# Patient Record
Sex: MALE | Race: WHITE | Employment: OTHER | URBAN - METROPOLITAN AREA
[De-identification: names, ages, dates, MRNs, and addresses within clinical notes are randomized per-mention and may not be internally consistent; named-entity substitution may affect disease eponyms.]

---

## 2017-05-30 ENCOUNTER — APPOINTMENT (OUTPATIENT)
Dept: GENERAL RADIOLOGY | Age: 62
DRG: 246 | End: 2017-05-30
Attending: PHYSICIAN ASSISTANT
Payer: SUBSIDIZED

## 2017-05-30 ENCOUNTER — HOSPITAL ENCOUNTER (INPATIENT)
Age: 62
LOS: 3 days | Discharge: HOME | DRG: 246 | End: 2017-06-02
Attending: EMERGENCY MEDICINE | Admitting: SPECIALIST
Payer: SUBSIDIZED

## 2017-05-30 DIAGNOSIS — I21.4 NSTEMI (NON-ST ELEVATED MYOCARDIAL INFARCTION) (HCC): Primary | ICD-10-CM

## 2017-05-30 PROBLEM — I50.9 ACUTE CONGESTIVE HEART FAILURE (HCC): Status: ACTIVE | Noted: 2017-05-30

## 2017-05-30 LAB
ALBUMIN SERPL BCP-MCNC: 2.9 G/DL (ref 3.5–5)
ALBUMIN/GLOB SERPL: 0.7 {RATIO} (ref 1.1–2.2)
ALP SERPL-CCNC: 85 U/L (ref 45–117)
ALT SERPL-CCNC: 29 U/L (ref 12–78)
ANION GAP BLD CALC-SCNC: 8 MMOL/L (ref 5–15)
AST SERPL W P-5'-P-CCNC: 14 U/L (ref 15–37)
ATRIAL RATE: 72 BPM
ATRIAL RATE: 82 BPM
BASOPHILS # BLD AUTO: 0 K/UL (ref 0–0.1)
BASOPHILS # BLD: 0 % (ref 0–1)
BILIRUB SERPL-MCNC: 0.2 MG/DL (ref 0.2–1)
BNP SERPL-MCNC: 7025 PG/ML (ref 0–125)
BUN SERPL-MCNC: 10 MG/DL (ref 6–20)
BUN/CREAT SERPL: 10 (ref 12–20)
CALCIUM SERPL-MCNC: 8.4 MG/DL (ref 8.5–10.1)
CALCULATED P AXIS, ECG09: 16 DEGREES
CALCULATED P AXIS, ECG09: 55 DEGREES
CALCULATED R AXIS, ECG10: -140 DEGREES
CALCULATED R AXIS, ECG10: 169 DEGREES
CALCULATED T AXIS, ECG11: -125 DEGREES
CALCULATED T AXIS, ECG11: -146 DEGREES
CHLORIDE SERPL-SCNC: 106 MMOL/L (ref 97–108)
CO2 SERPL-SCNC: 28 MMOL/L (ref 21–32)
CREAT SERPL-MCNC: 1.04 MG/DL (ref 0.7–1.3)
DIAGNOSIS, 93000: NORMAL
DIAGNOSIS, 93000: NORMAL
EOSINOPHIL # BLD: 0.2 K/UL (ref 0–0.4)
EOSINOPHIL NFR BLD: 2 % (ref 0–7)
ERYTHROCYTE [DISTWIDTH] IN BLOOD BY AUTOMATED COUNT: 12.6 % (ref 11.5–14.5)
GLOBULIN SER CALC-MCNC: 4 G/DL (ref 2–4)
GLUCOSE SERPL-MCNC: 136 MG/DL (ref 65–100)
HCT VFR BLD AUTO: 43.3 % (ref 36.6–50.3)
HGB BLD-MCNC: 14.8 G/DL (ref 12.1–17)
LYMPHOCYTES # BLD AUTO: 21 % (ref 12–49)
LYMPHOCYTES # BLD: 1.8 K/UL (ref 0.8–3.5)
MCH RBC QN AUTO: 33.2 PG (ref 26–34)
MCHC RBC AUTO-ENTMCNC: 34.2 G/DL (ref 30–36.5)
MCV RBC AUTO: 97.1 FL (ref 80–99)
MONOCYTES # BLD: 0.5 K/UL (ref 0–1)
MONOCYTES NFR BLD AUTO: 6 % (ref 5–13)
NEUTS SEG # BLD: 6.2 K/UL (ref 1.8–8)
NEUTS SEG NFR BLD AUTO: 71 % (ref 32–75)
P-R INTERVAL, ECG05: 162 MS
P-R INTERVAL, ECG05: 166 MS
PLATELET # BLD AUTO: 270 K/UL (ref 150–400)
POTASSIUM SERPL-SCNC: 4.3 MMOL/L (ref 3.5–5.1)
PROT SERPL-MCNC: 6.9 G/DL (ref 6.4–8.2)
Q-T INTERVAL, ECG07: 416 MS
Q-T INTERVAL, ECG07: 418 MS
QRS DURATION, ECG06: 78 MS
QRS DURATION, ECG06: 84 MS
QTC CALCULATION (BEZET), ECG08: 455 MS
QTC CALCULATION (BEZET), ECG08: 488 MS
RBC # BLD AUTO: 4.46 M/UL (ref 4.1–5.7)
SODIUM SERPL-SCNC: 142 MMOL/L (ref 136–145)
TROPONIN I SERPL-MCNC: 1.61 NG/ML
VENTRICULAR RATE, ECG03: 72 BPM
VENTRICULAR RATE, ECG03: 82 BPM
WBC # BLD AUTO: 8.8 K/UL (ref 4.1–11.1)

## 2017-05-30 PROCEDURE — 94640 AIRWAY INHALATION TREATMENT: CPT

## 2017-05-30 PROCEDURE — 99285 EMERGENCY DEPT VISIT HI MDM: CPT

## 2017-05-30 PROCEDURE — 36415 COLL VENOUS BLD VENIPUNCTURE: CPT | Performed by: PHYSICIAN ASSISTANT

## 2017-05-30 PROCEDURE — 85025 COMPLETE CBC W/AUTO DIFF WBC: CPT | Performed by: PHYSICIAN ASSISTANT

## 2017-05-30 PROCEDURE — 77030029684 HC NEB SM VOL KT MONA -A

## 2017-05-30 PROCEDURE — 84484 ASSAY OF TROPONIN QUANT: CPT | Performed by: PHYSICIAN ASSISTANT

## 2017-05-30 PROCEDURE — 80053 COMPREHEN METABOLIC PANEL: CPT | Performed by: PHYSICIAN ASSISTANT

## 2017-05-30 PROCEDURE — 74011000250 HC RX REV CODE- 250: Performed by: SPECIALIST

## 2017-05-30 PROCEDURE — 77030013140 HC MSK NEB VYRM -A

## 2017-05-30 PROCEDURE — 71020 XR CHEST PA LAT: CPT

## 2017-05-30 PROCEDURE — 74011250637 HC RX REV CODE- 250/637: Performed by: PHYSICIAN ASSISTANT

## 2017-05-30 PROCEDURE — 93005 ELECTROCARDIOGRAM TRACING: CPT

## 2017-05-30 PROCEDURE — 96374 THER/PROPH/DIAG INJ IV PUSH: CPT

## 2017-05-30 PROCEDURE — 83880 ASSAY OF NATRIURETIC PEPTIDE: CPT | Performed by: EMERGENCY MEDICINE

## 2017-05-30 PROCEDURE — 65660000000 HC RM CCU STEPDOWN

## 2017-05-30 PROCEDURE — 74011250636 HC RX REV CODE- 250/636: Performed by: SPECIALIST

## 2017-05-30 PROCEDURE — 74011250637 HC RX REV CODE- 250/637: Performed by: SPECIALIST

## 2017-05-30 RX ORDER — GUAIFENESIN 100 MG/5ML
81 LIQUID (ML) ORAL DAILY
Status: DISCONTINUED | OUTPATIENT
Start: 2017-05-31 | End: 2017-06-02 | Stop reason: HOSPADM

## 2017-05-30 RX ORDER — ALBUTEROL SULFATE 1.25 MG/3ML
1.25 SOLUTION RESPIRATORY (INHALATION)
Status: DISCONTINUED | OUTPATIENT
Start: 2017-05-30 | End: 2017-06-02 | Stop reason: HOSPADM

## 2017-05-30 RX ORDER — IBUPROFEN 200 MG
600 TABLET ORAL
COMMUNITY
End: 2017-06-02

## 2017-05-30 RX ORDER — ASPIRIN 325 MG
325 TABLET ORAL
Status: COMPLETED | OUTPATIENT
Start: 2017-05-30 | End: 2017-05-30

## 2017-05-30 RX ORDER — ENOXAPARIN SODIUM 100 MG/ML
40 INJECTION SUBCUTANEOUS EVERY 24 HOURS
Status: DISCONTINUED | OUTPATIENT
Start: 2017-05-30 | End: 2017-06-02 | Stop reason: HOSPADM

## 2017-05-30 RX ORDER — VALSARTAN 80 MG/1
40 TABLET ORAL EVERY 12 HOURS
Status: DISCONTINUED | OUTPATIENT
Start: 2017-05-30 | End: 2017-06-01

## 2017-05-30 RX ORDER — GUAIFENESIN 100 MG/5ML
200 SOLUTION ORAL 4 TIMES DAILY
COMMUNITY
End: 2017-06-14

## 2017-05-30 RX ORDER — BUMETANIDE 0.25 MG/ML
1 INJECTION INTRAMUSCULAR; INTRAVENOUS ONCE
Status: COMPLETED | OUTPATIENT
Start: 2017-05-30 | End: 2017-05-30

## 2017-05-30 RX ORDER — SODIUM CHLORIDE 0.9 % (FLUSH) 0.9 %
5-10 SYRINGE (ML) INJECTION EVERY 8 HOURS
Status: DISCONTINUED | OUTPATIENT
Start: 2017-05-30 | End: 2017-06-02 | Stop reason: HOSPADM

## 2017-05-30 RX ORDER — SODIUM CHLORIDE 0.9 % (FLUSH) 0.9 %
5-10 SYRINGE (ML) INJECTION AS NEEDED
Status: DISCONTINUED | OUTPATIENT
Start: 2017-05-30 | End: 2017-06-02 | Stop reason: HOSPADM

## 2017-05-30 RX ADMIN — Medication 10 ML: at 21:29

## 2017-05-30 RX ADMIN — ALBUTEROL SULFATE 1.25 MG: 1.25 SOLUTION RESPIRATORY (INHALATION) at 18:59

## 2017-05-30 RX ADMIN — ENOXAPARIN SODIUM 40 MG: 40 INJECTION SUBCUTANEOUS at 21:28

## 2017-05-30 RX ADMIN — ASPIRIN 325 MG: 325 TABLET, COATED ORAL at 16:48

## 2017-05-30 RX ADMIN — BUMETANIDE 1 MG: 0.25 INJECTION INTRAMUSCULAR; INTRAVENOUS at 18:58

## 2017-05-30 RX ADMIN — VALSARTAN 40 MG: 80 TABLET ORAL at 21:28

## 2017-05-30 NOTE — ED PROVIDER NOTES
HPI Comments: Jacqueline Jean Baptiste is a 59 y.o. male  who presents by private vehicle to ER with c/o Patient presents with:  Shortness of Breath. Patient reports falling on mothers day and hitting his left chest on a stair. Since patient with cough, congestion and shortness of breath. Patient has taken mucinex with minimal relief. Patient reports a few days of fever. He specifically denies any  chills, nausea, vomiting,  headache, rash, diarrhea, abdominal pain, urinary/bowel changes, sweating or weight loss. PCP: None   PMHx significant for: History reviewed. No pertinent past medical history. PSHx significant for: Past Surgical History:  No date: HX TONSILLECTOMY  No date: HX WISDOM TEETH EXTRACTION  Social Hx: Tobacco use: Smoking status: Current Every Day Smoker                                                   Packs/day: 2.00      Years: 0.00         Smokeless status: Not on file                     ; EtOH use: The patient states he drinks 42 per week.; Illicit Drug use: Allergies:  No Known Allergies    There are no other complaints, changes or physical findings at this time. Patient is a 59 y.o. male presenting with shortness of breath. The history is provided by the patient. Shortness of Breath   This is a new problem. The problem occurs intermittently. The current episode started more than 1 week ago. The problem has not changed since onset. Associated symptoms include cough. Pertinent negatives include no fever, no headaches, no coryza, no rhinorrhea, no ear pain, no neck pain, no sputum production, no hemoptysis, no wheezing, no PND, no orthopnea, no chest pain, no syncope, no vomiting, no abdominal pain, no rash, no leg pain, no leg swelling and no claudication. The problem's precipitants include smoke. He has tried nothing for the symptoms. He has had no prior hospitalizations. He has had no prior ED visits. He has had no prior ICU admissions.  Associated medical issues do not include asthma, COPD, pneumonia, chronic lung disease, PE, CAD, heart failure, past MI, DVT or recent surgery. History reviewed. No pertinent past medical history. Past Surgical History:   Procedure Laterality Date    HX TONSILLECTOMY      HX WISDOM TEETH EXTRACTION           History reviewed. No pertinent family history. Social History     Social History    Marital status: SINGLE     Spouse name: N/A    Number of children: N/A    Years of education: N/A     Occupational History    Not on file. Social History Main Topics    Smoking status: Current Every Day Smoker     Packs/day: 2.00    Smokeless tobacco: Not on file    Alcohol use 25.2 oz/week     42 Cans of beer per week    Drug use: Not on file    Sexual activity: Not on file     Other Topics Concern    Not on file     Social History Narrative    No narrative on file         ALLERGIES: Review of patient's allergies indicates no known allergies. Review of Systems   Constitutional: Negative. Negative for fever. HENT: Negative. Negative for ear pain and rhinorrhea. Eyes: Negative. Respiratory: Positive for cough and shortness of breath. Negative for hemoptysis, sputum production and wheezing. Cardiovascular: Negative. Negative for chest pain, orthopnea, claudication, leg swelling, syncope and PND. Gastrointestinal: Negative. Negative for abdominal pain and vomiting. Endocrine: Negative. Genitourinary: Negative. Musculoskeletal: Negative. Negative for neck pain. Skin: Negative. Negative for rash. Allergic/Immunologic: Negative. Neurological: Negative. Negative for headaches. Hematological: Negative. Psychiatric/Behavioral: Negative. All other systems reviewed and are negative.       Vitals:    05/30/17 1428   BP: 148/81   Pulse: 84   Resp: 24   Temp: 98.2 °F (36.8 °C)   SpO2: 94%   Weight: 104.3 kg (230 lb)   Height: 5' 10\" (1.778 m)            Physical Exam   Constitutional: He is oriented to person, place, and time. He appears well-developed and well-nourished. HENT:   Head: Normocephalic and atraumatic. Right Ear: External ear normal.   Left Ear: External ear normal.   Mouth/Throat: Oropharynx is clear and moist. No oropharyngeal exudate. Eyes: Conjunctivae and EOM are normal. Pupils are equal, round, and reactive to light. Right eye exhibits no discharge. Left eye exhibits no discharge. No scleral icterus. Neck: Normal range of motion. Neck supple. No tracheal deviation present. No thyromegaly present. Cardiovascular: Normal rate, regular rhythm, normal heart sounds and intact distal pulses. No murmur heard. Pulmonary/Chest: Effort normal and breath sounds normal. No respiratory distress. He has no wheezes. He has no rales. Abdominal: Soft. Bowel sounds are normal. He exhibits no distension. There is no tenderness. There is no rebound and no guarding. Musculoskeletal: Normal range of motion. He exhibits no edema or tenderness. Lymphadenopathy:     He has no cervical adenopathy. Neurological: He is alert and oriented to person, place, and time. No cranial nerve deficit. Coordination normal.   Skin: Skin is warm. No rash noted. No erythema. Psychiatric: He has a normal mood and affect. His behavior is normal. Judgment and thought content normal.   Nursing note and vitals reviewed. MDM  Number of Diagnoses or Management Options  NSTEMI (non-ST elevated myocardial infarction) [facility]):   Diagnosis management comments: Assesment/Plan- Patient with elevated troponin, abnormal EKG and chest xray. Discussed with Dr. Alicia Nix for admission.         Amount and/or Complexity of Data Reviewed  Clinical lab tests: ordered and reviewed  Tests in the radiology section of CPT®: ordered and reviewed  Tests in the medicine section of CPT®: ordered and reviewed  Discuss the patient with other providers: yes (Attending-  Dr. Marion Matamoros  )      ED Course       Procedures             CONSULT NOTE:   5:15 JESSI Tomlin PA-C spoke with Dr. Shoaib Coulter,   Specialty: Cardiology  Discussed pt's hx, disposition, and available diagnostic and imaging results. Reviewed care plans. Consultant agrees with plans as outlined. Will see for further evaluation.

## 2017-05-30 NOTE — H&P
Cardiology History and Physical    Admission date & time: 5/30/2017 5:51 PM     Chief complaint:  Samir Ochoa is a 59 y.o. male who complains of progressive BERNABE. Impression:  Subacute ADHF, likely ischemic with LV dysfunction  Recent anterior MI, likely 2 weeks ago  COPD with tobacco abuse  Alcohol dependence      Plan:   Presentation with acute MI Mother's Day now complicated by progressive HF. Will diurese, start ARB (eventually ARNI), continue aspirin. Avoid beta blocker just yet given active wheezing. Echo tomorrow. Based on these findings will decide on cath/MRI, etc      History of present illness:  No PMH except for long term smoker/beer drinker. Acute chest pain 2 weeks ago on Mother's Day, lasting 3 days. Now with progressive BERNABE/orthopnea/PND, much worse today. No recurrent chest pain. No edema. EKG in ED - recent anterior MI with Qs V1-5, evolving T wave inversions. CXR - ADHF  Troponin 1+    No meds    History reviewed. No pertinent past medical history. Past Surgical History:   Procedure Laterality Date    HX TONSILLECTOMY      HX WISDOM TEETH EXTRACTION       History reviewed. No pertinent family history. Primary care physician:  None     Medications before admission:  Previous Medications    No medications on file      No Known Allergies    . Lives with sister. Not       Review of systems:  Review of Systems   Constitutional: Positive for malaise/fatigue. Negative for chills and fever. HENT: Negative for hearing loss. Respiratory: Positive for cough, shortness of breath and wheezing. Negative for hemoptysis and sputum production. Cardiovascular: Positive for orthopnea and PND. Negative for palpitations, claudication and leg swelling. Gastrointestinal: Negative for abdominal pain, blood in stool, heartburn, melena, nausea and vomiting. Genitourinary: Negative for dysuria and urgency. Musculoskeletal: Negative for back pain. Neurological: Negative for dizziness, sensory change, speech change, focal weakness, seizures, loss of consciousness and headaches. Endo/Heme/Allergies: Does not bruise/bleed easily. Psychiatric/Behavioral: Negative for depression and hallucinations. The patient is not nervous/anxious. All other systems reviewed and are negative except as above. Physical Exam:  Visit Vitals    /77    Pulse 86    Temp 98.2 °F (36.8 °C)    Resp 22    Ht 5' 10\" (1.778 m)    Wt 230 lb (104.3 kg)    SpO2 (!) 89%    BMI 33 kg/m2        Physical Examination: General appearance - alert, well appearing, and in no distress and overweight  Neck - supple, no significant adenopathy, carotids upstroke normal bilaterally, no bruits  Chest - wheezing noted posteriorly.  Coarse crackles right base  Heart - normal rate, regular rhythm, normal S1, S2, no murmurs, rubs, clicks or gallops  Abdomen - soft, nontender, nondistended, no masses or organomegaly  Neurological - alert, oriented, normal speech, no focal findings or movement disorder noted  Extremities - peripheral pulses normal, no pedal edema, no clubbing or cyanosis    Laboratory and Imaging have been reviewed and are notable for:    EKG: see above    X Ray: HF, CM    Recent Results (from the past 12 hour(s))   EKG, 12 LEAD, INITIAL    Collection Time: 05/30/17  3:01 PM   Result Value Ref Range    Ventricular Rate 72 BPM    Atrial Rate 72 BPM    P-R Interval 166 ms    QRS Duration 78 ms    Q-T Interval 416 ms    QTC Calculation (Bezet) 455 ms    Calculated P Axis 16 degrees    Calculated R Axis 169 degrees    Calculated T Axis -125 degrees    Diagnosis       Normal sinus rhythm  Low voltage QRS  Possible Anterolateral infarct , age undetermined  T wave abnormality, consider inferior ischemia  Abnormal ECG  No previous ECGs available     CBC WITH AUTOMATED DIFF    Collection Time: 05/30/17  3:37 PM   Result Value Ref Range    WBC 8.8 4.1 - 11.1 K/uL    RBC 4.46 4.10 - 5.70 M/uL    HGB 14.8 12.1 - 17.0 g/dL    HCT 43.3 36.6 - 50.3 %    MCV 97.1 80.0 - 99.0 FL    MCH 33.2 26.0 - 34.0 PG    MCHC 34.2 30.0 - 36.5 g/dL    RDW 12.6 11.5 - 14.5 %    PLATELET 538 753 - 450 K/uL    NEUTROPHILS 71 32 - 75 %    LYMPHOCYTES 21 12 - 49 %    MONOCYTES 6 5 - 13 %    EOSINOPHILS 2 0 - 7 %    BASOPHILS 0 0 - 1 %    ABS. NEUTROPHILS 6.2 1.8 - 8.0 K/UL    ABS. LYMPHOCYTES 1.8 0.8 - 3.5 K/UL    ABS. MONOCYTES 0.5 0.0 - 1.0 K/UL    ABS. EOSINOPHILS 0.2 0.0 - 0.4 K/UL    ABS. BASOPHILS 0.0 0.0 - 0.1 K/UL   METABOLIC PANEL, COMPREHENSIVE    Collection Time: 05/30/17  3:37 PM   Result Value Ref Range    Sodium 142 136 - 145 mmol/L    Potassium 4.3 3.5 - 5.1 mmol/L    Chloride 106 97 - 108 mmol/L    CO2 28 21 - 32 mmol/L    Anion gap 8 5 - 15 mmol/L    Glucose 136 (H) 65 - 100 mg/dL    BUN 10 6 - 20 MG/DL    Creatinine 1.04 0.70 - 1.30 MG/DL    BUN/Creatinine ratio 10 (L) 12 - 20      GFR est AA >60 >60 ml/min/1.73m2    GFR est non-AA >60 >60 ml/min/1.73m2    Calcium 8.4 (L) 8.5 - 10.1 MG/DL    Bilirubin, total 0.2 0.2 - 1.0 MG/DL    ALT (SGPT) 29 12 - 78 U/L    AST (SGOT) 14 (L) 15 - 37 U/L    Alk.  phosphatase 85 45 - 117 U/L    Protein, total 6.9 6.4 - 8.2 g/dL    Albumin 2.9 (L) 3.5 - 5.0 g/dL    Globulin 4.0 2.0 - 4.0 g/dL    A-G Ratio 0.7 (L) 1.1 - 2.2     TROPONIN I    Collection Time: 05/30/17  3:37 PM   Result Value Ref Range    Troponin-I, Qt. 1.61 (H) <0.05 ng/mL   EKG, 12 LEAD, INITIAL    Collection Time: 05/30/17  4:41 PM   Result Value Ref Range    Ventricular Rate 82 BPM    Atrial Rate 82 BPM    P-R Interval 162 ms    QRS Duration 84 ms    Q-T Interval 418 ms    QTC Calculation (Bezet) 488 ms    Calculated P Axis 55 degrees    Calculated R Axis -140 degrees    Calculated T Axis -146 degrees    Diagnosis       Normal sinus rhythm  Possible Left atrial enlargement  Low voltage QRS  Possible Anterolateral infarct , age undetermined  T wave abnormality, consider inferior ischemia  Abnormal ECG  When compared with ECG of 30-MAY-2017 15:01,  MANUAL COMPARISON REQUIRED, DATA IS UNCONFIRMED

## 2017-05-30 NOTE — IP AVS SNAPSHOT
[facility] 
 
 
 [address] [place] [address] 
[phone] Patient: Toya Youssef MRN: [id number] GPX:[id number] You are allergic to the following Allergen Reactions Codeine Itching Recent Documentation Height Weight BMI Smoking Status 1.778 m 102.9 kg 32.54 kg/m2 Current Every Day Smoker Emergency Contacts Name Discharge Info Relation Home Work Mobile Serene Holder DISCHARGE CAREGIVER [3] Sister [23] [phone] About your hospitalization You were admitted on:  May 30, 2017 You last received care in the:  [facility] 3 PROG CARE TELE 2 You were discharged on:  June 2, 2017 Unit phone number:  [phone] Why you were hospitalized Your primary diagnosis was:  Acute On Chronic Systolic Heart Failure (Hcc) Your diagnoses also included:  Acute Congestive Heart Failure (Hcc), Coronary Artery Disease Involving Native Coronary Artery Without Angina Pectoris, Etoh Abuse, Mi (Myocardial Infarction) (Hcc), 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 
[phone] Cecelia Rahman MD On 6/7/2017 11:20 am  [address] [place] [phone] [address] [place] [address] 
[phone]  None   None ([phone]) Patient stated that they have no PCP 
  
 [facility]  [facility] WILL SUPPLY YOUR OXYGEN [address] [facility] [address] 
[phone] Your Appointments Wednesday June 07, 2017 11:20 AM EDT Office Visit with Alejandra Moore MD  
[facility] [address] [facility] 600 1007 [facility]  
[phone] Thursday June 08, 2017  1:00 PM EDT TRANSITIONAL CARE MANAGEMENT with Nieves Min MD  
[facility]  
 [address] [place] [address] 3 1007 [facility]  
[phone] Current Discharge Medication List  
  
START taking these medications Dose & Instructions Dispensing Information Comments Morning Noon Evening Bedtime  
 aspirin 81 mg chewable tablet Your last dose was: Your next dose is:    
   
   
 Dose:  81 mg Take 1 Tab by mouth daily. Refills:  0  
     
   
   
   
  
 atorvastatin 20 mg tablet Commonly known as:  LIPITOR Your last dose was: Your next dose is:    
   
   
 Dose:  20 mg Take 1 Tab by mouth daily. Dr. Alejandra Moore to provide refills Quantity:  30 Tab Refills:  0  
     
   
   
   
  
 bumetanide 0.5 mg tablet Commonly known as:  Mariajose Sam Your last dose was: Your next dose is:    
   
   
 Dose:  0.5 mg Take 1 Tab by mouth daily. Dr. Alejandra Moore to provide refills Quantity:  30 Tab Refills:  0  
     
   
   
   
  
 clopidogrel 75 mg Tab Commonly known as:  PLAVIX Start taking on:  6/3/2017 Your last dose was: Your next dose is:    
   
   
 Dose:  75 mg Take 1 Tab by mouth daily. Dr. Alejandra Moore to provide refills  Indications: Thrombosis Prevention after PCI Quantity:  30 Tab Refills:  0  
     
   
   
   
  
 fluticasone-vilanterol 100-25 mcg/dose inhaler Commonly known as:  BREO ELLIPTA Your last dose was: Your next dose is:    
   
   
 Dose:  1 Puff Take 1 Puff by inhalation daily. Dr Jesus Quinones to provide refills Quantity:  1 Inhaler Refills:  0  
     
   
   
   
  
 metFORMIN 500 mg tablet Commonly known as:  GLUCOPHAGE Your last dose was: Your next dose is:    
   
   
 Dose:  500 mg Take 1 Tab by mouth two (2) times daily (with meals). Quantity:  60 Tab Refills:  3  
     
   
   
   
  
 metoprolol succinate 25 mg XL tablet Commonly known as:  TOPROL-XL Your last dose was: Your next dose is:    
   
   
 Dose:  25 mg Take 1 Tab by mouth daily. Dr. Paloma Duran to provide refills Quantity:  30 Tab Refills:  0  
     
   
   
   
  
 nicotine 21 mg/24 hr  
Commonly known as:  Carlee Begin Your last dose was: Your next dose is:    
   
   
 Dose:  1 Patch 1 Patch by TransDERmal route daily for 30 days. Dr Valeriano skinner to provide refills Quantity:  1 Patch Refills:  0  
     
   
   
   
  
 * predniSONE 20 mg tablet Commonly known as:  Patel Shiley Your last dose was: Your next dose is:    
   
   
 Dose:  40 mg Take 2 Tabs by mouth daily (with breakfast). Quantity:  6 Tab Refills:  0  
     
   
   
   
  
 * predniSONE 20 mg tablet Commonly known as:  Patel Shiley Start taking on:  6/5/2017 Your last dose was: Your next dose is:    
   
   
 Dose:  20 mg Take 1 Tab by mouth daily (with breakfast). Quantity:  3 Tab Refills:  0  
     
   
   
   
  
 * predniSONE 10 mg tablet Commonly known as:  Patel Shiley Start taking on:  6/8/2017 Your last dose was: Your next dose is:    
   
   
 Dose:  10 mg Take 1 Tab by mouth daily (with breakfast). Quantity:  3 Tab Refills:  0  
     
   
   
   
  
 * Notice: This list has 3 medication(s) that are the same as other medications prescribed for you. Read the directions carefully, and ask your doctor or other care provider to review them with you. CONTINUE these medications which have NOT CHANGED Dose & Instructions Dispensing Information Comments Morning Noon Evening Bedtime  
 guaiFENesin 100 mg/5 mL liquid Commonly known as:  ROBITUSSIN Your last dose was: Your next dose is:    
   
   
 Dose:  200 mg Take 200 mg by mouth four (4) times daily. Refills:  0 STOP taking these medications   
 ibuprofen 200 mg tablet Commonly known as:  MOTRIN Where to Get Your Medications Information on where to get these meds will be given to you by the nurse or doctor. ! Ask your nurse or doctor about these medications  
  atorvastatin 20 mg tablet  
 bumetanide 0.5 mg tablet  
 clopidogrel 75 mg Tab  
 fluticasone-vilanterol 100-25 mcg/dose inhaler  
 metFORMIN 500 mg tablet  
 metoprolol succinate 25 mg XL tablet  
 nicotine 21 mg/24 hr  
 predniSONE 10 mg tablet  
 predniSONE 20 mg tablet  
 predniSONE 20 mg tablet Discharge Instructions [facility] Office: [address] [place] [address] 
    [phone] [facility] office: [address] [place] [address],[address] [place]  
                           [phone] Patient Discharge Instructions Vibha Asif / [id number] : 1955 Admitted 2017 Discharged: 2017 Cardiologist:   Dr. Sheila Riley  PCP:   Family practice    Pulm: Dr Darrion Masters Diagnosis:  
Patient Active Problem List  
Diagnosis Code  Acute congestive heart failure (HCC) I50.9  Coronary artery disease involving native coronary artery without angina pectoris I25.10  Acute on chronic systolic heart failure (HCC) I50.23  
 ETOH abuse F10.10  MI (myocardial infarction) ([facility] [address] I21.3  Controlled type 2 diabetes mellitus with circulatory disorder (HCC) E11.59  
 Nicotine dependence F17.200 Procedures/Test: ECHO: weak heart                                 CATH: permanent blockage Left anterior descending. Stent to the right coronary artery · It is important that you take the medication exactly as they are prescribed. · Keep your medication in the bottles provided by the pharmacist and keep a list of the medication names, dosages, and times to be taken in your wallet. · Do not take other medications without consulting your doctor. BRING ALL of YOUR MEDICINES TO YOUR OFFICE VISIT with Dr. Rigoberto Jenkins Discharge Instructions It is normal to feel tired the first couple days. Take it easy and follow the physicians instructions. CHECK THE CATHETER INSERTION SITE DAILY: 
 
Remove the wrist dressing 24 hours after the procedure. You may shower 24 hours after the procedure. Wash with soap and water and pat dry. Gentle cleaning of the site with soap and water is sufficient, cover with a dry clean dressing or bandage. Do not apply creams or powders to the area. No soaking the wrist for 3 days. Leave the puncture site open to air after 24 hours post-procedure. CALL THE PHYSICIANS:  
 
If the site becomes red, swollen or feels warm to the touch If there is bleeding or drainage or if there is unusual pain at the radial site. If there is any minor oozing, you may apply a band-aid and remove after 12 hours. If the bleeding continues, hold pressure with the middle finger against the puncture site and the thumb against the back of the wrist,call 911 to be transported to the hospital. 
DO NOT DRIVE YOURSELF, [facility] [place] [facility]. ACTIVITY:  
For the first 24 hours do not manipulate the wrist. 
No lifting, pushing or pulling over 3-5 pounds with the affected wrist for 7 daysand no straining the insertion site. Do not lift grocery bags or the garbage can, do not run the vacuum  or  for 7 days.  
Start with short walks as in the hospital and gradually increase as tolerated each day. It is recommended to walk 30 minutes 5-7 days per week. Follow your physicians instructions on activity. Avoid walking outside in extremes of heat or cold. Walk inside when it is cold and windy or hot and humid. Things to keep in mind: 
No driving for at least 24 hours, or as designated by your physician. Limit the number of times you go up and down the stairs Take rests and pace yourself with activity. Be careful and do not strain with bowel movements. MEDICATIONS: 
 
Take all medications as prescribed Call your physician if you have any questions Keep an updated list of your medications with you at all times and give a list to your physician and pharmacist 
 
SIGNS AND SYMPTOMS:  
Be cautious of symptoms of angina or recurrent symptoms such as chest discomfort, unusual shortness of breath or fatigue. These could be symptoms of restenosis, a new blockage or a heart attack. If your symptoms are relieved with rest it is still recommended that you notify your physician of recurrent chest pain or discomfort. For CHEST PAIN or symptoms of angina not relieved with rest:  If the discomfort is not relieved with rest, and you have been prescribed Nitroglycerin, take as directed (taken under the tongue, one at a time 5 minutes apart for a total of 3 doses). If the discomfort is not relieved after the 3rd nitroglycerin, call 911. If you have not been prescribed Nitroglycerin  and your chest discomfort is not relieved with rest, call 911. AFTER CARE:  
Follow up with your physician as instructed. Follow a heart healthy diet with proper portion control, daily stress management, daily exercise, blood pressure and cholesterol control , and smoking cessation. See CHF packet Daily weight and call MD for 3 pound weight gain in 3 days. Diet: American Heart association, low fat, 1500 mg sodium  Diabetic. Call for or return to ER for recurrent or prolonged chest pain, shortness of breath, lightheadedness, dizzy, palpitations, passing out, swelling in the legs or abdomen, pain or swelling  At the cath site. Lifestyle changes IF you smoke, Stop smoking go to : PrimeLetters.ca. aspx for text reminders Eat a heart-healthy diet that is low in cholesterol, saturated fat, and salt, and is full of fruits, vegetables and whole-grains. Eat at least two servings of fish each week. You may get more details about how to eat healthy, but these tips can help you get started. Www.aha.com When should you call for help? Call 911 anytime you think you may need emergency care. For example, call if: 
You have signs of a heart attack. These may include: 
Chest pain or pressure. Sweating. Shortness of breath. Nausea or vomiting. Pain that spreads from the chest to the neck, jaw, or one or both shoulders or arms. Dizziness or lightheadedness. A fast or irregular pulse. After calling 911, chew 1 adult-strength aspirin. Wait for an ambulance. Do not try to drive yourself. You passed out (lost consciousness). You feel like you are having another heart attack. Call your doctor now or seek immediate medical care if: 
You have had any chest pain, even if it has gone away. You have new or increased shortness of breath. You are dizzy or lightheaded, or you feel like you may faint. Watch closely for changes in your health, and be sure to contact your doctor if you have any problem Information obtained by : 
I understand that if any problems occur once I am at home I am to contact my physician. I understand and acknowledge receipt of the instructions indicated above.   
 
                                                                                                                                     
R.N.'s Signature Date/Time Patient or Representative Signature                                                          Date/Time Heart Failure: Care Instructions Your Care Instructions Heart failure occurs when your heart does not pump as much blood as the body needs. Failure does not mean that the heart has stopped pumping but rather that it is not pumping as well as it should. Over time, this causes fluid buildup in your lungs and other parts of your body. Fluid buildup can cause shortness of breath, fatigue, swollen ankles, and other problems. By taking medicines regularly, reducing sodium (salt) in your diet, checking your weight every day, and making lifestyle changes, you can feel better and live longer. Follow-up care is a key part of your treatment and safety. Be sure to make and go to all appointments, and call your doctor if you are having problems. It's also a good idea to know your test results and keep a list of the medicines you take. How can you care for yourself at home? Medicines · Be safe with medicines. Take your medicines exactly as prescribed. Call your doctor if you think you are having a problem with your medicine. · Do not take any vitamins, over-the-counter medicine, or herbal products without talking to your doctor first. Ross Herd not take ibuprofen (Advil or Motrin) and naproxen (Aleve) without talking to your doctor first. They could make your heart failure worse. · You may be taking some of the following medicine. ¨ Beta-blockers can slow heart rate, decrease blood pressure, and improve your condition. Taking a beta-blocker may lower your chance of needing to be hospitalized. ¨ Angiotensin-converting enzyme inhibitors (ACEIs) reduce the heart's workload, lower blood pressure, and reduce swelling.  Taking an ACEI may lower your chance of needing to be hospitalized again. ¨ Angiotensin II receptor blockers (ARBs) work like ACEIs. Your doctor may prescribe them instead of ACEIs. ¨ Diuretics, also called water pills, reduce swelling. ¨ Potassium supplements replace this important mineral, which is sometimes lost with diuretics. ¨ Aspirin and other blood thinners prevent blood clots, which can cause a stroke or heart attack. You will get more details on the specific medicines your doctor prescribes. Diet · Your doctor may suggest that you limit sodium to 1,500 milligrams (mg) a day. That is less than 1 teaspoon of salt a day, including all the salt you eat in cooking or in packaged foods. People get most of their sodium from processed foods. Fast food and restaurant meals also tend to be very high in sodium. · Ask your doctor how much liquid you can drink each day. You may have to limit liquids. Weight · Weigh yourself without clothing at the same time each day. Record your weight. Call your doctor if you gain more than 3 pounds in 24 hours or 5 pounds in one week. A sudden weight gain may mean that your heart failure is getting worse. Activity level · Start light exercise (if your doctor says it is okay). Even if you can only do a small amount, exercise will help you get stronger, have more energy, and manage your weight and your stress. Walking is an easy way to get exercise. Start out by walking a little more than you did before. Bit by bit, increase the amount you walk. · When you exercise, watch for signs that your heart is working too hard. You are pushing yourself too hard if you cannot talk while you are exercising. If you become short of breath or dizzy or have chest pain, stop, sit down, and rest. 
· If you feel \"wiped out\" the day after you exercise, walk slower or for a shorter distance until you can work up to a better pace. · Get enough rest at night.  Sleeping with 1 or 2 pillows under your upper body and head may help you breathe easier. Lifestyle changes · Do not smoke. Smoking can make a heart condition worse. If you need help quitting, talk to your doctor about stop-smoking programs and medicines. These can increase your chances of quitting for good. Quitting smoking may be the most important step you can take to protect your heart. · Limit alcohol to 2 drinks a day for men and 1 drink a day for women. Too much alcohol can cause health problems. · Avoid getting sick from colds and the flu. Get a pneumococcal vaccine shot. If you have had one before, ask your doctor whether you need another dose. Get a flu shot each year. If you must be around people with colds or the flu, wash your hands often. When should you call for help? Call 911 if you have symptoms of sudden heart failure such as: 
· You have severe trouble breathing. · You cough up pink, foamy mucus. · You have a new irregular or rapid heartbeat. Call your doctor now or seek immediate medical care if: 
· You have new or increased shortness of breath. · You are dizzy or lightheaded, or you feel like you may faint. · You have sudden weight gain, such as 3 pounds in 24 hours, or 5 pounds in one week. · You have increased swelling in your legs, ankles, or feet. · You are suddenly so tired or weak that you cannot do your usual activities. Watch closely for changes in your health, and be sure to contact your doctor if: 
· You develop new symptoms. Where can you learn more? Go to Fantrotter.be Enter N553 in the search box to learn more about \"Heart Failure: Care Instructions. \"  
© 7626-2007 Healthwise, Incorporated. Care instructions adapted under license by Mallorie Negrete (which disclaims liability or warranty for this information).  This care instruction is for use with your licensed healthcare professional. If you have questions about a medical condition or this instruction, always ask your healthcare professional. [place] [address] any warranty or liability for your use of this information. Content Version: 77.5.408137; Current as of: February 20, 2015 (modified 9/26/16). Discharge Instructions Attachments/References CLOPIDOGREL (BY MOUTH) (ENGLISH) METOPROLOL (BY MOUTH) (ENGLISH) BUMETANIDE (BY MOUTH) (ENGLISH) ASPIRIN: HEART ATTACK AND STROKE PREVENTION (ENGLISH) METFORMIN (BY MOUTH) (ENGLISH) PREDNISONE (BY MOUTH) (ENGLISH) Discharge Orders None [facility] Announcement We are excited to announce that we are making your provider's discharge notes available to you in GlobeRanger. You will see these notes when they are completed and signed by the physician that discharged you from your recent hospital stay. If you have any questions or concerns about any information you see in GlobeRanger, please call the Health Information Department where you were seen or reach out to your Primary Care Provider for more information about your plan of care. Introducing [facility]! Kenya Cleverly introduces GlobeRanger patient portal. Now you can access parts of your medical record, email your doctor's office, and request medication refills online. 1. In your internet browser, go to [email or web address] 2. Click on the First Time User? Click Here link in the Sign In box. You will see the New Member Sign Up page. 3. Enter your GlobeRanger Access Code exactly as it appears below. You will not need to use this code after youve completed the sign-up process. If you do not sign up before the expiration date, you must request a new code. · GlobeRanger Access Code: [email or web address] Expires: 8/29/2017 11:22 AM 
 
4. Enter the last four digits of your Social Security Number (xxxx) and Date of Birth (mm/dd/yyyy) as indicated and click Submit. You will be taken to the next sign-up page. 5. Create a CPA Exchange ID. This will be your CPA Exchange login ID and cannot be changed, so think of one that is secure and easy to remember. 6. Create a CPA Exchange password. You can change your password at any time. 7. Enter your Password Reset Question and Answer. This can be used at a later time if you forget your password. 8. Enter your e-mail address. You will receive e-mail notification when new information is available in [address] 9. Click Sign Up. You can now view and download portions of your medical record. 10. Click the Download Summary menu link to download a portable copy of your medical information. If you have questions, please visit the Frequently Asked Questions section of the CPA Exchange website. Remember, CPA Exchange is NOT to be used for urgent needs. For medical emergencies, dial 911. Now available from your iPhone and Android! General Information Please provide this summary of care documentation to your next provider. Patient Signature:  ____________________________________________________________ Date:  ____________________________________________________________  
  
Terrie Vargas Provider Signature:  ____________________________________________________________ Date:  ____________________________________________________________ More Information Clopidogrel (By mouth) Clopidogrel (zoli-HEU-gj-grel) Helps prevent stroke, heart attack, and other heart problems. This medicine is a platelet inhibitor. Brand Name(s): Plavix There may be other brand names for this medicine. When This Medicine Should Not Be Used: This medicine is not right for everyone. Do not use it if you had an allergic reaction to clopidogrel. How to Use This Medicine:  
Tablet · Your doctor will tell you how much medicine to use. Do not use more than directed. · This medicine should come with a Medication Guide.  Ask your pharmacist for a copy if you do not have one. · Missed dose: Take a dose as soon as you remember. If it is almost time for your next dose, wait until then and take a regular dose. Do not take extra medicine to make up for a missed dose. · Store the medicine in a closed container at room temperature, away from heat, moisture, and direct light. Drugs and Foods to Avoid: Ask your doctor or pharmacist before using any other medicine, including over-the-counter medicines, vitamins, and herbal products. · Some medicines can affect how clopidogrel works. Tell your doctor if you are using any of the following: ¨ Esomeprazole, omeprazole, repaglinide, or warfarin ¨ Medicine to treat depression ¨ NSAID pain or arthritis medicine (including celecoxib, diclofenac, ibuprofen, or naproxen) Warnings While Using This Medicine: · Tell your doctor if you are pregnant or breastfeeding, or if you have bleeding problems, stomach ulcer or bleeding, a recent stroke, or have a history of bleeding problems. · This medicine can cause you to bleed and bruise more easily. Take precautions to avoid injury. Brush and floss your teeth gently, do not play rough sports, and be careful with sharp objects. Severe bleeding can be life-threatening. · This medicine may cause a rare but serious blood clotting condition called thrombotic thrombocytopenic purpura. · Make sure any doctor or dentist who treats you knows that you are using this medicine. Tell your doctor if you plan to have surgery or a dental procedure. · Do not stop using this medicine suddenly. Your doctor will need to slowly decrease your dose before you stop it completely. · Your doctor will check your progress and the effects of this medicine at regular visits. Keep all appointments. · Keep all medicine out of the reach of children. Never share your medicine with anyone. Possible Side Effects While Using This Medicine: Call your doctor right away if you notice any of these side effects: · Allergic reaction: Itching or hives, swelling in your face or hands, swelling or tingling in your mouth or throat, chest tightness, trouble breathing · Bloody or black, tarry stools · Nosebleeds · Pinpoint red or purple spots on your skin or in your mouth · Problems with vision, speech, or walking · Red or dark brown urine · Seizures · Severe stomach pain · Trouble breathing, tiredness, fast heartbeat, yellow skin or eyes · Unusual bleeding, bruising, or weakness · Vomiting of blood or vomit that looks like coffee grounds If you notice other side effects that you think are caused by this medicine, tell your doctor. Call your doctor for medical advice about side effects. You may report side effects to FDA at 9-336-FDA-1397 © 2017 [address]  Information is for End User's use only and may not be sold, redistributed or otherwise used for commercial purposes. The above information is an  only. It is not intended as medical advice for individual conditions or treatments. Talk to your doctor, nurse or pharmacist before following any medical regimen to see if it is safe and effective for you. Metoprolol (By mouth) Metoprolol (met-oh-PROE-lol) Treats high blood pressure, angina (chest pain), and heart failure. May lower the risk of death after a heart attack. This medicine is a beta-blocker. Brand Name(s): Lopressor, Toprol XL There may be other brand names for this medicine. When This Medicine Should Not Be Used: This medicine is not right for everyone. Do not use if you had an allergic reaction to metoprolol or similar medicines. Do not use this medicine if you have certain blood circulation or heart problems. Ask your doctor about these problems. How to Use This Medicine:  
Tablet, Long Acting Tablet · Take your medicine as directed.  Your dose may need to be changed several times to find what works best for you. · Take this medicine with a meal or right after a meal. Take this medicine the same way every day, at the same time. · Swallow the tablet whole with a glass of water. You may break the extended-release tablet in half, but do not chew or crush it. · Missed dose: Take a dose as soon as you remember. If it is almost time for your next dose, wait until then and take a regular dose. Do not take extra medicine to make up for a missed dose. · Store the medicine in a closed container at room temperature, away from heat, moisture, and direct light. Drugs and Foods to Avoid: Ask your doctor or pharmacist before using any other medicine, including over-the-counter medicines, vitamins, and herbal products. · Some medicines can affect how metoprolol works. Tell your doctor if you are taking any of the following: ¨ Digoxin, dipyridamole, hydralazine, hydroxychloroquine, methyldopa, quinidine ¨ Medicine to treat depression (such as bupropion, clomipramine, desipramine, fluoxetine, fluvoxamine, paroxetine, sertraline), medicine to treat mental illness (such as chlorpromazine, fluphenazine, haloperidol, thioridazine), medicine for heart rhythm problems (such as propafenone), HIV/AIDS medicine (such as ritonavir), medicine to treat a fungus infection (such as terbinafine), a monoamine oxidase inhibitor (MAOI), an ergot medicine for headaches, a calcium channel blocker (such as amlodipine, diltiazem, verapamil), or an alpha blocker (such as clonidine, prazosin, reserpine, guanethidine) Warnings While Using This Medicine: · Tell your doctor if you are pregnant or breastfeeding, or if you have blood vessel, heart, or circulation problems (such as heart failure, rhythm problems, or slow heartbeat). Tell your doctor if you have kidney disease, liver disease, diabetes, lung disease (such as asthma), an overactive thyroid, or a history of allergies. · This medicine may cause worse symptoms of heart failure while the dose is being adjusted. · Do not stop using this medicine suddenly. Your doctor will need to slowly decrease your dose before you stop it completely. · Tell any doctor or dentist who treats you that you are using this medicine. You may need to stop using this medicine several days before you have surgery or medical tests. · This medicine could lower your blood pressure too much, especially when you first use it or if you are dehydrated. Stand or sit up slowly if you feel lightheaded or dizzy. · This medicine may make you dizzy or drowsy. Do not drive or do anything else that could be dangerous until you know how this medicine affects you. · Your doctor will check your progress and the effects of this medicine at regular visits. Keep all appointments. · Keep all medicine out of the reach of children. Never share your medicine with anyone. Possible Side Effects While Using This Medicine:  
Call your doctor right away if you notice any of these side effects: · Allergic reaction: Itching or hives, swelling in your face or hands, swelling or tingling in your mouth or throat, chest tightness, trouble breathing · Lightheadedness, dizziness, or fainting · Slow heartbeat · Swelling in your hands, ankles, or feet, trouble breathing, tiredness · Worsening chest pain If you notice these less serious side effects, talk with your doctor: · Diarrhea · Mild dizziness or tiredness If you notice other side effects that you think are caused by this medicine, tell your doctor. Call your doctor for medical advice about side effects. You may report side effects to FDA at 7-173-NOX-1366 © 2017 [facility] Information is for End User's use only and may not be sold, redistributed or otherwise used for commercial purposes. The above information is an  only.  It is not intended as medical advice for individual conditions or treatments. Talk to your doctor, nurse or pharmacist before following any medical regimen to see if it is safe and effective for you. Bumetanide (By mouth) Bumetanide (bue-MET-a-nide) Treats fluid retention (edema) and high blood pressure. This medicine is a diuretic (water pill). Brand Name(s):  
There may be other brand names for this medicine. When This Medicine Should Not Be Used: This medicine is not right for everyone. Do not use it if you had an allergic reaction to bumetanide or to sulfa drugs, you have liver or kidney problems, or you are dehydrated. How to Use This Medicine:  
Tablet · Your doctor will tell you how much medicine to use. Do not use more than directed. · If this medicine upsets your stomach, you may take it with food. · Missed dose: Take a dose as soon as you remember. If it is almost time for your next dose, wait until then and take a regular dose. Do not take extra medicine to make up for a missed dose. · Store the medicine in a closed container at room temperature, away from heat, moisture, and direct light. Drugs and Foods to Avoid: Ask your doctor or pharmacist before using any other medicine, including over-the-counter medicines, vitamins, and herbal products. · Some medicines and foods can affect how bumetanide works. Tell your doctor if you are taking any of the following: ¨ A blood thinner, such as warfarin ¨ Bepridil ¨ Digoxin ¨ Lithium ¨ Other diuretics, such as furosemide or hydrochlorothiazide · Do not drink alcohol while you are using this medicine. Warnings While Using This Medicine: · Tell your doctor if you are pregnant or breastfeeding, or if you have kidney disease, liver disease, diabetes, gout, or pancreatitis. · This medicine may make you dizzy. Do not drive or do anything that could be dangerous until you know how this medicine affects you. · Tell any doctor or dentist who treats you that you are taking this medicine. · Keep all medicine out of the reach of children. Never share your medicine with anyone. Possible Side Effects While Using This Medicine:  
Call your doctor right away if you notice any of these side effects: · Allergic reaction: Itching or hives, swelling in your face or hands, swelling or tingling in your mouth or throat, chest tightness, trouble breathing · Blistering, peeling, red skin rash · Chest pain · Dry mouth, increased thirst, muscle cramps, problems urinating, nausea or vomiting · Fever, chills, cough, pain in your side or lower back · Hearing loss, or ringing in the ears · Lightheadedness or fainting · Severe diarrhea or stomach pain · Unusual bleeding or bruising If you notice these less serious side effects, talk with your doctor: · Blurred vision · Loss of appetite · Problems having sex · Stomach cramps If you notice other side effects that you think are caused by this medicine, tell your doctor. Call your doctor for medical advice about side effects. You may report side effects to FDA at 6-088-FDA-6472 © 2017 [address] Information is for End User's use only and may not be sold, redistributed or otherwise used for commercial purposes. The above information is an  only. It is not intended as medical advice for individual conditions or treatments. Talk to your doctor, nurse or pharmacist before following any medical regimen to see if it is safe and effective for you. Taking Aspirin to Prevent Heart Attack and Stroke: Care Instructions Your Care Instructions Aspirin acts as a \"blood thinner. \" It prevents blood clots from forming. When taken during and after a heart attack, it can reduce your chance of dying. And it's used if you have a stent in your coronary artery. Also, aspirin helps certain people lower their risk of a heart attack or stroke. Be sure you know what dose of aspirin to take and how often to take it. Low-dose aspirin is typically 81 mg. But the dose for daily aspirin can range from 81 mg to 325 mg. Taking aspirin every day can cause bleeding. It may not be safe if you have stomach ulcers. And it may not be safe if you have high blood pressure that is not controlled. If you take aspirin pills every day, do not take ones that have other ingredients such as caffeine or sodium. Before you start to take aspirin, tell your doctor all the medicines, vitamins, herbal products, and supplements you take. Follow-up care is a key part of your treatment and safety. Be sure to make and go to all appointments, and call your doctor if you are having problems. It's also a good idea to know your test results and keep a list of the medicines you take. How can you care for yourself at home? · Take aspirin with a full glass of water unless your doctor tells you not to. Do not lie down right after you take it. · If you have a stent in your coronary artery, take your aspirin as your heart doctor says to. If another doctor says to stop taking the aspirin for any reason, talk to your heart doctor before you stop. · Do not chew or crush the coated or sustained-release forms of aspirin. · Ask your doctor if you can drink alcohol while you take aspirin. And ask how much you can drink. Too much alcohol with aspirin can cause stomach bleeding. · Do not take aspirin if you are pregnant, unless your doctor says it is okay. · Keep all aspirin out of children's reach. · Throw aspirin away if it starts to smell like vinegar. · Do not take aspirin if you have gout or if you take prescription blood thinners, unless your doctor has told you to. · Do not take prescription or over-the-counter medicines, vitamins, herbal products, or supplements without talking to your doctor first. [employer] the label before you take another over-the-counter medicine.  Many contain aspirin. So they could cause you to take too much aspirin. · Talk with your doctor before you take a pain medicine. Ask which type of medicine you can take and how to take it safely with aspirin. · Tell your doctor or dentist before a surgery or procedure that you take aspirin. He or she will tell you if you should stop taking aspirin before your surgery or procedure. Make sure that you understand exactly what your doctor wants you to do. Where can you learn more? Go to http://olivier-yon.info/. Enter D188 in the search box to learn more about \"Taking Aspirin to Prevent Heart Attack and Stroke: Care Instructions. \" Current as of: January 27, 2016 Content Version: 11.2 © 4817-1765 [employer]. Care instructions adapted under license by [employer] (which disclaims liability or warranty for this information). If you have questions about a medical condition or this instruction, always ask your healthcare professional. [place] [address] any warranty or liability for your use of this information. Metformin (By mouth) Metformin Hydrochloride (met-FOR-min marixa-droe-KLOR-karlene) Treats type 2 diabetes. Brand Name(s): Fortamet, Glucophage, Glucophage XR, Glumetza, Riomet There may be other brand names for this medicine. When This Medicine Should Not Be Used: This medicine is not right for everyone. Do not use if you had an allergic reaction to metformin, or if you have severe kidney problems or metabolic acidosis. How to Use This Medicine:  
Liquid, Tablet, Long Acting Tablet · Take your medicine as directed. Your dose may need to be changed several times to find what works best for you. · It is best to take this medicine with food or milk. · Swallow the extended-release tablet whole. Do not crush, break, or chew it. Tell your doctor if you have trouble swallowing the tablets whole. · Measure the oral liquid medicine with a marked measuring spoon, oral syringe, or medicine cup. · Read and follow the patient instructions that come with this medicine. Talk to your doctor or pharmacist if you have any questions. · Missed dose: Take a dose as soon as you remember. If it is almost time for your next dose, wait until then and take a regular dose. Do not take extra medicine to make up for a missed dose. · Store the medicine in a closed container at room temperature, away from heat, moisture, and direct light. Drugs and Foods to Avoid: Ask your doctor or pharmacist before using any other medicine, including over-the-counter medicines, vitamins, and herbal products. · Some medicines can affect how metformin works. Tell your doctor if you are using any of the following: ¨ Acetazolamide ¨ Dichlorphenamide ¨ Diuretics (water pills) ¨ Estrogen or birth control pills ¨ Heart or blood pressure medicine ¨ Isoniazid ¨ Nicotinic acid ¨ Phenothiazine medicine ¨ Phenytoin Jamie Rhymes Steroid medicine ¨ Thyroid medicine ¨ Topiramate ¨ Zonisamide Warnings While Using This Medicine: · Tell your doctor if you are pregnant or breastfeeding, or if you have heart or blood vessel disease, heart failure, blood circulation problems, kidney disease, liver disease, anemia, an adrenal gland or pituitary gland disorder, or a vitamin B12 deficiency. Tell your doctor if you had a heart attack. Tell your doctor if you drink alcohol. · Too much of this medicine can cause a rare, but serious condition called lactic acidosis. · Part of the extended-release tablet may pass in your stool. This is normal. 
· Make sure any doctor or dentist who treats you knows that you are using this medicine. You may need to stop using this medicine before you have surgery, an x-ray, CT scan, or other medical test. 
· Your doctor will do lab tests at regular visits to check on the effects of this medicine. Keep all appointments. · Keep all medicine out of the reach of children. Never share your medicine with anyone. Possible Side Effects While Using This Medicine:  
Call your doctor right away if you notice any of these side effects: · Allergic reaction: Itching or hives, swelling in your face or hands, swelling or tingling in your mouth or throat, chest tightness, trouble breathing · Confusion, fast heartbeat, increased hunger, shakiness · Fever or chills · Stomach pain, nausea, vomiting, muscle pain or cramping · Trouble breathing, slow heartbeat, lightheadedness, dizziness · Unusual tiredness or weakness If you notice these less serious side effects, talk with your doctor: · Diarrhea, gas, nausea If you notice other side effects that you think are caused by this medicine, tell your doctor. Call your doctor for medical advice about side effects. You may report side effects to FDA at 9-698-FDA-2626 © 2017 [address] Information is for End User's use only and may not be sold, redistributed or otherwise used for commercial purposes. The above information is an  only. It is not intended as medical advice for individual conditions or treatments. Talk to your doctor, nurse or pharmacist before following any medical regimen to see if it is safe and effective for you. Prednisone (By mouth) Prednisone (PRED-ni-sone) Treats many diseases and conditions, especially problems related to inflammation. This medicine is a corticosteroid. Brand Name(s): Contrast Allergy PreMed Pack, Milly, predniSONE Intensol There may be other brand names for this medicine. When This Medicine Should Not Be Used: This medicine is not right for everyone. Do not use if you had an allergic reaction to prednisone or if you are pregnant. How to Use This Medicine:  
Liquid, Tablet, Delayed Release Tablet · Take your medicine as directed.  Your dose may need to be changed several times to find what works best for you. · It is best to take this medicine with food or milk. · Swallow the delayed-release tablet whole. Do not crush, break, or chew it. · Measure the oral liquid medicine with a marked measuring spoon, oral syringe, or medicine cup. · Missed dose: Take a dose as soon as you remember. If it is almost time for your next dose, wait until then and take a regular dose. Do not take extra medicine to make up for a missed dose. · Store the medicine in a closed container at room temperature, away from heat, moisture, and direct light. Do not freeze the oral liquid. Drugs and Foods to Avoid: Ask your doctor or pharmacist before using any other medicine, including over-the-counter medicines, vitamins, and herbal products. · Tell your doctor if you use any of the following: ¨ Aminoglutethimide, amphotericin B, carbamazepine, cholestyramine, cyclosporine, digoxin, isoniazid, ketoconazole, phenobarbital, phenytoin, or rifampin ¨ Blood thinner, such as warfarin ¨ NSAID pain or arthritis medicine, such as aspirin, diclofenac, ibuprofen, naproxen, celecoxib ¨ Diuretic (water pill) ¨ Diabetes medicine ¨ Macrolide antibiotic, such as azithromycin, clarithromycin, erythromycin ¨ Estrogen, including birth control pills or hormone replacement therapy · This medicine may interfere with vaccines. Ask your doctor before you get a flu shot or any other vaccines. Warnings While Using This Medicine: · It is not safe to take this medicine during pregnancy. It could harm an unborn baby. Tell your doctor right away if you become pregnant. · Tell your doctor if you are breastfeeding or if you have kidney problems, heart failure, high blood pressure, a recent heart attack, diabetes, glaucoma, osteoporosis, or thyroid problems. Tell your doctor about any infection you have.  Also tell your doctor if you have had mental or emotional problems (such as depression) or stomach or bowel problems (such as an ulcer or diverticulitis). · This medicine may cause the following problems: ¨ Mood or behavior changes ¨ Higher blood pressure, retaining water, changes in salt or potassium levels in your body ¨ Cataracts or glaucoma (with long-term use) ¨ Weak bones or osteoporosis (with long-term use) ¨ Slow growth in children (with long-term use) ¨ Muscle problems (with high doses, especially if you have myasthenia gravis or similar nerve and muscle problems) · Do not stop using this medicine suddenly. Your doctor will need to slowly decrease your dose before you stop it completely. · This medicine could cause you to get infections more easily. Tell your doctor right away if you are exposed to chicken pox, measles, or other serious infection. Tell your doctor if you had a serious infection in the past, such as tuberculosis or herpes. · Tell your doctor about any extra stress or anxiety in your life. Your dose might need to be changed for a short time. · Tell any doctor or dentist who treats you that you are using this medicine. This medicine may affect certain medical test results. · Keep all medicine out of the reach of children. Never share your medicine with anyone. Possible Side Effects While Using This Medicine:  
Call your doctor right away if you notice any of these side effects: · Allergic reaction: Itching or hives, swelling in your face or hands, swelling or tingling in your mouth or throat, chest tightness, trouble breathing · Dark freckles, skin color changes, coldness, weakness, tiredness, nausea, vomiting, weight loss · Depression, unusual thoughts, feelings, or behaviors, trouble sleeping · Fever, chills, cough, sore throat, and body aches · Muscle pain or weakness · Rapid weight gain, swelling in your hands, ankles, or feet · Severe stomach pain, nausea, vomiting, or red or black stools · Skin changes or growths · Trouble seeing, eye pain, headache If you notice these less serious side effects, talk with your doctor: · Increased appetite · Round, puffy face · Weight gain around your neck, upper back, breast, face, or waist 
If you notice other side effects that you think are caused by this medicine, tell your doctor. Call your doctor for medical advice about side effects. You may report side effects to FDA at 4-334-FDA-3914 © 2017 [address] Information is for End User's use only and may not be sold, redistributed or otherwise used for commercial purposes. The above information is an  only. It is not intended as medical advice for individual conditions or treatments. Talk to your doctor, nurse or pharmacist before following any medical regimen to see if it is safe and effective for you.

## 2017-05-30 NOTE — PROGRESS NOTES
[facility]: MED RECONCILIATION    Comments:  Patient denies being on any RX medications. Allergies: Review of patient's allergies indicates no known allergies. Prior to Admission Medications:     Medication Documentation Review Audit       Reviewed by Vicki Miller PHARMD (Pharmacist) on 05/30/17 at 1830         Medication Sig Documenting Provider Last Dose Status Taking? guaiFENesin (ROBITUSSIN) 100 mg/5 mL liquid Take 200 mg by mouth four (4) times daily. Historical Provider 5/30/2017 Active Yes    ibuprofen (MOTRIN) 200 mg tablet Take 600 mg by mouth every eight (8) hours as needed for Pain.  Historical Provider 5/30/2017 Active Yes                  JUANITA MedinaD   Contact: [id number]

## 2017-05-30 NOTE — IP AVS SNAPSHOT
Current Discharge Medication List  
  
START taking these medications Dose & Instructions Dispensing Information Comments Morning Noon Evening Bedtime  
 aspirin 81 mg chewable tablet Your last dose was: Your next dose is:    
   
   
 Dose:  81 mg Take 1 Tab by mouth daily. Refills:  0  
     
   
   
   
  
 atorvastatin 20 mg tablet Commonly known as:  LIPITOR Your last dose was: Your next dose is:    
   
   
 Dose:  20 mg Take 1 Tab by mouth daily. Dr. Dirk Barros to provide refills Quantity:  30 Tab Refills:  0  
     
   
   
   
  
 bumetanide 0.5 mg tablet Commonly known as:  Jonh Finders Your last dose was: Your next dose is:    
   
   
 Dose:  0.5 mg Take 1 Tab by mouth daily. Dr. Dirk Barros to provide refills Quantity:  30 Tab Refills:  0  
     
   
   
   
  
 clopidogrel 75 mg Tab Commonly known as:  PLAVIX Start taking on:  6/3/2017 Your last dose was: Your next dose is:    
   
   
 Dose:  75 mg Take 1 Tab by mouth daily. Dr. Dirk Barros to provide refills  Indications: Thrombosis Prevention after PCI Quantity:  30 Tab Refills:  0  
     
   
   
   
  
 fluticasone-vilanterol 100-25 mcg/dose inhaler Commonly known as:  BREO ELLIPTA Your last dose was: Your next dose is:    
   
   
 Dose:  1 Puff Take 1 Puff by inhalation daily. Dr Jose E Hayward to provide refills Quantity:  1 Inhaler Refills:  0  
     
   
   
   
  
 metFORMIN 500 mg tablet Commonly known as:  GLUCOPHAGE Your last dose was: Your next dose is:    
   
   
 Dose:  500 mg Take 1 Tab by mouth two (2) times daily (with meals). Quantity:  60 Tab Refills:  3  
     
   
   
   
  
 metoprolol succinate 25 mg XL tablet Commonly known as:  TOPROL-XL Your last dose was:     
   
Your next dose is:    
   
   
 Dose:  25 mg  
 Take 1 Tab by mouth daily. Dr. Betsy Lau to provide refills Quantity:  30 Tab Refills:  0  
     
   
   
   
  
 nicotine 21 mg/24 hr  
Commonly known as:  Keron Love Your last dose was: Your next dose is:    
   
   
 Dose:  1 Patch 1 Patch by TransDERmal route daily for 30 days. Dr Rudy skinner to provide refills Quantity:  1 Patch Refills:  0  
     
   
   
   
  
 * predniSONE 20 mg tablet Commonly known as:  Euel Salaam Your last dose was: Your next dose is:    
   
   
 Dose:  40 mg Take 2 Tabs by mouth daily (with breakfast). Quantity:  6 Tab Refills:  0  
     
   
   
   
  
 * predniSONE 20 mg tablet Commonly known as:  Euel Salaam Start taking on:  6/5/2017 Your last dose was: Your next dose is:    
   
   
 Dose:  20 mg Take 1 Tab by mouth daily (with breakfast). Quantity:  3 Tab Refills:  0  
     
   
   
   
  
 * predniSONE 10 mg tablet Commonly known as:  Euel Salaam Start taking on:  6/8/2017 Your last dose was: Your next dose is:    
   
   
 Dose:  10 mg Take 1 Tab by mouth daily (with breakfast). Quantity:  3 Tab Refills:  0  
     
   
   
   
  
 * Notice: This list has 3 medication(s) that are the same as other medications prescribed for you. Read the directions carefully, and ask your doctor or other care provider to review them with you. CONTINUE these medications which have NOT CHANGED Dose & Instructions Dispensing Information Comments Morning Noon Evening Bedtime  
 guaiFENesin 100 mg/5 mL liquid Commonly known as:  ROBITUSSIN Your last dose was: Your next dose is:    
   
   
 Dose:  200 mg Take 200 mg by mouth four (4) times daily. Refills:  0 STOP taking these medications   
 ibuprofen 200 mg tablet Commonly known as:  MOTRIN Where to Get Your Medications Information on where to get these meds will be given to you by the nurse or doctor. ! Ask your nurse or doctor about these medications  
  atorvastatin 20 mg tablet  
 bumetanide 0.5 mg tablet  
 clopidogrel 75 mg Tab  
 fluticasone-vilanterol 100-25 mcg/dose inhaler  
 metFORMIN 500 mg tablet  
 metoprolol succinate 25 mg XL tablet  
 nicotine 21 mg/24 hr  
 predniSONE 10 mg tablet  
 predniSONE 20 mg tablet  
 predniSONE 20 mg tablet

## 2017-05-30 NOTE — ED TRIAGE NOTES
Shortness of breath times several weeks. Dallin Segura on mothers day hitting left side of chest and was not seen anywhere. Increasing shortness of breath. Denies cough or fever.

## 2017-05-31 LAB
ANION GAP BLD CALC-SCNC: 10 MMOL/L (ref 5–15)
BUN SERPL-MCNC: 10 MG/DL (ref 6–20)
BUN/CREAT SERPL: 10 (ref 12–20)
CALCIUM SERPL-MCNC: 8.5 MG/DL (ref 8.5–10.1)
CHLORIDE SERPL-SCNC: 103 MMOL/L (ref 97–108)
CHOLEST SERPL-MCNC: 138 MG/DL
CO2 SERPL-SCNC: 27 MMOL/L (ref 21–32)
CREAT SERPL-MCNC: 1.01 MG/DL (ref 0.7–1.3)
EST. AVERAGE GLUCOSE BLD GHB EST-MCNC: 166 MG/DL
GLUCOSE SERPL-MCNC: 133 MG/DL (ref 65–100)
HBA1C MFR BLD: 7.4 % (ref 4.2–6.3)
HDLC SERPL-MCNC: 42 MG/DL
HDLC SERPL: 3.3 {RATIO} (ref 0–5)
LDLC SERPL CALC-MCNC: 70.8 MG/DL (ref 0–100)
LIPID PROFILE,FLP: NORMAL
POTASSIUM SERPL-SCNC: 3.6 MMOL/L (ref 3.5–5.1)
SODIUM SERPL-SCNC: 140 MMOL/L (ref 136–145)
TRIGL SERPL-MCNC: 126 MG/DL (ref ?–150)
TROPONIN I SERPL-MCNC: 1.46 NG/ML
VLDLC SERPL CALC-MCNC: 25.2 MG/DL

## 2017-05-31 PROCEDURE — 80061 LIPID PANEL: CPT | Performed by: SPECIALIST

## 2017-05-31 PROCEDURE — 74011000250 HC RX REV CODE- 250: Performed by: PHYSICIAN ASSISTANT

## 2017-05-31 PROCEDURE — 74011250637 HC RX REV CODE- 250/637: Performed by: PHYSICIAN ASSISTANT

## 2017-05-31 PROCEDURE — 94640 AIRWAY INHALATION TREATMENT: CPT

## 2017-05-31 PROCEDURE — 93306 TTE W/DOPPLER COMPLETE: CPT

## 2017-05-31 PROCEDURE — 80048 BASIC METABOLIC PNL TOTAL CA: CPT | Performed by: SPECIALIST

## 2017-05-31 PROCEDURE — 74011250637 HC RX REV CODE- 250/637: Performed by: SPECIALIST

## 2017-05-31 PROCEDURE — 77030013140 HC MSK NEB VYRM -A

## 2017-05-31 PROCEDURE — 77010033678 HC OXYGEN DAILY

## 2017-05-31 PROCEDURE — 36415 COLL VENOUS BLD VENIPUNCTURE: CPT | Performed by: SPECIALIST

## 2017-05-31 PROCEDURE — 74011250636 HC RX REV CODE- 250/636: Performed by: PHYSICIAN ASSISTANT

## 2017-05-31 PROCEDURE — 83036 HEMOGLOBIN GLYCOSYLATED A1C: CPT | Performed by: SPECIALIST

## 2017-05-31 PROCEDURE — 84484 ASSAY OF TROPONIN QUANT: CPT | Performed by: SPECIALIST

## 2017-05-31 PROCEDURE — 65660000000 HC RM CCU STEPDOWN

## 2017-05-31 PROCEDURE — 74011250636 HC RX REV CODE- 250/636: Performed by: SPECIALIST

## 2017-05-31 RX ORDER — ALPRAZOLAM 0.5 MG/1
0.5 TABLET ORAL
Status: DISCONTINUED | OUTPATIENT
Start: 2017-05-31 | End: 2017-06-02 | Stop reason: HOSPADM

## 2017-05-31 RX ORDER — IBUPROFEN 200 MG
1 TABLET ORAL DAILY
Status: DISCONTINUED | OUTPATIENT
Start: 2017-05-31 | End: 2017-06-02 | Stop reason: HOSPADM

## 2017-05-31 RX ORDER — LORAZEPAM 2 MG/ML
2 INJECTION INTRAMUSCULAR
Status: DISCONTINUED | OUTPATIENT
Start: 2017-05-31 | End: 2017-06-02 | Stop reason: HOSPADM

## 2017-05-31 RX ORDER — FLUTICASONE FUROATE AND VILANTEROL 100; 25 UG/1; UG/1
1 POWDER RESPIRATORY (INHALATION) DAILY
Status: DISCONTINUED | OUTPATIENT
Start: 2017-05-31 | End: 2017-06-02 | Stop reason: HOSPADM

## 2017-05-31 RX ORDER — ROSUVASTATIN CALCIUM 10 MG/1
5 TABLET, COATED ORAL
Status: DISCONTINUED | OUTPATIENT
Start: 2017-05-31 | End: 2017-06-02 | Stop reason: HOSPADM

## 2017-05-31 RX ORDER — METOPROLOL SUCCINATE 25 MG/1
25 TABLET, EXTENDED RELEASE ORAL DAILY
Status: DISCONTINUED | OUTPATIENT
Start: 2017-05-31 | End: 2017-06-02 | Stop reason: HOSPADM

## 2017-05-31 RX ORDER — IPRATROPIUM BROMIDE AND ALBUTEROL SULFATE 2.5; .5 MG/3ML; MG/3ML
3 SOLUTION RESPIRATORY (INHALATION)
Status: DISCONTINUED | OUTPATIENT
Start: 2017-05-31 | End: 2017-06-02 | Stop reason: HOSPADM

## 2017-05-31 RX ORDER — LORAZEPAM 2 MG/ML
4 INJECTION INTRAMUSCULAR
Status: DISCONTINUED | OUTPATIENT
Start: 2017-05-31 | End: 2017-06-02 | Stop reason: HOSPADM

## 2017-05-31 RX ORDER — BUMETANIDE 1 MG/1
0.5 TABLET ORAL DAILY
Status: DISCONTINUED | OUTPATIENT
Start: 2017-05-31 | End: 2017-06-02 | Stop reason: HOSPADM

## 2017-05-31 RX ADMIN — ALPRAZOLAM 0.5 MG: 0.5 TABLET ORAL at 21:58

## 2017-05-31 RX ADMIN — VALSARTAN 40 MG: 80 TABLET ORAL at 08:47

## 2017-05-31 RX ADMIN — Medication 10 ML: at 14:05

## 2017-05-31 RX ADMIN — ENOXAPARIN SODIUM 40 MG: 40 INJECTION SUBCUTANEOUS at 22:01

## 2017-05-31 RX ADMIN — Medication 10 ML: at 22:02

## 2017-05-31 RX ADMIN — METHYLPREDNISOLONE SODIUM SUCCINATE 40 MG: 40 INJECTION, POWDER, FOR SOLUTION INTRAMUSCULAR; INTRAVENOUS at 14:05

## 2017-05-31 RX ADMIN — ROSUVASTATIN CALCIUM 5 MG: 10 TABLET, FILM COATED ORAL at 21:57

## 2017-05-31 RX ADMIN — BUMETANIDE 0.5 MG: 1 TABLET ORAL at 08:47

## 2017-05-31 RX ADMIN — IPRATROPIUM BROMIDE AND ALBUTEROL SULFATE 3 ML: .5; 3 SOLUTION RESPIRATORY (INHALATION) at 19:55

## 2017-05-31 RX ADMIN — METOPROLOL SUCCINATE 25 MG: 25 TABLET, FILM COATED, EXTENDED RELEASE ORAL at 08:47

## 2017-05-31 RX ADMIN — METHYLPREDNISOLONE SODIUM SUCCINATE 40 MG: 40 INJECTION, POWDER, FOR SOLUTION INTRAMUSCULAR; INTRAVENOUS at 21:54

## 2017-05-31 RX ADMIN — Medication 10 ML: at 05:16

## 2017-05-31 RX ADMIN — VALSARTAN 40 MG: 80 TABLET ORAL at 21:59

## 2017-05-31 RX ADMIN — ASPIRIN 81 MG 81 MG: 81 TABLET ORAL at 08:47

## 2017-05-31 RX ADMIN — METHYLPREDNISOLONE SODIUM SUCCINATE 40 MG: 40 INJECTION, POWDER, FOR SOLUTION INTRAMUSCULAR; INTRAVENOUS at 10:16

## 2017-05-31 RX ADMIN — FLUTICASONE FUROATE AND VILANTEROL TRIFENATATE 1 PUFF: 100; 25 POWDER RESPIRATORY (INHALATION) at 10:15

## 2017-05-31 RX ADMIN — IPRATROPIUM BROMIDE AND ALBUTEROL SULFATE 3 ML: .5; 3 SOLUTION RESPIRATORY (INHALATION) at 15:14

## 2017-05-31 RX ADMIN — IPRATROPIUM BROMIDE AND ALBUTEROL SULFATE 3 ML: .5; 3 SOLUTION RESPIRATORY (INHALATION) at 11:00

## 2017-05-31 NOTE — PROGRESS NOTES
Kristina Pfeiffer MD [address] [place] [address] Dillon Gladys   Office [phone]  Mobile [phone]            NAME:  Frank Rick   :   1955   MRN:   [id number]     Assessment/Plan:   1. Recent anterior MI with HF  2. COPD  3. New onset DM  4. Nicotine addiction  5. Alcohol dependence - no evidence of withdrawal as yet     Start low dose beta blocker  Maintenance diuretic  Echo today  Pulm evaluation  Check A1c - may be a good candidate for Denver Darting, MD    Subjective:   Cardiac ROS: breathing much better. Patient denies any exertional chest pain,, palpitations, syncope,, edema or paroxysmal nocturnal dyspnea. Review of Systems: No nausea, indigestion, vomiting, pain, , sputum. No bleeding. Taking po. Appetite fair      Objective:     Visit Vitals    /85 (BP 1 Location: Left arm, BP Patient Position: At rest;Supine)    Pulse 70    Temp 98.4 °F (36.9 °C)    Resp 20    Ht 5' 10\" (1.778 m)    Wt 229 lb 9.6 oz (104.1 kg)    SpO2 93%    BMI 32.94 kg/m2    O2 Flow Rate (L/min): 2 l/min O2 Device: Nasal cannula    Temp (24hrs), Av.7 °F (37.1 °C), Min:98.2 °F (36.8 °C), Max:99.4 °F (37.4 °C)            1901 -  0700  In: 400 [P.O.:400]  Out: 900 [Urine:900]    TELE: sinus    General: AAOx3 cooperative, no acute distress. HEENT: Atraumatic. Pink and moist.  Anicteric sclerae. Neck : Supple, no thyromegaly. Lungs: few rales right base, scattered rhonchi  Heart: Regular rhythm, no murmur, no rubs, no gallops. No JVD. No carotid bruits. Abdomen: Soft, non-distended, non-tender. + Bowel sounds. No bruits. Extremities: No edema, no clubbing, no cyanosis. No calf tenderness  Neurologic: Grossly intact. Alert and oriented X 3. No acute neurological distress. Psych: Good insight. Not anxious nor agitated.     Additional comments: None    Care Plan discussed with:    Comments   Patient x    Family      RN     Care Manager Consultant:          Data Review:     EKG    Echo    No results for input(s): TNIPOC in the last 72 hours. No lab exists for component: ITNL   Recent Labs      05/31/17   0030  05/30/17   1537   TROIQ  1.46*  1.61*     Recent Labs      05/31/17   0030  05/30/17   1537   NA  140  142   K  3.6  4.3   CL  103  106   CO2  27  28   BUN  10  10   CREA  1.01  1.04   GLU  133*  136*   ALB   --   2.9*   WBC   --   8.8   HGB   --   14.8   HCT   --   43.3   PLT   --   270     No results for input(s): INR, PTP, APTT in the last 72 hours.     No lab exists for component: INREXT    Medications reviewed  Current Facility-Administered Medications   Medication Dose Route Frequency    sodium chloride (NS) flush 5-10 mL  5-10 mL IntraVENous Q8H    sodium chloride (NS) flush 5-10 mL  5-10 mL IntraVENous PRN    aspirin chewable tablet 81 mg  81 mg Oral DAILY    valsartan (DIOVAN) tablet 40 mg  40 mg Oral Q12H    enoxaparin (LOVENOX) injection 40 mg  40 mg SubCUTAneous Q24H    albuterol (ACCUNEB) nebulizer solution 1.25 mg  1.25 mg Nebulization Q6H PRN         Sushil Mendoza MD

## 2017-05-31 NOTE — PROGRESS NOTES
0900: Pulmonary at bedside. Notified of need for nicotine patch. Night RN reported increased anxiety overnight. Patient needing something for anxiety. Orders to follow. 1730: MRI checklist complete. 1800: No CBC last night. Unable to add on Hgb A1C. Lab drawn and sent. Bedside shift change report given to Indu VALDES (oncoming nurse) by Issa German RN (offgoing nurse). Report included the following information SBAR, Kardex, Intake/Output, MAR, Recent Results and Cardiac Rhythm NSR.

## 2017-05-31 NOTE — ROUTINE PROCESS
TRANSFER - OUT REPORT:    Verbal report given to claude Koehler  being transferred to [facility] for routine progression of care       Report consisted of patients Situation, Background, Assessment and   Recommendations(SBAR). Information from the following report(s) SBAR, ED Summary, [facility] and Recent Results was reviewed with the receiving nurse. Opportunity for questions and clarification was provided.

## 2017-05-31 NOTE — CONSULTS
Name: Shavonne Olivares[address]: [address]   : 1955 Admit Date: 2017   Phone: [phone]  Room: [facility]   PCP: None  MRN: [id number]   Date: 2017  Code: Full Code        HPI:    Chart and notes reviewed. Data reviewed. I review the patient's current medications in the medical record at each encounter. I have evaluated and examined the patient. 9:05 AM       History was obtained from patient. I was asked by Janet Solano MD to see Joe Bourne in consultation for a chief complaint of COPD. Mr. Katharine Garcia is a pleasant 59year old male who presented to the [address] [place] [address] ED with SOB since the day after Mother's Day. Patient states he fell down the stairs and hit his left chest and head. Denies LOC. Since that time, he reports increasing SOB and states his chest felt like it was in a vice. SOB worse with exertion. Nebs and O2 have been helpful. EKG in the ED with recent anterior MI with Qs V1-5, evolving T wave inversions. He reports smoking 2PPD for the last 30 years. Also reports drinking 6 beers per night. States his last drink was on Saturday. No sign/symptoms of withdrawal at this time. He denies known history of lung disease. Does not use home inhalers or nebs at baseline. Reports occupational exposure as a  working around  [employer] and also working as a . He does not wear a protective mask while working. Denies fever or chills. Denies CP currently. Does not take any routine home medications at baseline. Does voice desire to stop smoking and is asking for a nicotine patch. He reports snoring and fatigue/feelig tired during the day. CXR is personally visualized. There are small bilateral pleural effusions. There is pulmonary vascular congestion and mild to moderate pulmonary edema. There are Kerley B lines present. Trop 1.61 --> 1.46  proBNP 7025    History reviewed. No pertinent past medical history.     Past Surgical History:   Procedure Laterality Date    HX TONSILLECTOMY      HX WISDOM TEETH EXTRACTION         History reviewed. No pertinent family history. Social History   Substance Use Topics    Smoking status: Current Every Day Smoker     Packs/day: 2.00    Smokeless tobacco: Not on file    Alcohol use 25.2 oz/week     42 Cans of beer per week       No Known Allergies    Current Facility-Administered Medications   Medication Dose Route Frequency    metoprolol succinate (TOPROL-XL) XL tablet 25 mg  25 mg Oral DAILY    bumetanide (BUMEX) tablet 0.5 mg  0.5 mg Oral DAILY    nicotine (NICODERM CQ) 21 mg/24 hr patch 1 Patch  1 Patch TransDERmal DAILY    ALPRAZolam (XANAX) tablet 0.5 mg  0.5 mg Oral QHS PRN    sodium chloride (NS) flush 5-10 mL  5-10 mL IntraVENous Q8H    sodium chloride (NS) flush 5-10 mL  5-10 mL IntraVENous PRN    aspirin chewable tablet 81 mg  81 mg Oral DAILY    valsartan (DIOVAN) tablet 40 mg  40 mg Oral Q12H    enoxaparin (LOVENOX) injection 40 mg  40 mg SubCUTAneous Q24H    albuterol (ACCUNEB) nebulizer solution 1.25 mg  1.25 mg Nebulization Q6H PRN         REVIEW OF SYSTEMS   Negative except as stated in the HPI. Physical Exam:   Visit Vitals    /72 (BP 1 Location: Left arm, BP Patient Position: At rest)    Pulse 72    Temp 98.3 °F (36.8 °C)    Resp 22    Ht 5' 10\" (1.778 m)    Wt 104.1 kg (229 lb 9.6 oz)    SpO2 92%    BMI 32.94 kg/m2       General:  Alert, cooperative, no distress, appears stated age. Head:  Normocephalic, without obvious abnormality, atraumatic. Eyes:  Conjunctivae/corneas clear. Nose: Nares normal. Septum midline. Mucosa normal.    Throat: Lips, mucosa, and tongue normal.    Neck: Supple, symmetrical, trachea midline, no adenopathy. Lungs:   Coarse breath sounds with wheezing bilaterally. Chest wall:  No tenderness or deformity. Heart:  Regular rate and rhythm, S1, S2 normal, no murmur, click, rub or gallop.    Abdomen:   Soft, non-tender. Bowel sounds normal. No masses,  No organomegaly. Extremities: Extremities normal, atraumatic, no cyanosis or edema. Pulses: 2+ and symmetric all extremities. Skin: Skin color, texture, turgor normal. No rashes or lesions   Lymph nodes: Cervical, supraclavicular nodes normal.   Neurologic: Grossly nonfocal       Lab Results   Component Value Date/Time    Sodium 140 05/31/2017 12:30 AM    Potassium 3.6 05/31/2017 12:30 AM    Chloride 103 05/31/2017 12:30 AM    CO2 27 05/31/2017 12:30 AM    BUN 10 05/31/2017 12:30 AM    Creatinine 1.01 05/31/2017 12:30 AM    Glucose 133 05/31/2017 12:30 AM    Calcium 8.5 05/31/2017 12:30 AM       Lab Results   Component Value Date/Time    WBC 8.8 05/30/2017 03:37 PM    HGB 14.8 05/30/2017 03:37 PM    PLATELET 295 48/25/6643 03:37 PM    MCV 97.1 05/30/2017 03:37 PM       Lab Results   Component Value Date/Time    AST (SGOT) 14 05/30/2017 03:37 PM    Alk.  phosphatase 85 05/30/2017 03:37 PM    Protein, total 6.9 05/30/2017 03:37 PM    Albumin 2.9 05/30/2017 03:37 PM    Globulin 4.0 05/30/2017 03:37 PM       No results found for: IRON, FE, TIBC, IBCT, PSAT, FERR    No results found for: SR, CRP, ELPIDIO, ANAIGG, RA, RPR, RPRT, VDRLT, VDRLS, TSH, TSHEXT     No results found for: PH, PHI, PCO2, PCO2I, PO2, PO2I, HCO3, HCO3I, FIO2, FIO2I    Lab Results   Component Value Date/Time    Troponin-I, Qt. 1.46 05/31/2017 12:30 AM        No results found for: CULT    No results found for: TOXA1, RPR, HBCM, HBSAG, HAAB, HCAB, HCAB1, HAAT, G6PD, CRYAC, HIVGT, HIVR, HIV1, HIV12, HIVPC, HIVRPI    No results found for: VANCT, CPK    No results found for: COLOR, APPRN, SPGRU, BRUNO, PROTU, GLUCU, KETU, BILU, BLDU, UROU, BATOOL, LEUKU, WBCU, RBCU, UEPI, BACTU, CASTS, UCRY    IMPRESSION  · Acute hypoxic respiratory failure  · Recent anterior MI  · Acute diastolic heart failure with likely ischemia and LV dysfunction  · Wheezing with likely underlying COPD and probable exacerbation  · Pulmonary edema  · Heavy tobacco use  · Alcohol abuse    PLAN  · Wean O2 as able to keep sats > 90%  · Will need 6 MWT prior to discharge  · Place on schedule Duonebs and Breo  · Place on Solumedrol 40 mg q 8hr  · F/U ECHO; cardiology following and considering potential fututre cath  · Diuresis with Bumex per cardiology  · ASA, Bblocker, ARB per cardiology  · Discussed the importance of complete smoking cessation; nicotine patch ordered  · Low dose Xanax 0.5mg at night if needed for anxiety  · CIWA protocol placed  · Patient would benefit from outpatient pulmonary follow up with complete PFTs and sleep evaluation  · GI prophylaxis: not indicated, cardiac diet  · DVT prophylaxis: Lovenox      Thank you for allowing us to participate in the care of this patient. We will be happy to follow along in his progress with you.     Andrea Sherman

## 2017-05-31 NOTE — PROGRESS NOTES
5/31/2017   CARE MANAGEMENT NOTE:  CM is following pt for initial discharge planning. EMR reviewed. CM met with pt who was his own historian for this needs assessment. Reportedly, pt is renting a room in a two story home with bedroom on the second floor. His landlady and older sister also reside in the home. There are three front steps and one entry step thru the side entrance; and nine steps between floor levels. PTA, pt was ambulatory without any assistive device although he reports being SOB with steps at times. He reports being indepn with ADLs, and provides his own transportation. Pt is employed full time at [employer] but he does not have insurance coverage. He has not applied for any [place]. Encouraged pt to apply for the Care Card - application given to pt for completion. Pt does not have home healthcare nor DME for home use. PCP - none. CM provided pt with a list of BS providers. Plan is to return home when medically stable. He does not anticipate any post discharge needs at this writing.   Danitza

## 2017-05-31 NOTE — ROUTINE PROCESS
TRANSFER - IN REPORT:    Verbal report received from Leta Marrufo RN(name) on Joe Bourne  being received from ER(unit) for routine progression of care      Report consisted of patients Situation, Background, Assessment and   Recommendations(SBAR). Information from the following report(s) SBAR, Kardex, ED Summary, Intake/Output, MAR, Accordion, Recent Results, Cardiac Rhythm SR and Alarm Parameters  was reviewed with the receiving nurse. Opportunity for questions and clarification was provided. Assessment completed upon patients arrival to unit and care assumed. 2 RN skin assessment done per writer and Nino Dacosta RN. No skin issues noted.

## 2017-05-31 NOTE — CARDIO/PULMONARY
Cardiac Rehab: Received consult for cardiac teaching on MI & CHF. Per Dr Ji Buchanan, \"Recent anterior MI, likely 2 weeks ago. \" Now complicated by progressive HF. LVEF 15-20%. Heart Failure education folder given to [facility]. Discussed patient's understanding of his current cardiac condition. Aware he had a heart attack and likely COPD. Explained HF with reduced EF. Discussed signs/symptoms of fluid overload. Reviewed importance of daily weight monitoring and low sodium diet (less than 1500 mg daily). He has a scale. Briefly reviewed purposes of new cardiac meds. Pt indicated affording meds may be an issues due to lack of insurance coverage. Has Care Card application. Smoking history assessed. Pt is a current 2 ppd smoker; started at age 26. Aware of importance of smoking cessation, as well as limiting ETOH intake. Pt has been working as a  at [employer] does yard work. Indicated he does not believe he will be able to work anymore. Expressed feeling \"overwhelmed\" with the change in his health status. [facility] could benefit from reinforcement on all topics discussed, with emphasis on new meds: metoprolol, valsartan, Bumex, and aspirin. He verbalized basic understanding and all questions were answered. UPDATE at 36: Met with patient and his sister Mitchell Oshea). Pt resides with this sister in [place]. Reviewed CHF management with pt & his family member. Sister indicated plans to assist pt in applying for SSDI. Strongly encouraged pt to follow through on [place] application, which was at his bedside.

## 2017-05-31 NOTE — ROUTINE PROCESS
Bedside and Verbal shift change report given to Melisa Felty, RN (oncoming nurse) by Rajeev Edge RN (offgoing nurse). Report included the following information SBAR, Kardex, ED Summary, Intake/Output, MAR, Accordion, Recent Results, Cardiac Rhythm SR and Alarm Parameters .

## 2017-05-31 NOTE — PROGRESS NOTES
Echo demonstrates severe LV dysfunction with EF 15-20%, apical akinesis. Will get cardiac MRI tomorrow. If + viability, then cath.      Will need LifeVest at discharge

## 2017-06-01 ENCOUNTER — APPOINTMENT (OUTPATIENT)
Dept: MRI IMAGING | Age: 62
DRG: 246 | End: 2017-06-01
Attending: SPECIALIST
Payer: SUBSIDIZED

## 2017-06-01 PROBLEM — I50.23 ACUTE ON CHRONIC SYSTOLIC HEART FAILURE (HCC): Status: ACTIVE | Noted: 2017-06-01

## 2017-06-01 PROBLEM — I21.9 MI (MYOCARDIAL INFARCTION) (HCC): Chronic | Status: ACTIVE | Noted: 2017-06-01

## 2017-06-01 PROBLEM — I25.10 CORONARY ARTERY DISEASE INVOLVING NATIVE CORONARY ARTERY WITHOUT ANGINA PECTORIS: Status: ACTIVE | Noted: 2017-06-01

## 2017-06-01 PROBLEM — E11.59 CONTROLLED TYPE 2 DIABETES MELLITUS WITH CIRCULATORY DISORDER (HCC): Chronic | Status: ACTIVE | Noted: 2017-06-01

## 2017-06-01 PROBLEM — F17.200 NICOTINE DEPENDENCE: Chronic | Status: ACTIVE | Noted: 2017-06-01

## 2017-06-01 PROBLEM — F10.10 ETOH ABUSE: Chronic | Status: ACTIVE | Noted: 2017-06-01

## 2017-06-01 LAB
GLUCOSE BLD STRIP.AUTO-MCNC: 174 MG/DL (ref 65–100)
GLUCOSE BLD STRIP.AUTO-MCNC: 237 MG/DL (ref 65–100)
SERVICE CMNT-IMP: ABNORMAL
SERVICE CMNT-IMP: ABNORMAL

## 2017-06-01 PROCEDURE — 75561 CARDIAC MRI FOR MORPH W/DYE: CPT

## 2017-06-01 PROCEDURE — 74011250636 HC RX REV CODE- 250/636: Performed by: SPECIALIST

## 2017-06-01 PROCEDURE — 94640 AIRWAY INHALATION TREATMENT: CPT

## 2017-06-01 PROCEDURE — 82962 GLUCOSE BLOOD TEST: CPT

## 2017-06-01 PROCEDURE — 77010033678 HC OXYGEN DAILY

## 2017-06-01 PROCEDURE — 74011636320 HC RX REV CODE- 636/320

## 2017-06-01 PROCEDURE — 74011250637 HC RX REV CODE- 250/637: Performed by: PHYSICIAN ASSISTANT

## 2017-06-01 PROCEDURE — A9579 GAD-BASE MR CONTRAST NOS,1ML: HCPCS

## 2017-06-01 PROCEDURE — 74011000250 HC RX REV CODE- 250: Performed by: PHYSICIAN ASSISTANT

## 2017-06-01 PROCEDURE — 77030021566

## 2017-06-01 PROCEDURE — 74011250636 HC RX REV CODE- 250/636: Performed by: PHYSICIAN ASSISTANT

## 2017-06-01 PROCEDURE — 74011250637 HC RX REV CODE- 250/637: Performed by: NURSE PRACTITIONER

## 2017-06-01 PROCEDURE — 65660000000 HC RM CCU STEPDOWN

## 2017-06-01 PROCEDURE — 74011250637 HC RX REV CODE- 250/637: Performed by: SPECIALIST

## 2017-06-01 PROCEDURE — 74011636637 HC RX REV CODE- 636/637: Performed by: FAMILY MEDICINE

## 2017-06-01 RX ORDER — VALSARTAN 40 MG/1
20 TABLET ORAL EVERY 12 HOURS
Status: DISCONTINUED | OUTPATIENT
Start: 2017-06-01 | End: 2017-06-02

## 2017-06-01 RX ORDER — DEXTROSE 50 % IN WATER (D50W) INTRAVENOUS SYRINGE
12.5-25 AS NEEDED
Status: DISCONTINUED | OUTPATIENT
Start: 2017-06-01 | End: 2017-06-02 | Stop reason: HOSPADM

## 2017-06-01 RX ORDER — MAGNESIUM SULFATE 100 %
4 CRYSTALS MISCELLANEOUS AS NEEDED
Status: DISCONTINUED | OUTPATIENT
Start: 2017-06-01 | End: 2017-06-02 | Stop reason: HOSPADM

## 2017-06-01 RX ORDER — INSULIN LISPRO 100 [IU]/ML
INJECTION, SOLUTION INTRAVENOUS; SUBCUTANEOUS
Status: DISCONTINUED | OUTPATIENT
Start: 2017-06-01 | End: 2017-06-02 | Stop reason: HOSPADM

## 2017-06-01 RX ORDER — PREDNISONE 20 MG/1
20 TABLET ORAL
Status: DISCONTINUED | OUTPATIENT
Start: 2017-06-05 | End: 2017-06-02 | Stop reason: HOSPADM

## 2017-06-01 RX ORDER — PREDNISONE 5 MG/1
10 TABLET ORAL
Status: DISCONTINUED | OUTPATIENT
Start: 2017-06-08 | End: 2017-06-02 | Stop reason: HOSPADM

## 2017-06-01 RX ORDER — PREDNISONE 20 MG/1
40 TABLET ORAL
Status: DISCONTINUED | OUTPATIENT
Start: 2017-06-02 | End: 2017-06-02 | Stop reason: HOSPADM

## 2017-06-01 RX ADMIN — VALSARTAN 20 MG: 40 TABLET ORAL at 22:26

## 2017-06-01 RX ADMIN — Medication 10 ML: at 14:00

## 2017-06-01 RX ADMIN — BUMETANIDE 0.5 MG: 1 TABLET ORAL at 10:01

## 2017-06-01 RX ADMIN — Medication 10 ML: at 06:52

## 2017-06-01 RX ADMIN — IPRATROPIUM BROMIDE AND ALBUTEROL SULFATE 3 ML: .5; 3 SOLUTION RESPIRATORY (INHALATION) at 11:51

## 2017-06-01 RX ADMIN — Medication 10 ML: at 22:29

## 2017-06-01 RX ADMIN — ASPIRIN 81 MG 81 MG: 81 TABLET ORAL at 10:01

## 2017-06-01 RX ADMIN — INSULIN LISPRO 2 UNITS: 100 INJECTION, SOLUTION INTRAVENOUS; SUBCUTANEOUS at 17:58

## 2017-06-01 RX ADMIN — METHYLPREDNISOLONE SODIUM SUCCINATE 40 MG: 40 INJECTION, POWDER, FOR SOLUTION INTRAMUSCULAR; INTRAVENOUS at 06:52

## 2017-06-01 RX ADMIN — INSULIN LISPRO 2 UNITS: 100 INJECTION, SOLUTION INTRAVENOUS; SUBCUTANEOUS at 22:27

## 2017-06-01 RX ADMIN — IPRATROPIUM BROMIDE AND ALBUTEROL SULFATE 3 ML: .5; 3 SOLUTION RESPIRATORY (INHALATION) at 19:09

## 2017-06-01 RX ADMIN — FLUTICASONE FUROATE AND VILANTEROL TRIFENATATE 1 PUFF: 100; 25 POWDER RESPIRATORY (INHALATION) at 10:04

## 2017-06-01 RX ADMIN — METOPROLOL SUCCINATE 25 MG: 25 TABLET, FILM COATED, EXTENDED RELEASE ORAL at 10:01

## 2017-06-01 RX ADMIN — GADOPENTETATE DIMEGLUMINE 40 ML: 469.01 INJECTION INTRAVENOUS at 08:00

## 2017-06-01 RX ADMIN — ROSUVASTATIN CALCIUM 5 MG: 10 TABLET, FILM COATED ORAL at 22:27

## 2017-06-01 RX ADMIN — IPRATROPIUM BROMIDE AND ALBUTEROL SULFATE 3 ML: .5; 3 SOLUTION RESPIRATORY (INHALATION) at 07:20

## 2017-06-01 RX ADMIN — ENOXAPARIN SODIUM 40 MG: 40 INJECTION SUBCUTANEOUS at 22:27

## 2017-06-01 RX ADMIN — IPRATROPIUM BROMIDE AND ALBUTEROL SULFATE 3 ML: .5; 3 SOLUTION RESPIRATORY (INHALATION) at 15:34

## 2017-06-01 RX ADMIN — METHYLPREDNISOLONE SODIUM SUCCINATE 40 MG: 40 INJECTION, POWDER, FOR SOLUTION INTRAMUSCULAR; INTRAVENOUS at 13:08

## 2017-06-01 NOTE — PROGRESS NOTES
6-1-2017 CARE MANAGEMENT NOTE:  I met with the pt to introduce myself and explain the role of case management. He confirmed that he rents a room in a 2 story house with his bedroom on the second floor. He has been working full-time at [employer] but does not thin he will be able to return to work due to his recently diagnosed medical problems. I advised him to apply for Social Security disability upon discharge. The cardiac nurse practitioner has put in an order for the pt to be fitted for a Life Vest. I explained to the pt that the cost would be $3000 which he cannot afford. I sent a request to Teresita Julio to cover the cost and it has been approved. CM will continue to follow and assist with discharge needs as indicated.  CELSA ConnollyW, CM

## 2017-06-01 NOTE — DIABETES MGMT
DTC Consult Note    Recommendations/ Comments: If appropriate, please consider adding Metformin when appropriate and adding carb consistent to his meal plan. Also pt would benefit from POC glucose and insulin correction scale    Pt has had a lot of bad news today so reports he has had about as much information as he could take. He has heard a lot of misinformation about diabetes and has a negative outlook about his life at the moment. We began some basic education but he is not ready to hear more right now. Will follow up to teach meter later today and recommend OP education when he can be more receptive. Consult received for [x]             New diagnosis    Chart reviewed and initial evaluation complete on Vega Santos. Patient is a 59 y.o. male with new diagnosis Type 2 diabetes. Assessed and instructed patient on the following:   ·  interpretation of lab results- discussed what an A1c is and how used to dx; goal    · blood sugar goals- what is normal for someone without diabetes  · complications of diabetes mellitus- pt thinks his feet are going to fall off  · Exercise-reports he is very active but when I described aerobic activity did not sound like he was doing any  · SMBG skills-will need a meter but not ready to learn right now  · nutrition -eats 3 meals, sister fixes supper.  Drinks beer from 4-8 pm daily, dislikes veggies  · referred to Diabetes Educator- would benefit from OP education after d/c    Encouraged the following:   · increased exercise as allowed   · dietary modifications: eat 3 meals a day, use plate method for portion control, increase veggies   · regular blood sugar monitorin times daily    Provided patient with the following: [x]             Diabetes Self care education materials               []             Insulin education materials               []             CHO counting education materials               [x]             Outpatient DTC contact number []             Glucometer                 Discussed with patient and/or family need for follow up appointment for diabetes management after discharge. A1c:   Lab Results   Component Value Date/Time    Hemoglobin A1c 7.4 05/31/2017 05:31 PM       Recent Glucose Results: No results found for: GLU, GLUPOC, GLUCPOC     Lab Results   Component Value Date/Time    Creatinine 1.01 05/31/2017 12:30 AM     Estimated Creatinine Clearance: 90.8 mL/min (based on Cr of 1.01). Active Orders   Diet    DIET CARDIAC Regular; 2 GM NA (House Low NA)        PO intake: Patient Vitals for the past 72 hrs:   % Diet Eaten   05/31/17 1407 100 %   05/31/17 0849 100 %       Current hospital DM medication: none    Will continue to follow as needed. Thank you.   Berry Elizabeth RD, CDE

## 2017-06-01 NOTE — PROGRESS NOTES
Spiritual Care Assessment/Progress Notes    [facility] [id number]  [id number]    1955  59 y.o.  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 ([facility] 06/01/2017    Nicotine dependence 06/01/2017    Acute congestive heart failure ([facility] 05/30/2017        Date: 6/1/2017       Level of Uatsdin/Spiritual Activity:  []         Involved in mainor tradition/spiritual practice    []         Not involved in mainor tradition/spiritual practice  [x]         Spiritually oriented    []         Claims no spiritual orientation    []         seeking spiritual identity  []         Feels alienated from Adventist practice/tradition  []         Feels angry about Adventist practice/tradition  [x]         Spirituality/Adventist tradition is a resource for coping at this time.   []         Not able to assess due to medical condition    Services Provided Today:  []         crisis intervention    []         reading Scriptures  [x]         spiritual assessment    [x]         prayer  [x]         empathic listening/emotional support  []         rites and rituals (cite in comments)  [x]         life review     []         Adventist support  []         theological development   []         advocacy  []         ethical dialog     [x]         blessing  []         bereavement support    []         support to family  []         anticipatory grief support   []         help with AMD  []         spiritual guidance    []         meditation      Spiritual Care Needs  [x]         Emotional Support  [x]         Spiritual/Congregational Care  []         Loss/Adjustment  []         Advocacy/Referral                /Ethics  []         No needs expressed at               this time  []         Other: (note in               comments)  Spiritual Care Plan  []         Follow up visits with               pt/family  []         Provide materials  []         Schedule sacraments  []         Contact Community               Clergy  [x]         Follow up as needed  []         Other: (note in               comments)     Comments:  responded to staff request for pt support in room 322. Pt discussed many of his medical concerns and processed some of his complex pscyho-social concerns. Pt then discussed his mainor, preeminently as [address], however, he has come to respect and appreciate [address] and [facility], including praying the Rosary along with friends who are Moravian. Pt notes his mainor has been a silver lining allowing him what he has needed to cope and find strength through the many ups and downs he has faced.  provided a listening presence as pt processed life and mainor. Additionally,  provided pt with a Rosary and booklet on How to [address] at pt's request. Pt also discussed the importance of finding a Shinto community, namely a [employer], as he is expressing interest in finding a community of mainor. Spiritual care will continue to follow as able and as needed.      10[address] Anna Mccarthy M.Div, M.S, Anthony Schwartz4 available at [phone]

## 2017-06-01 NOTE — PROGRESS NOTES
SHIFT REPORT  Bedside and Verbal shift change report given to Indu VALDES (oncoming nurse) by Rody Miranda RN (offgoing nurse). Report included the following information SBAR, Kardex, MAR and Cardiac Rhythm Normal sinus rhythm. SHIFT ASSESSMENT    1920 Pt requested 2 juices, assisted by RN. Pt CIWA is 2.     81747 CIWA is 1. Pt resting. [place] is 1. Pt resting. END SHIFT REPORT  Bedside and Verbal shift change report given to Priti Marie (oncoming nurse) by Grisel Fernandez RN (offgoing nurse). Report included the following information SBAR, Kardex and MAR, sinus rhythm.

## 2017-06-01 NOTE — PROGRESS NOTES
Name: Suresh [facility] [address]: [address]   : 1955 Admit Date: 2017   Phone: [phone]  Room: [facility]   PCP: None  MRN: [id number]   Date: 2017  Code: Full Code          Chart and notes reviewed. Data reviewed. I review the patient's current medications in the medical record at each encounter. I have evaluated and examined the patient. Overnight events  Afebrile  Sats 93% on 2L  Hgb A1C 7.4  CIWA 1  ECHO: EF 15-20% with diffuse akinesis; grade 2 diastolic dysfunction  Cardiac MRI pending    ROS: Feeling overwhelmed about his newly diagnosed heart disease and DM. States his SOB is improved overall. Still with some BERNABE. Has occasional dry cough. Denies fever or chills. Denies CP. Denies abd pain ro EL pain/swelling. Physical Exam:   Visit Vitals    /66 (BP 1 Location: Left arm, BP Patient Position: At rest)    Pulse 74    Temp 97.9 °F (36.6 °C)    Resp 16    Ht 5' 10\" (1.778 m)    Wt 105.1 kg (231 lb 9.6 oz)    SpO2 93%    BMI 33.23 kg/m2       General:  Alert, cooperative, no distress, appears stated age. Head:  Normocephalic, without obvious abnormality, atraumatic. Eyes:  Conjunctivae/corneas clear. Nose: Nares normal. Septum midline. Mucosa normal.    Throat: Lips, mucosa, and tongue normal.    Neck: Supple, symmetrical, trachea midline, no adenopathy. Lungs:   Significantly improved air movement today. No wheeze appreciated and lung sound are mostly clear. Chest wall:  No tenderness or deformity. Heart:  Regular rate and rhythm, S1, S2 normal, no murmur, click, rub or gallop. Abdomen:   Obese, soft, non-tender. Bowel sounds normal. No masses,  No organomegaly. Extremities: Extremities normal, atraumatic, no cyanosis or edema. Pulses: 2+ and symmetric all extremities.    Skin: Skin color, texture, turgor normal. No rashes or lesions   Lymph nodes: Cervical, supraclavicular nodes normal.   Neurologic: Grossly nonfocal       Lab Results   Component Value Date/Time    Sodium 140 05/31/2017 12:30 AM    Potassium 3.6 05/31/2017 12:30 AM    Chloride 103 05/31/2017 12:30 AM    CO2 27 05/31/2017 12:30 AM    BUN 10 05/31/2017 12:30 AM    Creatinine 1.01 05/31/2017 12:30 AM    Glucose 133 05/31/2017 12:30 AM    Calcium 8.5 05/31/2017 12:30 AM       Lab Results   Component Value Date/Time    WBC 8.8 05/30/2017 03:37 PM    HGB 14.8 05/30/2017 03:37 PM    PLATELET 599 73/31/2464 03:37 PM    MCV 97.1 05/30/2017 03:37 PM       Lab Results   Component Value Date/Time    AST (SGOT) 14 05/30/2017 03:37 PM    Alk.  phosphatase 85 05/30/2017 03:37 PM    Protein, total 6.9 05/30/2017 03:37 PM    Albumin 2.9 05/30/2017 03:37 PM    Globulin 4.0 05/30/2017 03:37 PM       No results found for: IRON, FE, TIBC, IBCT, PSAT, FERR    No results found for: SR, CRP, ELPIDIO, ANAIGG, RA, RPR, RPRT, VDRLT, VDRLS, TSH, TSHEXT, TSHEXT     No results found for: PH, PHI, PCO2, PCO2I, PO2, PO2I, HCO3, HCO3I, FIO2, FIO2I    Lab Results   Component Value Date/Time    Troponin-I, Qt. 1.46 05/31/2017 12:30 AM        No results found for: CULT    No results found for: TOXA1, RPR, HBCM, HBSAG, HAAB, HCAB, HCAB1, HAAT, G6PD, CRYAC, HIVGT, HIVR, HIV1, HIV12, HIVPC, HIVRPI    No results found for: VANCT, CPK    No results found for: COLOR, APPRN, SPGRU, BRUNO, PROTU, GLUCU, KETU, BILU, BLDU, UROU, BATOOL, LEUKU, WBCU, RBCU, UEPI, BACTU, CASTS, UCRY      Images: no new chest images this morning    IMPRESSION  · Acute hypoxic respiratory failure  · Recent anterior MI  · ICM: EF 37-31%  · Acute diastolic heart failure with likely ischemia and LV dysfunction  · Wheezing with likely underlying COPD and probable exacerbation: improved  · Pulmonary edema  · Heavy tobacco use  · Alcohol abuse    PLAN  · Wean O2 as able to keep sats > 90%  · Check 6 MWT and overnight oximetry prior to discharge  · Continue schedule Duonebs and Breo  · Switch Solumedrol to prednisone taper  · F/U cardiac MRI; cardiology following and considering potential cath  · Life vest ordered  · Diuresis with Bumex per cardiology  · ASA, Bblocker, ARB per cardiology  · Discussed the importance of complete smoking cessation; nicotine patch in place  · Low dose Xanax 0.5mg at night if needed for anxiety  · CIWA protocol  · Patient would benefit from outpatient pulmonary follow up with complete PFTs and sleep evaluation. He will be referred to research. · GI prophylaxis: not indicated, cardiac diet  · DVT prophylaxis: Lovenox      Patient is stable from a pulmonary standpoint. We will sign off and arrange for outpatient follow up as above. Please call with questions.     Andrea Calle

## 2017-06-01 NOTE — PROGRESS NOTES
SHIFT REPORT  Bedside and Verbal shift change report given to Indu VALDES (oncoming nurse) by Dore Romberg (offgoing nurse). Report included the following information SBAR, Kardex and MAR. SHIFT ASSESSMENT    2030 Pt said Xanax made me to feel worst in the morning then a hangover and doesn't want it tonight. Pt said he is overwelment with all the new health issues. 0130 Tele called for pt heart rate in 140. Pt was checked. Pt went to bathroom. 0232 Pt resting quietly in bed. END SHIFT REPORT  Bedside and Verbal shift change report given to Brian Cunningham (oncoming nurse) by Kierra Colunga RN (offgoing nurse). Report included the following information SBAR, Kardex and MAR.

## 2017-06-01 NOTE — PROGRESS NOTES
[facility] [address] [place] [address]   Office [phone]  Fax [phone]       Initial Consult Note     Name: Rene Lynne MRN: [id number]  Sex: male    YOB: 1955  Age: 59 y.o. PCP: None     Date of admission:    5/30/2017  Date of consultation:   6/1/2017  Requesting physician:   Liz Mobley  Reason for consultation:   Medical management of diabetes    History of present illness  Rene Lynne is a 59 y.o. male with hx of tobacco and alcohol abuse who is admitted for the ACHF . The pt presented to the ER with progressive SOB for the last couple of weeks, on admission was diagnosed with ACHF. The pt is managed by the cardiologist.   The patient denies any history of diabetes. Has not seen the last PCP since 1985. No vision change,no polyuria, no frequency with urination. Pt endorses intermittent bilateral leg discomfort, denies tingling or numbness. Pt denies chest pain now, reports SOB improved, . Home Medications   Prior to Admission medications    Medication Sig Start Date End Date Taking? Authorizing Provider   ibuprofen (MOTRIN) 200 mg tablet Take 600 mg by mouth every eight (8) hours as needed for Pain. Yes Historical Provider   guaiFENesin (ROBITUSSIN) 100 mg/5 mL liquid Take 200 mg by mouth four (4) times daily.    Yes Historical Provider       Allergies   No Known Allergies    Past Medical History   Past Medical History:   Diagnosis Date    Acute on chronic systolic heart failure ([address] 6/1/2017    Controlled type 2 diabetes mellitus with circulatory disorder ([address] 6/1/2017    Coronary artery disease involving native coronary artery without angina pectoris 6/1/2017    ETOH abuse 6/1/2017    MI (myocardial infarction) ([address] 6/1/2017    Nicotine dependence 6/1/2017       Past Surgical History  Past Surgical History:   Procedure Laterality Date    HX TONSILLECTOMY      HX WISDOM TEETH EXTRACTION         Family History  Father: with multiple heart attacks in early 35s. No history of diabetes. Social History   Living arrangements: patient lives with the [facility]    Ambulates: Independently     Alcohol history: Yes, significant. 6-7 beers per day, last drink 5 days ago on Saturday 5/27. Smoking history: smoker  (2-2.5 ppd x 30 yrs)    Illicit drug history: no history of illicit drug use        Review of Systems  Review of Systems   Constitutional: Positive for activity change. Negative for appetite change, diaphoresis and fatigue. HENT: Negative for congestion, hearing loss and trouble swallowing. Eyes: Negative for photophobia, pain and visual disturbance. Respiratory: Negative for chest tightness, shortness of breath and wheezing. Cardiovascular: Negative for chest pain, palpitations and leg swelling. Gastrointestinal: Negative for abdominal pain and nausea. Endocrine: Negative for polydipsia and polyuria. Genitourinary: Negative for difficulty urinating, dysuria and flank pain. Musculoskeletal: Negative for back pain and myalgias. Neurological: Negative for headaches. Physical Exam  Objective  O2 Flow Rate (L/min): 2 l/min O2 Device: Nasal cannula   General:  Alert, cooperative, no distress, appears stated age. Head:  Normocephalic, without obvious abnormality, atraumatic. Eyes:  Conjunctivae/corneas clear. Nose: Nares normal. Septum midline. Mucosa normal.    Throat: Lips, mucosa, and tongue normal.    Neck: Supple, symmetrical, trachea midline, no adenopathy. Lungs: No wheezing, no rales. Chest wall:  No tenderness or deformity. Heart:  Regular rate and rhythm, S1, S2 normal, no murmur, click, rub or gallop. Abdomen:  Obese, soft, non-tender. Bowel sounds normal. No masses, No organomegaly. Extremities: Extremities normal, atraumatic, no cyanosis or edema. Pulses: 2+ and symmetric all extremities.    Skin: Skin color, texture, turgor normal. No rashes or lesions   Lymph nodes: Cervical, supraclavicular nodes normal.   Neurologic: Grossly nonfocal       Laboratory Data  No results found for this or any previous visit (from the past 8 hour(s)). Imaging  CXR Results  (Last 48 hours)               05/30/17 1521  XR CHEST PA LAT Final result    Impression:  IMPRESSION:   1. Findings consistent with congestive heart failure. Narrative:  Exam:  2 view chest       Indication: Shortness of breath, times several weeks left-sided chest pain, no   cough or fever. COMPARISON: None       PA and lateral views demonstrate mild cardiomegaly. The patient is on a cardiac   monitor. There are small bilateral pleural effusions. There is pulmonary vascular   congestion and mild to moderate pulmonary edema. There are Kerley B lines   present. These findings are consistent with congestive heart failure. CT Results  (Last 48 hours)    None        EKG (5/30/2017): Normal sinus rhythm. Possible Left atrial enlargement. Low voltage QRS . Possible Anterolateral infarct , age undetermined . T wave abnormality, consider inferior ischemia. Abnormal ECG          Impression / Recommendations     Timi Mae is a 59 y.o. male who is admitted for ACHF. The Family Medicine Service was consulted for medical management of newly diagnosed diabetes. Diabetes mellitus type 2. Newly diagnosed. HgA1C  7.4 (5/31/2017). In the setting on NSTEMI. -POC BG checks ACHS with SSI while IP  -Will consider Metformin as the first line treatment after the discharge  -The pt needs eye exam, foot exam and urine microalbumin    ACHF/ STEMI.  Management per primary team ( cardiology)  -Metoprolol XL 25 mg daily  -Bumex 0.5mg daily  -Diovan 20 mg every 12 hours   -ASA  -Crestor 5 mg daily     COPD exacerbation.   -Pulmonologist is following, management per them  -Duonebs  -prednisone  -OP follow up for PFT as outpatient     Tobacco abuse.  -Continue Nitotine patch 21mg/24h daily  -Discussed smoking cessation and the benefits from it    Alcohol abuse. Drinks 6-7 beers per day. Last drink 5 days ago. -CIWA protocol          FEN/GI - Cardiac diet per primary team.   DVT prophylaxis - Lovenox  GI prophylaxis - Not indicated at this time  Disposition - Plan to d/c to Per primary team.  Code Status - Full, discussed with patient / caregivers. Thank you very much for allowing us to participate in the care of this pleasant patient. The family medicine service will continue to follow the patient's medical progress along with you. Please do not hesitate to page with any questions or to discuss the case (pager # [phone]).     Patient will be discussed with Dr. Hiro Funk by:     Beto Raymundo MD  Family Medicine Resident        For Billing    Chief Complaint   Patient presents with    [facility] Problems  Date Reviewed: 6/1/2017          Codes Class Noted POA    Coronary artery disease involving native coronary artery without angina pectoris ICD-10-CM: I25.10  ICD-9-CM: 414.01  6/1/2017 Yes        * (Principal)Acute on chronic systolic heart failure ([facility] ICD-10-CM: I50.23  ICD-9-CM: 428.23  6/1/2017 Yes        ETOH abuse (Chronic) ICD-10-CM: F10.10  ICD-9-CM: 305.00  6/1/2017 Yes        MI (myocardial infarction) ([facility] (Chronic) ICD-10-CM: I21.3  ICD-9-CM: 410.90  6/1/2017 No        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

## 2017-06-01 NOTE — PROGRESS NOTES
Cardiology Progress Note    [facility]      NAME:  Augie Millard   :   1955   MRN:   [id number]     Assessment/Plan:   Recent    AMI- OOH       - MRI this am- if viability noted plan cath in am       - ASA, BB, statin   HFrEF- ischemic EF 15%        - cont low dose BB       - hold diovan this am 2/2 hypotension- decrease dose        - low dose buemx       - Life vest ordered for SCD px- long DW pt         - wean O2, 6mwt, overnight ox        - cardaic rehab ed        - case management for disability/care card/medicaide aj   DM- new onset      - diabetes ed       -consult FP      - ? jardiance given good HF data   Nicotine addition  Etoh abuse  DVT px:   GI px:  Care Coordination: 35 minutes spent reviewing data, dx, treatment w/ pt, coordinating care with team including care management, and  arranging tests, consult, vest .    Agreed. Pt seen and examined. Breathing better. Multiple noncardiac issues and \"opinions\".  RRR. F/U MRI for viability. If viability, then consider cath. Subjective:   Augie Millard is a 59y.o. year old male w/ no prior hx:   except for long term smoker/beer drinker. Acute chest pain 2 weeks ago on Mother's Day, lasting 3 days. Now with progressive BERNABE/orthopnea/PND, much worse today. No recurrent chest pain. No edema.      EKG in ED - recent anterior MI with Qs V1-5, evolving T wave inversions. CXR - ADHF  Troponin 1+    A1C 7.4   proBNP 7000  Overnight activities reviewed:    - BP lowish overnight    - Tele SR    - cardiac MRI this am         - Lab s pending  - UO: 1150      I/O +530 /24, net I/O -545   - CIWA 0-2     Cardiac ROS: class 2-3 sx- fatigue/BERNABE, Patient denies any exertional chest pain, dyspnea, palpitations, syncope, orthopnea, edema or paroxysmal nocturnal dyspnea. Review of Systems: fatigue, No nausea, indigestion, vomiting, pain, cough, sputum. No bleeding. Taking po.  Appetite OK, OOB         CARDIAC EVALUATION   ECHO: 2017:  EF 15-20%, apical akinesis    Objective:     Visit Vitals    /66 (BP 1 Location: Left arm, BP Patient Position: At rest)    Pulse 74    Temp 97.9 °F (36.6 °C)    Resp 16    Ht 5' 10\" (1.778 m)    Wt 231 lb 9.6 oz (105.1 kg)    SpO2 93%    BMI 33.23 kg/m2      O2 Flow Rate (L/min): 2 l/min O2 Device: Nasal cannula    Temp (24hrs), Av.7 °F (37.1 °C), Min:97.9 °F (36.6 °C), Max:99.5 °F (37.5 °C)        Intake/Output Summary (Last 24 hours) at 17  Last data filed at 17 3566   Gross per 24 hour   Intake             1200 ml   Output             1425 ml   Net             -225 ml       TELE: SR     General: AAOx3 cooperative, no acute distress. HEENT: Atraumatic. Pink and moist.  Anicteric sclerae. Neck: Supple,     Lungs: 2 lpm nc CTA bilaterally. No wheezing/rhonchi/crackles. Heart: PMI: diminished, Regular rhythm, no murmur, no S3, no rubs, no gallops. No JVD. + HJR, No carotid bruits. Abdomen: Soft, non-distended, non-tender. + Bowel sounds. No bruits. : voiding   Extremities: No edema, no clubbing, no cyanosis. No calf tenderness  Neurologic: Grossly intact. Alert and oriented X 3. No acute neurological distress. Psych: improving  insight. Not anxious nor agitated.       Care Plan discussed with:    Comments   Patient y    Family      RN y    Care Manager                    Consultant:          Data Review:   CMP: No results found for: NA, K, CL, CO2, AGAP, GLU, BUN, CREA, GFRAA, GFRNA, CA, MG, PHOS, ALB, TBIL, TBILI, TP, ALB, GLOB, AGRAT, SGOT, ALT, GPT  CBC: No results found for: WBC, HGB, HCT, PLT, HGBEXT, HCTEXT, PLTEXT  All Cardiac Markers in the last 24 hours: No results found for: CPK, CKMMB, CKMB, RCK3, CKMBT, CKNDX, CKND1, VINAYAK, TROPT, TROIQ, LEN, TROPT, TNIPOC, BNP, BNPP  Recent Glucose Results: No results found for: GLUCPOC, GLU, GLUPOC  ABG: No results found for: PH, PHI, PCO2, PCO2I, PO2, PO2I, HCO3, HCO3I, FIO2, FIO2I  LIPIDS:  Cholesterol, total   Date Value Ref Range Status   05/31/2017 138 <200 MG/DL Final     Triglyceride   Date Value Ref Range Status   05/31/2017 126 <150 MG/DL Final     Comment:     Based on NCEP-ATP III:  Triglycerides <150 mg/dL  is considered normal, 150-199 mg/dL  borderline high,  200-499 mg/dL high and  greater than or equal to 500 mg/dL very high. HDL Cholesterol   Date Value Ref Range Status   05/31/2017 42 MG/DL Final     Comment:     Based on NCEP ATP III, HDL Cholesterol <40 mg/dL is considered low and >60 mg/dL is elevated.      LDL, calculated   Date Value Ref Range Status   05/31/2017 70.8 0 - 100 MG/DL Final     Comment:     Based on the NCEP-ATP: LDL-C concentrations are considered  optimal <100 mg/dL,  near optimal/above Normal 100-129 mg/dL  Borderline High: 130-159, High: 160-189 mg/dL  Very High: Greater than or equal to 190 mg/dL       VLDL, calculated   Date Value Ref Range Status   05/31/2017 25.2 MG/DL Final     CHOL/HDL Ratio   Date Value Ref Range Status   05/31/2017 3.3 0 - 5.0   Final       Medications reviewed  Current Facility-Administered Medications   Medication Dose Route Frequency    metoprolol succinate (TOPROL-XL) XL tablet 25 mg  25 mg Oral DAILY    bumetanide (BUMEX) tablet 0.5 mg  0.5 mg Oral DAILY    nicotine (NICODERM CQ) 21 mg/24 hr patch 1 Patch  1 Patch TransDERmal DAILY    ALPRAZolam (XANAX) tablet 0.5 mg  0.5 mg Oral QHS PRN    albuterol-ipratropium (DUO-NEB) 2.5 MG-0.5 MG/3 ML  3 mL Nebulization QID RT    fluticasone-vilanterol (BREO ELLIPTA) 100mcg-25mcg/puff  1 Puff Inhalation DAILY    methylPREDNISolone (PF) (SOLU-MEDROL) injection 40 mg  40 mg IntraVENous Q8H    LORazepam (ATIVAN) injection 2 mg  2 mg IntraVENous Q1H PRN    LORazepam (ATIVAN) injection 4 mg  4 mg IntraVENous Q1H PRN    rosuvastatin (CRESTOR) tablet 5 mg  5 mg Oral QHS    sodium chloride (NS) flush 5-10 mL  5-10 mL IntraVENous Q8H    sodium chloride (NS) flush 5-10 mL 5-10 mL IntraVENous PRN    aspirin chewable tablet 81 mg  81 mg Oral DAILY    valsartan (DIOVAN) tablet 40 mg  40 mg Oral Q12H    enoxaparin (LOVENOX) injection 40 mg  40 mg SubCUTAneous Q24H    albuterol (ACCUNEB) nebulizer solution 1.25 mg  1.25 mg Nebulization Q6H PRN         Selena Carrera RN, ACNP-BC, [facility]

## 2017-06-01 NOTE — DISCHARGE SUMMARY
Cardiology Discharge Summary     Patient ID:       Joe Bourne  [id number]  57 y.o.  1955    Admit Date: 5/30/2017    Discharge Date: 6/1/2017     Admitting Physician: Janet Solano MD   PCP: None    Discharge Physician: Dr. Ramirez Saliva: Pulmonary/Intensive care and family practice                     Cardiac Rehab,   Case management     Admission Diagnoses: Acute congestive heart failure [facility])    Discharge Diagnoses:    Patient Active Problem List   Diagnosis Code    Acute congestive heart failure ([facility] I50.9    Coronary artery disease involving native coronary artery without angina pectoris I25.10    Acute on chronic systolic heart failure ([facility] I50.23    ETOH abuse F10.10    MI (myocardial infarction) ([facility] I21.3    Controlled type 2 diabetes mellitus with circulatory disorder [facility] E11.59    Nicotine dependence F17.200       Discharge Condition: Stable  Care Coordination: 45 minutes spent coordinating care, DC planning, arranging follow up, reviewing dx, medications, treatment plan, follow up plans, and   Cardiology Procedures this Admission:  Left heart catheterization with PCI  EchoCardiogram  MRI     Hospital Course:    Joe Bourne  is a 59y.o. year old male w/ no prior hx:   except for long term smoker/beer drinker. Acute chest pain 2 weeks ago on Mother's Day, lasting 3 days. Now with progressive BERNABE/orthopnea/PND. No recurrent chest pain. No edema.      EKG in ED - recent anterior MI with Qs V1-5, evolving T wave inversions. CXR - ADHF  Troponin 1+     He   was admitted to telemetry unit  for further observation and medical therapy. He  was treated with lovenox,  beta blockers, ASA, statin. ECHO: 5/31//2017: EF 15-20%, apical AK. He underwent a cardiac MRI for viability.      MRI: 6/1/2017: LV dilated LVEF 32%, severe HK ant wall, RVEF 55%, mild MR; anterior infarct- no viability RCA/LCA: viable   Subsequently underwent a cardiac CATH 6/2/2017: LM: nl, LAD: m100%, LCX codom normal, RCA:m 85%, LVEDP 25, no AV gradient, dilated LV, apical AK, EF 20%  -- s/p PCI pRCA: 2.5x18 Xience (EMORY). He was treated with DAPT, BB ,and statin   Refer patient to phase II outpatient cardiac rehab @ [facility]. HFrEF- ischemic EF 32% by MRI - proBNP 7000- He was treated with IV loops-> po. His BP only tolerated  low dose BB. He was challanged with diovan which was stopped 2/2 hypotension. Life vest   for SCD px. Overnight oximetry showed desat. [facility]       Acute Hypoxic resp failure- combined HF and COPD. Seen by pulm. Steroids and nebs added. He will follow up with pulm as OP who are planning to enroll him in a study. DM- new onset. A1C 7.4 Family practice consulted. Diabetes education-   Metformin to start 6/5. Family practice to follow. Nicotine addition- nicoderm   Etoh abuse- CIWAh scale   $ issues- case management consulted- discussed . disability/medicaide- care card application provided. meds changed to $4 list. Samples provided by pulm. Care card application        LIPIDS:   Lab Results  Component Value Date/Time   Cholesterol, total 138 05/31/2017 12:30 AM   HDL Cholesterol 42 05/31/2017 12:30 AM   LDL, calculated 70.8 05/31/2017 12:30 AM   Triglyceride 126 05/31/2017 12:30 AM   CHOL/HDL Ratio 3.3 05/31/2017 12:30 AM       Lab Results  Component Value Date/Time   ALT (SGPT) 29 05/30/2017 03:37 PM   AST (SGOT) 14 05/30/2017 03:37 PM   Alk. phosphatase 85 05/30/2017 03:37 PM   Bilirubin, total 0.2 05/30/2017 03:37 PM              The patient was seen by cardiac rehab for education. Up ambulating with assistance. Case mangement was consulted for discharge planning. The patient/family was kept apprised of his disease process and treatment plan of care. After receiving maximum benefit from his in-patient hospitalization, he was ready for discharge. At the time of discharge  He was up ambulating and hemodynamically stable.      Discharge Exam: Visit Vitals    /66 (BP 1 Location: Left arm, BP Patient Position: At rest)    Pulse 74    Temp 97.9 °F (36.6 °C)    Resp 16    Ht 5' 10\" (1.778 m)    Wt 231 lb 9.6 oz (105.1 kg)    SpO2 93%    BMI 33.23 kg/m2     See Final Progress Note    Disposition: home    Patient Instructions: see AVS       Referenced discharge instructions provided by nursing for diet and activity. Follow-up with   Future Appointments  Date Time Provider Catalina Riley   6/7/2017 11:20 AM Vishal Beatty MD [facility] LANDA   6/8/2017 1:00 PM Yanely Arroyo MD FP [facility] SCHED       Signed:  Calos Jeffery.  Brayton Boxer, RN, ACNP-BC, Terri Bojorquez MD    6/1/2017  10:13 AM

## 2017-06-01 NOTE — PROGRESS NOTES
Bedside and Verbal shift change report given to Caryle Murdoch (oncoming nurse) by Samir Vidal (offgoing nurse). Report included the following information SBAR, Kardex, Intake/Output, MAR, Recent Results and Cardiac Rhythm nsr. At time of report, pt is off floor to cardiac MRI, was NOT assessed by me. Per RN pt was NOT NPO since midnight. Schuyler in MRI notified, is ok. 0930 pt back on floor, tele notified. Janel Pelt notified of /66. Orders for am labs, consults to CM to order life vest, diabetes mgmt, cardiac rehab    0936 Janel Pelt at bedside. Verbal orders to hold diovan. 0674 verbal orders to dc siezure precautions, CIWA protocol. Placing order for  visit. 1040 order for consult to family practice, overnight oximetry, 6 minute walk, diovan 20 mg q12, am pro BNP    1130 Alicja Jimenez at bedside. Order for PO prednisone taper    1312 orders for POC glucose ACHS, SSI, diabetes order set. 1620 telephone orders taken from Janel Pelt to make pt NPO with meds at midnight. Per Lifevest rep, she is concerned re: pt compliance. Also, per rep, pt is making depressed statements. Janel Pelt notified, telephone orders taken for psych consult. 1710 pt refusing life vest. Explained purpose & importance, still refusing. Janel Pelt notified. Telephone orders taken from Dr Joya Morales to change diet to cardiac [facility].    1800 pt agreed to wear life vest, spoke at length about importance of adherence to medical regimen after discharge. 1830 informational handouts provided to pt on cardiac catheterization & diabetes. Bedside and Verbal shift change report given to Indu (oncoming nurse) by Caryle Murdoch (offgoing nurse). Report included the following information SBAR, Kardex, Procedure Summary, Intake/Output, MAR, Recent Results and Cardiac Rhythm nsr. Night RN aware pt is NPO at midnight.

## 2017-06-02 VITALS
RESPIRATION RATE: 20 BRPM | DIASTOLIC BLOOD PRESSURE: 114 MMHG | OXYGEN SATURATION: 94 % | HEART RATE: 72 BPM | SYSTOLIC BLOOD PRESSURE: 128 MMHG | BODY MASS INDEX: 32.47 KG/M2 | WEIGHT: 226.8 LBS | TEMPERATURE: 99 F | HEIGHT: 70 IN

## 2017-06-02 LAB
ANION GAP BLD CALC-SCNC: 8 MMOL/L (ref 5–15)
ATRIAL RATE: 63 BPM
BNP SERPL-MCNC: 6586 PG/ML (ref 0–125)
BUN SERPL-MCNC: 22 MG/DL (ref 6–20)
BUN/CREAT SERPL: 20 (ref 12–20)
CALCIUM SERPL-MCNC: 8.9 MG/DL (ref 8.5–10.1)
CALCULATED P AXIS, ECG09: 57 DEGREES
CALCULATED R AXIS, ECG10: -108 DEGREES
CALCULATED T AXIS, ECG11: -134 DEGREES
CHLORIDE SERPL-SCNC: 103 MMOL/L (ref 97–108)
CO2 SERPL-SCNC: 28 MMOL/L (ref 21–32)
CREAT SERPL-MCNC: 1.1 MG/DL (ref 0.7–1.3)
DIAGNOSIS, 93000: NORMAL
GLUCOSE BLD STRIP.AUTO-MCNC: 129 MG/DL (ref 65–100)
GLUCOSE BLD STRIP.AUTO-MCNC: 131 MG/DL (ref 65–100)
GLUCOSE BLD STRIP.AUTO-MCNC: 170 MG/DL (ref 65–100)
GLUCOSE BLD STRIP.AUTO-MCNC: 231 MG/DL (ref 65–100)
GLUCOSE SERPL-MCNC: 128 MG/DL (ref 65–100)
MAGNESIUM SERPL-MCNC: 2 MG/DL (ref 1.6–2.4)
P-R INTERVAL, ECG05: 162 MS
POTASSIUM SERPL-SCNC: 4.1 MMOL/L (ref 3.5–5.1)
Q-T INTERVAL, ECG07: 476 MS
QRS DURATION, ECG06: 98 MS
QTC CALCULATION (BEZET), ECG08: 487 MS
SERVICE CMNT-IMP: ABNORMAL
SODIUM SERPL-SCNC: 139 MMOL/L (ref 136–145)
VENTRICULAR RATE, ECG03: 63 BPM

## 2017-06-02 PROCEDURE — C1769 GUIDE WIRE: HCPCS

## 2017-06-02 PROCEDURE — C1874 STENT, COATED/COV W/DEL SYS: HCPCS

## 2017-06-02 PROCEDURE — 027034Z DILATION OF CORONARY ARTERY, ONE ARTERY WITH DRUG-ELUTING INTRALUMINAL DEVICE, PERCUTANEOUS APPROACH: ICD-10-PCS | Performed by: SPECIALIST

## 2017-06-02 PROCEDURE — 77030019569 HC BND COMPR RAD TERU -B

## 2017-06-02 PROCEDURE — 74011250636 HC RX REV CODE- 250/636: Performed by: SPECIALIST

## 2017-06-02 PROCEDURE — 82962 GLUCOSE BLOOD TEST: CPT

## 2017-06-02 PROCEDURE — 77010033678 HC OXYGEN DAILY

## 2017-06-02 PROCEDURE — B2151ZZ FLUOROSCOPY OF LEFT HEART USING LOW OSMOLAR CONTRAST: ICD-10-PCS | Performed by: SPECIALIST

## 2017-06-02 PROCEDURE — 36415 COLL VENOUS BLD VENIPUNCTURE: CPT | Performed by: NURSE PRACTITIONER

## 2017-06-02 PROCEDURE — B2111ZZ FLUOROSCOPY OF MULTIPLE CORONARY ARTERIES USING LOW OSMOLAR CONTRAST: ICD-10-PCS | Performed by: SPECIALIST

## 2017-06-02 PROCEDURE — 80048 BASIC METABOLIC PNL TOTAL CA: CPT | Performed by: NURSE PRACTITIONER

## 2017-06-02 PROCEDURE — 97161 PT EVAL LOW COMPLEX 20 MIN: CPT

## 2017-06-02 PROCEDURE — 77030013521 HC DEV INFL BLN BSC -B

## 2017-06-02 PROCEDURE — 94640 AIRWAY INHALATION TREATMENT: CPT

## 2017-06-02 PROCEDURE — 93005 ELECTROCARDIOGRAM TRACING: CPT

## 2017-06-02 PROCEDURE — 74011000250 HC RX REV CODE- 250: Performed by: SPECIALIST

## 2017-06-02 PROCEDURE — 4A023N7 MEASUREMENT OF CARDIAC SAMPLING AND PRESSURE, LEFT HEART, PERCUTANEOUS APPROACH: ICD-10-PCS | Performed by: SPECIALIST

## 2017-06-02 PROCEDURE — 74011250637 HC RX REV CODE- 250/637: Performed by: SPECIALIST

## 2017-06-02 PROCEDURE — 74011636320 HC RX REV CODE- 636/320: Performed by: SPECIALIST

## 2017-06-02 PROCEDURE — 74011000258 HC RX REV CODE- 258: Performed by: SPECIALIST

## 2017-06-02 PROCEDURE — 74011000250 HC RX REV CODE- 250: Performed by: PHYSICIAN ASSISTANT

## 2017-06-02 PROCEDURE — 93458 L HRT ARTERY/VENTRICLE ANGIO: CPT

## 2017-06-02 PROCEDURE — 83880 ASSAY OF NATRIURETIC PEPTIDE: CPT | Performed by: NURSE PRACTITIONER

## 2017-06-02 PROCEDURE — C1887 CATHETER, GUIDING: HCPCS

## 2017-06-02 PROCEDURE — 77030029065 HC DRSG HEMO QCLOT ZMED -B

## 2017-06-02 PROCEDURE — 77030004532 HC CATH ANGI DX IMP BSC -A

## 2017-06-02 PROCEDURE — C1894 INTRO/SHEATH, NON-LASER: HCPCS

## 2017-06-02 PROCEDURE — 83735 ASSAY OF MAGNESIUM: CPT | Performed by: NURSE PRACTITIONER

## 2017-06-02 RX ORDER — ATORVASTATIN CALCIUM 20 MG/1
20 TABLET, FILM COATED ORAL DAILY
Qty: 30 TAB | Refills: 0 | Status: SHIPPED | OUTPATIENT
Start: 2017-06-02 | End: 2017-06-14 | Stop reason: SDUPTHER

## 2017-06-02 RX ORDER — PREDNISONE 20 MG/1
20 TABLET ORAL
Qty: 3 TAB | Refills: 0 | Status: SHIPPED | OUTPATIENT
Start: 2017-06-05 | End: 2017-06-14 | Stop reason: ALTCHOICE

## 2017-06-02 RX ORDER — BUMETANIDE 0.5 MG/1
0.5 TABLET ORAL DAILY
Qty: 30 TAB | Refills: 0 | Status: SHIPPED | OUTPATIENT
Start: 2017-06-02 | End: 2017-07-03 | Stop reason: SDUPTHER

## 2017-06-02 RX ORDER — METOPROLOL SUCCINATE 25 MG/1
25 TABLET, EXTENDED RELEASE ORAL DAILY
Qty: 30 TAB | Refills: 0 | Status: SHIPPED | OUTPATIENT
Start: 2017-06-02 | End: 2017-07-03 | Stop reason: SDUPTHER

## 2017-06-02 RX ORDER — SODIUM CHLORIDE 9 MG/ML
75 INJECTION, SOLUTION INTRAVENOUS CONTINUOUS
Status: DISPENSED | OUTPATIENT
Start: 2017-06-02 | End: 2017-06-02

## 2017-06-02 RX ORDER — MIDAZOLAM HYDROCHLORIDE 1 MG/ML
.5-1 INJECTION, SOLUTION INTRAMUSCULAR; INTRAVENOUS
Status: DISCONTINUED | OUTPATIENT
Start: 2017-06-02 | End: 2017-06-02 | Stop reason: HOSPADM

## 2017-06-02 RX ORDER — HEPARIN SODIUM 200 [USP'U]/100ML
500 INJECTION, SOLUTION INTRAVENOUS
Status: DISCONTINUED | OUTPATIENT
Start: 2017-06-02 | End: 2017-06-02 | Stop reason: HOSPADM

## 2017-06-02 RX ORDER — IBUPROFEN 200 MG
1 TABLET ORAL DAILY
Qty: 1 PATCH | Refills: 0 | Status: SHIPPED | OUTPATIENT
Start: 2017-06-02 | End: 2017-07-02

## 2017-06-02 RX ORDER — PREDNISONE 10 MG/1
10 TABLET ORAL
Qty: 3 TAB | Refills: 0 | Status: SHIPPED | OUTPATIENT
Start: 2017-06-08 | End: 2017-06-14 | Stop reason: ALTCHOICE

## 2017-06-02 RX ORDER — PREDNISONE 20 MG/1
40 TABLET ORAL
Qty: 6 TAB | Refills: 0 | Status: SHIPPED | OUTPATIENT
Start: 2017-06-02 | End: 2017-06-14 | Stop reason: ALTCHOICE

## 2017-06-02 RX ORDER — LIDOCAINE HYDROCHLORIDE 10 MG/ML
1-20 INJECTION INFILTRATION; PERINEURAL
Status: DISCONTINUED | OUTPATIENT
Start: 2017-06-02 | End: 2017-06-02 | Stop reason: HOSPADM

## 2017-06-02 RX ORDER — SODIUM CHLORIDE 0.9 % (FLUSH) 0.9 %
5-10 SYRINGE (ML) INJECTION EVERY 8 HOURS
Status: DISCONTINUED | OUTPATIENT
Start: 2017-06-02 | End: 2017-06-02 | Stop reason: HOSPADM

## 2017-06-02 RX ORDER — FLUTICASONE FUROATE AND VILANTEROL 100; 25 UG/1; UG/1
1 POWDER RESPIRATORY (INHALATION) DAILY
Qty: 1 INHALER | Refills: 0 | Status: SHIPPED | OUTPATIENT
Start: 2017-06-02 | End: 2017-11-01

## 2017-06-02 RX ORDER — SODIUM CHLORIDE 0.9 % (FLUSH) 0.9 %
5-10 SYRINGE (ML) INJECTION AS NEEDED
Status: DISCONTINUED | OUTPATIENT
Start: 2017-06-02 | End: 2017-06-02 | Stop reason: HOSPADM

## 2017-06-02 RX ORDER — NITROGLYCERIN 0.4 MG/1
0.4 TABLET SUBLINGUAL
Status: DISCONTINUED | OUTPATIENT
Start: 2017-06-02 | End: 2017-06-02 | Stop reason: HOSPADM

## 2017-06-02 RX ORDER — HYDROCORTISONE SODIUM SUCCINATE 100 MG/2ML
100 INJECTION, POWDER, FOR SOLUTION INTRAMUSCULAR; INTRAVENOUS
Status: DISCONTINUED | OUTPATIENT
Start: 2017-06-02 | End: 2017-06-02 | Stop reason: HOSPADM

## 2017-06-02 RX ORDER — CLOPIDOGREL BISULFATE 75 MG/1
600 TABLET ORAL
Status: COMPLETED | OUTPATIENT
Start: 2017-06-02 | End: 2017-06-02

## 2017-06-02 RX ORDER — VERAPAMIL HYDROCHLORIDE 2.5 MG/ML
2.5-5 INJECTION, SOLUTION INTRAVENOUS
Status: DISCONTINUED | OUTPATIENT
Start: 2017-06-02 | End: 2017-06-02 | Stop reason: HOSPADM

## 2017-06-02 RX ORDER — FENTANYL CITRATE 50 UG/ML
25-200 INJECTION, SOLUTION INTRAMUSCULAR; INTRAVENOUS
Status: DISCONTINUED | OUTPATIENT
Start: 2017-06-02 | End: 2017-06-02 | Stop reason: HOSPADM

## 2017-06-02 RX ORDER — HEPARIN SODIUM 1000 [USP'U]/ML
1000-5000 INJECTION, SOLUTION INTRAVENOUS; SUBCUTANEOUS
Status: DISCONTINUED | OUTPATIENT
Start: 2017-06-02 | End: 2017-06-02 | Stop reason: HOSPADM

## 2017-06-02 RX ORDER — CLOPIDOGREL BISULFATE 75 MG/1
75 TABLET ORAL DAILY
Qty: 30 TAB | Refills: 0 | Status: SHIPPED | OUTPATIENT
Start: 2017-06-03 | End: 2017-06-30 | Stop reason: SDUPTHER

## 2017-06-02 RX ORDER — GUAIFENESIN 100 MG/5ML
81 LIQUID (ML) ORAL DAILY
Status: SHIPPED | COMMUNITY
Start: 2017-06-02

## 2017-06-02 RX ORDER — CLOPIDOGREL BISULFATE 75 MG/1
75 TABLET ORAL DAILY
Status: DISCONTINUED | OUTPATIENT
Start: 2017-06-03 | End: 2017-06-02 | Stop reason: HOSPADM

## 2017-06-02 RX ORDER — METFORMIN HYDROCHLORIDE 500 MG/1
500 TABLET ORAL 2 TIMES DAILY WITH MEALS
Qty: 60 TAB | Refills: 3 | OUTPATIENT
Start: 2017-06-02 | End: 2017-06-14

## 2017-06-02 RX ADMIN — MIDAZOLAM HYDROCHLORIDE 1 MG: 1 INJECTION, SOLUTION INTRAMUSCULAR; INTRAVENOUS at 10:18

## 2017-06-02 RX ADMIN — VERAPAMIL HYDROCHLORIDE 2.5 MG: 2.5 INJECTION INTRAVENOUS at 09:53

## 2017-06-02 RX ADMIN — MIDAZOLAM HYDROCHLORIDE 1 MG: 1 INJECTION, SOLUTION INTRAMUSCULAR; INTRAVENOUS at 10:19

## 2017-06-02 RX ADMIN — HEPARIN SODIUM 1000 UNITS: 200 INJECTION, SOLUTION INTRAVENOUS at 09:43

## 2017-06-02 RX ADMIN — NITROGLYCERIN 200 MCG: 5 INJECTION, SOLUTION INTRAVENOUS at 09:52

## 2017-06-02 RX ADMIN — FENTANYL CITRATE 50 MCG: 50 INJECTION, SOLUTION INTRAMUSCULAR; INTRAVENOUS at 09:51

## 2017-06-02 RX ADMIN — BIVALIRUDIN 1.75 MG/KG/HR: 250 INJECTION, POWDER, LYOPHILIZED, FOR SOLUTION INTRAVENOUS at 10:20

## 2017-06-02 RX ADMIN — Medication 10 ML: at 14:00

## 2017-06-02 RX ADMIN — MIDAZOLAM HYDROCHLORIDE 1 MG: 1 INJECTION, SOLUTION INTRAMUSCULAR; INTRAVENOUS at 09:55

## 2017-06-02 RX ADMIN — LIDOCAINE HYDROCHLORIDE 4 ML: 10 INJECTION, SOLUTION INFILTRATION; PERINEURAL at 09:49

## 2017-06-02 RX ADMIN — HEPARIN SODIUM 3000 UNITS: 1000 INJECTION, SOLUTION INTRAVENOUS; SUBCUTANEOUS at 09:55

## 2017-06-02 RX ADMIN — IPRATROPIUM BROMIDE AND ALBUTEROL SULFATE 3 ML: .5; 3 SOLUTION RESPIRATORY (INHALATION) at 07:23

## 2017-06-02 RX ADMIN — IPRATROPIUM BROMIDE AND ALBUTEROL SULFATE 3 ML: .5; 3 SOLUTION RESPIRATORY (INHALATION) at 15:04

## 2017-06-02 RX ADMIN — CLOPIDOGREL 600 MG: 75 TABLET, FILM COATED ORAL at 10:29

## 2017-06-02 RX ADMIN — HEPARIN SODIUM 1000 UNITS: 200 INJECTION, SOLUTION INTRAVENOUS at 09:42

## 2017-06-02 RX ADMIN — MIDAZOLAM HYDROCHLORIDE 3 MG: 1 INJECTION, SOLUTION INTRAMUSCULAR; INTRAVENOUS at 09:48

## 2017-06-02 RX ADMIN — IOPAMIDOL 140 ML: 755 INJECTION, SOLUTION INTRAVENOUS at 10:30

## 2017-06-02 RX ADMIN — ASPIRIN 81 MG 81 MG: 81 TABLET ORAL at 12:01

## 2017-06-02 NOTE — CARDIO/PULMONARY
Cardiac Rehab: Received consult for cardiac teaching on CAD. S/P PTCA with EMORY to RCA. LVEF 32% by cardiac MRI. Patient off floor. Will follow. UPDATE at 1430: Met with Vibha Asif. LifeVest at bedside. Discussed pt's understanding of PCI procedure and tx plan. Reviewed diagram of coronary arteries with blockages listed. Provided stent card and booklet on coronary artery procedures. Briefly reviewed post-PCI temporary restrictions. Pt repeatedly interrupted this nurse with jovial remarks. He joked about part of his heart being \"dead. \" Had difficulty staying focused on topic. Encouraged pt to read booklet on PCI procedure. Recommend additional teaching be done with family present, for further reinforcement. Vibha Asif could benefit from reinforcement on the following topics:  Purpose and potential side effects of meds: Plavix, metoprolol, Bumex, atorvastatin, metformin, and prednisone. Info attached to AVS on new PO meds. Dietary recommendations for diabetes & heart healthy/low sodium nutrition. S/S fluid overload & when to call MD for fluid wt gain.

## 2017-06-02 NOTE — PROGRESS NOTES
6-2-2017 CASE MANAGEMENT NOTE:  The pt received the Life Vest yesterday evening from [employer]. Per order, I sent a referral thru [employer] to [facility] for nocturnal oxygen and the CM supervisor, Venice Summers, has approved payment for this. [employer] has contacted the pt tnd will deliver the oxygen to the pt this evening when he notifies them he is home. I also have given the pt Good Rx coupons for Clopidogrel, Atorvastatin and Metoprolol and faxed all the prescriptions to [address] [place] [address] ([phone], [phone]) per the sister's request. The pt's sister will transport him home today. Care Management Interventions  PCP Verified by CM:  Yes  Transition of Care Consult (CM Consult):  (Life Vest and nocturnal oxygen)  Discharge Durable Medical Equipment: Yes (Oxygen from [facility])  Physical Therapy Consult: Yes  Occupational Therapy Consult: Yes  Speech Therapy Consult: No  Current Support Network:  (Rents a room and sister lives in same house)  Confirm Follow Up Transport: Family  Plan discussed with Pt/Family/Caregiver: Yes  Discharge Location  Discharge Placement:  (Home with oxygen from [facility])    ELLEN Che, CM

## 2017-06-02 NOTE — PROGRESS NOTES
1038 TRANSFER - IN REPORT:    Verbal report received from Cath(name) on [facility]  being received from cath(unit) for routine progression of care      Report consisted of patients Situation, Background, Assessment and   Recommendations(SBAR). Information from the following report(s) Procedure Summary was reviewed with the receiving nurse. Opportunity for questions and clarification was provided. Assessment completed upon patients arrival to unit and care assumed. No family here per patient. 1155 Dr Osvaldo Price at bedside speaking with patient. Aware am meds held prior to cath. Confirmed he would like aspirin administered as soon as possible. 1230  Air released incrementally from TR Band. No bleeding or hematoma noted. Radial and Ulnar pulse on R wrist palpable. Pt tolerated well. Will continue to monitor. 12:58 PM  Air release completed. TR Band removed from R wrist. No bleeding or  Hematoma. Dressing applied. Wrist immobilizer in place. Radial and ulnar pulse remain palpable on affected extremity. Pt tolerated well. Instructions given to pt regarding movement and activity restrictions. Pt voiced understanding. 1:05 PM  TRANSFER - OUT REPORT:    Verbal report given to Ramona(name) on [facility]  being transferred to 322(unit) for routine progression of care       Report consisted of patients Situation, Background, Assessment and   Recommendations(SBAR). Information from the following report(s) SBAR, Kardex, Procedure Summary, MAR and Cardiac Rhythm SR was reviewed with the receiving nurse. Lines:   Peripheral IV 06/02/17 Left Arm (Active)   Site Assessment Clean, dry, & intact 6/2/2017 11:36 AM   Phlebitis Assessment 0 6/2/2017 11:36 AM   Infiltration Assessment 0 6/2/2017 11:36 AM   Dressing Status Clean, dry, & intact 6/2/2017 11:36 AM        Opportunity for questions and clarification was provided.       Patient transported with:   Monitor  Registered Nurse  [facility] [[email or web address]] Diagnostics

## 2017-06-02 NOTE — DIABETES MGMT
DTC Consult Note    Recommendations/ Comments: If appropriate, please consider adding Metformin when appropriate. Please consider the following uptitration for the medical management of Type 2 Diabetes using Metformin:   Week 1: Metformin 500 mg with breakfast only  Week 2: Metformin 500 mg with breakfast; 500 mg with dinner   Week 3: Metformin 1000 mg with breakfast; 500 mg with dinner  Week 4: Metformin 1000 mg with breakfast; 1000 mg with dinner  -If gastrointestinal side effects appear as doses advanced, decrease to previous lower dose and try to advance the dose at a later time. Medical Management of Hyperglycemia in Type 2 Diabetes: A Consensus Algorithm for the Initiation and Adjustment of Therapy. Diabetes Care 31:111, 2008    Estimated Creatinine Clearance: 82.6 mL/min (based on Cr of 1.1). Ally Wright will require prescriptions for the following (Please specify Reli-On brand.):    ReliOn Prime Meter  ReliOn Prime Test Strips   ReliOn Lancets UltraThin Plus  ReliOn Lancing Device     These medications can only be filled at [facility], to take advantage to the low cost.         Consult received for [x]             New diagnosis    Chart reviewed and initial evaluation complete on Ally Wright. Patient is a 59 y.o. male with new diagnosis Type 2 diabetes.     Assessed and instructed patient on the following:   ·  interpretation of lab results- discussed what an A1c is and how used to dx; goal    · blood sugar goals- what is normal for someone without diabetes  · complications of diabetes mellitus- especially importance of heart health + diabetes    · Exercise  · SMBG skills  · Nutrition- drinks juice, milk, sweet tea- discussed importance of avoiding sugary drinks  · referred to Diabetes Educator- would benefit from OP education after d/c    Encouraged the following:   · increased exercise as allowed   · dietary modifications: eat 3 meals a day, use plate method for portion control, increase veggies   · regular blood sugar monitorin times daily    Provided patient with the following: [x]             Diabetes Self care education materials               []             Insulin education materials               []             CHO counting education materials               [x]             Outpatient DTC contact number               []             Glucometer                 Discussed with patient and/or family need for follow up appointment for diabetes management after discharge. A1c:   Lab Results   Component Value Date/Time    Hemoglobin A1c 7.4 2017 05:31 PM       Recent Glucose Results:   Lab Results   Component Value Date/Time     (H) 2017 01:40 AM    GLUCPOC 129 (H) 2017 10:43 AM    GLUCPOC 131 (H) 2017 07:39 AM    GLUCPOC 237 (H) 2017 08:56 PM        Lab Results   Component Value Date/Time    Creatinine 1.10 2017 01:40 AM     Estimated Creatinine Clearance: 82.6 mL/min (based on Cr of 1.1). Active Orders   Diet    DIET CARDIAC Regular; 2 GM NA (House Low NA); Consistent Carb 2000kcal        PO intake:   Patient Vitals for the past 72 hrs:   % Diet Eaten   17 1407 100 %   17 0849 100 %       Current hospital DM medication: none    Will continue to follow as needed. Thank you. Justino Dia.  MAYA Serrano, [address] [place] [address]   [phone] (office)  [phone] (pager)

## 2017-06-02 NOTE — PROGRESS NOTES
1130: Bedside shift change report given to Vin (oncoming nurse) by Christian Juárez (offgoing nurse). Report included the following information SBAR, Kardex and Recent Results.

## 2017-06-02 NOTE — PROGRESS NOTES
Bedside and Verbal shift change report given to JasonRN (oncoming nurse) by Abi-RN (offgoing nurse). Report included the following information SBAR, Kardex, MAR and Recent Results.

## 2017-06-02 NOTE — DISCHARGE INSTRUCTIONS
[facility] Office: [address]      [phone]  175 [facility] office: [place]                               [phone]    Patient Discharge Instructions    Sherrill Koehler / [id number] : 1955    Admitted 2017 Discharged: 2017   Cardiologist:   Dr. 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 Stent to the right coronary artery     · It is important that you take the medication exactly as they are prescribed. · Keep your medication in the bottles provided by the pharmacist and keep a list of the medication names, dosages, and times to be taken in your wallet. · Do not take other medications without consulting your doctor. BRING ALL of YOUR MEDICINES TO YOUR OFFICE VISIT with Dr. Rooney Shone Discharge Instructions    It is normal to feel tired the first couple days. Take it easy and follow the physicians instructions. CHECK THE CATHETER INSERTION SITE DAILY:    Remove the wrist dressing 24 hours after the procedure. You may shower 24 hours after the procedure. Wash with soap and water and pat dry. Gentle cleaning of the site with soap and water is sufficient, cover with a dry clean dressing or bandage. Do not apply creams or powders to the area. No soaking the wrist for 3 days.   Leave the puncture site open to air after 24 hours post-procedure. CALL THE PHYSICIANS:     If the site becomes red, swollen or feels warm to the touch  If there is bleeding or drainage or if there is unusual pain at the radial site. If there is any minor oozing, you may apply a band-aid and remove after 12 hours. If the bleeding continues, hold pressure with the middle finger against the puncture site and the thumb against the back of the wrist,call 911 to be transported to the hospital.  DO NOT DRIVE YOURSELF, [facility]. ACTIVITY:   For the first 24 hours do not manipulate the wrist.  No lifting, pushing or pulling over 3-5 pounds with the affected wrist for 7 daysand no straining the insertion site. Do not lift grocery bags or the garbage can, do not run the vacuum  or  for 7 days. Start with short walks as in the hospital and gradually increase as tolerated each day. It is recommended to walk 30 minutes 5-7 days per week. Follow your physicians instructions on activity. Avoid walking outside in extremes of heat or cold. Walk inside when it is cold and windy or hot and humid. Things to keep in mind:  No driving for at least 24 hours, or as designated by your physician. Limit the number of times you go up and down the stairs  Take rests and pace yourself with activity. Be careful and do not strain with bowel movements. MEDICATIONS:    Take all medications as prescribed  Call your physician if you have any questions  Keep an updated list of your medications with you at all times and give a list to your physician and pharmacist    SIGNS AND SYMPTOMS:   Be cautious of symptoms of angina or recurrent symptoms such as chest discomfort, unusual shortness of breath or fatigue. These could be symptoms of restenosis, a new blockage or a heart attack.    If your symptoms are relieved with rest it is still recommended that you notify your physician of recurrent chest pain or discomfort. For CHEST PAIN or symptoms of angina not relieved with rest:  If the discomfort is not relieved with rest, and you have been prescribed Nitroglycerin, take as directed (taken under the tongue, one at a time 5 minutes apart for a total of 3 doses). If the discomfort is not relieved after the 3rd nitroglycerin, call 911. If you have not been prescribed Nitroglycerin  and your chest discomfort is not relieved with rest, call 911. AFTER CARE:   Follow up with your physician as instructed. Follow a heart healthy diet with proper portion control, daily stress management, daily exercise, blood pressure and cholesterol control , and smoking cessation. See CHF packet     Daily weight and call MD for 3 pound weight gain in 3 days. Diet: American Heart association, low fat, 1500 mg sodium  Diabetic. Call for or return to ER for recurrent or prolonged chest pain, shortness of breath, lightheadedness, dizzy, palpitations, passing out, swelling in the legs or abdomen, pain or swelling  At the cath site. Lifestyle changes  IF you smoke, Stop smoking go to : PrimeLettClearEdge3D.ca. aspx for text reminders    Eat a heart-healthy diet that is low in cholesterol, saturated fat, and salt, and is full of fruits, vegetables and whole-grains. Eat at least two servings of fish each week. You may get more details about how to eat healthy, but these tips can help you get started. Www.aha.com  When should you call for help? Call 911 anytime you think you may need emergency care. For example, call if:  You have signs of a heart attack. These may include:  Chest pain or pressure. Sweating. Shortness of breath. Nausea or vomiting. Pain that spreads from the chest to the neck, jaw, or one or both shoulders or arms. Dizziness or lightheadedness. A fast or irregular pulse. After calling 911, chew 1 adult-strength aspirin. Wait for an ambulance. Do not try to drive yourself.   You passed out (lost consciousness). You feel like you are having another heart attack. Call your doctor now or seek immediate medical care if:  You have had any chest pain, even if it has gone away. You have new or increased shortness of breath. You are dizzy or lightheaded, or you feel like you may faint. Watch closely for changes in your health, and be sure to contact your doctor if you have any problem      Information obtained by :  I understand that if any problems occur once I am at home I am to contact my physician. I understand and acknowledge receipt of the instructions indicated above. R.N.'s Signature                                                                  Date/Time                                                                                                                                              Patient or Representative Signature                                                          Date/Time        Heart Failure: Care Instructions  Your Care Instructions     Heart failure occurs when your heart does not pump as much blood as the body needs. Failure does not mean that the heart has stopped pumping but rather that it is not pumping as well as it should. Over time, this causes fluid buildup in your lungs and other parts of your body. Fluid buildup can cause shortness of breath, fatigue, swollen ankles, and other problems. By taking medicines regularly, reducing sodium (salt) in your diet, checking your weight every day, and making lifestyle changes, you can feel better and live longer. Follow-up care is a key part of your treatment and safety. Be sure to make and go to all appointments, and call your doctor if you are having problems. It's also a good idea to know your test results and keep a list of the medicines you take.   How can you care for yourself at home? Medicines  · Be safe with medicines. Take your medicines exactly as prescribed. Call your doctor if you think you are having a problem with your medicine. · Do not take any vitamins, over-the-counter medicine, or herbal products without talking to your doctor first. Terra Bricesh not take ibuprofen (Advil or Motrin) and naproxen (Aleve) without talking to your doctor first. They could make your heart failure worse. · You may be taking some of the following medicine. ¨ Beta-blockers can slow heart rate, decrease blood pressure, and improve your condition. Taking a beta-blocker may lower your chance of needing to be hospitalized. ¨ Angiotensin-converting enzyme inhibitors (ACEIs) reduce the heart's workload, lower blood pressure, and reduce swelling. Taking an ACEI may lower your chance of needing to be hospitalized again. ¨ Angiotensin II receptor blockers (ARBs) work like ACEIs. Your doctor may prescribe them instead of ACEIs. ¨ Diuretics, also called water pills, reduce swelling. ¨ Potassium supplements replace this important mineral, which is sometimes lost with diuretics. ¨ Aspirin and other blood thinners prevent blood clots, which can cause a stroke or heart attack. You will get more details on the specific medicines your doctor prescribes. Diet  · Your doctor may suggest that you limit sodium to 1,500 milligrams (mg) a day. That is less than 1 teaspoon of salt a day, including all the salt you eat in cooking or in packaged foods. People get most of their sodium from processed foods. Fast food and restaurant meals also tend to be very high in sodium. · Ask your doctor how much liquid you can drink each day. You may have to limit liquids. Weight  · Weigh yourself without clothing at the same time each day. Record your weight. Call your doctor if you gain more than 3 pounds in 24 hours or 5 pounds in one week. A sudden weight gain may mean that your heart failure is getting worse.   Activity level  · Start light exercise (if your doctor says it is okay). Even if you can only do a small amount, exercise will help you get stronger, have more energy, and manage your weight and your stress. Walking is an easy way to get exercise. Start out by walking a little more than you did before. Bit by bit, increase the amount you walk. · When you exercise, watch for signs that your heart is working too hard. You are pushing yourself too hard if you cannot talk while you are exercising. If you become short of breath or dizzy or have chest pain, stop, sit down, and rest.  · If you feel \"wiped out\" the day after you exercise, walk slower or for a shorter distance until you can work up to a better pace. · Get enough rest at night. Sleeping with 1 or 2 pillows under your upper body and head may help you breathe easier. Lifestyle changes  · Do not smoke. Smoking can make a heart condition worse. If you need help quitting, talk to your doctor about stop-smoking programs and medicines. These can increase your chances of quitting for good. Quitting smoking may be the most important step you can take to protect your heart. · Limit alcohol to 2 drinks a day for men and 1 drink a day for women. Too much alcohol can cause health problems. · Avoid getting sick from colds and the flu. Get a pneumococcal vaccine shot. If you have had one before, ask your doctor whether you need another dose. Get a flu shot each year. If you must be around people with colds or the flu, wash your hands often. When should you call for help? Call 911 if you have symptoms of sudden heart failure such as:  · You have severe trouble breathing. · You cough up pink, foamy mucus. · You have a new irregular or rapid heartbeat. Call your doctor now or seek immediate medical care if:  · You have new or increased shortness of breath. · You are dizzy or lightheaded, or you feel like you may faint.   · You have sudden weight gain, such as 3 pounds in 24 hours, or 5 pounds in one week. · You have increased swelling in your legs, ankles, or feet. · You are suddenly so tired or weak that you cannot do your usual activities. Watch closely for changes in your health, and be sure to contact your doctor if:  · You develop new symptoms. Where can you learn more? Go to DBVu.be  Enter S955 in the search box to learn more about \"Heart Failure: Care Instructions. \"   © 4124-9607 Healthwise, Incorporated. Care instructions adapted under license by Denise Terry (which disclaims liability or warranty for this information). This care instruction is for use with your licensed healthcare professional. If you have questions about a medical condition or this instruction, always ask your healthcare professional. [address] any warranty or liability for your use of this information. Content Version: 49.8.229516; Current as of: February 20, 2015 (modified 9/26/16).

## 2017-06-02 NOTE — CONSULTS
PSYCHIATRIC CONSULTATION  Daniel Rodas MD  [phone]                         IDENTIFICATION:    Patient Name  Augie Millard   Date of Birth 1955   [facility] [id number]   Medical Record Number  [id number]      Age  59 y.o. PCP None   Admit date:  5/30/2017    Room Number  322/01  @ [facility]   Date of Service  6/2/2017            HISTORY         REASON FOR CONSULTATION:  CC: \"depressive statements\". HISTORY OF PRESENT ILLNESS:    The patient Augie Millard is a 59 y.o.  male with no past psychiatric history, who presented for the principle diagnosis of Acute on chronic systolic heart failure ([facility] Patient reports/evidences the following emotional symptoms: anxiety (about being here ) and concern about health problems . The patient's condition has been precipitated by heart issues and psychosocial stressors (financial). Pt states he has been working any where from 3-4 jobs to support himself and his sister. 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 Says, that because of all this manual labor he got tired. Next day he was at his home and was bringing a load of laundry up the stairs and fell and injured his head slightly (had some bleeding from scalp). Also injured his ribs. Did not go to work for couple of days as  he did not feel good, then came here. Says \"last night was hell\". [facility] closed in here. Says he was told last night that he will get a stent this AM. Then nurses came to him to put bedrails saying he is going to go in alcohol w/d. Says he drinks 6 beers after work \"to chill out\" after long day of manual labor. Also smokes a whole pack of cigarrettes while drinking. Says he can stop whenever he wants and does not see it as a problem. Last drank on Saturday. No hx of w/d syndrome. There is no history of suicidal ideation and no suicide attempts. no history of psychosis.  no history of manic symptoms. no previous psych hospitalizations. Denies being depressed. He does admit to some insomnia. Takes OTC sleep aid but it is not consistently effective. ALLERGIES:  Allergies   Allergen Reactions    Codeine Itching      MEDICATIONS PRIOR TO ADMISSION:  Prescriptions Prior to Admission   Medication Sig    ibuprofen (MOTRIN) 200 mg tablet Take 600 mg by mouth every eight (8) hours as needed for Pain.  guaiFENesin (ROBITUSSIN) 100 mg/5 mL liquid Take 200 mg by mouth four (4) times daily. 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 ([facility] 6/1/2017    Coronary artery disease involving native coronary artery without angina pectoris 6/1/2017    ETOH abuse 6/1/2017    MI (myocardial infarction) ([facility] 6/1/2017    Nicotine dependence 6/1/2017      Past Medical History:   Diagnosis Date    Acute on chronic systolic heart failure ([facility] 6/1/2017    Controlled type 2 diabetes mellitus with circulatory disorder ([facility] 6/1/2017    Coronary artery disease involving native coronary artery without angina pectoris 6/1/2017    ETOH abuse 6/1/2017    MI (myocardial infarction) ([facility] 6/1/2017    Nicotine dependence 6/1/2017     Past Surgical History:   Procedure Laterality Date    HX TONSILLECTOMY      HX WISDOM TEETH EXTRACTION        SOCIAL HISTORY:    Social History     Social History Narrative     Social History   Substance Use Topics    Smoking status: Current Every Day Smoker     Packs/day: 2.00    Smokeless tobacco: None    Alcohol use 25.2 oz/week     42 Cans of beer per week      FAMILY HISTORY:  History reviewed. History reviewed. No pertinent family history.     REVIEW of SYSTEMS:   As noted in history of present illness           MENTAL STATUS EXAM & VITALS     Oriented X4. Memory: WNL. Mood: Ok . Affect: Full range, calm. Makes appropriate jokes. Normal speech. Denies SI/HI. no delusions. no hallucinations. Thought process logical and goal directed. Concentration limited. Insight/judgement Fair.              VITALS:     Patient Vitals for the past 24 hrs:   Temp Pulse Resp BP SpO2   06/02/17 1500 - 74 - - -   06/02/17 1357 97.1 °F (36.2 °C) 65 20 101/57 93 %   06/02/17 1300 - 65 25 112/67 94 %   06/02/17 1245 - 63 21 99/67 95 %   06/02/17 1230 - 62 19 118/73 96 %   06/02/17 1215 - 61 22 108/66 95 %   06/02/17 1200 - 62 20 104/79 95 %   06/02/17 1145 - 63 23 104/68 96 %   06/02/17 1130 - 61 19 103/66 95 %   06/02/17 1115 - 61 21 99/59 93 %   06/02/17 1100 - (!) 58 15 96/67 92 %   06/02/17 1045 - 67 19 110/69 91 %   06/02/17 1039 - 70 14 - 94 %   06/02/17 1038 - 87 20 110/83 -   06/02/17 0910 - - (!) 94 (!) 84/60 -   06/02/17 0909 97.7 °F (36.5 °C) 70 (!) 89 (!) 78/51 -   06/02/17 0700 - (!) 57 - - -   06/02/17 0343 98.7 °F (37.1 °C) 68 17 97/62 -   06/02/17 0010 98.1 °F (36.7 °C) 81 19 106/67 95 %   06/01/17 2226 - 80 - 115/77 -   06/01/17 1920 99.3 °F (37.4 °C) 88 19 98/62 93 %   06/01/17 1912 - - - - 94 %              DATA     Labs reviewed    MEDICATIONS       ALL MEDICATIONS  Current Facility-Administered Medications   Medication Dose Route Frequency    sodium chloride (NS) flush 5-10 mL  5-10 mL IntraVENous Q8H    sodium chloride (NS) flush 5-10 mL  5-10 mL IntraVENous PRN    hydrocortisone Sod Succ (PF) (SOLU-CORTEF) injection 100 mg  100 mg IntraVENous ONCE PRN    nitroglycerin (NITROSTAT) tablet 0.4 mg  0.4 mg SubLINGual Q5MIN PRN    0.9% sodium chloride infusion  75 mL/hr IntraVENous CONTINUOUS    [START ON 6/3/2017] clopidogrel (PLAVIX) tablet 75 mg  75 mg Oral DAILY    predniSONE (DELTASONE) tablet 40 mg  40 mg Oral DAILY WITH BREAKFAST    [START ON 6/5/2017] predniSONE (DELTASONE) tablet 20 mg  20 mg Oral DAILY WITH BREAKFAST    [START ON 6/8/2017] predniSONE (DELTASONE) tablet 10 mg  10 mg Oral DAILY WITH BREAKFAST    insulin lispro (HUMALOG) injection   SubCUTAneous AC&HS    glucose chewable tablet 16 g  4 Tab Oral PRN    dextrose (D50W) injection syrg 12.5-25 g  12.5-25 g IntraVENous PRN    glucagon (GLUCAGEN) injection 1 mg  1 mg IntraMUSCular PRN    metoprolol succinate (TOPROL-XL) XL tablet 25 mg  25 mg Oral DAILY    bumetanide (BUMEX) tablet 0.5 mg  0.5 mg Oral DAILY    nicotine (NICODERM CQ) 21 mg/24 hr patch 1 Patch  1 Patch TransDERmal DAILY    ALPRAZolam (XANAX) tablet 0.5 mg  0.5 mg Oral QHS PRN    albuterol-ipratropium (DUO-NEB) 2.5 MG-0.5 MG/3 ML  3 mL Nebulization QID RT    fluticasone-vilanterol (BREO ELLIPTA) 100mcg-25mcg/puff  1 Puff Inhalation DAILY    LORazepam (ATIVAN) injection 2 mg  2 mg IntraVENous Q1H PRN    LORazepam (ATIVAN) injection 4 mg  4 mg IntraVENous Q1H PRN    rosuvastatin (CRESTOR) tablet 5 mg  5 mg Oral QHS    sodium chloride (NS) flush 5-10 mL  5-10 mL IntraVENous Q8H    sodium chloride (NS) flush 5-10 mL  5-10 mL IntraVENous PRN    aspirin chewable tablet 81 mg  81 mg Oral DAILY    enoxaparin (LOVENOX) injection 40 mg  40 mg SubCUTAneous Q24H    albuterol (ACCUNEB) nebulizer solution 1.25 mg  1.25 mg Nebulization Q6H PRN      SCHEDULED MEDICATIONS  Current Facility-Administered Medications   Medication Dose Route Frequency    sodium chloride (NS) flush 5-10 mL  5-10 mL IntraVENous Q8H    0.9% sodium chloride infusion  75 mL/hr IntraVENous CONTINUOUS    [START ON 6/3/2017] clopidogrel (PLAVIX) tablet 75 mg  75 mg Oral DAILY    predniSONE (DELTASONE) tablet 40 mg  40 mg Oral DAILY WITH BREAKFAST    [START ON 6/5/2017] predniSONE (DELTASONE) tablet 20 mg  20 mg Oral DAILY WITH BREAKFAST    [START ON 6/8/2017] predniSONE (DELTASONE) tablet 10 mg  10 mg Oral DAILY WITH BREAKFAST    insulin lispro (HUMALOG) injection   SubCUTAneous AC&HS    metoprolol succinate (TOPROL-XL) XL tablet 25 mg  25 mg Oral DAILY    bumetanide (BUMEX) tablet 0.5 mg  0.5 mg Oral DAILY    nicotine (NICODERM CQ) 21 mg/24 hr patch 1 Patch  1 Patch TransDERmal DAILY    albuterol-ipratropium (DUO-NEB) 2.5 MG-0.5 MG/3 ML  3 mL Nebulization QID RT    fluticasone-vilanterol (BREO ELLIPTA) 100mcg-25mcg/puff  1 Puff Inhalation DAILY    rosuvastatin (CRESTOR) tablet 5 mg  5 mg Oral QHS    sodium chloride (NS) flush 5-10 mL  5-10 mL IntraVENous Q8H    aspirin chewable tablet 81 mg  81 mg Oral DAILY    enoxaparin (LOVENOX) injection 40 mg  40 mg SubCUTAneous Q24H                ASSESSMENT & PLAN          Case d/w nursing staff and hx obtained/plan reviewed with pt who gave verbal informed consent for treatment with medications as noted below. The patient Yonny Jefferson is a 59 y.o.  male who presents at this time for the following psychiatric diagnoses:    Adjustment d/o with Anxiety  Alcohol abuse    Plan:  1. Try Melatonin prn for sleep  2. Pt does not want any help for his alcohol use. Can be discharged home from psych stand point and can follow up as outpatient with a psychiatrist.    Will sign off. Please call if further input is needed. Thank you for the opportunity to participate in the care of your patient.                         SIGNED:    Mode Donovan MD  6/2/2017

## 2017-06-02 NOTE — PROCEDURES
Cardiac Catheterization    Date of Procedure: 6/2/2017   Preoperative Diagnosis: anterior MI, HF, low EF  Postoperative Diagnosis: see below    Procedure: Left heart cath, LV angiography, coronary angiography via right radial artery  Cardiologist: Yanira Montgomery MD  Anesthesia: local + IV sedation  Estimated Blood Loss: Minimal  Specimens removed: None  Findings: LM normal, LAD mid 100%, LCX codom normal, RCA mid 85%, LVEDP 25, no AV gradient, dilated LV, apical AK, EF 20%. See full cath note.   Complications: none    2v CAD  Severe LV dysfunction  - no viability LAD territory  Elevated EDP    PCI RCA to follow  Home later today  LifeVest  DAPT

## 2017-06-02 NOTE — CARDIO/PULMONARY
Cardiac Rehab: Met briefly with Coyr Anna to reinforce previous CHF & post-MI teaching. Pt indicated awareness of need to make changes in his lifestyle. Reported nicotine patch has helped with cravings for cigarettes. Was surprised to be told by NP that he is now a diabetic with Hgb A1c 7.4. Reported he loves sweets. Expressed feelings of sadness and apprehension. Offered emotional support and encouragement. Cardiac MRI today to determine viability, prior to cath. LifeVest also planned prior to discharge.

## 2017-06-02 NOTE — PROGRESS NOTES
[facility] FAMILY MEDICINE RESIDENCY PROGRAM   Follow-up Consult Note    Date: 6/2/2017    Assessment/Plan:     Jillian Johnson is a 59 y.o. male who is admitted for ACHF. The Family Medicine Service was consulted for medical management of newly diagnosed diabetes.     Diabetes mellitus type 2. Newly diagnosed. HgA1C 7.4 (5/31/2017). In the setting on NSTEMI. BG in 130-230s yesterday. Required 4 units SSI  -Continue POC BG checks ACHS with SSI while IP  -Metformin as the first line treatment after the discharge  -The pt needs eye exam, foot exam and urine microalbumin  -HgA1C rechecek 3 months OP after the discharge     ACHF/ STEMI. Management per primary team ( cardiology)  -Metoprolol XL 25 mg daily  -Bumex 0.5mg daily  -Diovan 20 mg every 12 hours   -ASA  -Crestor 5 mg daily      COPD exacerbation.   -Pulmonologist is following, management per them  -Duonebs  -prednisone  -OP follow up for PFT as outpatient      Tobacco abuse.  -Continue Nitotine patch 21mg/24h daily  -Discussed smoking cessation and the benefits from it     Alcohol abuse. Drinks 6-7 beers per day. Last drink 5 days ago. -CIWA protocol              FEN/GI - Cardiac diet per primary team.   DVT prophylaxis - Lovenox  GI prophylaxis - Not indicated at this time  Disposition - Plan to d/c to Per primary team.  Thank you very much for allowing us to participate in the care of this pleasant patient. The family medicine service will continue to follow the patient's medical progress along with you. Please do not hesitate to page with any questions or to discuss the case (pager# [phone]). The pt is stable for discharge for our standpoint with close follow up with us. Patient to be discussed with Dr. Daquan Lambert MD  Family Medicine Resident         CC: \" Doing OK, just sleepy, was not able to sleep during the night\"    Subjective  No acute events overnight. Patient doing OK, no chest pain reported.   Denies chills, headaches, chest pain, shortness of breath, palpitations, abdominal pain, nausea and vomiting, and LE edema. NPO since midnight. One bowel movement this morning.     Inpatient Medications  Current Facility-Administered Medications   Medication Dose Route Frequency    lidocaine (XYLOCAINE) 10 mg/mL (1 %) injection 1-20 mL  1-20 mL SubCUTAneous Multiple    fentaNYL citrate (PF) injection  mcg   mcg IntraVENous Multiple    heparin (porcine) 1,000 unit/mL injection 1,000-5,000 Units  1,000-5,000 Units IntraVENous Multiple    iopamidol (ISOVUE-370) 76 % injection 100-300 mL  100-300 mL IntraarTERial Multiple    midazolam (VERSED) injection 0.5-10 mg  0.5-10 mg IntraVENous Multiple    heparinized saline 2 units/mL infusion 1,000 Units  500 mL IntraarTERial Multiple    heparinized saline 2 units/mL infusion 1,000 Units  500 mL IntraSHEAth Multiple    verapamil (ISOPTIN) 2.5 mg/mL injection 2.5-5 mg  2.5-5 mg IntraarTERial Multiple    nitroglycerin 100 mcg/ml compounded injection  1-10 mL IntraarTERial Multiple    bivalirudin (ANGIOMAX) 250 mg in 0.9% sodium chloride (MBP/ADV) 50 mL  1.75 mg/kg/hr IntraVENous CONTINUOUS    iopamidol (ISOVUE-370) 76 % injection 50 mL  50 mL Other RAD ONCE    sodium chloride (NS) flush 5-10 mL  5-10 mL IntraVENous Q8H    sodium chloride (NS) flush 5-10 mL  5-10 mL IntraVENous PRN    hydrocortisone Sod Succ (PF) (SOLU-CORTEF) injection 100 mg  100 mg IntraVENous ONCE PRN    nitroglycerin (NITROSTAT) tablet 0.4 mg  0.4 mg SubLINGual Q5MIN PRN    0.9% sodium chloride infusion  75 mL/hr IntraVENous CONTINUOUS    [START ON 6/3/2017] clopidogrel (PLAVIX) tablet 75 mg  75 mg Oral DAILY    valsartan (DIOVAN) tablet 20 mg  20 mg Oral Q12H    predniSONE (DELTASONE) tablet 40 mg  40 mg Oral DAILY WITH BREAKFAST    [START ON 6/5/2017] predniSONE (DELTASONE) tablet 20 mg  20 mg Oral DAILY WITH BREAKFAST    [START ON 6/8/2017] predniSONE (DELTASONE) tablet 10 mg  10 mg Oral DAILY WITH BREAKFAST    insulin lispro (HUMALOG) injection   SubCUTAneous AC&HS    glucose chewable tablet 16 g  4 Tab Oral PRN    dextrose (D50W) injection syrg 12.5-25 g  12.5-25 g IntraVENous PRN    glucagon (GLUCAGEN) injection 1 mg  1 mg IntraMUSCular PRN    metoprolol succinate (TOPROL-XL) XL tablet 25 mg  25 mg Oral DAILY    bumetanide (BUMEX) tablet 0.5 mg  0.5 mg Oral DAILY    nicotine (NICODERM CQ) 21 mg/24 hr patch 1 Patch  1 Patch TransDERmal DAILY    ALPRAZolam (XANAX) tablet 0.5 mg  0.5 mg Oral QHS PRN    albuterol-ipratropium (DUO-NEB) 2.5 MG-0.5 MG/3 ML  3 mL Nebulization QID RT    fluticasone-vilanterol (BREO ELLIPTA) 100mcg-25mcg/puff  1 Puff Inhalation DAILY    LORazepam (ATIVAN) injection 2 mg  2 mg IntraVENous Q1H PRN    LORazepam (ATIVAN) injection 4 mg  4 mg IntraVENous Q1H PRN    rosuvastatin (CRESTOR) tablet 5 mg  5 mg Oral QHS    sodium chloride (NS) flush 5-10 mL  5-10 mL IntraVENous Q8H    sodium chloride (NS) flush 5-10 mL  5-10 mL IntraVENous PRN    aspirin chewable tablet 81 mg  81 mg Oral DAILY    enoxaparin (LOVENOX) injection 40 mg  40 mg SubCUTAneous Q24H    albuterol (ACCUNEB) nebulizer solution 1.25 mg  1.25 mg Nebulization Q6H PRN         Allergies  Allergies   Allergen Reactions    Codeine Itching         Objective  Vitals:  Patient Vitals for the past 8 hrs:   Temp Pulse Resp BP SpO2   06/02/17 0910 - - (!) 94 (!) 84/60 -   06/02/17 0909 97.7 °F (36.5 °C) 70 (!) 89 (!) 78/51 -   06/02/17 0700 - (!) 57 - - -   06/02/17 0343 98.7 °F (37.1 °C) 68 17 97/62 -         I/O:    Intake/Output Summary (Last 24 hours) at 06/02/17 1034  Last data filed at 06/02/17 0224   Gross per 24 hour   Intake              960 ml   Output             1000 ml   Net              -40 ml     Last shift:       Last 3 shifts:    05/31 1901 - 06/02 0700  In: 1680 [P.O.:1680]  Out: 2425 [Urine:2425]    Physical Exam:  General: No acute distress. Alert. Cooperative.    Head: Normocephalic. Atraumatic. Eyes:  Conjunctiva pink. Sclera white. PERRL. Nose:  Septum midline. Mucosa pink. No drainage. Throat: Mucosa pink. Moist mucous membranes. No tonsillar exudates or erythema. Palate movement equal bilaterally. Respiratory: CTAB. No w/r/r/c.   Cardiovascular: RRR. Normal S1,S2. No m/r/g. Pulses 2+ throughout. GI: + bowel sounds. Nontender. No rebound tenderness or guarding. Nondistended   Extremities: No edema. No palpable cord. No tenderness.      Laboratory Data  Recent Results (from the past 12 hour(s))   METABOLIC PANEL, BASIC    Collection Time: 06/02/17  1:40 AM   Result Value Ref Range    Sodium 139 136 - 145 mmol/L    Potassium 4.1 3.5 - 5.1 mmol/L    Chloride 103 97 - 108 mmol/L    CO2 28 21 - 32 mmol/L    Anion gap 8 5 - 15 mmol/L    Glucose 128 (H) 65 - 100 mg/dL    BUN 22 (H) 6 - 20 MG/DL    Creatinine 1.10 0.70 - 1.30 MG/DL    BUN/Creatinine ratio 20 12 - 20      GFR est AA >60 >60 ml/min/1.73m2    GFR est non-AA >60 >60 ml/min/1.73m2    Calcium 8.9 8.5 - 10.1 MG/DL   MAGNESIUM    Collection Time: 06/02/17  1:40 AM   Result Value Ref Range    Magnesium 2.0 1.6 - 2.4 mg/dL   PRO-BNP    Collection Time: 06/02/17  1:40 AM   Result Value Ref Range    NT pro-BNP 6586 (H) 0 - 125 PG/ML   GLUCOSE, POC    Collection Time: 06/02/17  7:39 AM   Result Value Ref Range    Glucose (POC) 131 (H) 65 - 100 mg/dL    Performed by Belle Abreu (PCT)          [facility] Problems  Date Reviewed: 6/1/2017          Codes Class Noted POA    Coronary artery disease involving native coronary artery without angina pectoris ICD-10-CM: I25.10  ICD-9-CM: 414.01  6/1/2017 Yes        * (Principal)Acute on chronic systolic heart failure ([facility] [address] ICD-10-CM: I50.23  ICD-9-CM: 428.23  6/1/2017 Yes        ETOH abuse (Chronic) ICD-10-CM: F10.10  ICD-9-CM: 305.00  6/1/2017 Yes        [facility] (Chronic) ICD-10-CM: I21.3  ICD-9-CM: 410.90  6/1/2017 No Controlled type 2 diabetes mellitus with circulatory disorder (HCC) (Chronic) ICD-10-CM: E11.59  ICD-9-CM: 250.70  6/1/2017 Yes        Nicotine dependence (Chronic) ICD-10-CM: S62.865  ICD-9-CM: 305.1  6/1/2017 Yes        Acute congestive heart failure ([facility] [address] ICD-10-CM: I50.9  ICD-9-CM: 428.0  5/30/2017 Yes

## 2017-06-02 NOTE — PROGRESS NOTES
Cardiology Progress Note    [address]      NAME:  Vega Santos   :   1955   MRN:   [id number]     Assessment/Plan:   Recent    AMI- OOH       - MRI this am- if viability noted plan cath in am       - ASA, BB, statin   CAD s/p EMORY- > RCA       - DAPT x 1 year       - BB, statin   HFrEF- ischemic EF 32% by MRI         - cont low dose BB       - stop Diovan  2/2 hypotension        - low dose bumex        - Life vest   for SCD px        - noctrunal O2- OPwean O2, 6mwt, overnight ox        - cardaic rehab ed        - case management for disability/care card/medicaide aj   Acute Hypoxic resp failure          - nocturnal  O2- trying to arrange           - Breo- pulm to provide form          - prednisone taper         - f/u pulm   DM- new onset      - metformin to start       - diabetes ed       - OP f/u w FP      Nicotine addition- nicoderm   Etoh abuse  $ issues- appreciate case management help      - try for $4 list      - care card application         Care Coordination: 45 minutes spent reviewing data, dx, treatment w/ pt, coordinating care with team including care management, and  Arranging DC . Home today      Future Appointments  Date Time Provider Catalina Riley   2017 1:00 PM Raquel Barron MD [facility] SCHED         Subjective:   Vega Santos is a 59y.o. year old male w/ no prior hx:   except for long term smoker/beer drinker. Acute chest pain 2 weeks ago on Mother's Day, lasting 3 days. Now with progressive BERNABE/orthopnea/PND, much worse today. No recurrent chest pain. No edema.      EKG in ED - recent anterior MI with Qs V1-5, evolving T wave inversions.    CXR - ADHF  Troponin 1+    A1C 7.4   proBNP 7000-> 6586   Overnight activities reviewed:    - MRI no viability ant wall   - cath this am EMORY-> RCA              - BP lowish overnight    - Tele SR          - Life vest fitted    - overnight oximtrey desat 43 minutes    - Cr 1.1 K 4.1 mg 2     - UO: 1500  I/O -615, , net I/O -1L    - CIWAH 0-2     Cardiac ROS: breathing better,  fatigue/BERNABE, Patient denies any exertional chest pain, dyspnea, palpitations, syncope, orthopnea, edema or paroxysmal nocturnal dyspnea. Review of Systems: fatigue, anxious at times, No nausea, indigestion, vomiting, pain, cough, sputum. No bleeding. Taking po. Appetite OK, OOB         CARDIAC EVALUATION   ECHO: 2017: EF 15-20%, apical AK    MRI: 2017: LV dilated LVEF 32%, severe HK ant wall, RVEF 55%, mild MR; anterior infarct- no viability RCA/LCA: viable     CATH 2017: LM: nl, LAD: m100%, LCX codom normal, RCA:m 85%, LVEDP 25, no AV gradient, dilated LV, apical AK, EF 20%  -- s/p PCI pRCA: 2.5x18 Xience (EMORY). R radial TR band    Objective:     Visit Vitals    /67    Pulse 65    Temp 97.7 °F (36.5 °C)    Resp 25    Ht 5' 10\" (1.778 m)    Wt 226 lb 12.8 oz (102.9 kg)    SpO2 94%    BMI 32.54 kg/m2      O2 Flow Rate (L/min): 2 l/min O2 Device: Room air    Temp (24hrs), Av.4 °F (36.9 °C), Min:97.7 °F (36.5 °C), Max:99.3 °F (37.4 °C)        Intake/Output Summary (Last 24 hours) at 17 1339  Last data filed at 17 0224   Gross per 24 hour   Intake              960 ml   Output             1000 ml   Net              -40 ml       TELE: SR     General: AAOx3 cooperative, no acute distress. HEENT: Atraumatic. Pink and moist.  Anicteric sclerae. Neck: Supple,     Lungs: few crackles bibasilar  No wheezing/rhonchi   Heart: PMI: diminished, Regular rhythm, no murmur, no S3, no rubs, no gallops. No JVD. No  HJR, No carotid bruits. Abdomen: Soft, non-distended, non-tender. + Bowel sounds. No bruits. : voiding   Extremities: No edema, no clubbing, no cyanosis. No calf tenderness  Wrist- intact, CNS distally intact   Neurologic: Grossly intact. Alert and oriented X 3. No acute neurological distress. Psych: improving  insight. Not anxious nor agitated.       Care Plan discussed with: Comments   Patient y    Family      RN y    Care Manager                    Consultant:  natasha [facility]        Data Review:   CMP:   Lab Results   Component Value Date/Time     06/02/2017 01:40 AM    K 4.1 06/02/2017 01:40 AM     06/02/2017 01:40 AM    CO2 28 06/02/2017 01:40 AM    AGAP 8 06/02/2017 01:40 AM     (H) 06/02/2017 01:40 AM    BUN 22 (H) 06/02/2017 01:40 AM    CREA 1.10 06/02/2017 01:40 AM    GFRAA >60 06/02/2017 01:40 AM    GFRNA >60 06/02/2017 01:40 AM    CA 8.9 06/02/2017 01:40 AM    MG 2.0 06/02/2017 01:40 AM     CBC: No results found for: WBC, HGB, HCT, PLT, HGBEXT, HCTEXT, PLTEXT, HGBEXT, HCTEXT, PLTEXT  All Cardiac Markers in the last 24 hours: No results found for: CPK, CKMMB, CKMB, RCK3, CKMBT, CKNDX, CKND1, VINAYAK, TROPT, TROIQ, LEN, TROPT, TNIPOC, BNP, BNPP  Recent Glucose Results:   Lab Results   Component Value Date/Time    GLUCPOC 129 (H) 06/02/2017 10:43 AM    GLUCPOC 131 (H) 06/02/2017 07:39 AM    GLUCPOC 237 (H) 06/01/2017 08:56 PM     (H) 06/02/2017 01:40 AM     ABG: No results found for: PH, PHI, PCO2, PCO2I, PO2, PO2I, HCO3, HCO3I, FIO2, FIO2I  LIPIDS:  Cholesterol, total   Date Value Ref Range Status   05/31/2017 138 <200 MG/DL Final     Triglyceride   Date Value Ref Range Status   05/31/2017 126 <150 MG/DL Final     Comment:     Based on NCEP-ATP III:  Triglycerides <150 mg/dL  is considered normal, 150-199 mg/dL  borderline high,  200-499 mg/dL high and  greater than or equal to 500 mg/dL very high. HDL Cholesterol   Date Value Ref Range Status   05/31/2017 42 MG/DL Final     Comment:     Based on NCEP ATP III, HDL Cholesterol <40 mg/dL is considered low and >60 mg/dL is elevated.      LDL, calculated   Date Value Ref Range Status   05/31/2017 70.8 0 - 100 MG/DL Final     Comment:     Based on the NCEP-ATP: LDL-C concentrations are considered  optimal <100 mg/dL,  near optimal/above Normal 100-129 mg/dL  Borderline High: 130-159, High: 160-189 mg/dL  Very High: Greater than or equal to 190 mg/dL       VLDL, calculated   Date Value Ref Range Status   05/31/2017 25.2 MG/DL Final     CHOL/HDL Ratio   Date Value Ref Range Status   05/31/2017 3.3 0 - 5.0   Final       Medications reviewed  Current Facility-Administered Medications   Medication Dose Route Frequency    sodium chloride (NS) flush 5-10 mL  5-10 mL IntraVENous Q8H    sodium chloride (NS) flush 5-10 mL  5-10 mL IntraVENous PRN    hydrocortisone Sod Succ (PF) (SOLU-CORTEF) injection 100 mg  100 mg IntraVENous ONCE PRN    nitroglycerin (NITROSTAT) tablet 0.4 mg  0.4 mg SubLINGual Q5MIN PRN    0.9% sodium chloride infusion  75 mL/hr IntraVENous CONTINUOUS    [START ON 6/3/2017] clopidogrel (PLAVIX) tablet 75 mg  75 mg Oral DAILY    valsartan (DIOVAN) tablet 20 mg  20 mg Oral Q12H    predniSONE (DELTASONE) tablet 40 mg  40 mg Oral DAILY WITH BREAKFAST    [START ON 6/5/2017] predniSONE (DELTASONE) tablet 20 mg  20 mg Oral DAILY WITH BREAKFAST    [START ON 6/8/2017] predniSONE (DELTASONE) tablet 10 mg  10 mg Oral DAILY WITH BREAKFAST    insulin lispro (HUMALOG) injection   SubCUTAneous AC&HS    glucose chewable tablet 16 g  4 Tab Oral PRN    dextrose (D50W) injection syrg 12.5-25 g  12.5-25 g IntraVENous PRN    glucagon (GLUCAGEN) injection 1 mg  1 mg IntraMUSCular PRN    metoprolol succinate (TOPROL-XL) XL tablet 25 mg  25 mg Oral DAILY    bumetanide (BUMEX) tablet 0.5 mg  0.5 mg Oral DAILY    nicotine (NICODERM CQ) 21 mg/24 hr patch 1 Patch  1 Patch TransDERmal DAILY    ALPRAZolam (XANAX) tablet 0.5 mg  0.5 mg Oral QHS PRN    albuterol-ipratropium (DUO-NEB) 2.5 MG-0.5 MG/3 ML  3 mL Nebulization QID RT    fluticasone-vilanterol (BREO ELLIPTA) 100mcg-25mcg/puff  1 Puff Inhalation DAILY    LORazepam (ATIVAN) injection 2 mg  2 mg IntraVENous Q1H PRN    LORazepam (ATIVAN) injection 4 mg  4 mg IntraVENous Q1H PRN    rosuvastatin (CRESTOR) tablet 5 mg  5 mg Oral QHS    sodium chloride (NS) flush 5-10 mL  5-10 mL IntraVENous Q8H    sodium chloride (NS) flush 5-10 mL  5-10 mL IntraVENous PRN    aspirin chewable tablet 81 mg  81 mg Oral DAILY    enoxaparin (LOVENOX) injection 40 mg  40 mg SubCUTAneous Q24H    albuterol (ACCUNEB) nebulizer solution 1.25 mg  1.25 mg Nebulization Q6H PRN         Selena Rockwell RN, ACNP-BC, [facility]

## 2017-06-06 ENCOUNTER — PATIENT OUTREACH (OUTPATIENT)
Dept: CARDIOLOGY CLINIC | Age: 62
End: 2017-06-06

## 2017-06-06 NOTE — PROGRESS NOTES
Transition of Care Cardiology Nurse Navigator note- ED to Hospital admission 5/30-6/2/17at [facility] for acute Systolic HF- in the setting of recent MI (did not seek care). 6/2/17: cath done with PCI pRCA: 2.5x18 Xience (EMORY). R radial TR band  PLAN: ASA/plavix, statin. Works full time, currently uninsured, current every day smoker(discharged with nicoderm patch), ETOH abuse (psychiatry consulted) and anxiety disorder related to current health care and financial issues. Per CM notes- in the process of applying for FA from [employer], received FA from care fund for [employer]. States that he plans to file for social security disability income.      Per Cardiology Notes:     Left VM asking for return call and relaying below appointment:   Future Appointments  Date Time Provider Catalina Riley   6/7/2017 11:20 AM Betsy Lau MD [facility]   6/8/2017 1:00 PM Becca Cox MD [facility] 10.

## 2017-06-13 ENCOUNTER — PATIENT OUTREACH (OUTPATIENT)
Dept: CARDIOLOGY CLINIC | Age: 62
End: 2017-06-13

## 2017-06-13 NOTE — PROGRESS NOTES
Transition of Care Cardiology Note:  Left second VM messages to return call about missed appointments- cardiology and PCP. Karoline Kelly, his sister finally called me back- his cell phone is dead. She states that she (they) did not know about the two missed appointments- checked with NP as primary cardiologist is away this week- I will ask them to come in tomorrow morning to see Cheli Howard at 9:30 am-    She was currently at the [facility] office- applying for [employer] [place]. She states that he will not start receiving his SS income for disability until [facility] July- they do not have any extra funds at this time. His sister is going to call me back later when she gets home so we can do medication review, etc.   She said he is weak and not wanting to do much, BP nor weight is being checked. They checked it at the [facility] and it was 98/something- she said it was \"good\". She reports that he is staying awake all night and sleeping all day. She said he is \"depressed\". I will update PCP NN and ask for her assistance in scheduling follow up PCP appointment so they can discuss. But good news is that he has not been smoking, drinking and has been cutting out sodium and sugar.  - all cold turkey. Later received phone call from sister, Karoline Kelly- she will bring him in for tomorrow's appointment with NP and she will bring in all medication bottles. Relayed that I had secured PCP appointment for Friday at 6- Dr. Henri Crisostomo. Reached out to [employer]- he has only been wearing about 15% of the time. I have asked for them to come to office to meet with him in person. Note:  She uses a cell phone that has a limited number of minutes each month. She does not check her VM messages- uses minutes. She will see that you called and will call you back. PLAN:  I will meet with both tomorrow to determine supportive plan.

## 2017-06-14 ENCOUNTER — OFFICE VISIT (OUTPATIENT)
Dept: CARDIOLOGY CLINIC | Age: 62
End: 2017-06-14

## 2017-06-14 VITALS
WEIGHT: 226.4 LBS | DIASTOLIC BLOOD PRESSURE: 70 MMHG | RESPIRATION RATE: 20 BRPM | HEIGHT: 70 IN | HEART RATE: 75 BPM | OXYGEN SATURATION: 95 % | BODY MASS INDEX: 32.41 KG/M2 | SYSTOLIC BLOOD PRESSURE: 122 MMHG

## 2017-06-14 DIAGNOSIS — I50.22 CHRONIC SYSTOLIC CONGESTIVE HEART FAILURE (HCC): ICD-10-CM

## 2017-06-14 DIAGNOSIS — F17.208 NICOTINE DEPENDENCE WITH OTHER NICOTINE-INDUCED DISORDER, UNSPECIFIED NICOTINE PRODUCT TYPE: Chronic | ICD-10-CM

## 2017-06-14 DIAGNOSIS — I21.4 NON-ST ELEVATION (NSTEMI) MYOCARDIAL INFARCTION (HCC): Chronic | ICD-10-CM

## 2017-06-14 DIAGNOSIS — E11.59 CONTROLLED TYPE 2 DIABETES MELLITUS WITH OTHER CIRCULATORY COMPLICATION, UNSPECIFIED LONG TERM INSULIN USE STATUS: Chronic | ICD-10-CM

## 2017-06-14 DIAGNOSIS — F10.10 ETOH ABUSE: Chronic | ICD-10-CM

## 2017-06-14 DIAGNOSIS — I25.10 CORONARY ARTERY DISEASE INVOLVING NATIVE CORONARY ARTERY OF NATIVE HEART WITHOUT ANGINA PECTORIS: Primary | ICD-10-CM

## 2017-06-14 PROBLEM — I50.23 ACUTE ON CHRONIC SYSTOLIC HEART FAILURE (HCC): Status: RESOLVED | Noted: 2017-06-01 | Resolved: 2017-06-14

## 2017-06-14 PROBLEM — I50.9 ACUTE CONGESTIVE HEART FAILURE (HCC): Status: RESOLVED | Noted: 2017-05-30 | Resolved: 2017-06-14

## 2017-06-14 RX ORDER — ATORVASTATIN CALCIUM 20 MG/1
10 TABLET, FILM COATED ORAL DAILY
Qty: 30 TAB | Refills: 0
Start: 2017-06-14 | End: 2017-07-03 | Stop reason: SDUPTHER

## 2017-06-14 NOTE — MR AVS SNAPSHOT
Visit Information Date & Time Provider Department Dept. Phone Encounter #  
 6/14/2017  9:30 AM Nimo Cifuentes NP [facility] Nafisa Pineda [phone] [id number] Follow-up Instructions Return in about 3 months (around 9/14/2017). 6/16/2017 11:00 AM  
TRANSITIONAL CARE MANAGEMENT with Manuel Morrow MD  
[facility] [address]) Appt Note: NP/Hosp f/u systolic heart failure [address] [facility] 100[address] [facility]  
[phone]  
  
   
 [address] [facility] [address] Upcoming Health Maintenance Date Due Hepatitis C Screening 1955 FOOT EXAM Q1 12/10/1963 MICROALBUMIN Q1 12/10/1963 EYE EXAM RETINAL OR DILATED Q1 12/10/1963 Pneumococcal 19-64 Medium Risk (1 of 1 - PPSV23) 12/10/1972 DTaP/Tdap/Td series (1 - Tdap) 12/10/1974 FOBT Q 1 YEAR AGE 50-75 12/10/2003 ZOSTER VACCINE AGE 60> 12/10/2013 INFLUENZA AGE 9 TO ADULT 8/1/2017 HEMOGLOBIN A1C Q6M 11/30/2017 LIPID PANEL Q1 5/31/2018 Allergies as of 6/14/2017  Review Complete On: 6/14/2017 By: Nimo Cifuentes NP Severity Noted Reaction Type Reactions Codeine  06/02/2017    Itching Current Immunizations  Never Reviewed No immunizations on file. Not reviewed this visit You Were Diagnosed With   
  
 Codes Comments Coronary artery disease involving native coronary artery of native heart without angina pectoris    -  Primary ICD-10-CM: I25.10 ICD-9-CM: 414.01 Chronic systolic congestive heart failure (HCC)     ICD-10-CM: I50.22 ICD-9-CM: 428.22, 428.0 Non-ST elevation (NSTEMI) myocardial infarction [facility])     ICD-10-CM: I21.4 ICD-9-CM: 410.70 Controlled type 2 diabetes mellitus with other circulatory complication, unspecified long term insulin use status     ICD-10-CM: E11.59 
ICD-9-CM: 250.70 ETOH abuse     ICD-10-CM: F10.10 ICD-9-CM: 305.00   
 Nicotine dependence with other nicotine-induced disorder, unspecified nicotine product type     ICD-10-CM: F17.208 ICD-9-CM: 292.89, 305.1 Vitals BP Pulse Resp Height(growth percentile) Weight(growth percentile) SpO2  
 122/70 (BP 1 Location: Right arm, BP Patient Position: Sitting) 75 20 5' 10\" (1.778 m) 226 lb 6.4 oz (102.7 kg) 95% BMI Smoking Status 32.49 kg/m2 Current Every Day Smoker BMI and BSA Data Body Mass Index Body Surface Area  
 32.49 kg/m 2 2.25 m 2 Your Updated Medication List  
  
   
This list is accurate as of: 6/14/17 11:15 AM.  Always use your most recent med list.  
  
  
  
  
 aspirin 81 mg chewable tablet Take 1 Tab by mouth daily. atorvastatin 20 mg tablet Commonly known as:  LIPITOR Take 1 Tab by mouth daily. Dr. Lefty Ziegler to provide refills  
  
 bumetanide 0.5 mg tablet Commonly known as:  Myesha Hove Take 1 Tab by mouth daily. Dr. Lefty Ziegler to provide refills  
  
 clopidogrel 75 mg Tab Commonly known as:  PLAVIX Take 1 Tab by mouth daily. Dr. Lefty Ziegler to provide refills  Indications: Thrombosis Prevention after PCI  
  
 fluticasone-vilanterol 100-25 mcg/dose inhaler Commonly known as:  BREO ELLIPTA Take 1 Puff by inhalation daily. Dr Claudio Diaz to provide refills  
  
 metoprolol succinate 25 mg XL tablet Commonly known as:  TOPROL-XL Take 1 Tab by mouth daily. Dr. Lefty Ziegler to provide refills  
  
 nicotine 21 mg/24 hr  
Commonly known as:  NICODERM CQ  
1 Patch by TransDERmal route daily for 30 days. Dr Idalmis skinner to provide refills We Performed the Following AMB POC EKG ROUTINE W/ 12 LEADS, INTER & REP [72611 CPT(R)] Follow-up Instructions Return in about 3 months (around 9/14/2017). To-Do List   
 Around 09/14/2017 ECHO:  2D ECHO COMPLETE ADULT (TTE) W OR WO CONTR Patient Instructions You have just had a large heart attack and the pumping function of your heart muscle has been significantly affected. This is something that can become stronger and that you can live a long life with. In order to do this and have a good quality life  it is very important that you take the medications as prescribed. For your heart failure/weak pump - please continue Toprol XL 25 mg daily. To prevent fluid build up, you must follow a low sodium diet. Continue to take Bumex 0.5 mg every day. Start to weight yourself each day and record your weight. If you have a weight gain of 3-5 pounds over a 2-3 day period or you have worsening shortness of breath or swelling, please call our office. You were found to have several large blockages in your heart arteries. 1 of them was fixed but the others are not able to be fixed. In order to prevent another heart attack or worsening narrowing of your arteries it is very important that you take the following every day as prescribed: 
Aspirin 81 mg daily Plavix 75 mg daily - these two medication keep your stent open and prevent clots from forming. Cholesterol medication is important to reduce inflammation in your arteries and also lower your blood cholesterol levels which are what causes blockages to form in the first place. Due to your symptoms, we can try to reduce the dose of Lipitor to 10 mg daily (cut the current pill in half) and add an over the counter medication called CoQ10 enzyme 100 mg twice daily - this can help with muscle aches and pains that may be caused by the statin. You have been diagnosed with diabetes and COPD. It is very important that you follow up with a primary care provider and pulmonary (lung) doctor to have these things further evaluated and treated. I believe that you will be able to return to work after another week or two.   Keeping a regular schedule will help you get back to day to day life and help you move forward form this big ordeal.  
 
Remember, that we are here for you and are here to support you. Please call us with any questions or concerns that you may have prior to your next office visit. Introducing [facility] & HEALTH SERVICES! [address] introduces [email or web address] patient portal. Now you can access parts of your medical record, email your doctor's office, and request medication refills online. 1. In your internet browser, go to [email or web address] 2. Click on the First Time User? Click Here link in the Sign In box. You will see the New Member Sign Up page. 3. Enter your [email or web address] Access Code exactly as it appears below. You will not need to use this code after youve completed the sign-up process. If you do not sign up before the expiration date, you must request a new code. · [email or web address] Access Code: [email or web address] Expires: 8/29/2017 11:22 AM 
 
4. Enter the last four digits of your Social Security Number (xxxx) and Date of Birth (mm/dd/yyyy) as indicated and click Submit. You will be taken to the next sign-up page. 5. Create a [email or web address] ID. This will be your [email or web address] login ID and cannot be changed, so think of one that is secure and easy to remember. 6. Create a [email or web address] password. You can change your password at any time. 7. Enter your Password Reset Question and Answer. This can be used at a later time if you forget your password. 8. Enter your e-mail address. You will receive e-mail notification when new information is available in [address] 9. Click Sign Up. You can now view and download portions of your medical record. 10. Click the Download Summary menu link to download a portable copy of your medical information. If you have questions, please visit the Frequently Asked Questions section of the [email or web address] website. Remember, [email or web address] is NOT to be used for urgent needs. For medical emergencies, dial 911. Now available from your iPhone and Android! Please provide this summary of care documentation to your next provider. Your primary care clinician is listed as NONE. If you have any questions after today's visit, please call [phone].

## 2017-06-14 NOTE — PATIENT INSTRUCTIONS
You have just had a large heart attack and the pumping function of your heart muscle has been significantly affected. This is something that can become stronger and that you can live a long life with. In order to do this and have a good quality life  it is very important that you take the medications as prescribed. For your heart failure/weak pump - please continue Toprol XL 25 mg daily. To prevent fluid build up, you must follow a low sodium diet. Continue to take Bumex 0.5 mg every day. Start to weight yourself each day and record your weight. If you have a weight gain of 3-5 pounds over a 2-3 day period or you have worsening shortness of breath or swelling, please call our office. You were found to have several large blockages in your heart arteries. 1 of them was fixed but the others are not able to be fixed. In order to prevent another heart attack or worsening narrowing of your arteries it is very important that you take the following every day as prescribed:  Aspirin 81 mg daily  Plavix 75 mg daily - these two medication keep your stent open and prevent clots from forming. Cholesterol medication is important to reduce inflammation in your arteries and also lower your blood cholesterol levels which are what causes blockages to form in the first place. Due to your symptoms, we can try to reduce the dose of Lipitor to 10 mg daily (cut the current pill in half) and add an over the counter medication called CoQ10 enzyme 100 mg twice daily - this can help with muscle aches and pains that may be caused by the statin. You have been diagnosed with diabetes and COPD. It is very important that you follow up with a primary care provider and pulmonary (lung) doctor to have these things further evaluated and treated. I believe that you will be able to return to work after another week or two.   Keeping a regular schedule will help you get back to day to day life and help you move forward form this big ordeal.     Remember, that we are here for you and are here to support you. Please call us with any questions or concerns that you may have prior to your next office visit.

## 2017-06-16 ENCOUNTER — OFFICE VISIT (OUTPATIENT)
Dept: FAMILY MEDICINE CLINIC | Age: 62
End: 2017-06-16

## 2017-06-16 VITALS
BODY MASS INDEX: 32.93 KG/M2 | OXYGEN SATURATION: 98 % | SYSTOLIC BLOOD PRESSURE: 100 MMHG | WEIGHT: 230 LBS | TEMPERATURE: 99.2 F | HEIGHT: 70 IN | DIASTOLIC BLOOD PRESSURE: 68 MMHG | RESPIRATION RATE: 20 BRPM | HEART RATE: 69 BPM

## 2017-06-16 DIAGNOSIS — I50.21 ACUTE SYSTOLIC CONGESTIVE HEART FAILURE (HCC): Primary | ICD-10-CM

## 2017-06-16 DIAGNOSIS — E11.9 CONTROLLED TYPE 2 DIABETES MELLITUS WITHOUT COMPLICATION, UNSPECIFIED LONG TERM INSULIN USE STATUS: ICD-10-CM

## 2017-06-16 DIAGNOSIS — J44.9 CHRONIC OBSTRUCTIVE PULMONARY DISEASE, UNSPECIFIED COPD TYPE (HCC): ICD-10-CM

## 2017-06-16 LAB
ALBUMIN UR QL STRIP: 30 MG/L
CREATININE, URINE POC: 100 MG/DL
MICROALBUMIN/CREAT RATIO POC: <30 MG/G

## 2017-06-16 RX ORDER — METFORMIN HYDROCHLORIDE 500 MG/1
500 TABLET ORAL 2 TIMES DAILY WITH MEALS
Qty: 180 TAB | Refills: 1 | Status: SHIPPED | OUTPATIENT
Start: 2017-06-16 | End: 2017-07-03 | Stop reason: SDUPTHER

## 2017-06-16 NOTE — PATIENT INSTRUCTIONS

## 2017-06-16 NOTE — MR AVS SNAPSHOT
Visit Information Date & Time Provider Department Dept. Phone Encounter #  
 6/16/2017 11:00 AM Tyrone Christy MD [facility] [phone] [id number] Your Appointments 9/18/2017  3:00 PM  
ECHO CARDIOGRAMS 2D with ECHO, STFRANCIS  
[facility] ([facility] SCHEDULING) Appt Note: 3 mo fup echo at 3 at 4 dr Sherman Acuna [address] [place] [address] [facility] 99 [address]  
[phone]  
  
   
 [address] [place] [address] [place] [address]  
  
    
 9/18/2017  4:00 PM  
ESTABLISHED PATIENT with Rosa Mackey MD  
[facility] ([facility]) Appt Note: 3 mo fup echo at 3 at 4 dr Sherman Acuna [address] [place] [address] 1007 [facility]  
[address] [place] [address] [address] Upcoming Health Maintenance Date Due Hepatitis C Screening 1955 EYE EXAM RETINAL OR DILATED Q1 12/10/1963 Pneumococcal 19-64 Medium Risk (1 of 1 - PPSV23) 12/10/1972 DTaP/Tdap/Td series (1 - Tdap) 12/10/1974 FOBT Q 1 YEAR AGE 50-75 12/10/2003 ZOSTER VACCINE AGE 60> 12/10/2013 INFLUENZA AGE 9 TO ADULT 8/1/2017 HEMOGLOBIN A1C Q6M 11/30/2017 LIPID PANEL Q1 5/31/2018 FOOT EXAM Q1 6/16/2018 MICROALBUMIN Q1 6/16/2018 Allergies as of 6/16/2017  Review Complete On: 6/16/2017 By: Claudia Valles LPN Severity Noted Reaction Type Reactions Codeine  06/02/2017    Itching Current Immunizations  Never Reviewed No immunizations on file. Not reviewed this visit You Were Diagnosed With   
  
 Codes Comments Acute systolic congestive heart failure ([facility]    -  Primary ICD-10-CM: I50.21 ICD-9-CM: 428.21, 428.0 Chronic obstructive pulmonary disease, unspecified COPD type ([facility]     ICD-10-CM: J44.9 ICD-9-CM: 258  Controlled type 2 diabetes mellitus without complication, unspecified long term insulin use status ([facility].)     ICD-10-CM: E11.9 ICD-9-CM: 250.00 Vitals BP Pulse Temp Resp Height(growth percentile) Weight(growth percentile) 100/68 (BP 1 Location: Left arm, BP Patient Position: Sitting) 69 99.2 °F (37.3 °C) (Oral) 20 5' 10\" (1.778 m) 230 lb (104.3 kg) SpO2 BMI Smoking Status 98% 33 kg/m2 Former Smoker Vitals History BMI and BSA Data Body Mass Index Body Surface Area  
 33 kg/m 2 2.27 m 2 Your Updated Medication List  
  
   
This list is accurate as of: 6/16/17 11:43 AM.  Always use your most recent med list.  
  
  
  
  
 aspirin 81 mg chewable tablet Take 1 Tab by mouth daily. atorvastatin 20 mg tablet Commonly known as:  LIPITOR Take 0.5 Tabs by mouth daily. Dr. Pema Dia to provide refills  
  
 bumetanide 0.5 mg tablet Commonly known as:  Shelva Mulligan Take 1 Tab by mouth daily. Dr. Pema Dia to provide refills  
  
 clopidogrel 75 mg Tab Commonly known as:  PLAVIX Take 1 Tab by mouth daily. Dr. Pema Dia to provide refills  Indications: Thrombosis Prevention after PCI  
  
 fluticasone-vilanterol 100-25 mcg/dose inhaler Commonly known as:  BREO ELLIPTA Take 1 Puff by inhalation daily. Dr Murali Moser to provide refills  
  
 metFORMIN 500 mg tablet Commonly known as:  GLUCOPHAGE Take 1 Tab by mouth two (2) times daily (with meals). metoprolol succinate 25 mg XL tablet Commonly known as:  TOPROL-XL Take 1 Tab by mouth daily. Dr. Pema Dia to provide refills  
  
 nicotine 21 mg/24 hr  
Commonly known as:  NICODERM CQ  
1 Patch by TransDERmal route daily for 30 days. Dr Kingston skinner to provide refills Prescriptions Printed Refills  
 metFORMIN (GLUCOPHAGE) 500 mg tablet 1 Sig: Take 1 Tab by mouth two (2) times daily (with meals). Class: Print Route: Oral  
  
We Performed the Following AMB POC URINE, MICROALBUMIN, SEMIQUANT (3 RESULTS) [01292 CPT(R)] REFERRAL TO OPHTHALMOLOGY [REF57 Custom] Comments:  
 [facility] (multiple locations across [place]) 
[phone] Savanna Gao MD 
[facility] [address] Angelica Hicks  Phone: [phone] Fax: [phone] [facility] Ophthalmology [facility] CANCER [place] location [facility] Department of Ophthalmology [address], [address] [place], [address] Phone: [phone] Fax: [phone] [facility] location [facility] Department of Ophthalmology [address] Phone: [phone] Fax: [phone] Ophthalmology Clinic location [facility] Department of Ophthalmology [address], [address] [place], [facility] [address] [facility] [address] Phone: [phone] Fax: [phone] Referral Information Referral ID Referred By Referred To  
  
 [id number] Beny Barrera Not Available Visits Status Start Date End Date 1 New Request 6/16/17 6/16/18 If your referral has a status of pending review or denied, additional information will be sent to support the outcome of this decision. Patient Instructions Learning About Diabetes Food Guidelines Your Care Instructions Meal planning is important to manage diabetes. It helps keep your blood sugar at a target level (which you set with your doctor). You don't have to eat special foods. You can eat what your family eats, including sweets once in a while. But you do have to pay attention to how often you eat and how much you eat of certain foods. You may want to work with a dietitian or a certified diabetes educator (CDE) to help you plan meals and snacks. A dietitian or CDE can also help you lose weight if that is one of your goals. What should you know about eating carbs?  
Managing the amount of carbohydrate (carbs) you eat is an important part of healthy meals when you have diabetes. Carbohydrate is found in many foods. · Learn which foods have carbs. And learn the amounts of carbs in different foods. ¨ Bread, cereal, pasta, and rice have about 15 grams of carbs in a serving. A serving is 1 slice of bread (1 ounce), ½ cup of cooked cereal, or 1/3 cup of cooked pasta or rice. ¨ Fruits have 15 grams of carbs in a serving. A serving is 1 small fresh fruit, such as an apple or orange; ½ of a banana; ½ cup of cooked or canned fruit; ½ cup of fruit juice; 1 cup of melon or raspberries; or 2 tablespoons of dried fruit. ¨ Milk and no-sugar-added yogurt have 15 grams of carbs in a serving. A serving is 1 cup of milk or 2/3 cup of no-sugar-added yogurt. ¨ Starchy vegetables have 15 grams of carbs in a serving. A serving is ½ cup of mashed potatoes or sweet potato; 1 cup winter squash; ½ of a small baked potato; ½ cup of cooked beans; or ½ cup cooked corn or green peas. · Learn how much carbs to eat each day and at each meal. A dietitian or CDE can teach you how to keep track of the amount of carbs you eat. This is called carbohydrate counting. · If you are not sure how to count carbohydrate grams, use the Plate Method to plan meals. It is a good, quick way to make sure that you have a balanced meal. It also helps you spread carbs throughout the day. ¨ Divide your plate by types of foods. Put non-starchy vegetables on half the plate, meat or other protein food on one-quarter of the plate, and a grain or starchy vegetable in the final quarter of the plate. To this you can add a small piece of fruit and 1 cup of milk or yogurt, depending on how many carbs you are supposed to eat at a meal. 
· Try to eat about the same amount of carbs at each meal. Do not \"save up\" your daily allowance of carbs to eat at one meal. 
· Proteins have very little or no carbs per serving.  Examples of proteins are beef, chicken, turkey, fish, eggs, tofu, cheese, cottage cheese, and peanut butter. A serving size of meat is 3 ounces, which is about the size of a deck of cards. Examples of meat substitute serving sizes (equal to 1 ounce of meat) are 1/4 cup of cottage cheese, 1 egg, 1 tablespoon of peanut butter, and ½ cup of tofu. How can you eat out and still eat healthy? · Learn to estimate the serving sizes of foods that have carbohydrate. If you measure food at home, it will be easier to estimate the amount in a serving of restaurant food. · If the meal you order has too much carbohydrate (such as potatoes, corn, or baked beans), ask to have a low-carbohydrate food instead. Ask for a salad or green vegetables. · If you use insulin, check your blood sugar before and after eating out to help you plan how much to eat in the future. · If you eat more carbohydrate at a meal than you had planned, take a walk or do other exercise. This will help lower your blood sugar. What else should you know? · Limit saturated fat, such as the fat from meat and dairy products. This is a healthy choice because people who have diabetes are at higher risk of heart disease. So choose lean cuts of meat and nonfat or low-fat dairy products. Use olive or canola oil instead of butter or shortening when cooking. · Don't skip meals. Your blood sugar may drop too low if you skip meals and take insulin or certain medicines for diabetes. · Check with your doctor before you drink alcohol. Alcohol can cause your blood sugar to drop too low. Alcohol can also cause a bad reaction if you take certain diabetes medicines. Follow-up care is a key part of your treatment and safety. Be sure to make and go to all appointments, and call your doctor if you are having problems. It's also a good idea to know your test results and keep a list of the medicines you take. Where can you learn more? Go to http://olivier-yon.info/.  
Enter N277 in the search box to learn more about \"Learning About Diabetes Food Guidelines. \" Current as of: May 23, 2016 Content Version: 11.2 © 1633-5947 Your[employer], Screenmailer. Care instructions adapted under license by [employer] (which disclaims liability or warranty for this information). If you have questions about a medical condition or this instruction, always ask your healthcare professional. [address] any warranty or liability for your use of this information. Introducing [facility] & HEALTH SERVICES! [employer] introduces GenieDB patient portal. Now you can access parts of your medical record, email your doctor's office, and request medication refills online. 1. In your internet browser, go to [email or web address] for Business 2. Click on the First Time User? Click Here link in the Sign In box. You will see the New Member Sign Up page. 3. Enter your GenieDB Access Code exactly as it appears below. You will not need to use this code after youve completed the sign-up process. If you do not sign up before the expiration date, you must request a new code. · GenieDB Access Code: [email or web address] Expires: 8/29/2017 11:22 AM 
 
4. Enter the last four digits of your Social Security Number (xxxx) and Date of Birth (mm/dd/yyyy) as indicated and click Submit. You will be taken to the next sign-up page. 5. Create a GenieDB ID. This will be your GenieDB login ID and cannot be changed, so think of one that is secure and easy to remember. 6. Create a GenieDB password. You can change your password at any time. 7. Enter your Password Reset Question and Answer. This can be used at a later time if you forget your password. 8. Enter your e-mail address. You will receive e-mail notification when new information is available in [address] 9. Click Sign Up. You can now view and download portions of your medical record. 10. Click the Download Summary menu link to download a portable copy of your medical information. If you have questions, please visit the Frequently Asked Questions section of the GoNabitt website. Remember, [employer] is NOT to be used for urgent needs. For medical emergencies, dial 911. Now available from your iPhone and Android! Please provide this summary of care documentation to your next provider. Your primary care clinician is listed as NONE. If you have any questions after today's visit, please call [phone].

## 2017-06-16 NOTE — PROGRESS NOTES
Niko Green  59 y.o. male  1955  [address]  [id number]   [address]: Progress Note  Tyrone Christy MD       Encounter Date: 6/16/2017    No chief complaint on file. History of Present Illness   Niko Green is a 59 y.o. male who presents to clinic today for hospital follow up. He recently admitted to [facility] for MI and acute CHF. Received stent and now on DAPT, BB and statin. EF 15-20% and now on life vest. He had acute hypoxic respiratory failure 2/2 HF and COPD. Seen by pulm. Steroids and nebs added. In the hospital new onset diabetes noted as well. A1c. 7.4. Diabetes education- Metformin to start 6/5. He has no complaints today. Denies CP, palpitations, SOB, leg swelling or pain. Review of Systems   Review of Systems   Constitutional: Negative for chills and fever. Eyes: Negative for blurred vision. Respiratory: Negative for cough and shortness of breath. Cardiovascular: Negative for chest pain, palpitations and leg swelling. Gastrointestinal: Negative. Genitourinary: Negative. Musculoskeletal: Negative. Neurological: Negative. Negative for headaches. Psychiatric/Behavioral: Negative. Vitals/Objective:     Visit Vitals    /68 (BP 1 Location: Left arm, BP Patient Position: Sitting)    Pulse 69    Temp 99.2 °F (37.3 °C) (Oral)    Resp 20    Ht 5' 10\" (1.778 m)    Wt 230 lb (104.3 kg)    SpO2 98%    BMI 33 kg/m2         Physical Exam:  General: calm, relaxed, appears nondistressed   Skin: no rashes or lesions noted, no erythema, ecchymoses, or petechiae, no urticaria   HEENT:  normocephalic/atraumatic, no facial droop, PERLLA/EOMI, pharynx clear,  speech clear and fluent. Conjunctivae/Cornea clear.    Lungs:  normal symmetric expansion - clear to auscultation, no stridor, rales, rhonchi, or wheezes heard   Cardiac:  regular rate and rhythm, normal S1/S2, no obvious murmur, gallop, or rub, PMI appears nondisplaced   Abdomen:  soft, nontender, normally active bowel sounds, no guarding or rebound, no mass or pulsatile mass; no ballotable ascites or fluid wave   Extremities:  no edema or calf tenderness, no palpable cord, pulses intact  No DVT signs   Pulses:  2+ and symmetric all extremities   Psychiatric: Alert, oriented x3  Normal affect, judgement and insight       Assessment and Plan:   1. Acute systolic congestive heart failure (HCC)  Asymptomatic today. VSS. Continue DAPT, BB and statin. Continue following up with cardiology. 2. Chronic obstructive pulmonary disease, unspecified COPD type ([address]  Continue breo ellipta. Albuterol as needed. Follow up with Pulm. 3. Controlled type 2 diabetes mellitus without complication, unspecified long term insulin use status ([address]  Newly diagnosed. Will continue metformin and refer to ophthalmology. Check microalbumin. Cholesterol looks okay, but due to CHF, he is on statin already. RTC in 3 months to recheck A1c     - AMB POC URINE, MICROALBUMIN, SEMIQUANT (3 RESULTS)  - REFERRAL TO OPHTHALMOLOGY  - metFORMIN (GLUCOPHAGE) 500 mg tablet; Take 1 Tab by mouth two (2) times daily (with meals). Dispense: 180 Tab; Refill: 1      AVS was printed, given to patient and briefly discussed prior to patient's departure from the office today. Kimberly Glover MD  [facility] Medicine Resident  [address] [place] [address]     History   Patients past medical, surgical and family histories were reviewed and updated.     Past Medical History:   Diagnosis Date    Acute on chronic systolic heart failure ([address] 6/1/2017    Controlled type 2 diabetes mellitus with circulatory disorder ([address] 6/1/2017    Coronary artery disease involving native coronary artery without angina pectoris 6/1/2017    ETOH abuse 6/1/2017    MI (myocardial infarction) ([address] 6/1/2017    Nicotine dependence 6/1/2017     Past Surgical History:   Procedure Laterality Date    HX TONSILLECTOMY  HX WISDOM TEETH EXTRACTION       Family History   Problem Relation Age of Onset    Stroke Father     Heart Disease Father     Hypertension Father      Social History     Social History    Marital status: SINGLE     Spouse name: N/A    Number of children: N/A    Years of education: N/A     Occupational History    Not on file. Social History Main Topics    Smoking status: Former Smoker     Packs/day: 2.00     Quit date: 5/31/2017    Smokeless tobacco: Never Used    Alcohol use 25.2 oz/week     42 Cans of beer per week    Drug use: No    Sexual activity: Yes     Partners: Female     Other Topics Concern    Not on file     Social History Narrative            Current Medications/Allergies     Current Outpatient Prescriptions   Medication Sig Dispense Refill    metFORMIN (GLUCOPHAGE) 500 mg tablet Take 1 Tab by mouth two (2) times daily (with meals). 180 Tab 1    aspirin 81 mg chewable tablet Take 1 Tab by mouth daily.  fluticasone-vilanterol (BREO ELLIPTA) 100-25 mcg/dose inhaler Take 1 Puff by inhalation daily. Dr Solange Crisostomo to provide refills 1 Inhaler 0    metoprolol succinate (TOPROL-XL) 25 mg XL tablet Take 1 Tab by mouth daily. Dr. Hortensia Claros to provide refills 30 Tab 0    nicotine (NICODERM CQ) 21 mg/24 hr 1 Patch by TransDERmal route daily for 30 days. Dr Annie skinner to provide refills 1 Patch 0    atorvastatin (LIPITOR) 20 mg tablet Take 0.5 Tabs by mouth daily. Dr. Hortensia Claros to provide refills 30 Tab 0    bumetanide (BUMEX) 0.5 mg tablet Take 1 Tab by mouth daily. Dr. Hortensia Claros to provide refills 30 Tab 0    clopidogrel (PLAVIX) 75 mg tab Take 1 Tab by mouth daily. Dr. Hortensia Claros to provide refills  Indications:  Thrombosis Prevention after PCI 30 Tab 0     Allergies   Allergen Reactions    Codeine Itching

## 2017-06-30 ENCOUNTER — TELEPHONE (OUTPATIENT)
Dept: CARDIOLOGY CLINIC | Age: 62
End: 2017-06-30

## 2017-06-30 RX ORDER — BUMETANIDE 0.5 MG/1
0.5 TABLET ORAL DAILY
Qty: 30 TAB | Refills: 0 | Status: CANCELLED | OUTPATIENT
Start: 2017-06-30

## 2017-06-30 RX ORDER — METOPROLOL SUCCINATE 25 MG/1
25 TABLET, EXTENDED RELEASE ORAL DAILY
Qty: 30 TAB | Refills: 0 | Status: CANCELLED | OUTPATIENT
Start: 2017-06-30

## 2017-06-30 RX ORDER — ATORVASTATIN CALCIUM 20 MG/1
10 TABLET, FILM COATED ORAL DAILY
Qty: 30 TAB | Refills: 0 | Status: CANCELLED | OUTPATIENT
Start: 2017-06-30

## 2017-07-03 ENCOUNTER — PATIENT OUTREACH (OUTPATIENT)
Dept: CARDIOLOGY CLINIC | Age: 62
End: 2017-07-03

## 2017-07-03 DIAGNOSIS — I25.118 CORONARY ARTERY DISEASE INVOLVING NATIVE HEART WITH OTHER FORM OF ANGINA PECTORIS, UNSPECIFIED VESSEL OR LESION TYPE (HCC): ICD-10-CM

## 2017-07-03 DIAGNOSIS — E78.00 PURE HYPERCHOLESTEROLEMIA: ICD-10-CM

## 2017-07-03 DIAGNOSIS — I50.22 CHRONIC SYSTOLIC CONGESTIVE HEART FAILURE (HCC): Primary | ICD-10-CM

## 2017-07-03 DIAGNOSIS — E11.9 CONTROLLED TYPE 2 DIABETES MELLITUS WITHOUT COMPLICATION, UNSPECIFIED LONG TERM INSULIN USE STATUS: ICD-10-CM

## 2017-07-03 RX ORDER — ATORVASTATIN CALCIUM 10 MG/1
10 TABLET, FILM COATED ORAL DAILY
Qty: 30 TAB | Refills: 2 | Status: SHIPPED | OUTPATIENT
Start: 2017-07-03 | End: 2017-09-18

## 2017-07-03 RX ORDER — BUMETANIDE 0.5 MG/1
0.5 TABLET ORAL DAILY
Qty: 30 TAB | Refills: 2 | Status: SHIPPED | OUTPATIENT
Start: 2017-07-03 | End: 2017-10-09 | Stop reason: SDUPTHER

## 2017-07-03 RX ORDER — CLOPIDOGREL BISULFATE 75 MG/1
75 TABLET ORAL DAILY
Qty: 90 TAB | Refills: 3 | Status: SHIPPED | OUTPATIENT
Start: 2017-07-03 | End: 2019-01-18 | Stop reason: SDUPTHER

## 2017-07-03 RX ORDER — METFORMIN HYDROCHLORIDE 500 MG/1
500 TABLET ORAL 2 TIMES DAILY WITH MEALS
Qty: 60 TAB | Refills: 0 | Status: SHIPPED | OUTPATIENT
Start: 2017-07-03 | End: 2017-11-01

## 2017-07-03 RX ORDER — METOPROLOL SUCCINATE 25 MG/1
25 TABLET, EXTENDED RELEASE ORAL DAILY
Qty: 30 TAB | Refills: 0 | Status: SHIPPED | OUTPATIENT
Start: 2017-07-03 | End: 2017-08-02 | Stop reason: SDUPTHER

## 2017-07-03 NOTE — PROGRESS NOTES
Cardiology Nurse Navigator note:    Received return call from Mae Patel- she is saying that the [facility] can fille their water jugs at the [facility]- their water-well is good drinking water. They do not drink the water at the office- [place]. Called to let sister, marisol know that she can fill water jugs at the [facility]. She states that they barely made their financial obligations- they still had their food stamps for this month (gone for next month due to income disqualifications) and he got his tax return money. His friend had to loan him $200 to  his medications. She is relaying that they have their completed application in at the [facility]) clinic- but have not heard back- she is relaying a newly found \"abscess in the ear\" and needs the dental services as well. Left VM for the director to return call from Free clinic- trying to see when he will be able to be seen. Feel the oxygen use may be related to COPD. He will need to see someone to follow up and refill scripts for DM and COPD management. Reached out to the NN in the [facility] office- to see if she can get him an appointment. She is relaying \"increased ability to do more but has had to come home and use his oxygen. 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 She said his BP is still running 98/something. Denies any dizzy or unsteadiness. Asked if he was checking weight- some days his abdomen looks bigger than other days. She said he is still not smoking nor drinking- but continues with hearty appetite. He is not happy with diet changes- does not like to eat vegetables. He is eating a lot of Tuna fish. She said he did get his phone back into working- she is upset that he got one that is costing $35 a month- not limited on minutes- she does not feel that he needs a phone that is that expensive.      Later received VM from sister, they were at the [employer]- there were no refills left on scripts. Consulted with NP- she will refill cardiac meds for 2 months and the metformin for one month only- until he can follow up with card and also establish with either clinic or new PCP.

## 2017-07-11 ENCOUNTER — PATIENT OUTREACH (OUTPATIENT)
Dept: CARDIOLOGY CLINIC | Age: 62
End: 2017-07-11

## 2017-07-27 ENCOUNTER — HOSPITAL ENCOUNTER (OUTPATIENT)
Dept: LAB | Age: 62
Discharge: HOME | End: 2017-07-27

## 2017-07-27 LAB
ALBUMIN SERPL BCP-MCNC: 4.3 G/DL (ref 3.5–5)
ALBUMIN/GLOB SERPL: 1.3 {RATIO} (ref 1.1–2.2)
ALP SERPL-CCNC: 97 U/L (ref 45–117)
ALT SERPL-CCNC: 20 U/L (ref 12–78)
ANION GAP BLD CALC-SCNC: 10 MMOL/L (ref 5–15)
AST SERPL W P-5'-P-CCNC: 9 U/L (ref 15–37)
BASOPHILS # BLD AUTO: 0 K/UL (ref 0–0.1)
BASOPHILS # BLD: 0 % (ref 0–1)
BILIRUB SERPL-MCNC: 0.6 MG/DL (ref 0.2–1)
BUN SERPL-MCNC: 14 MG/DL (ref 6–20)
BUN/CREAT SERPL: 13 (ref 12–20)
CALCIUM SERPL-MCNC: 9.3 MG/DL (ref 8.5–10.1)
CHLORIDE SERPL-SCNC: 103 MMOL/L (ref 97–108)
CHOLEST SERPL-MCNC: 152 MG/DL
CO2 SERPL-SCNC: 28 MMOL/L (ref 21–32)
CREAT SERPL-MCNC: 1.12 MG/DL (ref 0.7–1.3)
EOSINOPHIL # BLD: 0.3 K/UL (ref 0–0.4)
EOSINOPHIL NFR BLD: 4 % (ref 0–7)
ERYTHROCYTE [DISTWIDTH] IN BLOOD BY AUTOMATED COUNT: 13.5 % (ref 11.5–14.5)
EST. AVERAGE GLUCOSE BLD GHB EST-MCNC: 169 MG/DL
GLOBULIN SER CALC-MCNC: 3.4 G/DL (ref 2–4)
GLUCOSE SERPL-MCNC: 150 MG/DL (ref 65–100)
HBA1C MFR BLD: 7.5 % (ref 4.2–6.3)
HCT VFR BLD AUTO: 41.3 % (ref 36.6–50.3)
HDLC SERPL-MCNC: 44 MG/DL
HDLC SERPL: 3.5 {RATIO} (ref 0–5)
HGB BLD-MCNC: 13.9 G/DL (ref 12.1–17)
LDLC SERPL CALC-MCNC: 84.6 MG/DL (ref 0–100)
LIPID PROFILE,FLP: NORMAL
LYMPHOCYTES # BLD AUTO: 21 % (ref 12–49)
LYMPHOCYTES # BLD: 1.6 K/UL (ref 0.8–3.5)
MCH RBC QN AUTO: 32.3 PG (ref 26–34)
MCHC RBC AUTO-ENTMCNC: 33.7 G/DL (ref 30–36.5)
MCV RBC AUTO: 96 FL (ref 80–99)
MONOCYTES # BLD: 0.5 K/UL (ref 0–1)
MONOCYTES NFR BLD AUTO: 7 % (ref 5–13)
NEUTS SEG # BLD: 5.3 K/UL (ref 1.8–8)
NEUTS SEG NFR BLD AUTO: 68 % (ref 32–75)
PLATELET # BLD AUTO: 206 K/UL (ref 150–400)
POTASSIUM SERPL-SCNC: 4.2 MMOL/L (ref 3.5–5.1)
PROT SERPL-MCNC: 7.7 G/DL (ref 6.4–8.2)
RBC # BLD AUTO: 4.3 M/UL (ref 4.1–5.7)
SODIUM SERPL-SCNC: 141 MMOL/L (ref 136–145)
TRIGL SERPL-MCNC: 117 MG/DL (ref ?–150)
TSH SERPL DL<=0.05 MIU/L-ACNC: 1.68 UIU/ML (ref 0.36–3.74)
VLDLC SERPL CALC-MCNC: 23.4 MG/DL
WBC # BLD AUTO: 7.7 K/UL (ref 4.1–11.1)

## 2017-07-27 PROCEDURE — 80053 COMPREHEN METABOLIC PANEL: CPT | Performed by: EMERGENCY MEDICINE

## 2017-07-27 PROCEDURE — 83036 HEMOGLOBIN GLYCOSYLATED A1C: CPT | Performed by: EMERGENCY MEDICINE

## 2017-07-27 PROCEDURE — 85025 COMPLETE CBC W/AUTO DIFF WBC: CPT | Performed by: EMERGENCY MEDICINE

## 2017-07-27 PROCEDURE — 84443 ASSAY THYROID STIM HORMONE: CPT | Performed by: EMERGENCY MEDICINE

## 2017-07-27 PROCEDURE — 80061 LIPID PANEL: CPT | Performed by: EMERGENCY MEDICINE

## 2017-09-18 ENCOUNTER — CLINICAL SUPPORT (OUTPATIENT)
Dept: CARDIOLOGY CLINIC | Age: 62
End: 2017-09-18

## 2017-09-18 ENCOUNTER — OFFICE VISIT (OUTPATIENT)
Dept: CARDIOLOGY CLINIC | Age: 62
End: 2017-09-18

## 2017-09-18 VITALS
OXYGEN SATURATION: 98 % | SYSTOLIC BLOOD PRESSURE: 120 MMHG | RESPIRATION RATE: 16 BRPM | WEIGHT: 243 LBS | DIASTOLIC BLOOD PRESSURE: 78 MMHG | BODY MASS INDEX: 34.79 KG/M2 | HEART RATE: 80 BPM | HEIGHT: 70 IN

## 2017-09-18 VITALS — WEIGHT: 243 LBS | BODY MASS INDEX: 34.87 KG/M2

## 2017-09-18 DIAGNOSIS — F17.208 NICOTINE DEPENDENCE WITH OTHER NICOTINE-INDUCED DISORDER, UNSPECIFIED NICOTINE PRODUCT TYPE: Chronic | ICD-10-CM

## 2017-09-18 DIAGNOSIS — I50.22 CHRONIC SYSTOLIC CONGESTIVE HEART FAILURE (HCC): ICD-10-CM

## 2017-09-18 DIAGNOSIS — I50.22 CHRONIC SYSTOLIC CONGESTIVE HEART FAILURE (HCC): Primary | ICD-10-CM

## 2017-09-18 DIAGNOSIS — I25.10 CORONARY ARTERY DISEASE INVOLVING NATIVE CORONARY ARTERY OF NATIVE HEART WITHOUT ANGINA PECTORIS: ICD-10-CM

## 2017-09-18 DIAGNOSIS — I25.5 ISCHEMIC CARDIOMYOPATHY: ICD-10-CM

## 2017-09-18 DIAGNOSIS — F10.10 ETOH ABUSE: Chronic | ICD-10-CM

## 2017-09-18 DIAGNOSIS — E11.59 CONTROLLED TYPE 2 DIABETES MELLITUS WITH OTHER CIRCULATORY COMPLICATION, WITHOUT LONG-TERM CURRENT USE OF INSULIN (HCC): Chronic | ICD-10-CM

## 2017-09-18 PROBLEM — I21.9 MI (MYOCARDIAL INFARCTION) (HCC): Chronic | Status: RESOLVED | Noted: 2017-06-01 | Resolved: 2017-09-18

## 2017-09-18 RX ORDER — VARENICLINE TARTRATE 1 MG/1
1 TABLET, FILM COATED ORAL
COMMUNITY
End: 2017-10-03

## 2017-09-18 RX ORDER — EZETIMIBE 10 MG/1
10 TABLET ORAL DAILY
COMMUNITY
End: 2018-01-10 | Stop reason: SDUPTHER

## 2017-09-18 NOTE — PROGRESS NOTES
Manny Lambert, [address]  [address],  [address]  [place], [address] [facility]    Office [phone]  Fax [phone]  Cell [phone]        Kobe Godoy is a 59 y.o. male last seen by Vy Macias NP 3 months ago. Last seen by me in the hospital 4 months ago. Assessment  Encounter Diagnoses   Name Primary?  Coronary artery disease involving native coronary artery of native heart without angina pectoris     Chronic systolic congestive heart failure (HCC) Yes    Controlled type 2 diabetes mellitus with other circulatory complication, without long-term current use of insulin (HCC)     ETOH abuse     Nicotine dependence with other nicotine-induced disorder, unspecified nicotine product type     Ischemic cardiomyopathy        Recommendations:    Kobe Godoy has ischemic cardiomyopathy with persistent severe LV dysfunction with class 2-3 HF. He suffered an out-of-hospital MI in May 2017 and presented with subacute HF in June. Cadiac MRI demonstrated viability only in the RCA territory. Cardiac cath demonstrated mid %, RCA mid 85%. He underwent PCI RCA with [facility]. Would continue DAPT for a full year. Drivers of CAD include smoking and T2DM. Optimal medical therapy has been limited by lowish BPs. Will challenge him with Entresto 24/26 BID for two weeks and reevaluate. Continue low dose Toprol and Bumex. His long-term  outlook is guarded. I am not convinced he has full insight into his illness. His poor financial health is also a risk factor. Long discussion about his risk of SCD. Will refer to Dr. Nancy Schneider for ICD implant. We had a long discussion about smoking cessation, low carb diet and regular exercise. He is now being followed by Dr. Ephraim Villarreal in the [facility] free clinic. Subjective:  Mr. Tracy Tarzan continues to have BERNABE after walking 7 steps. He smokes 1 ppd and drinks 2-3 beers daily. He denies any coughing or wheezing.  He intermittently uses oxygen at night. He fatigues in the early afternoons but notes he wakes up at 5 AM. BPs at home run in the 731-528 systolic range with HRs in the 70s. Patient denies any exertional chest pain, dyspnea, palpitations, syncope, orthopnea, edema or paroxysmal nocturnal dyspnea. He was prescribed Chantix by Dr. Shawnee Helm for smoking cessation but has felt \"wired\" on this medication and subsequently stopped. He has not been adherent with Metformin secondary to GI distress. Patient does not have insurance, retired. Cardiac risk factors   HTN no  DM  yes  Smoking yes    Cardiac testing  ECHO: 5/31//2017: EF 15-20%, apical AK    MRI: 6/1/2017: LV dilated LVEF 32%, severe HK ant wall, RVEF 55%, mild MR; anterior infarct- no viability RCA/LCA: viable     CATH 6/2/2017: LM: nl, LAD: m100%, LCX codom normal, RCA:m 85%, LVEDP 25, no AV gradient, dilated LV, apical AK, EF 20%  -- s/p PCI pRCA: 2.5x18 Xience ([facility]). R radial TR band    Past Medical History:   Diagnosis Date    Controlled type 2 diabetes mellitus with circulatory disorder ([facility] 6/1/2017    COPD (chronic obstructive pulmonary disease) [facility]     Coronary artery disease involving native coronary artery without angina pectoris 06/01/2017    [facility] MI, 2v disease s/p PCI RCA with [facility] June 2017    ETOH abuse 6/1/2017    Ischemic cardiomyopathy     Extensive LAD territory infarction    Nicotine dependence 5/7/4393    Systolic heart failure (HCC)         Current Outpatient Prescriptions   Medication Sig Dispense Refill    ezetimibe (ZETIA) 10 mg tablet Take 10 mg by mouth daily.  ACETAMINOPHEN/DIPHENHYDRAMINE (TYLENOL PM PO) Take  by mouth.  varenicline (CHANTIX CONTINUING MONTH PAK) 1 mg tablet Take 1 mg by mouth two (2) times daily (after meals).  metoprolol succinate (TOPROL-XL) 25 mg XL tablet TAKE ONE TABLET BY MOUTH ONCE DAILY 30 Tab 6    clopidogrel (PLAVIX) 75 mg tab Take 1 Tab by mouth daily.  Dr. Jadon Kelly to provide refills Indications: Thrombosis Prevention after PCI 90 Tab 3    bumetanide (BUMEX) 0.5 mg tablet Take 1 Tab by mouth daily. 30 Tab 2    metFORMIN (GLUCOPHAGE) 500 mg tablet Take 1 Tab by mouth two (2) times daily (with meals). 60 Tab 0    aspirin 81 mg chewable tablet Take 1 Tab by mouth daily.  fluticasone-vilanterol (BREO ELLIPTA) 100-25 mcg/dose inhaler Take 1 Puff by inhalation daily. Dr Joe Half to provide refills 1 Inhaler 0       Allergies   Allergen Reactions    Codeine Itching      Retired. Review of Systems  Constitutional: Negative for fever, chills, malaise/fatigue and diaphoresis. Respiratory: Negative for cough, hemoptysis, sputum production, and wheezing. Positive for BERNABE. Cardiovascular: Negative for chest pain, palpitations, orthopnea, claudication, leg swelling and PND. Gastrointestinal: Negative for heartburn, nausea, vomiting, blood in stool and melena. Genitourinary: Negative for dysuria and flank pain. Musculoskeletal: Negative for joint pain and back pain. Skin: Negative for rash. Neurological: Negative for focal weakness, seizures, loss of consciousness, weakness and headaches. Endo/Heme/Allergies: Does not bruise/bleed easily. Psychiatric/Behavioral: Negative for memory loss. The patient does not have insomnia. Physical Exam    Visit Vitals    /78 (BP 1 Location: Left arm, BP Patient Position: Sitting)    Pulse 80    Resp 16    Ht 5' 10\" (1.778 m)    Wt 243 lb (110.2 kg)    SpO2 98%    BMI 34.87 kg/m2     Wt Readings from Last 3 Encounters:   09/18/17 243 lb (110.2 kg)   06/16/17 230 lb (104.3 kg)   06/14/17 226 lb 6.4 oz (102.7 kg)      General - well developed well nourished  Neck - JVP normal, thyroid nl  Cardiac - normal S1, S2, no murmurs, rubs or gallops. No clicks  Vascular - carotids without bruits, radials, femorals and pedal pulses equal bilateral  Lungs - clear to auscultation bilaterals, no rales, wheezing.  Scattered rhonchi posteriorly   Abd - soft nontender, no HSM, no abd bruits  Extremities - no edema  Skin - no rash  Neuro - nonfocal  Psych - normal mood and affect      Cardiographics  ECG 6/14/17 - SR 75, TWI; unchanged  Echo 9/18/17- Dilated LV, EF 20%, apical dyskinesis    Written by Naomi Bernal, as dictated by Foster Esteves MD.  Zoila Bonner

## 2017-09-18 NOTE — MR AVS SNAPSHOT
Visit Information Date & Time Provider Department Dept. Phone Encounter #  
 9/18/2017  4:00 PM Orlando Conklin [phone] [id number] Your Appointments 10/3/2017  1:00 PM  
ESTABLISHED PATIENT with Orlando Conklin MD  
[facility] ([facility]) Appt Note: 2 week follow up per - Dr. Reza English [address] [facility] 600 1007 [address] [address] [address] Upcoming Health Maintenance Date Due Hepatitis C Screening 1955 EYE EXAM RETINAL OR DILATED Q1 12/10/1963 Pneumococcal 19-64 Medium Risk (1 of 1 - PPSV23) 12/10/1972 DTaP/Tdap/Td series (1 - Tdap) 12/10/1974 FOBT Q 1 YEAR AGE 50-75 12/10/2003 ZOSTER VACCINE AGE 60> 10/10/2013 INFLUENZA AGE 9 TO ADULT 8/1/2017 HEMOGLOBIN A1C Q6M 1/27/2018 FOOT EXAM Q1 6/16/2018 MICROALBUMIN Q1 6/16/2018 LIPID PANEL Q1 7/27/2018 Allergies as of 9/18/2017  Review Complete On: 9/18/2017 By: Jigar Montesinos Severity Noted Reaction Type Reactions Codeine  06/02/2017    Itching Current Immunizations  Never Reviewed No immunizations on file. Not reviewed this visit You Were Diagnosed With   
  
 Codes Comments Non-ST elevation (NSTEMI) myocardial infarction [facility])    -  Primary ICD-10-CM: I21.4 ICD-9-CM: 410.70 Coronary artery disease involving native coronary artery of native heart without angina pectoris     ICD-10-CM: I25.10 ICD-9-CM: 414.01 Chronic systolic congestive heart failure (HCC)     ICD-10-CM: I50.22 ICD-9-CM: 428.22, 428.0 Controlled type 2 diabetes mellitus with other circulatory complication, without long-term current use of insulin (HCC)     ICD-10-CM: E11.59 
ICD-9-CM: 250.70 ETOH abuse     ICD-10-CM: F10.10 ICD-9-CM: 305.00   
 Nicotine dependence with other nicotine-induced disorder, unspecified nicotine product type     ICD-10-CM: F17.208 ICD-9-CM: 292.89, 305.1 Vitals BP Pulse Resp Height(growth percentile) Weight(growth percentile) SpO2  
 120/78 (BP 1 Location: Left arm, BP Patient Position: Sitting) 80 16 5' 10\" (1.778 m) 243 lb (110.2 kg) 98% BMI Smoking Status 34.87 kg/m2 Former Smoker BMI and BSA Data Body Mass Index Body Surface Area 34.87 kg/m 2 2.33 m 2 Preferred Pharmacy Pharmacy Name Phone [facility] [address] [place] [address] [phone] Your Updated Medication List  
  
   
This list is accurate as of: 9/18/17  4:30 PM.  Always use your most recent med list.  
  
  
  
  
 aspirin 81 mg chewable tablet Take 1 Tab by mouth daily. bumetanide 0.5 mg tablet Commonly known as:  Rumalda Fritter Take 1 Tab by mouth daily. CHANTIX CONTINUING MONTH ROSA 1 mg tablet Generic drug:  varenicline Take 1 mg by mouth two (2) times daily (after meals). clopidogrel 75 mg Tab Commonly known as:  PLAVIX Take 1 Tab by mouth daily. Dr. Vinicio Payne to provide refills  Indications: Thrombosis Prevention after PCI  
  
 fluticasone-vilanterol 100-25 mcg/dose inhaler Commonly known as:  BREO ELLIPTA Take 1 Puff by inhalation daily. Dr Smiley Costa to provide refills  
  
 metFORMIN 500 mg tablet Commonly known as:  GLUCOPHAGE Take 1 Tab by mouth two (2) times daily (with meals). metoprolol succinate 25 mg XL tablet Commonly known as:  TOPROL-XL  
TAKE ONE TABLET BY MOUTH ONCE DAILY  
  
 sacubitril-valsartan 24-26 mg tablet Commonly known as:  ENTRESTO Take 1 Tab by mouth two (2) times a day. TYLENOL PM PO Take  by mouth. ZETIA 10 mg tablet Generic drug:  ezetimibe Take 10 mg by mouth daily. Patient Instructions Start Entresto 24/26 1 tablet twice daily. Will refer you to Dr. Evangelista Muniz to discuss defibrillator implant. Learning About ICDs (Implantable Cardioverter-Defibrillators) What is an ICD (implantable cardioverter-defibrillator)? An ICD (implantable cardioverter-defibrillator) is a small, battery-powered device. It fixes serious changes in your heartbeat. ICDs are used in people who have had a life-threatening heart rhythm or are at high risk of having one. The ICD is placed inside the chest. It's attached to one or two wires (called leads) that go into the heart through a vein. How does an ICD work? An ICD is always checking your heart rate and rhythm. If the ICD detects a life-threatening, rapid heart rhythm, it tries to slow the rhythm to get it back to normal. If the bad rhythm doesn't stop, the ICD sends an electric shock to the heart. This restores a normal rhythm. The device then goes back to its watchful mode. An ICD also can fix a heart rate that is too fast or too slow. It does this without using a shock. It can send out electrical pulses to speed up a heart rate that is too slow. Or it can slow down a fast heart rate by matching the pace and bringing the heart rate back to normal. 
Your doctor will check the ICD regularly to make sure it is working right and isn't causing any problems. Your doctor will also check the battery to see if it needs to be replaced. How is an ICD placed? Your doctor will put the ICD under the skin in your chest during minor surgery. You will likely have medicine to make you feel relaxed and sleepy during the surgery. Your doctor makes a small cut (incision) in your upper chest. He or she puts one or two leads (wires) in a vein and threads them to the heart. Your doctor connects the leads to the ICD and places it in your chest. Then the incision is closed. Your doctor also programs the ICD. Most people spend the night in the hospital, just to make sure that the device is working and that there are no problems from the surgery. How does it feel to get a shock? The shock from an ICD hurts briefly. People feel it in different ways. It's been described as feeling like a punch in the chest. But the shock is a sign that the ICD is doing its job. It's there to save your life. You won't feel any pain if the ICD uses electrical pulses to fix a heart rate that is too fast or too slow. There's no way to know how often a shock might occur. It might never happen. Not knowing when or if a shock might happen may make you nervous. Knowing what to do if you get shocked can help. Ask your doctor for an action plan. This plan will guide you step-by-step if a shock happens. What else should you know? You can live a normal life with your ICD. Here are a few tips for living well with your ICD. · Avoid strong magnetic and electrical fields. These can keep your device from working right. Most office equipment and home appliances are safe to use. Check with your doctor about which things you should use with caution and which you should stay away from. · Be sure that any doctor, dentist, or other health professional you see knows that you have an ICD. · Always carry a card that tells what kind of device you have. Wear medical alert jewelry that says you have an ICD. You can buy this at most drugsConsorte Media. · If you do get a shock, follow your action plan for what to do. · You can lead an active life with an ICD. Ask your doctor what sort of activity and intensity is safe for you. As you plan for your future and your end of life, be sure to include plans for your ICD. You can make the decision to turn off your ICD as part of the medical treatment you want at the end of life. Follow-up care is a key part of your treatment and safety. Be sure to make and go to all appointments, and call your doctor if you are having problems.  It's also a good idea to know your test results and keep a list of the medicines you take. Where can you learn more? Go to http://olivier-yon.info/. Enter G935 in the search box to learn more about \"Learning About ICDs (Implantable Cardioverter-Defibrillators). \" 
Current as of: September 29, 2016 Content Version: 11.3 © 9385-2581 I-lighting. Care instructions adapted under license by BoatsGo (which disclaims liability or warranty for this information). If you have questions about a medical condition or this instruction, always ask your healthcare professional. [address] any warranty or liability for your use of this information. Introducing [facility]! Greg Sesay introduces [employer] patient portal. Now you can access parts of your medical record, email your doctor's office, and request medication refills online. 1. In your internet browser, go to [email or web address] 2. Click on the First Time User? Click Here link in the Sign In box. You will see the New Member Sign Up page. 3. Enter your Aff[employer] Access Code exactly as it appears below. You will not need to use this code after youve completed the sign-up process. If you do not sign up before the expiration date, you must request a new code. · [employer] Access Code: [email or web address] Expires: 12/17/2017  4:30 PM 
 
4. Enter the last four digits of your Social Security Number (xxxx) and Date of Birth (mm/dd/yyyy) as indicated and click Submit. You will be taken to the next sign-up page. 5. Create a FUNGO STUDIOSt ID. This will be your [employer] login ID and cannot be changed, so think of one that is secure and easy to remember. 6. Create a [employer] password. You can change your password at any time. 7. Enter your Password Reset Question and Answer. This can be used at a later time if you forget your password. 8. Enter your e-mail address.  You will receive e-mail notification when new information is available in EyeQuant. 9. Click Sign Up. You can now view and download portions of your medical record. 10. Click the Download Summary menu link to download a portable copy of your medical information. If you have questions, please visit the Frequently Asked Questions section of the EyeQuant website. Remember, EyeQuant is NOT to be used for urgent needs. For medical emergencies, dial 911. Now available from your iPhone and Android! Please provide this summary of care documentation to your next provider. Your primary care clinician is listed as NONE. If you have any questions after today's visit, please call [phone].

## 2017-09-18 NOTE — PATIENT INSTRUCTIONS
Start Entresto 24/26 1 tablet twice daily. Will refer you to Dr. Chasity Zapien to discuss defibrillator implant. Learning About ICDs (Implantable Cardioverter-Defibrillators)  What is an ICD (implantable cardioverter-defibrillator)? An ICD (implantable cardioverter-defibrillator) is a small, battery-powered device. It fixes serious changes in your heartbeat. ICDs are used in people who have had a life-threatening heart rhythm or are at high risk of having one. The ICD is placed inside the chest. It's attached to one or two wires (called leads) that go into the heart through a vein. How does an ICD work? An ICD is always checking your heart rate and rhythm. If the ICD detects a life-threatening, rapid heart rhythm, it tries to slow the rhythm to get it back to normal. If the bad rhythm doesn't stop, the ICD sends an electric shock to the heart. This restores a normal rhythm. The device then goes back to its watchful mode. An ICD also can fix a heart rate that is too fast or too slow. It does this without using a shock. It can send out electrical pulses to speed up a heart rate that is too slow. Or it can slow down a fast heart rate by matching the pace and bringing the heart rate back to normal.  Your doctor will check the ICD regularly to make sure it is working right and isn't causing any problems. Your doctor will also check the battery to see if it needs to be replaced. How is an ICD placed? Your doctor will put the ICD under the skin in your chest during minor surgery. You will likely have medicine to make you feel relaxed and sleepy during the surgery. Your doctor makes a small cut (incision) in your upper chest. He or she puts one or two leads (wires) in a vein and threads them to the heart. Your doctor connects the leads to the ICD and places it in your chest. Then the incision is closed. Your doctor also programs the ICD.   Most people spend the night in the hospital, just to make sure that the device is working and that there are no problems from the surgery. How does it feel to get a shock? The shock from an ICD hurts briefly. People feel it in different ways. It's been described as feeling like a punch in the chest. But the shock is a sign that the ICD is doing its job. It's there to save your life. You won't feel any pain if the ICD uses electrical pulses to fix a heart rate that is too fast or too slow. There's no way to know how often a shock might occur. It might never happen. Not knowing when or if a shock might happen may make you nervous. Knowing what to do if you get shocked can help. Ask your doctor for an action plan. This plan will guide you step-by-step if a shock happens. What else should you know? You can live a normal life with your ICD. Here are a few tips for living well with your ICD. · Avoid strong magnetic and electrical fields. These can keep your device from working right. Most office equipment and home appliances are safe to use. Check with your doctor about which things you should use with caution and which you should stay away from. · Be sure that any doctor, dentist, or other health professional you see knows that you have an ICD. · Always carry a card that tells what kind of device you have. Wear medical alert jewelry that says you have an ICD. You can buy this at most drugstores. · If you do get a shock, follow your action plan for what to do. · You can lead an active life with an ICD. Ask your doctor what sort of activity and intensity is safe for you. As you plan for your future and your end of life, be sure to include plans for your ICD. You can make the decision to turn off your ICD as part of the medical treatment you want at the end of life. Follow-up care is a key part of your treatment and safety. Be sure to make and go to all appointments, and call your doctor if you are having problems.  It's also a good idea to know your test results and keep a list of the medicines you take. Where can you learn more? Go to http://olivier-yon.info/. Enter Q629 in the search box to learn more about \"Learning About ICDs (Implantable Cardioverter-Defibrillators). \"  Current as of: September 29, 2016  Content Version: 11.3  © 0331-3489 Blaze DFM. Care instructions adapted under license by Trendient (which disclaims liability or warranty for this information). If you have questions about a medical condition or this instruction, always ask your healthcare professional. [address] any warranty or liability for your use of this information.

## 2017-10-03 ENCOUNTER — OFFICE VISIT (OUTPATIENT)
Dept: CARDIOLOGY CLINIC | Age: 62
End: 2017-10-03

## 2017-10-03 VITALS
DIASTOLIC BLOOD PRESSURE: 68 MMHG | OXYGEN SATURATION: 96 % | HEART RATE: 66 BPM | SYSTOLIC BLOOD PRESSURE: 110 MMHG | HEIGHT: 70 IN | RESPIRATION RATE: 18 BRPM | WEIGHT: 244.4 LBS | BODY MASS INDEX: 34.99 KG/M2

## 2017-10-03 DIAGNOSIS — I25.10 CORONARY ARTERY DISEASE INVOLVING NATIVE CORONARY ARTERY OF NATIVE HEART WITHOUT ANGINA PECTORIS: ICD-10-CM

## 2017-10-03 DIAGNOSIS — I50.22 CHRONIC SYSTOLIC CONGESTIVE HEART FAILURE (HCC): Primary | ICD-10-CM

## 2017-10-03 DIAGNOSIS — E11.59 CONTROLLED TYPE 2 DIABETES MELLITUS WITH OTHER CIRCULATORY COMPLICATION, WITHOUT LONG-TERM CURRENT USE OF INSULIN (HCC): Chronic | ICD-10-CM

## 2017-10-03 DIAGNOSIS — F17.208 NICOTINE DEPENDENCE WITH OTHER NICOTINE-INDUCED DISORDER, UNSPECIFIED NICOTINE PRODUCT TYPE: Chronic | ICD-10-CM

## 2017-10-03 DIAGNOSIS — I25.5 ISCHEMIC CARDIOMYOPATHY: ICD-10-CM

## 2017-10-03 DIAGNOSIS — F10.10 ETOH ABUSE: Chronic | ICD-10-CM

## 2017-10-03 NOTE — MR AVS SNAPSHOT
Visit Information Date & Time Provider Department Dept. Phone Encounter #  
 10/3/2017  1:00 PM Bailey Renner MD [facility] [place] [phone] [id number] Follow-up Instructions Return in about 3 months (around 1/3/2018). Your Appointments 11/1/2017  1:00 PM  
New Patient with Paramjit Jorgensen MD  
[facility] ([facility]) Appt Note: per dr Malena Talavera consult for difbulator [address] [facility] [place] 600 1007 [facility]  
[address] [facility] [place] [address] [address] Upcoming Health Maintenance Date Due Hepatitis C Screening 1955 EYE EXAM RETINAL OR DILATED Q1 12/10/1963 Pneumococcal 19-64 Medium Risk (1 of 1 - PPSV23) 12/10/1972 DTaP/Tdap/Td series (1 - Tdap) 12/10/1974 FOBT Q 1 YEAR AGE 50-75 12/10/2003 ZOSTER VACCINE AGE 60> 10/10/2013 INFLUENZA AGE 9 TO ADULT 8/1/2017 HEMOGLOBIN A1C Q6M 1/27/2018 FOOT EXAM Q1 6/16/2018 MICROALBUMIN Q1 6/16/2018 LIPID PANEL Q1 7/27/2018 Allergies as of 10/3/2017  Review Complete On: 10/3/2017 By: Sommer Campo LPN Severity Noted Reaction Type Reactions Codeine  06/02/2017    Itching Current Immunizations  Never Reviewed No immunizations on file. Not reviewed this visit You Were Diagnosed With   
  
 Codes Comments Ischemic cardiomyopathy    -  Primary ICD-10-CM: I25.5 ICD-9-CM: 414.8 Coronary artery disease involving native coronary artery of native heart without angina pectoris     ICD-10-CM: I25.10 ICD-9-CM: 414.01 Chronic systolic congestive heart failure (HCC)     ICD-10-CM: I50.22 ICD-9-CM: 428.22, 428.0 Controlled type 2 diabetes mellitus with other circulatory complication, without long-term current use of insulin (HCC)     ICD-10-CM: E11.59 
ICD-9-CM: 250.70 ETOH abuse     ICD-10-CM: F10.10 ICD-9-CM: 305.00   
 Nicotine dependence with other nicotine-induced disorder, unspecified nicotine product type     ICD-10-CM: F17.208 ICD-9-CM: 292.89, 305.1 Vitals BP Pulse Resp Height(growth percentile) Weight(growth percentile) SpO2  
 110/68 (BP 1 Location: Left arm, BP Patient Position: Sitting) 66 18 5' 10\" (1.778 m) 244 lb 6.4 oz (110.9 kg) 96% BMI Smoking Status 35.07 kg/m2 Current Every Day Smoker BMI and BSA Data Body Mass Index Body Surface Area 35.07 kg/m 2 2.34 m 2 Preferred Pharmacy Pharmacy Name Phone [facility] [address] [place] [address] [phone] Your Updated Medication List  
  
   
This list is accurate as of: 10/3/17  1:19 PM.  Always use your most recent med list.  
  
  
  
  
 aspirin 81 mg chewable tablet Take 1 Tab by mouth daily. bumetanide 0.5 mg tablet Commonly known as:  Marisue Days Take 1 Tab by mouth daily. clopidogrel 75 mg Tab Commonly known as:  PLAVIX Take 1 Tab by mouth daily. Dr. Bialey Renner to provide refills  Indications: Thrombosis Prevention after PCI  
  
 fluticasone-vilanterol 100-25 mcg/dose inhaler Commonly known as:  BREO ELLIPTA Take 1 Puff by inhalation daily. Dr Faye Klein to provide refills  
  
 metFORMIN 500 mg tablet Commonly known as:  GLUCOPHAGE Take 1 Tab by mouth two (2) times daily (with meals). metoprolol succinate 25 mg XL tablet Commonly known as:  TOPROL-XL  
TAKE ONE TABLET BY MOUTH ONCE DAILY  
  
 sacubitril-valsartan 24-26 mg tablet Commonly known as:  ENTRESTO Take 1 Tab by mouth two (2) times a day. TYLENOL PM PO Take  by mouth. ZETIA 10 mg tablet Generic drug:  ezetimibe Take 10 mg by mouth daily. Prescriptions Sent to Pharmacy Refills  
 sacubitril-valsartan (ENTRESTO) 24 mg/26 mg tablet 3 Sig: Take 1 Tab by mouth two (2) times a day.   
 Class: Normal  
 Pharmacy: [place] [address] [place]  #: [phone] Route: Oral  
  
Follow-up Instructions Return in about 3 months (around 1/3/2018). Introducing [facility]! [address] introduces [employer] patient portal. Now you can access parts of your medical record, email your doctor's office, and request medication refills online. 1. In your internet browser, go to [email or web address] 2. Click on the First Time User? Click Here link in the Sign In box. You will see the New Member Sign Up page. 3. Enter your Luma[employer]te Access Code exactly as it appears below. You will not need to use this code after youve completed the sign-up process. If you do not sign up before the expiration date, you must request a new code. · L[employer]te Access Code: [email or web address] Expires: 12/17/2017  4:30 PM 
 
4. Enter the last four digits of your Social Security Number (xxxx) and Date of Birth (mm/dd/yyyy) as indicated and click Submit. You will be taken to the next sign-up page. 5. Create a LumaCyte ID. This will be your Luma[employer]te login ID and cannot be changed, so think of one that is secure and easy to remember. 6. Create a LumaCyte password. You can change your password at any time. 7. Enter your Password Reset Question and Answer. This can be used at a later time if you forget your password. 8. Enter your e-mail address. You will receive e-mail notification when new information is available in [address] 9. Click Sign Up. You can now view and download portions of your medical record. 10. Click the Download Summary menu link to download a portable copy of your medical information. If you have questions, please visit the Frequently Asked Questions section of the LumaCyte website. Remember, Luma[employer]te is NOT to be used for urgent needs. For medical emergencies, dial 911. Now available from your iPhone and Android! Please provide this summary of care documentation to your next provider. Your primary care clinician is listed as NONE. If you have any questions after today's visit, please call [phone].

## 2017-10-03 NOTE — PROGRESS NOTES
[address]anny [address]  [address] Chris Linares, Jr [place], [address] [facility]    Office [phone]  Fax [phone]  Cell [phone]        Lianet Aguilar is a 59 y.o. male last seen 2 weeks ago. Assessment  Encounter Diagnoses   Name Primary?  Ischemic cardiomyopathy     Coronary artery disease involving native coronary artery of native heart without angina pectoris     Chronic systolic congestive heart failure (HCC) Yes    Controlled type 2 diabetes mellitus with other circulatory complication, without long-term current use of insulin (HCC)     ETOH abuse     Nicotine dependence with other nicotine-induced disorder, unspecified nicotine product type        Recommendations:    Lianet Aguilar has ischemic cardiomyopathy with persistent severe LV dysfunction with class 2-3 HF. He suffered an out-of-hospital MI in May 2017 and presented with subacute HF in June. Cadiac MRI demonstrated viability only in the RCA territory. Cardiac cath demonstrated mid %, RCA mid 85%. He underwent PCI RCA with EMORY. Would continue DAPT for a full year. Drivers of CAD include smoking and T2DM. He continues to have Class 2 HF symptoms on good medical therapy most recently started on Entresto. His BPs are low normal at home. Would not dose escalate at this time. His long term outlook looks guarded given his nicotine and alcohol consumption. We emphasized the critical importance to drastically reduce his alcohol consumption. We again had a long discussion about his risk for SCD. Will refer to DR. Marinelli for ICD implant    We had a long discussion about smoking cessation, low carb diet and regular exercise. He is now being followed by Dr. Airam Hale in the [facility] free clinic    Follow-up Disposition:  Return in about 3 months (around 1/3/2018). Subjective:    Mr. Richar Arriaga continues to have BERNABE. He smokes 1 ppd and drinks 2-4 beers daily. He intermittently uses oxygen at night. He hasn't been out of bed much the last couple days because he has been sleeping. He is having some lightheadedness today. He is tolerating Entresto without any side effects. He says he feels better while taking it. Patient denies any exertional chest pain, dyspnea, palpitations, syncope, orthopnea, edema or paroxysmal nocturnal dyspnea. He stopped the Lipitor due to diffuse myalgias. He started taking Zetia. Intolerant of Metformin due to GI upsets. Patient does not have insurance, retired. Cardiac risk factors   HTN no  DM  yes  Smoking yes    Cardiac testing  ECHO: 5/31//2017: EF 15-20%, apical AK    MRI: 6/1/2017: LV dilated LVEF 32%, severe HK ant wall, RVEF 55%, mild MR; anterior infarct- no viability RCA/LCA: viable     CATH 6/2/2017: LM: nl, LAD: m100%, LCX codom normal, RCA:m 85%, LVEDP 25, no AV gradient, dilated LV, apical AK, EF 20%  -- s/p PCI pRCA: 2.5x18 Xience (EMORY). R radial TR band  Echo 9/18/17- Dilated LV, EF 20%, apical dyskinesis    Past Medical History:   Diagnosis Date    Controlled type 2 diabetes mellitus with circulatory disorder ([facility] [place] [address] 6/1/2017    COPD (chronic obstructive pulmonary disease) ([facility] [place] [address]     Coronary artery disease involving native coronary artery without angina pectoris 06/01/2017    [facility] MI, 2v disease s/p PCI RCA with EMORY June 2017    ETOH abuse 6/1/2017    Ischemic cardiomyopathy     Extensive LAD territory infarction    Nicotine dependence 6/8/4660    Systolic heart failure (HCC)         Current Outpatient Prescriptions   Medication Sig Dispense Refill    sacubitril-valsartan (ENTRESTO) 24 mg/26 mg tablet Take 1 Tab by mouth two (2) times a day. 60 Tab 3    ezetimibe (ZETIA) 10 mg tablet Take 10 mg by mouth daily.  ACETAMINOPHEN/DIPHENHYDRAMINE (TYLENOL PM PO) Take  by mouth.  metoprolol succinate (TOPROL-XL) 25 mg XL tablet TAKE ONE TABLET BY MOUTH ONCE DAILY 30 Tab 6    clopidogrel (PLAVIX) 75 mg tab Take 1 Tab by mouth daily.  Dr. Gaynell Gowers Stefania to provide refills  Indications: Thrombosis Prevention after PCI 90 Tab 3    bumetanide (BUMEX) 0.5 mg tablet Take 1 Tab by mouth daily. 30 Tab 2    metFORMIN (GLUCOPHAGE) 500 mg tablet Take 1 Tab by mouth two (2) times daily (with meals). 60 Tab 0    aspirin 81 mg chewable tablet Take 1 Tab by mouth daily.  fluticasone-vilanterol (BREO ELLIPTA) 100-25 mcg/dose inhaler Take 1 Puff by inhalation daily. Dr Maddy Liu to provide refills 1 Inhaler 0       Allergies   Allergen Reactions    Codeine Itching      Retired. Review of Systems  Constitutional: Negative for fever, chills, malaise/fatigue and diaphoresis. Respiratory: Negative for cough, hemoptysis, sputum production, and wheezing. Positive for BERNABE. Cardiovascular: Negative for chest pain, palpitations, orthopnea, claudication, leg swelling and PND. Gastrointestinal: Negative for heartburn, nausea, vomiting, blood in stool and melena. Genitourinary: Negative for dysuria and flank pain. Musculoskeletal: Negative for joint pain and back pain. Skin: Negative for rash. Neurological: Negative for focal weakness, seizures, loss of consciousness, weakness and headaches. Endo/Heme/Allergies: Does not bruise/bleed easily. Psychiatric/Behavioral: Negative for memory loss. The patient does not have insomnia. Physical Exam    Visit Vitals    /68 (BP 1 Location: Left arm, BP Patient Position: Sitting)    Pulse 66    Resp 18    Ht 5' 10\" (1.778 m)    Wt 244 lb 6.4 oz (110.9 kg)    SpO2 96%    BMI 35.07 kg/m2     Wt Readings from Last 3 Encounters:   10/03/17 244 lb 6.4 oz (110.9 kg)   09/18/17 243 lb (110.2 kg)   09/18/17 243 lb (110.2 kg)      General - well developed well nourished  Neck - JVP normal, thyroid nl  Cardiac - normal S1, S2, no murmurs, rubs or gallops. No clicks  Vascular - carotids without bruits, radials, femorals and pedal pulses equal bilateral  Lungs - clear to auscultation bilaterals, no rales, wheezing. Scattered rhonchi posteriorly   Abd - soft nontender, no HSM, no abd bruits  Extremities - no edema  Skin - no rash  Neuro - nonfocal  Psych - normal mood and affect      Cardiographics  ECG 6/14/17 - SR 75, TWI; unchanged  Echo 9/18/17- Dilated LV, EF 20%, apical dyskinesis    Written by Constance Bill, as dictated by Oseas Duran MD.  Oseas Duran MD

## 2017-10-09 DIAGNOSIS — I50.22 CHRONIC SYSTOLIC CONGESTIVE HEART FAILURE (HCC): ICD-10-CM

## 2017-10-09 RX ORDER — BUMETANIDE 0.5 MG/1
TABLET ORAL
Qty: 30 TAB | Refills: 2 | Status: SHIPPED | OUTPATIENT
Start: 2017-10-09 | End: 2018-11-01

## 2017-11-01 ENCOUNTER — OFFICE VISIT (OUTPATIENT)
Dept: CARDIOLOGY CLINIC | Age: 62
End: 2017-11-01

## 2017-11-01 VITALS
HEIGHT: 70 IN | SYSTOLIC BLOOD PRESSURE: 108 MMHG | OXYGEN SATURATION: 95 % | DIASTOLIC BLOOD PRESSURE: 76 MMHG | RESPIRATION RATE: 18 BRPM | BODY MASS INDEX: 35.13 KG/M2 | HEART RATE: 78 BPM | WEIGHT: 245.4 LBS

## 2017-11-01 DIAGNOSIS — I50.22 CHRONIC SYSTOLIC CONGESTIVE HEART FAILURE (HCC): Primary | ICD-10-CM

## 2017-11-01 DIAGNOSIS — I25.5 ISCHEMIC CARDIOMYOPATHY: Primary | ICD-10-CM

## 2017-11-01 NOTE — PROGRESS NOTES
HISTORY OF PRESENTING ILLNESS      Abdoul Olivia is a 59 y.o. male with ICM, LVEF 20%, CAD/PCI, diabetes mellitus, tobacco/alcohol abuse, NYHA class 2-3 symptoms who is referred to discuss ICD implantation for primary prevention. He has been on optimal guideline directed medical therapy for 3 months.         ACTIVE PROBLEM LIST     Patient Active Problem List    Diagnosis Date Noted    Ischemic cardiomyopathy 09/18/2017    Coronary artery disease involving native coronary artery without angina pectoris 06/01/2017    Chronic systolic congestive heart failure ([facility] 06/01/2017    ETOH abuse 06/01/2017    [facility] 06/01/2017    Nicotine dependence 06/01/2017           PAST MEDICAL HISTORY     Past Medical History:   Diagnosis Date    Controlled type 2 diabetes mellitus with [facility]atory disorder [facility] 6/1/2017    COPD [facility] obstructive [facility] disease[facility]     Coronary artery disease involving native coronary artery without angina pectoris 06/01/2017    [facility] MI, 2v disease s/p PCI RCA with [facility]ORY June 2017    ETOH abuse 6/1/2017    Ischemic cardiomyopathy     Extensive LAD territory infarction    Nicotine dependence 0/6/3531    Systolic heart failure ([facility] [facility]            PAST SURGICAL HISTORY     Past Surgical History:   Procedure Laterality Date    HX TONSILLECTOMY      HX WISDOM TEETH EXTRACTION            ALLERGIES     Allergies   Allergen Reactions    Codeine Itching          FAMILY HISTORY     Family History   Problem Relation Age of Onset    Stroke Father     Heart Disease Father     Hypertension Father     negative for cardiac disease       SOCIAL HISTORY     Social History     Social History    Marital status: SINGLE     Spouse name: N/A    Number of children: N/A    Years of education: N/A     Social History Main Topics    Smoking status: Current Every Day Smoker     Packs/day: 2.00     Last attempt to quit: 5/31/2017  Smokeless tobacco: Never Used    Alcohol use 25.2 oz/week     42 Cans of beer per week    Drug use: No    Sexual activity: Yes     Partners: Female     Other Topics Concern    Not on file     Social History Narrative         MEDICATIONS     Current Outpatient Prescriptions   Medication Sig    bumetanide (BUMEX) 0.5 mg tablet TAKE ONE TABLET BY MOUTH ONCE DAILY    sacubitril-valsartan (ENTRESTO) 24 mg/26 mg tablet Take 1 Tab by mouth two (2) times a day.  ezetimibe (ZETIA) 10 mg tablet Take 10 mg by mouth daily.  ACETAMINOPHEN/DIPHENHYDRAMINE (TYLENOL PM PO) Take  by mouth.  metoprolol succinate (TOPROL-XL) 25 mg XL tablet TAKE ONE TABLET BY MOUTH ONCE DAILY    clopidogrel (PLAVIX) 75 mg tab Take 1 Tab by mouth daily. Dr. Aga Bazan to provide refills  Indications: Thrombosis Prevention after PCI    metFORMIN (GLUCOPHAGE) 500 mg tablet Take 1 Tab by mouth two (2) times daily (with meals).  aspirin 81 mg chewable tablet Take 1 Tab by mouth daily.  fluticasone-vilanterol (BREO ELLIPTA) 100-25 mcg/dose inhaler Take 1 Puff by inhalation daily. Dr Maya Meza to provide refills     No current facility-administered medications for this visit. I have reviewed the nurses notes, vitals, problem list, allergy list, medical history, family, social history and medications. REVIEW OF SYMPTOMS      General: Pt denies excessive weight gain or loss. Pt is able to conduct ADL's  HEENT: Denies blurred vision, headaches, hearing loss, epistaxis and difficulty swallowing. Respiratory: Denies cough, congestion, shortness of breath, BERNABE, wheezing or stridor.   Cardiovascular: Denies precordial pain, palpitations, edema or PND  Gastrointestinal: Denies poor appetite, indigestion, abdominal pain or blood in stool  Genitourinary: Denies hematuria, dysuria, increased urinary frequency  Musculoskeletal: Denies joint pain or swelling from muscles or joints  Neurologic: Denies tremor, paresthesias, headache, or sensory motor disturbance  Psychiatric: Denies confusion, insomnia, depression  Integumentray: Denies rash, itching or ulcers. Hematologic: Denies easy bruising, bleeding     PHYSICAL EXAMINATION      There were no vitals filed for this visit. General: Well developed, in no acute distress. HEENT: No jaundice, oral mucosa moist, no oral ulcers  Neck: Supple, no stiffness, no lymphadenopathy, supple  Heart:  Normal S1/S2 negative S3 or S4. Regular, no murmur, gallop or rub, no jugular venous distention  Respiratory: Clear bilaterally x 4, no wheezing or rales  Abdomen:   Soft, non-tender, bowel sounds are active.   Extremities:  No edema, normal cap refill, no cyanosis. Musculoskeletal: No clubbing, no deformities  Neuro: A&Ox3, speech clear, gait stable, cooperative, no focal neurologic deficits  Skin: Skin color is normal. No rashes or lesions. Non diaphoretic, moist.  Vascular: 2+ pulses symmetric in all extremities       DIAGNOSTIC DATA      EKG: Sinus rhythm with narrow QRS       LABORATORY DATA      Lab Results   Component Value Date/Time    WBC 7.7 07/27/2017 09:50 AM    HGB 13.9 07/27/2017 09:50 AM    HCT 41.3 07/27/2017 09:50 AM    PLATELET 300 49/87/0167 09:50 AM    MCV 96.0 07/27/2017 09:50 AM      Lab Results   Component Value Date/Time    Sodium 141 07/27/2017 09:50 AM    Potassium 4.2 07/27/2017 09:50 AM    Chloride 103 07/27/2017 09:50 AM    CO2 28 07/27/2017 09:50 AM    Anion gap 10 07/27/2017 09:50 AM    Glucose 150 07/27/2017 09:50 AM    BUN 14 07/27/2017 09:50 AM    Creatinine 1.12 07/27/2017 09:50 AM    BUN/Creatinine ratio 13 07/27/2017 09:50 AM    GFR est AA >60 07/27/2017 09:50 AM    GFR est non-AA >60 07/27/2017 09:50 AM    Calcium 9.3 07/27/2017 09:50 AM    Bilirubin, total 0.6 07/27/2017 09:50 AM    AST (SGOT) 9 07/27/2017 09:50 AM    Alk.  phosphatase 97 07/27/2017 09:50 AM    Protein, total 7.7 07/27/2017 09:50 AM    Albumin 4.3 07/27/2017 09:50 AM    Globulin 3.4 07/27/2017 09:50 AM    A-G Ratio 1.3 07/27/2017 09:50 AM    ALT (SGPT) 20 07/27/2017 09:50 AM           ASSESSMENT      1. Cardiomyopathy   A. Ischemic   B. NYHA class 2-3   C. LVEF 15-20%  2. Diabetes mellitus  3. CAD, native  4. Percutaneous transluminal angioplasty  5. Myocardial infarction, previous  6. COPD        PLAN     Plan for implantation of a single-chamber ICD for prevention of sudden cardiac death. [employer]     FOLLOW-UP     1 week post procedure      Thank you, Dr. Candance Corning for involving me in the care of this extraordinarily pleasant male. Please do not hesitate to contact me for further questions/concerns.          Rafy Piper MD  Cardiac Electrophysiology / Cardiology    [facility].  [address] [facility], [facility], [address], [address] [place] [address], [facility]  [phone] / [phone] Fax   [phone] / [phone] Fax

## 2017-11-01 NOTE — PATIENT INSTRUCTIONS
Treatment Plan: You are scheduled for an ICD (implantable cardiac defibrillator) at [facility] on Monday, December 4th. Please arrive at the patient registration desk located on the 2nd floor of the main building at 12:00pm.    Do not eat or drink anything after midnight the night before your procedure. You will need a . You may take your normal medications with a sip of water the morning of your procedure. Blood thinner instructions: Continue Plavix and Aspirin. Lab instructions: Please have labs drawn 7-10 days prior to scheduled procedure. Please call Dillon Wagner or Selvin Colon if you have any questions at [phone]. Please call theoffice if you develop any type of illness prior to your procedure. Implantable Cardioverter-Defibrillator Placement: Before Your Procedure  What is an implantable cardioverter-defibrillator? An implantable cardioverter-defibrillator (ICD) is a small, battery-powered device. It fixes life-threatening changes in your heartbeat. If the ICD detects a life-threatening heart rhythm, it tries to get it back to normal. If the dangerous rhythm does not stop, the ICD sends an electric shock to the heart to restore a normal rhythm. The device then goes back to its watchful mode. The doctor puts an ICD in your chest and attaches it to thin wires, called leads. The leads carry the shocks from the ICD to the heart. Before the procedure, you will get medicine to help you relax. The doctor will make an incision (cut) in the skin just below your collarbone. The cut may be on either side of your chest. The doctor will put the ICD leads through the cut. Most of the time, the leads go into a large blood vessel in the upper chest. Then the doctor will guide the leads through the blood vessel into the heart. The doctor will place the ICD under the skin of your chest. He or she will attach the leads to the ICD. Then the cut will be closed with stitches.   The procedure usually takes about an hour. You may stay in the hospital for 1 or 2 days. You can likely return to many of your normal activities after you get an ICD. But to stay safe, you may need to make some changes to your normal routine. You will need to be careful with certain types of electronic equipment. And you'll need to take extra care with medical and dental tests and procedures. You will be given specific instructions after getting your ICD. You may feel anxious or worried about having an ICD. This is common. You might feel better if you use techniques to help you relax. Make a plan for what to do if the ICD shocks you. And think about how the ICD will help you. Talk to your doctor about ways to help ease anxiety. Follow-up care is a key part of your treatment and safety. Be sure to make and go to all appointments, and call your doctor if you are having problems. It's also a good idea to know your test results and keep a list of the medicines you take. What happens before the procedure? ?Preparing for the procedure  ? · Understand exactly what procedure is planned, along with the risks, benefits, and other options. · Tell your doctors ALL the medicines, vitamins, supplements, and herbal remedies you take. Some of these can increase the risk of bleeding or interact with anesthesia. ? · If you take blood thinners, such as warfarin (Coumadin), clopidogrel (Plavix), or aspirin, be sure to talk to your doctor. He or she will tell you if you should stop taking these medicines before your procedure. Make sure that you understand exactly what your doctor wants you to do.   ? · Your doctor will tell you which medicines to take or stop before your procedure. You may need to stop taking certain medicines a week or more before the procedure. So talk to your doctor as soon as you can.   ? · If you have an advance directive, let your doctor know.  It may include a living will and a durable power of  for health care. Bring a copy to the hospital. If you don't have one, you may want to prepare one. It lets your doctor and loved ones know your health care wishes. Doctors advise that everyone prepare these papers before any type of surgery or procedure. Procedures can be stressful. This information will help you understand what you can expect. And it will help you safely prepare for your procedure. What happens on the day of the procedure? · Follow the instructions exactly about when to stop eating and drinking. If you don't, your procedure may be canceled. If your doctor told you to take your medicines on the day of the procedure, take them with only a sip of water. ? · Take a bath or shower before you come in for your procedure. Do not apply lotions, perfumes, deodorants, or nail polish. ? · Take off all jewelry and piercings. And take out contact lenses, if you wear them. ? At the hospital or surgery center   · Bring a picture ID. ? · You will be kept comfortable and safe by your anesthesia provider. You may get medicine that relaxes you or puts you in a light sleep. The area being worked on will be numb. ? · The procedure will take at least 1 hour. Going home   · Be sure you have someone to drive you home. Anesthesia and pain medicine make it unsafe for you to drive. ? · You will be given more specific instructions about recovering from your procedure. They will cover things like diet, wound care, follow-up care, driving, and getting back to your normal routine. When should you call your doctor? · You have questions or concerns. ? · You don't understand how to prepare for your procedure. ? · You become ill before the procedure (such as fever, flu, or a cold). ? · You need to reschedule or have changed your mind about having the procedure. Where can you learn more? Go to http://olivier-yon.info/.   Enter H673 in the search box to learn more about \"Implantable Cardioverter-Defibrillator Placement: Before Your Procedure. \"  Current as of: September 21, 2016  Content Version: 11.4  © 5116-1532 [employer]. Care instructions adapted under license by "[employer] (which disclaims liability or warranty for this information). If you have questions about a medical condition or this instruction, always ask your healthcare professional. [address] any warranty or liability for your use of this information.

## 2017-11-01 NOTE — PROGRESS NOTES
Visit Vitals    /76 (BP 1 Location: Left arm, BP Patient Position: Sitting)    Pulse 78    Resp 18    Ht 5' 10\" (1.778 m)    Wt 245 lb 6.4 oz (111.3 kg)    SpO2 95%    BMI 35.21 kg/m2

## 2017-11-28 LAB
BUN SERPL-MCNC: 12 MG/DL (ref 8–27)
BUN/CREAT SERPL: 13 (ref 10–24)
CALCIUM SERPL-MCNC: 9 MG/DL (ref 8.6–10.2)
CHLORIDE SERPL-SCNC: 101 MMOL/L (ref 96–106)
CO2 SERPL-SCNC: 23 MMOL/L (ref 18–29)
CREAT SERPL-MCNC: 0.91 MG/DL (ref 0.76–1.27)
ERYTHROCYTE [DISTWIDTH] IN BLOOD BY AUTOMATED COUNT: 14.2 % (ref 12.3–15.4)
GFR SERPLBLD CREATININE-BSD FMLA CKD-EPI: 103 ML/MIN/1.73
GFR SERPLBLD CREATININE-BSD FMLA CKD-EPI: 89 ML/MIN/1.73
GLUCOSE SERPL-MCNC: 138 MG/DL (ref 65–99)
HCT VFR BLD AUTO: 46.1 % (ref 37.5–51)
HGB BLD-MCNC: 16.4 G/DL (ref 12.6–17.7)
INR PPP: 1 (ref 0.8–1.2)
MCH RBC QN AUTO: 32.6 PG (ref 26.6–33)
MCHC RBC AUTO-ENTMCNC: 35.6 G/DL (ref 31.5–35.7)
MCV RBC AUTO: 92 FL (ref 79–97)
PLATELET # BLD AUTO: 172 X10E3/UL (ref 150–379)
POTASSIUM SERPL-SCNC: 4.3 MMOL/L (ref 3.5–5.2)
PROTHROMBIN TIME: 10.3 SEC (ref 9.1–12)
RBC # BLD AUTO: 5.03 X10E6/UL (ref 4.14–5.8)
SODIUM SERPL-SCNC: 137 MMOL/L (ref 134–144)
WBC # BLD AUTO: 9.8 X10E3/UL (ref 3.4–10.8)

## 2017-11-28 RX ORDER — SODIUM CHLORIDE 0.9 % (FLUSH) 0.9 %
5-10 SYRINGE (ML) INJECTION EVERY 8 HOURS
Status: CANCELLED | OUTPATIENT
Start: 2017-12-04

## 2017-11-28 RX ORDER — SODIUM CHLORIDE 0.9 % (FLUSH) 0.9 %
5-10 SYRINGE (ML) INJECTION AS NEEDED
Status: CANCELLED | OUTPATIENT
Start: 2017-12-04

## 2017-12-04 ENCOUNTER — APPOINTMENT (OUTPATIENT)
Dept: GENERAL RADIOLOGY | Age: 62
End: 2017-12-04
Attending: INTERNAL MEDICINE
Payer: SUBSIDIZED

## 2017-12-04 ENCOUNTER — HOSPITAL ENCOUNTER (OUTPATIENT)
Dept: NON INVASIVE DIAGNOSTICS | Age: 62
Setting detail: OBSERVATION
Discharge: HOME | End: 2017-12-05
Attending: INTERNAL MEDICINE | Admitting: INTERNAL MEDICINE
Payer: SUBSIDIZED

## 2017-12-04 PROBLEM — Z95.810 ICD (IMPLANTABLE CARDIOVERTER-DEFIBRILLATOR) IN PLACE: Status: ACTIVE | Noted: 2017-12-04

## 2017-12-04 LAB
ATRIAL RATE: 66 BPM
CALCULATED P AXIS, ECG09: 17 DEGREES
CALCULATED R AXIS, ECG10: -87 DEGREES
CALCULATED T AXIS, ECG11: 115 DEGREES
DIAGNOSIS, 93000: NORMAL
GLUCOSE BLD STRIP.AUTO-MCNC: 120 MG/DL (ref 65–100)
GLUCOSE BLD STRIP.AUTO-MCNC: 129 MG/DL (ref 65–100)
P-R INTERVAL, ECG05: 164 MS
Q-T INTERVAL, ECG07: 420 MS
QRS DURATION, ECG06: 94 MS
QTC CALCULATION (BEZET), ECG08: 440 MS
SERVICE CMNT-IMP: ABNORMAL
SERVICE CMNT-IMP: ABNORMAL
VENTRICULAR RATE, ECG03: 66 BPM

## 2017-12-04 PROCEDURE — 99218 HC RM OBSERVATION: CPT

## 2017-12-04 PROCEDURE — C1722 AICD, SINGLE CHAMBER: HCPCS | Performed by: INTERNAL MEDICINE

## 2017-12-04 PROCEDURE — 71020 XR CHEST PA LAT: CPT

## 2017-12-04 PROCEDURE — 77030018673

## 2017-12-04 PROCEDURE — C1777 LEAD, AICD, ENDO SINGLE COIL: HCPCS | Performed by: INTERNAL MEDICINE

## 2017-12-04 PROCEDURE — 74011000272 HC RX REV CODE- 272: Performed by: INTERNAL MEDICINE

## 2017-12-04 PROCEDURE — 74011636320 HC RX REV CODE- 636/320: Performed by: INTERNAL MEDICINE

## 2017-12-04 PROCEDURE — 99153 MOD SED SAME PHYS/QHP EA: CPT | Performed by: INTERNAL MEDICINE

## 2017-12-04 PROCEDURE — 33249 INSJ/RPLCMT DEFIB W/LEAD(S): CPT

## 2017-12-04 PROCEDURE — 77030031139 HC SUT VCRL2 J&J -A

## 2017-12-04 PROCEDURE — 99152 MOD SED SAME PHYS/QHP 5/>YRS: CPT | Performed by: INTERNAL MEDICINE

## 2017-12-04 PROCEDURE — 82962 GLUCOSE BLOOD TEST: CPT

## 2017-12-04 PROCEDURE — 77030018729 HC ELECTRD DEFIB PAD CARD -B

## 2017-12-04 PROCEDURE — 74011250636 HC RX REV CODE- 250/636: Performed by: INTERNAL MEDICINE

## 2017-12-04 PROCEDURE — C1892 INTRO/SHEATH,FIXED,PEEL-AWAY: HCPCS

## 2017-12-04 PROCEDURE — 93041 RHYTHM ECG TRACING: CPT

## 2017-12-04 PROCEDURE — A4565 SLINGS: HCPCS

## 2017-12-04 PROCEDURE — 74011000250 HC RX REV CODE- 250: Performed by: INTERNAL MEDICINE

## 2017-12-04 PROCEDURE — 77030012935 HC DRSG AQUACEL BMS -B

## 2017-12-04 PROCEDURE — 77030018547 HC SUT ETHBND1 J&J -B

## 2017-12-04 PROCEDURE — 77030002933 HC SUT MCRYL J&J -A

## 2017-12-04 PROCEDURE — 74011250637 HC RX REV CODE- 250/637: Performed by: INTERNAL MEDICINE

## 2017-12-04 PROCEDURE — 77030011640 HC PAD GRND REM COVD -A

## 2017-12-04 RX ORDER — BUPIVACAINE HYDROCHLORIDE 5 MG/ML
1-10 INJECTION, SOLUTION EPIDURAL; INTRACAUDAL ONCE
Status: COMPLETED | OUTPATIENT
Start: 2017-12-04 | End: 2017-12-04

## 2017-12-04 RX ORDER — ONDANSETRON 2 MG/ML
4 INJECTION INTRAMUSCULAR; INTRAVENOUS
Status: DISCONTINUED | OUTPATIENT
Start: 2017-12-04 | End: 2017-12-05 | Stop reason: HOSPADM

## 2017-12-04 RX ORDER — SODIUM CHLORIDE 0.9 % (FLUSH) 0.9 %
5-10 SYRINGE (ML) INJECTION AS NEEDED
Status: DISCONTINUED | OUTPATIENT
Start: 2017-12-04 | End: 2017-12-05 | Stop reason: HOSPADM

## 2017-12-04 RX ORDER — MIDAZOLAM HYDROCHLORIDE 1 MG/ML
.5-1 INJECTION, SOLUTION INTRAMUSCULAR; INTRAVENOUS
Status: DISCONTINUED | OUTPATIENT
Start: 2017-12-04 | End: 2017-12-04 | Stop reason: HOSPADM

## 2017-12-04 RX ORDER — DIPHENHYDRAMINE HYDROCHLORIDE 50 MG/ML
25 INJECTION, SOLUTION INTRAMUSCULAR; INTRAVENOUS ONCE
Status: COMPLETED | OUTPATIENT
Start: 2017-12-04 | End: 2017-12-04

## 2017-12-04 RX ORDER — BUMETANIDE 1 MG/1
0.5 TABLET ORAL DAILY
Status: DISCONTINUED | OUTPATIENT
Start: 2017-12-04 | End: 2017-12-05 | Stop reason: HOSPADM

## 2017-12-04 RX ORDER — GUAIFENESIN 100 MG/5ML
81 LIQUID (ML) ORAL DAILY
Status: DISCONTINUED | OUTPATIENT
Start: 2017-12-04 | End: 2017-12-05 | Stop reason: HOSPADM

## 2017-12-04 RX ORDER — METOPROLOL SUCCINATE 25 MG/1
25 TABLET, EXTENDED RELEASE ORAL DAILY
Status: DISCONTINUED | OUTPATIENT
Start: 2017-12-04 | End: 2017-12-05 | Stop reason: HOSPADM

## 2017-12-04 RX ORDER — SODIUM CHLORIDE 0.9 % (FLUSH) 0.9 %
5-10 SYRINGE (ML) INJECTION AS NEEDED
Status: DISCONTINUED | OUTPATIENT
Start: 2017-12-04 | End: 2017-12-04 | Stop reason: HOSPADM

## 2017-12-04 RX ORDER — GENTAMICIN SULFATE 80 MG/100ML
80 INJECTION, SOLUTION INTRAVENOUS ONCE
Status: COMPLETED | OUTPATIENT
Start: 2017-12-04 | End: 2017-12-04

## 2017-12-04 RX ORDER — CEFAZOLIN SODIUM IN 0.9 % NACL 2 G/50 ML
2 INTRAVENOUS SOLUTION, PIGGYBACK (ML) INTRAVENOUS EVERY 8 HOURS
Status: COMPLETED | OUTPATIENT
Start: 2017-12-04 | End: 2017-12-05

## 2017-12-04 RX ORDER — CEFAZOLIN SODIUM 1 G/3ML
2 INJECTION, POWDER, FOR SOLUTION INTRAMUSCULAR; INTRAVENOUS ONCE
Status: DISCONTINUED | OUTPATIENT
Start: 2017-12-04 | End: 2017-12-04

## 2017-12-04 RX ORDER — SODIUM CHLORIDE 0.9 % (FLUSH) 0.9 %
5-10 SYRINGE (ML) INJECTION EVERY 8 HOURS
Status: DISCONTINUED | OUTPATIENT
Start: 2017-12-04 | End: 2017-12-05 | Stop reason: HOSPADM

## 2017-12-04 RX ORDER — HEPARIN SODIUM 200 [USP'U]/100ML
500 INJECTION, SOLUTION INTRAVENOUS ONCE
Status: COMPLETED | OUTPATIENT
Start: 2017-12-04 | End: 2017-12-04

## 2017-12-04 RX ORDER — LIDOCAINE HYDROCHLORIDE AND EPINEPHRINE 10; 10 MG/ML; UG/ML
1.5 INJECTION, SOLUTION INFILTRATION; PERINEURAL ONCE
Status: COMPLETED | OUTPATIENT
Start: 2017-12-04 | End: 2017-12-04

## 2017-12-04 RX ORDER — CLOPIDOGREL BISULFATE 75 MG/1
75 TABLET ORAL DAILY
Status: DISCONTINUED | OUTPATIENT
Start: 2017-12-04 | End: 2017-12-05 | Stop reason: HOSPADM

## 2017-12-04 RX ORDER — SODIUM CHLORIDE 0.9 % (FLUSH) 0.9 %
5-10 SYRINGE (ML) INJECTION EVERY 8 HOURS
Status: DISCONTINUED | OUTPATIENT
Start: 2017-12-04 | End: 2017-12-04 | Stop reason: HOSPADM

## 2017-12-04 RX ORDER — FENTANYL CITRATE 50 UG/ML
25-200 INJECTION, SOLUTION INTRAMUSCULAR; INTRAVENOUS
Status: DISCONTINUED | OUTPATIENT
Start: 2017-12-04 | End: 2017-12-04 | Stop reason: HOSPADM

## 2017-12-04 RX ORDER — EZETIMIBE 10 MG/1
10 TABLET ORAL DAILY
Status: DISCONTINUED | OUTPATIENT
Start: 2017-12-04 | End: 2017-12-05 | Stop reason: HOSPADM

## 2017-12-04 RX ORDER — ACETAMINOPHEN 325 MG/1
650 TABLET ORAL
Status: DISCONTINUED | OUTPATIENT
Start: 2017-12-04 | End: 2017-12-05 | Stop reason: HOSPADM

## 2017-12-04 RX ORDER — CEFAZOLIN SODIUM IN 0.9 % NACL 2 G/50 ML
2 INTRAVENOUS SOLUTION, PIGGYBACK (ML) INTRAVENOUS ONCE
Status: COMPLETED | OUTPATIENT
Start: 2017-12-04 | End: 2017-12-04

## 2017-12-04 RX ADMIN — LIDOCAINE HYDROCHLORIDE,EPINEPHRINE BITARTRATE 15 MG: 10; .01 INJECTION, SOLUTION INFILTRATION; PERINEURAL at 15:48

## 2017-12-04 RX ADMIN — BUMETANIDE 0.5 MG: 1 TABLET ORAL at 18:30

## 2017-12-04 RX ADMIN — MIDAZOLAM HYDROCHLORIDE 1 MG: 1 INJECTION, SOLUTION INTRAMUSCULAR; INTRAVENOUS at 16:03

## 2017-12-04 RX ADMIN — Medication 10 ML: at 18:31

## 2017-12-04 RX ADMIN — ACETAMINOPHEN 650 MG: 325 TABLET ORAL at 22:30

## 2017-12-04 RX ADMIN — MIDAZOLAM HYDROCHLORIDE 1 MG: 1 INJECTION, SOLUTION INTRAMUSCULAR; INTRAVENOUS at 15:47

## 2017-12-04 RX ADMIN — SODIUM CHLORIDE 50000 UNITS: 900 IRRIGANT IRRIGATION at 16:00

## 2017-12-04 RX ADMIN — EZETIMIBE 10 MG: 10 TABLET ORAL at 21:45

## 2017-12-04 RX ADMIN — CEFAZOLIN 2 G: 1 INJECTION, POWDER, FOR SOLUTION INTRAMUSCULAR; INTRAVENOUS; PARENTERAL at 15:21

## 2017-12-04 RX ADMIN — CEFAZOLIN 2 G: 1 INJECTION, POWDER, FOR SOLUTION INTRAMUSCULAR; INTRAVENOUS; PARENTERAL at 22:29

## 2017-12-04 RX ADMIN — FENTANYL CITRATE 25 MCG: 50 INJECTION, SOLUTION INTRAMUSCULAR; INTRAVENOUS at 15:49

## 2017-12-04 RX ADMIN — FENTANYL CITRATE 25 MCG: 50 INJECTION, SOLUTION INTRAMUSCULAR; INTRAVENOUS at 16:03

## 2017-12-04 RX ADMIN — SACUBITRIL AND VALSARTAN 1 TABLET: 24; 26 TABLET, FILM COATED ORAL at 18:47

## 2017-12-04 RX ADMIN — MIDAZOLAM HYDROCHLORIDE 1 MG: 1 INJECTION, SOLUTION INTRAMUSCULAR; INTRAVENOUS at 15:50

## 2017-12-04 RX ADMIN — Medication 1000 UNITS: at 15:48

## 2017-12-04 RX ADMIN — BUPIVACAINE HYDROCHLORIDE 25 MG: 5 INJECTION, SOLUTION EPIDURAL; INTRACAUDAL at 15:48

## 2017-12-04 RX ADMIN — CLOPIDOGREL BISULFATE 75 MG: 75 TABLET ORAL at 18:30

## 2017-12-04 RX ADMIN — FENTANYL CITRATE 25 MCG: 50 INJECTION, SOLUTION INTRAMUSCULAR; INTRAVENOUS at 15:53

## 2017-12-04 RX ADMIN — FENTANYL CITRATE 25 MCG: 50 INJECTION, SOLUTION INTRAMUSCULAR; INTRAVENOUS at 15:41

## 2017-12-04 RX ADMIN — MIDAZOLAM HYDROCHLORIDE 2 MG: 1 INJECTION, SOLUTION INTRAMUSCULAR; INTRAVENOUS at 15:41

## 2017-12-04 RX ADMIN — GENTAMICIN SULFATE 80 MG: 80 INJECTION, SOLUTION INTRAVENOUS at 16:00

## 2017-12-04 RX ADMIN — IOPAMIDOL 15 ML: 755 INJECTION, SOLUTION INTRAVENOUS at 15:47

## 2017-12-04 RX ADMIN — ASPIRIN 81 MG 81 MG: 81 TABLET ORAL at 18:30

## 2017-12-04 RX ADMIN — DIPHENHYDRAMINE HYDROCHLORIDE 25 MG: 50 INJECTION, SOLUTION INTRAMUSCULAR; INTRAVENOUS at 15:26

## 2017-12-04 RX ADMIN — Medication 10 ML: at 21:51

## 2017-12-04 NOTE — IP AVS SNAPSHOT
Sabino Idoalfredo 
 
 
 [address] 
[phone] Patient: Sue Hanson MRN: [id number] ZIR:[id number] About your hospitalization You were admitted on:  December 4, 2017 You last received care in the:  [facility] 5M1 MED SURG 1 You were discharged on:  December 5, 2017 Why you were hospitalized Your primary diagnosis was:  Not on File Your diagnoses also included:  Icd (Implantable Cardioverter-Defibrillator) In Place Things You Need To Do (next 8 weeks) Follow up with None Where:  None (395) Patient stated that they have no PCP Wednesday Dec 13, 2017 HOSPITAL DISCHARGE with Flavio Chou NP at  2:00 PM  
Where:  [address] ([address] [place], [place] [address]) Follow up with Arminda Jacobsen MD  
2 pm with Brigitte Fierro NP Phone:  [phone] Where:  [address] [place] [address], [address] [place] [address], [address] [place] [address] Wednesday Jamari 10, 2018 PACEMAKER with PACEMAKER, STFRANCES at 11:30 AM  
Where:  [address] ([facility] SCHEDULING) HOSPITAL DISCHARGE with Arminda Jacobsen MD at 11:40 AM  
Where:  [address] ([address]) Friday Jan 12, 2018 ESTABLISHED PATIENT with Raul Pichardo MD at  2:00 PM  
Where:  [address] ([address] [place], [place] [address]) Discharge Orders None A check mónica indicates which time of day the medication should be taken. My Medications TAKE these medications as instructed Instructions Each Dose to Equal  
 Morning Noon Evening Bedtime  
 acetaminophen 325 mg tablet Commonly known as:  TYLENOL Your last dose was: Your next dose is: Take 2 Tabs by mouth every four (4) hours as needed. 650 mg  
    
   
   
   
  
 aspirin 81 mg chewable tablet Your last dose was: Your next dose is: Take 1 Tab by mouth daily. 81 mg  
    
   
   
   
  
 bumetanide 0.5 mg tablet Commonly known as:  Ledon Lights Your last dose was: Your next dose is: TAKE ONE TABLET BY MOUTH ONCE DAILY  
     
   
   
   
  
 cephALEXin 500 mg capsule Commonly known as:  Sanjana Vasquez Your last dose was: Your next dose is: Take 1 Cap by mouth three (3) times daily for 5 days. Indications: PREVENTION OF BACTERIAL ENDOCARDITIS  
 500 mg  
    
   
   
   
  
 clopidogrel 75 mg Tab Commonly known as:  PLAVIX Your last dose was: Your next dose is: Take 1 Tab by mouth daily. Dr. Sami Rodrigues to provide refills  Indications: Thrombosis Prevention after PCI 75 mg  
    
   
   
   
  
 doxylamine succinate 25 mg tablet Commonly known as:  Francisco Harvey Your last dose was: Your next dose is: Take 25 mg by mouth nightly as needed for Sleep. 25 mg  
    
   
   
   
  
 metoprolol succinate 25 mg XL tablet Commonly known as:  TOPROL-XL Your last dose was: Your next dose is: TAKE ONE TABLET BY MOUTH ONCE DAILY  
     
   
   
   
  
 sacubitril-valsartan 24-26 mg tablet Commonly known as:  ENTRESTO Your last dose was: Your next dose is: Take 1 Tab by mouth two (2) times a day. 1 Tab TYLENOL PM PO Your last dose was: Your next dose is: Take  by mouth. ZETIA 10 mg tablet Generic drug:  ezetimibe Your last dose was: Your next dose is: Take 10 mg by mouth daily. 10 mg Where to Get Your Medications These medications were sent to [facility] [address] [place] [address] Eduardo Louise [address] Phone:  [phone]  
  cephALEXin 500 mg capsule Discharge Instructions ICD Discharge Instructions Please make sure you have received your Temporary ICD identification card with your discharge instructions MEDICATIONS ? Take only the medications prescribed to you at discharge. ACTIVITY ? Return to your normal activity, except as noted below. o Do not lift anything heavier than 10 pounds for 4 weeks with the affected arm. This is how long it takes the muscles to heal, and the leads inside your heart to stabilize their position. o Do not reach above your head with the affected arm for 4 weeks, doing so increases the risk of lead dislodgement. o It is, however, important to move the affected arm to prevent shoulder stiffness and locking. o Avoid tight clothes or unnecessary pressure over your incision (such as bra straps or seat belts). If it is tender or sensitive to clothing, cover the incision with a soft dressing or pad. 
o Do NOT DRIVE until seen in the office SHOWERING  
  
 
? Leave the bandage over your incision for 7 days after the ICD implant. You bandage will be removed in clinic in 7 days. ? It is important to keep the bandaged area clean and dry. You may shower around the site until the bandage is removed in clinic. Thereafter, you may shower after the bandage is removed, washing it gently with soap and water. Do not apply any lotions, powders, or perfumes to the incision line. ? Avoid submerging your incision in water (tub baths, hot tubs, or swimming) for four weeks. ? Underneath the dressing. o If you have white steri-strips over your incision (underneath the gauze dressing), they will curl up at the end and fall off, usually within 10 days. Do not pull them off. 
- OR -  
o You may have a different type of closure for the incision. If Dermabond Adhesive was used to close your incision, you will receive a separate instruction sheet. DISCHARGE PRECAUTIONS ? Record your temperature every day, at the same time, for 3 weeks after your implant. A temperature of 100.5 F, or higher, can be the first sign of infection. This should be reported to your Doctor immediately. ? You can have an MRI after 6 weeks. You must be aware that any strong magnet or magnetic field can affect your ICD. In general, be careful of metal detectors, heavy machinery, and any area where arc-welding is performed. Avoid metal detectors such as the ones in security checkpoints at [facility] or [address] [place] [address]. When approaching a security checkpoint show your ICD Identification Card to security personnel and ask to be hand searched. ? Always tell your doctor or dentist that you have an ICD. Antibiotics may be prescribed before certain procedures. ? If you use a cell phone, hold it on the opposite side from where your ICD is implanted. ? Your temporary identification will be given to you with these instructions. Keep your ICD card in your wallet or on your person at all times. You should receive your permanent card in 8 weeks. If you do not receive your permanent card, please call the office at [phone]. TAKING YOUR PULSE ? Take your pulse the same time every day, preferably in the morning. ? Sit down and rest for 5 minutes prior to taking your pulse. ? Take your pulse for 1 full minute, use a clock or stop watch with a second hand. ? To feel your pulse, use the first two fingers of one hand; place them on the thumb side of the wrist of the opposite hand. The pulse will be steady, regular and throbbing. ? Call the office if your pulse is less than 40 beats per minute. SYMPTOMS THAT NEED TO BE REPORTED IMMEDIATELY ? Temperature more than 100.4 F ? Redness or warmth at the incision site, or pain for longer than the first 5 days after the implant. ? Drainage from the incision site. ? Swelling around the incision site. ? Shortness of breath. ? Rapid heart rate or palpitations. ? Dizziness, lightheadedness, fainting. ? Slow pulse below 40 beats per minute. ? REMEMBER: If you feel something is an emergency or cannot be handled over the phone, call 911 or go to the closest emergency room. WHAT TO DO IF YOUR ICD DELIVERS A SHOCK · If you ICD delivers a shock, you should seek attention at the nearest emergency room, call our office or call 911. Judi Carey MD 
Cardiac Electrophysiology / Cardiology [address] 
[address] [facility], [address] [facility], [address] [place], [address]         Win Da Silva 
[phone] / [phone] Fax       [phone] / [phone] Fax [employer] Announcement We are excited to announce that we are making your provider's discharge notes available to you in [employer]. You will see these notes when they are completed and signed by the physician that discharged you from your recent hospital stay. If you have any questions or concerns about any information you see in Proteo[employer]ix, please call the Health Information Department where you were seen or reach out to your Primary Care Provider for more information about your plan of care. Introducing [facility]! Osvaldo Mckay introduces [employer]ix patient portal. Now you can access parts of your medical record, email your doctor's office, and request medication refills online. 1. In your internet browser, go to [email or web address] 2. Click on the First Time User? Click Here link in the Sign In box. You will see the New Member Sign Up page. 3. Enter your [employer] Access Code exactly as it appears below. You will not need to use this code after youve completed the sign-up process. If you do not sign up before the expiration date, you must request a new code. · Coupmon Access Code: [email or web address] Expires: 12/17/2017  3:30 PM 
 
4. Enter the last four digits of your Social Security Number (xxxx) and Date of Birth (mm/dd/yyyy) as indicated and click Submit. You will be taken to the next sign-up page. 5. Create a Isogenicat ID. This will be your Coupmon login ID and cannot be changed, so think of one that is secure and easy to remember. 6. Create a Coupmon password. You can change your password at any time. 7. Enter your Password Reset Question and Answer. This can be used at a later time if you forget your password. 8. Enter your e-mail address. You will receive e-mail notification when new information is available in [address] 9. Click Sign Up. You can now view and download portions of your medical record. 10. Click the Download Summary menu link to download a portable copy of your medical information. If you have questions, please visit the Frequently Asked Questions section of the Coupmon website. Remember, Coupmon is NOT to be used for urgent needs. For medical emergencies, dial 911. Now available from your iPhone and Android! Providers Seen During Your Hospitalization Provider Specialty Primary office phone Cristal Herbert MD Cardiology [phone] Your Primary Care Physician (PCP) Primary Care Physician Office Phone Office Fax NONE ** None ** ** None ** You are allergic to the following Allergen Reactions Codeine Itching Recent Documentation Height Weight BMI Smoking Status 1.778 m 112.5 kg 35.59 kg/m2 Current Every Day Smoker Emergency Contacts Name Discharge Info Relation Home Work Mobile Serene Holder DISCHARGE CAREGIVER [3] Sister [23] [phone] Patient Belongings The following personal items are in your possession at time of discharge: 
     Visual Aid: Glasses                  Other Valuables: Joshua Ends Discharge Instructions Attachments/References MEFS - CEPHALEXIN (BIO-CEF, KEFLEX) - (BY MOUTH) (ENGLISH) HEART FAILURE: AVOIDING TRIGGERS (ENGLISH) Patient Handouts Cephalexin (Bio-Cef, Keflex) - (By mouth) Why this medicine is used:  
Treats infections. Contact a nurse or doctor right away if you have: · Blistering, peeling, or red skin rash · Severe or bloody diarrhea Common side effects: · Mild diarrhea or nausea © 2017 [address] Information is for End User's use only and may not be sold, redistributed or otherwise used for commercial purposes. Avoiding Triggers With Heart Failure: Care Instructions Your Care Instructions Triggers are anything that make your heart failure flare up. A flare-up is also called \"sudden heart failure\" or \"acute heart failure. \" When you have a flare-up, fluid builds up in your lungs, and you have problems breathing. You might need to go to the hospital. By watching for changes in your condition and avoiding triggers, you can prevent heart failure flare-ups. Follow-up care is a key part of your treatment and safety. Be sure to make and go to all appointments, and call your doctor if you are having problems. It's also a good idea to know your test results and keep a list of the medicines you take. How can you care for yourself at home? Watch for changes in your weight and condition · Weigh yourself without clothing at the same time each day. Record your weight. Call your doctor if you have sudden weight gain, such as more than 2 to 3 pounds in a day or 5 pounds in a week. (Your doctor may suggest a different range of weight gain.) A sudden weight gain may mean that your heart failure is getting worse. · Keep a daily record of your symptoms. Write down any changes in how you feel, such as new shortness of breath, cough, or problems eating.  Also record if your ankles are more swollen than usual and if you feel more tired than usual. Note anything that you ate or did that could have triggered these changes. Limit sodium Sodium causes your body to hold on to extra water. This may cause your heart failure symptoms to get worse. People get most of their sodium from processed foods. Fast food and restaurant meals also tend to be very high in sodium. · Your doctor may suggest that you limit sodium to 1,500 milligrams (mg) a day. That is less than 1 teaspoon of salt a day, including all the salt you eat in cooking or in packaged foods. · Read food labels on cans and food packages. They tell you how much sodium you get in one serving. Check the serving size. If you eat more than one serving, you are getting more sodium. · Be aware that sodium can come in forms other than salt, including monosodium glutamate (MSG), sodium citrate, and sodium bicarbonate (baking soda). MSG is often added to Asian food. You can sometimes ask for food without MSG or salt. · Slowly reducing salt will help you adjust to the taste. Take the salt shaker off the table. · Flavor your food with garlic, lemon juice, onion, vinegar, herbs, and spices instead of salt. Do not use soy sauce, steak sauce, onion salt, garlic salt, mustard, or ketchup on your food, unless it is labeled \"low-sodium\" or \"low-salt. \" 
· Make your own salad dressings, sauces, and ketchup without adding salt. · Use fresh or frozen ingredients, instead of canned ones, whenever you can. Choose low-sodium canned goods. · Eat less processed food and food from restaurants, including fast food. Exercise as directed Moderate, regular exercise is very good for your heart. It improves your blood flow and helps control your weight. But too much exercise can stress your heart and cause a heart failure flare-up. · Check with your doctor before you start an exercise program. 
· Walking is an easy way to get exercise. Start out slowly.  Gradually increase the length and pace of your walk. Swimming, riding a bike, and using a treadmill are also good forms of exercise. · When you exercise, watch for signs that your heart is working too hard. You are pushing yourself too hard if you cannot talk while you are exercising. If you become short of breath or dizzy or have chest pain, stop, sit down, and rest. 
· Do not exercise when you do not feel well. Take medicines correctly · Take your medicines exactly as prescribed. Call your doctor if you think you are having a problem with your medicine. · Make a list of all the medicines you take. Include those prescribed to you by other doctors and any over-the-counter medicines, vitamins, or supplements you take. Take this list with you when you go to any doctor. · Take your medicines at the same time every day. It may help you to post a list of all the medicines you take every day and what time of day you take them. · Make taking your medicine as simple as you can. Plan times to take your medicines when you are doing other things, such as eating a meal or getting ready for bed. This will make it easier to remember to take your medicines. · Get organized. Use helpful tools, such as daily or weekly pill containers. When should you call for help? Call 911 if you have symptoms of sudden heart failure such as: 
? · You have severe trouble breathing. ? · You cough up pink, foamy mucus. ? · You have a new irregular or rapid heartbeat. ?Call your doctor now or seek immediate medical care if: 
? · You have new or increased shortness of breath. ? · You are dizzy or lightheaded, or you feel like you may faint. ? · You have sudden weight gain, such as more than 2 to 3 pounds in a day or 5 pounds in a week. (Your doctor may suggest a different range of weight gain.) ? · You have increased swelling in your legs, ankles, or feet. ? · You are suddenly so tired or weak that you cannot do your usual activities. ?Watch closely for changes in your health, and be sure to contact your doctor if you develop new symptoms. Where can you learn more? Go to http://olivier-yon.info/. Enter V137 in the search box to learn more about \"Avoiding Triggers With Heart Failure: Care Instructions. \" Current as of: September 21, 2016 Content Version: 11.4 © 0685-0464 [employer]. Care instructions adapted under license by [employer] (which disclaims liability or warranty for this information). If you have questions about a medical condition or this instruction, always ask your healthcare professional. [address] any warranty or liability for your use of this information. Please provide this summary of care documentation to your next provider. Signatures-by signing, you are acknowledging that this After Visit Summary has been reviewed with you and you have received a copy. Patient Signature:  ____________________________________________________________ Date:  ____________________________________________________________  
  
Holger Linares Provider Signature:  ____________________________________________________________ Date:  ____________________________________________________________

## 2017-12-04 NOTE — PROGRESS NOTES
1235    Patient arrived. ID and allergies verified verbally with patient. Pt voices understanding of procedure to be performed. Consent obtained. Pt prepped for procedure. ICD    1505    TRANSFER - OUT REPORT:    Verbal report given to Jarvis Schaeffer RN(name) on [facility]  being transferred to (unit) for routine progression of care       Report consisted of patients Situation, Background, Assessment and   Recommendations(SBAR). Information from the following report(s) SBAR was reviewed with the receiving nurse. Lines:   Peripheral IV 12/04/17 Left Antecubital (Active)        Opportunity for questions and clarification was provided. Patient transported with:   Registered Nurse    [address] Anthony Farhad REPORT:    Verbal report received from [facility] JEFFY RN(name[facility] for routine progression of care      Report consisted of patients Situation, Background, Assessment and   Recommendations(SBAR). Information from the following report(s) SBAR was reviewed with the receiving nurse. Opportunity for questions and clarification was provided. Assessment completed upon patients arrival to unit and care assumed. Pt voices understanding of post procedure bedrest instructions. 1725    TRANSFER - OUT REPORT:    Verbal report given to [facility] RN 5th floor(name) on [facility]  being transferred to [facility](unit) for routine progression of care       Report consisted of patients Situation, Background, Assessment and   Recommendations(SBAR). Information from the following report(s) Kardex was reviewed with the receiving nurse. Lines:   Peripheral IV 12/04/17 Left Antecubital (Active)        Opportunity for questions and clarification was provided.       Patient transported with:   Registered Nurse

## 2017-12-04 NOTE — PROCEDURES
Cardiac Electrophysiology Report        PATIENT INFORMATION     Patient Name: Ajith Albarran  MRN: [id number]     Study Date: 2017    YOB: 1955        Age: 59 y.o. Gender: male      Procedure:  ICD Implantation    Referring Physician:  Dr. Parker James     Duty Name   Electrophysiologist Joyce Philippe MD   Monitor Carol Zeng RN   Circulator Donna Slater RN       PATIENT HISTORY     Cinthya Calderon a 59 y. o. male with ICM, LVEF 20%, CAD/PCI, diabetes mellitus, tobacco/alcohol abuse, NYHA class 2-3 symptoms who is referred to discuss ICD implantation for primary prevention. He has been on optimal guideline directed medical therapy for 3 months. He now presents for implantation of an ICD for primary prevention of sudden death. PROCEDURE     The patient was brought to the Cardiac Electrophysiology laboratory in a post-absorptive, fasting state. Informed consent was obtained. A peripheral IV was in place. Continuous electrocardiographic, blood pressure, O2 saturation and  CO2 monitoring was initiated. Intravenous antibiotics were administered pre-operatively. Self-adhesive cardioversion patches were positioned on the chest.  Conscious sedation was effectuated according to protocol. The patient was then prepped and draped in the usual sterile fashion. A left subclavian venogram was performed and demonstrated patency of the vein. A 50/50 mixture of lidocaine (1%) with epinephrine and bupivicaine (0.5%) was utilized for local anesthesia. An incision was performed medial to the left delto-pectoral groove. Sharp and blunt dissection was carried down to the level of the pre-pectoralis fascia. Hemostasis was maintained with electrocautery. Extra-thoracic, subclavian venous access was obtained and a sheath was placed using the modified Seldinger technique.  A single-coil, DF4 ICD lead was advanced under fluoroscopic guidance and positioned in the right ventricle where stable pacing and sensing characteristics were encountered. The sheath was peeled away and the lead was anchored to the pre-pectoralis fascia using O-ethibond non-absorbable suture material. A device pocket was then fashioned and flushed copiously with antibiotic solution. An ICD generator was connected to the lead and the generator was placed into the pocket. The device was secured to the pocket using O-ethibond, non-absorbable suture material. The pocket was then closed in three layers using 2-O vicryl, 3-O monocryl, 4-O monocryl absorbable suture material and Dermabond. The skin was closed using a sub-cuticular technique. A bio-occlusive dressing were applied to the skin. The patient remained hemodynamically stable, tolerated the procedure well and was transferred in stable condition. There were no immediate complications encountered during the procedure. No specimen were removed; there was minimal blood loss. LEAD & GENERATOR DATA       Model # Serial #   Generator [employer] [id number]   Ventricular Lead [employer] [id number]       PACE/SENSE DATA      Sensed Wave (mV) Threshold (V) Impedance (?)   Ventricle 17.9 0.5 788   Shock ------ ------ 71       FINAL PROGRAMMING     Pacing Mode Lower Rate (ppm) Upper Rate (ppm)   VVI 40    VF Rate (bpm) # Antitachycardia Pacing First Shock Energy (J)   210 Quick Convert 41 x 8   VT Rate (bpm) # Antitachycardia Pacing First Shock Energy (J)   185 Busts (3) 41 x 6         DEFIBRILLATION THRESHOLD TESTING     Deferred      MEDICATION SUMMARY     Medication Route Unit Total   Gentamycin IV mg 80   Ancef IV grams 2   Fentanyl IV micrograms 100   Versed IV grams 5       RADIOLOGY SUMMARY      Total   Fluoro Time (minutes) 2.1      Dose Area Product (mGy) 21       CONCLUSIONS     1. Successful implantation of a single-chamber ICD. 2. IV antibiotics x 24 hours for 3 doses followed by Keflex 500 mg po tid x 5 days.    3. Monitor overnight on telemetry. 4. CXR (PA & lateral) and device interrogation in AM.  5. Possible discharge in AM.  6. Wound check in EP clinic in 7 days. 7. Follow up in EP clinic in 1 month or earlier if necessary. 8. Follow up with Dr. Yue Gutierrez as scheduled. Thank you, Dr. Yue Gutierrez for involving me in the care of this extraordinarily pleasant male.               James Escudero MD  Cardiac Electrophysiology / Cardiology    [address], [address]  [phone] / [phone] Fax     [phone] / [phone] Fax

## 2017-12-04 NOTE — IP AVS SNAPSHOT
Bindu Gonzalez 
 
 
 [address] 
[phone] Patient: Isaiah Fish MRN: [id number] QIM:59/99/[id number] My Medications TAKE these medications as instructed Instructions Each Dose to Equal  
 Morning Noon Evening Bedtime  
 acetaminophen 325 mg tablet Commonly known as:  TYLENOL Your last dose was: Your next dose is: Take 2 Tabs by mouth every four (4) hours as needed. 650 mg  
    
   
   
   
  
 aspirin 81 mg chewable tablet Your last dose was: Your next dose is: Take 1 Tab by mouth daily. 81 mg  
    
   
   
   
  
 bumetanide 0.5 mg tablet Commonly known as:  Wilhelmenia Montane Your last dose was: Your next dose is: TAKE ONE TABLET BY MOUTH ONCE DAILY  
     
   
   
   
  
 cephALEXin 500 mg capsule Commonly known as:  Tenisha Moore Your last dose was: Your next dose is: Take 1 Cap by mouth three (3) times daily for 5 days. Indications: PREVENTION OF BACTERIAL ENDOCARDITIS  
 500 mg  
    
   
   
   
  
 clopidogrel 75 mg Tab Commonly known as:  PLAVIX Your last dose was: Your next dose is: Take 1 Tab by mouth daily. Dr. Amalia Pagan to provide refills  Indications: Thrombosis Prevention after PCI 75 mg  
    
   
   
   
  
 doxylamine succinate 25 mg tablet Commonly known as:  Vee Zara Your last dose was: Your next dose is: Take 25 mg by mouth nightly as needed for Sleep. 25 mg  
    
   
   
   
  
 metoprolol succinate 25 mg XL tablet Commonly known as:  TOPROL-XL Your last dose was: Your next dose is: TAKE ONE TABLET BY MOUTH ONCE DAILY  
     
   
   
   
  
 sacubitril-valsartan 24-26 mg tablet Commonly known as:  ENTRESTO Your last dose was: Your next dose is: Take 1 Tab by mouth two (2) times a day. 1 Tab TYLENOL PM PO Your last dose was: Your next dose is: Take  by mouth. ZETIA 10 mg tablet Generic drug:  ezetimibe Your last dose was: Your next dose is: Take 10 mg by mouth daily. 10 mg Where to Get Your Medications These medications were sent to [facility] [address] [place], [place] [address]max [id number] Phone:  [phone]  
  cephALEXin 500 mg capsule

## 2017-12-04 NOTE — H&P
HISTORY OF PRESENTING ILLNESS       Lorna Mahan is a 59 y.o. male with ICM, LVEF 20%, CAD/PCI, diabetes mellitus, tobacco/alcohol abuse, NYHA class 2-3 symptoms who is referred to discuss ICD implantation for primary prevention.  He has been on optimal guideline directed medical therapy for 3 months.          ACTIVE PROBLEM LIST           Patient Active Problem List     Diagnosis Date Noted    Ischemic cardiomyopathy 09/18/2017    Coronary artery disease involving native coronary artery without angina pectoris 06/01/2017    Chronic systolic congestive heart failure ([facility] 06/01/2017    ETOH abuse 06/01/2017    Controlled type 2 diabetes mellitus with circulatory disorder ([facility] 06/01/2017    Nicotine dependence 06/01/2017             PAST MEDICAL HISTORY           Past Medical History:   Diagnosis Date    Controlled type 2 diabetes mellitus with circulatory disorder ([facility] 6/1/2017    COPD (chronic obstructive pulmonary disease) (HCC)      Coronary artery disease involving native coronary artery without angina pectoris 06/01/2017     OOH MI, 2v disease s/p PCI RCA with [facility] June 2017    ETOH abuse 6/1/2017    Ischemic cardiomyopathy       Extensive LAD territory infarction    Nicotine dependence 8/2/9742    Systolic heart failure (HCC)               PAST SURGICAL HISTORY            Past Surgical History:   Procedure Laterality Date    HX TONSILLECTOMY        HX WISDOM TEETH EXTRACTION                 ALLERGIES           Allergies   Allergen Reactions    Codeine Itching            FAMILY HISTORY      Family History   Problem Relation Age of Onset    Stroke Father      Heart Disease Father      Hypertension Father      negative for cardiac disease         SOCIAL HISTORY       Social History                Social History    Marital status: SINGLE       Spouse name: N/A    Number of children: N/A    Years of education: N/A             Social History Main Topics    Smoking status: Current Every Day Smoker       Packs/day: 2.00       Last attempt to quit: 5/31/2017    Smokeless tobacco: Never Used    Alcohol use 25.2 oz/week        42 Cans of beer per week     Drug use: No    Sexual activity: Yes       Partners: Female           Other Topics Concern    Not on file      Social History Narrative               MEDICATIONS           Current Outpatient Prescriptions   Medication Sig    bumetanide (BUMEX) 0.5 mg tablet TAKE ONE TABLET BY MOUTH ONCE DAILY    sacubitril-valsartan (ENTRESTO) 24 mg/26 mg tablet Take 1 Tab by mouth two (2) times a day.  ezetimibe (ZETIA) 10 mg tablet Take 10 mg by mouth daily.  ACETAMINOPHEN/DIPHENHYDRAMINE (TYLENOL PM PO) Take  by mouth.  metoprolol succinate (TOPROL-XL) 25 mg XL tablet TAKE ONE TABLET BY MOUTH ONCE DAILY    clopidogrel (PLAVIX) 75 mg tab Take 1 Tab by mouth daily. Dr. Katy Alan to provide refills  Indications: Thrombosis Prevention after PCI    metFORMIN (GLUCOPHAGE) 500 mg tablet Take 1 Tab by mouth two (2) times daily (with meals).  aspirin 81 mg chewable tablet Take 1 Tab by mouth daily.  fluticasone-vilanterol (BREO ELLIPTA) 100-25 mcg/dose inhaler Take 1 Puff by inhalation daily. Dr Lizett Yi to provide refills      No current facility-administered medications for this visit.          I have reviewed the nurses notes, vitals, problem list, allergy list, medical history, family, social history and medications.         REVIEW OF SYMPTOMS       General: Pt denies excessive weight gain or loss. Pt is able to conduct ADL's  HEENT: Denies blurred vision, headaches, hearing loss, epistaxis and difficulty swallowing. Respiratory: Denies cough, congestion, shortness of breath, BERNABE, wheezing or stridor.   Cardiovascular: Denies precordial pain, palpitations, edema or PND  Gastrointestinal: Denies poor appetite, indigestion, abdominal pain or blood in stool  Genitourinary: Denies hematuria, dysuria, increased urinary frequency  Musculoskeletal: Denies joint pain or swelling from muscles or joints  Neurologic: Denies tremor, paresthesias, headache, or sensory motor disturbance  Psychiatric: Denies confusion, insomnia, depression  Integumentray: Denies rash, itching or ulcers. Hematologic: Denies easy bruising, bleeding      PHYSICAL EXAMINATION       There were no vitals filed for this visit. General: Well developed, in no acute distress. HEENT: No jaundice, oral mucosa moist, no oral ulcers  Neck: Supple, no stiffness, no lymphadenopathy, supple  Heart:  Normal S1/S2 negative S3 or S4. Regular, no murmur, gallop or rub, no jugular venous distention  Respiratory: Clear bilaterally x 4, no wheezing or rales  Abdomen:   Soft, non-tender, bowel sounds are active.   Extremities:  No edema, normal cap refill, no cyanosis. Musculoskeletal: No clubbing, no deformities  Neuro: A&Ox3, speech clear, gait stable, cooperative, no focal neurologic deficits  Skin: Skin color is normal. No rashes or lesions.  Non diaphoretic, moist.  Vascular: 2+ pulses symmetric in all extremities         DIAGNOSTIC DATA       EKG: Sinus rhythm with narrow QRS         LABORATORY DATA             Lab Results   Component Value Date/Time     WBC 7.7 07/27/2017 09:50 AM     HGB 13.9 07/27/2017 09:50 AM     HCT 41.3 07/27/2017 09:50 AM     PLATELET 152 81/79/6417 09:50 AM     MCV 96.0 07/27/2017 09:50 AM            Lab Results   Component Value Date/Time     Sodium 141 07/27/2017 09:50 AM     Potassium 4.2 07/27/2017 09:50 AM     Chloride 103 07/27/2017 09:50 AM     CO2 28 07/27/2017 09:50 AM     Anion gap 10 07/27/2017 09:50 AM     Glucose 150 07/27/2017 09:50 AM     BUN 14 07/27/2017 09:50 AM     Creatinine 1.12 07/27/2017 09:50 AM     BUN/Creatinine ratio 13 07/27/2017 09:50 AM     GFR est AA >60 07/27/2017 09:50 AM     GFR est non-AA >60 07/27/2017 09:50 AM     Calcium 9.3 07/27/2017 09:50 AM     Bilirubin, total 0.6 07/27/2017 09:50 AM     AST (SGOT) 9 07/27/2017 09:50 AM     Alk. phosphatase 97 07/27/2017 09:50 AM     Protein, total 7.7 07/27/2017 09:50 AM     Albumin 4.3 07/27/2017 09:50 AM     Globulin 3.4 07/27/2017 09:50 AM     A-G Ratio 1.3 07/27/2017 09:50 AM     ALT (SGPT) 20 07/27/2017 09:50 AM             ASSESSMENT       1. Cardiomyopathy                        A. Ischemic                        B. NYHA class 2-3                        C. LVEF 15-20%  2. Diabetes mellitus  3. CAD, native  4. Percutaneous transluminal angioplasty  5. Myocardial infarction, previous  6. COPD          PLAN      Plan for implantation of a single-chamber ICD for prevention of sudden cardiac death.   [address], MD  Cardiac Electrophysiology / Cardiology     [address], [address] [place] [address] Rd, [address] [place] [address], [facility] [address]                                                                                 [facility], [address] [place] [address]  [phone] / [phone] Fax                                                                  [phone] / [phone] Fax

## 2017-12-05 VITALS
TEMPERATURE: 98.2 F | OXYGEN SATURATION: 94 % | HEART RATE: 72 BPM | HEIGHT: 70 IN | DIASTOLIC BLOOD PRESSURE: 75 MMHG | BODY MASS INDEX: 35.51 KG/M2 | RESPIRATION RATE: 18 BRPM | WEIGHT: 248.02 LBS | SYSTOLIC BLOOD PRESSURE: 117 MMHG

## 2017-12-05 PROCEDURE — 74011250636 HC RX REV CODE- 250/636: Performed by: INTERNAL MEDICINE

## 2017-12-05 PROCEDURE — 74011250637 HC RX REV CODE- 250/637: Performed by: INTERNAL MEDICINE

## 2017-12-05 PROCEDURE — 99218 HC RM OBSERVATION: CPT

## 2017-12-05 RX ORDER — CEPHALEXIN 500 MG/1
500 CAPSULE ORAL 3 TIMES DAILY
Qty: 15 CAP | Refills: 0 | Status: SHIPPED | OUTPATIENT
Start: 2017-12-05 | End: 2017-12-05

## 2017-12-05 RX ORDER — ACETAMINOPHEN 325 MG/1
650 TABLET ORAL
Status: SHIPPED | COMMUNITY
Start: 2017-12-05 | End: 2019-05-20

## 2017-12-05 RX ORDER — CEPHALEXIN 500 MG/1
500 CAPSULE ORAL 3 TIMES DAILY
Qty: 15 CAP | Refills: 0 | Status: SHIPPED | OUTPATIENT
Start: 2017-12-05 | End: 2017-12-10

## 2017-12-05 RX ADMIN — ACETAMINOPHEN 650 MG: 325 TABLET ORAL at 04:04

## 2017-12-05 RX ADMIN — CEFAZOLIN 2 G: 1 INJECTION, POWDER, FOR SOLUTION INTRAMUSCULAR; INTRAVENOUS; PARENTERAL at 13:56

## 2017-12-05 RX ADMIN — CEFAZOLIN 2 G: 1 INJECTION, POWDER, FOR SOLUTION INTRAMUSCULAR; INTRAVENOUS; PARENTERAL at 06:36

## 2017-12-05 RX ADMIN — ASPIRIN 81 MG 81 MG: 81 TABLET ORAL at 09:09

## 2017-12-05 RX ADMIN — ACETAMINOPHEN 650 MG: 325 TABLET ORAL at 09:16

## 2017-12-05 RX ADMIN — SACUBITRIL AND VALSARTAN 1 TABLET: 24; 26 TABLET, FILM COATED ORAL at 06:36

## 2017-12-05 RX ADMIN — ACETAMINOPHEN 650 MG: 325 TABLET ORAL at 13:56

## 2017-12-05 RX ADMIN — Medication 10 ML: at 06:36

## 2017-12-05 NOTE — CARDIO/PULMONARY
Cardiac Rehab: Received consult for cardiac education on post-ICD. 58 yo male admitted for ICD implantation by Dr Meghan Reynolds. S/P single-chamber ICD (12/4). LVEF 20% by echo (9/18/17). Cardiologist is Dr Wade Galvin. Receives health care at the [facility]. Patient is familiar to Cardiac Rehab RN from recent admission with acute CHF, MI, s/p PTCA/stent (6/2/17). 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 Pt resides in [place]; his sister Tian Santiago) has dementia & lives with him, Pt is somewhat hard of hearing. Cardiac Meds:  ACE/ARB - Entresto (sacubitril-valsartan); started about 3 weeks ago, per pt. BB - metoprolol succinate  Statin - none (see lipid panel, 7/27/17). Pt reported atorvastatin caused severe abdominal pains. ASA - 81 mg chewable  Prior to admission meds also included: Plavix, Zetia and Bumex. New PO Medication: Keflex. Smoking History: Current smoker. Reported he has cut back from 2 ppd to 1 ppd. Also has hx ETOH abuse (reported 6 beers/day in June; reported  2-3 beers/day in November). Hgb A1c 7.5 (7/27/17). Met with Ajith Albarran, prior to discharge from 5th floor med-surg unit. Pt reported he stopped working after June admission and has applied for disability. Has been feeling better since starting Entresto; has been able to use leaf blower in his yards. 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 \"    Discussed pt's understanding of purpose of ICD. Explained pacemaker function & low rate set at 40 bpm. Provided ICD booklet & temporary card. Reviewed post-ICD instructions, with emphasis on monitoring for s/s infection, temporary restrictions, pain mgmt, and what to do if shock occurs. Reinforced importance of f/u appts with Cardiology NP on 12/13/17, and with Dr Wade Galvin on 1/12/18. Purpose of visit was also to review CHF management. Pt indicated he did not remember reading HF info from past admission.  However, he remembered hearing about his low EF. Explained EF. Provided Heart Failure education folder. Reviewed current cardiac meds. Pt reported most of his meds are provided at the [facility]) clinic. However, they would not provide Entresto. He obtained samples from Cardiologist's office and only has \"a few days left. \" He has not filled out the paperwork to obtain assistance from the pharmaceutic company. Discussed purpose of Keflex and potential side effects. Encouraged pt to call MD if he has difficulty obtaining his meds, or if he suspects side effects may be occurring. Pt reported he does not always take Bumex, especially when he has to drive somewhere. Explained goal of med management to help heart function improve, and need to avoid fluid overload, which stresses the heart. Pt reported he has not been weighing regularly. Reinforced importance of daily weights and when to contact MD for fluid wt gain. Pt reported he was not able to take metformin because of abdominal pain. Reported he did not remember name of MD who was managing his diabetes, and has not followed up with him. Reviewed potential complications of uncontrolled DM. Encouraged pt to work on improving diabetic control; also reinforced low sodium diet. Pt denied drinking energy or sports drinks. Nadine Gutierrez required prompting to answer teach back questions. He would benefit from reinforcement on all topics discussed. All pt's questions were answered. Spoke with Dr Leeanne Dia about pt's report that he is almost out of Entresto. Samples were arranged through cardiologist's office. Sent message to Ara Hernández NP, about pt needing reinforcement on med compliance and daily weights. Pt to see nurse practitioner on 12/13.

## 2017-12-05 NOTE — PROGRESS NOTES
Cardiology Progress Note         NAME:  Nadine Gutierrez   :   1955   MRN:   [id number]     Assessment/Plan:   S/p AICD- device ck Ok , CXR lead looks OK, no PTX      - IV abx x 3 doses- last dose 2 pm       - keflex 500 mg TID x 5 days       - wound ck 1 week        - APAP for pain relief 2/2 allergy to codeine   ICM      - cont PTA meds- BB, bumex,  entresto  CAD /DEC 2017      - DAPT       - BB,s tatin      Care Coordination: 35 minutes spent reviewing data, dx, treatment w/ pt and family, coordinating care with team including care management, and  arranging follow up. Future Appointments  Date Time Provider Catalina Riley   2017 2:00 PM ALEX Villagran   2018 2:00 PM MD MARIANELA Guzman MJ SCHED         Subjective:   Nadine Gutierrez is a 59y.o. year old male w/ hx: ICM, LVEF 20%, CAD/PCI (2017), diabetes mellitus, tobacco/alcohol abuse, NYHA class 2-3 symptoms who is referred to discuss ICD implantation for primary prevention. He has been on optimal guideline directed medical therapy for 3 months.         Overnight activities reviewed:    - /70 range    - Tele: sinus rhythm,sinus kanwal        Cardiac ROS:functional status improved since starting entresto, class 2 dyspnea/sx Patient denies any exertional chest pain, dyspnea, palpitations, syncope, orthopnea, edema or paroxysmal nocturnal dyspnea. Review of Systems: pain at site, somewhat managed, unable to take codeine, No nausea, indigestion, vomiting, pain, cough, sputum. No bleeding. Taking po. Appetite .  Tolerating      CARDIAC EVALUATION   ECHO: 2017: EF 15-20%, apical AK  ECHO 17- Dilated LV, EF 20%, apical DK       MRI: 2017: LV dilated LVEF 32%, severe HK ant wall, RVEF 55%, mild MR; anterior infarct- no viability RCA/LCA: viable     CATH 2017: LM: nl, LAD: m100%, LCX codom normal, RCA:m 85%, LVEDP 25, no AV gradient, dilated LV, apical AK, EF 20%  -- s/p PCI pRCA: 2.5x18 Xience ([facility]). AICD 2017; [place] [employer]        Objective:     Visit Vitals    /72 (BP 1 Location: Right arm, BP Patient Position: At rest)    Pulse 62    Temp 98.4 °F (36.9 °C)    Resp 18    Ht 5' 10\" (1.778 m)    Wt 248 lb 0.3 oz (112.5 kg)    SpO2 94%    BMI 35.59 kg/m2        O2 Device: Room air    Temp (24hrs), Av.2 °F (36.8 °C), Min:97.5 °F (36.4 °C), Max:98.6 °F (37 °C)        Intake/Output Summary (Last 24 hours) at 17 1107  Last data filed at 17 1845   Gross per 24 hour   Intake              240 ml   Output                0 ml   Net              240 ml       TELE: sinus kanwal     General: AAOx3 cooperative, no acute distress. HEENT: Atraumatic. Pink and moist.  Anicteric sclerae. Neck: Supple,     Lungs: CTA bilaterally. No wheezing/rhonchi/crackles. Heart: PMI: diminished, AICD left infraclavicular space  Regular rhythm, no murmur, no S3, no rubs, no gallops. No JVD. No carotid bruits. Abdomen: Soft, non-distended, non-tender. + Bowel sounds. No bruits. : voiding   Extremities: No edema, no clubbing, no cyanosis. No calf tenderness  Neurologic: Grossly intact. Alert and oriented X 3. No acute neurological distress. Psych: Good insight. Not anxious nor agitated.       Care Plan discussed with:    Comments   Patient y    Family      RN y    Care Manager                    Consultant:          Data Review:   CMP: No results found for: NA, K, CL, CO2, AGAP, GLU, BUN, CREA, GFRAA, GFRNA, CA, MG, PHOS, ALB, TBIL, TBILI, TP, ALB, GLOB, AGRAT, SGOT, ALT, GPT  CBC: No results found for: WBC, HGB, HCT, PLT, HGBEXT, HCTEXT, PLTEXT, HGBEXT, HCTEXT, PLTEXT  All Cardiac Markers in the last 24 hours: No results found for: CPK, CK, CKMMB, CKMB, RCK3, CKMBT, CKNDX, CKND1, VINAYAK, TROPT, TROIQ, LEN, TROPT, TNIPOC, BNP, BNPP  Recent Glucose Results:   Lab Results   Component Value Date/Time    GLUCPOC 120 (H) 2017 04:30 PM    GLUCPOC 129 (H) 12/04/2017 12:56 PM     ABG: No results found for: PH, PHI, PCO2, PCO2I, PO2, PO2I, HCO3, HCO3I, FIO2, FIO2I  LIPIDS:  Cholesterol, total   Date Value Ref Range Status   07/27/2017 152 <200 MG/DL Final     HDL Cholesterol   Date Value Ref Range Status   07/27/2017 44 MG/DL Final     Comment:     Based on NCEP ATP III, HDL Cholesterol <40 mg/dL is considered low and >60 mg/dL is elevated. LDL, calculated   Date Value Ref Range Status   07/27/2017 84.6 0 - 100 MG/DL Final     Comment:     Based on the NCEP-ATP: LDL-C concentrations are considered  optimal <100 mg/dL,  near optimal/above Normal 100-129 mg/dL  Borderline High: 130-159, High: 160-189 mg/dL  Very High: Greater than or equal to 190 mg/dL       VLDL, calculated   Date Value Ref Range Status   07/27/2017 23.4 MG/DL Final     CHOL/HDL Ratio   Date Value Ref Range Status   07/27/2017 3.5 0 - 5.0   Final       Medications reviewed  Current Facility-Administered Medications   Medication Dose Route Frequency    sodium chloride (NS) flush 5-10 mL  5-10 mL IntraVENous Q8H    sodium chloride (NS) flush 5-10 mL  5-10 mL IntraVENous PRN    acetaminophen (TYLENOL) tablet 650 mg  650 mg Oral Q4H PRN    ondansetron (ZOFRAN) injection 4 mg  4 mg IntraVENous Q4H PRN    ceFAZolin in 0.9% NS (ANCEF) IVPB soln 2 g  2 g IntraVENous Q8H    sacubitril-valsartan (ENTRESTO) 24-26 mg tablet 1 Tab  1 Tab Oral BID    bumetanide (BUMEX) tablet 0.5 mg  0.5 mg Oral DAILY    ezetimibe (ZETIA) tablet 10 mg  10 mg Oral DAILY    metoprolol succinate (TOPROL-XL) XL tablet 25 mg  25 mg Oral DAILY    clopidogrel (PLAVIX) tablet 75 mg  75 mg Oral DAILY    aspirin chewable tablet 81 mg  81 mg Oral DAILY         Selena Read, RN, ACNP-BC, [facility]

## 2017-12-05 NOTE — ADVANCED PRACTICE NURSE
Pt has only a few Entresto  Pills left, samples provided from office   Advised he needs to complete paperwork to obtain free medication from [employer]

## 2017-12-05 NOTE — DISCHARGE INSTRUCTIONS
ICD  Discharge Instructions    Please make sure you have received your Temporary ICD identification card with your discharge instructions      MEDICATIONS         Take only the medications prescribed to you at discharge. ACTIVITY         Return to your normal activity, except as noted below. o Do not lift anything heavier than 10 pounds for 4 weeks with the affected arm. This is how long it takes the muscles to heal, and the leads inside your heart to stabilize their position. o Do not reach above your head with the affected arm for 4 weeks, doing so increases the risk of lead dislodgement.    o It is, however, important to move the affected arm to prevent shoulder stiffness and locking. o Avoid tight clothes or unnecessary pressure over your incision (such as bra straps or seat belts). If it is tender or sensitive to clothing, cover the incision with a soft dressing or pad.  o Do NOT DRIVE until seen in the office      SHOWERING         Leave the bandage over your incision for 7 days after the ICD implant. You bandage will be removed in clinic in 7 days.  It is important to keep the bandaged area clean and dry. You may shower around the site until the bandage is removed in clinic. Thereafter, you may shower after the bandage is removed, washing it gently with soap and water. Do not apply any lotions, powders, or perfumes to the incision line.  Avoid submerging your incision in water (tub baths, hot tubs, or swimming) for four weeks.  Underneath the dressing. o If you have white steri-strips over your incision (underneath the gauze dressing), they will curl up at the end and fall off, usually within 10 days. Do not pull them off.  - OR -   o You may have a different type of closure for the incision. If Dermabond Adhesive was used to close your incision, you will receive a separate instruction sheet.       DISCHARGE PRECAUTIONS         Record your temperature every day, at the same time, for 3 weeks after your implant. A temperature of 100.5 F, or higher, can be the first sign of infection. This should be reported to your Doctor immediately.  You can have an MRI after 6 weeks. You must be aware that any strong magnet or magnetic field can affect your ICD. In general, be careful of metal detectors, heavy machinery, and any area where arc-welding is performed. Avoid metal detectors such as the ones in security checkpoints at [facility] or [address] [place] [address]. When approaching a security checkpoint show your ICD Identification Card to security personnel and ask to be hand searched.  Always tell your doctor or dentist that you have an ICD. Antibiotics may be prescribed before certain procedures.  If you use a cell phone, hold it on the opposite side from where your ICD is implanted.  Your temporary identification will be given to you with these instructions. Keep your ICD card in your wallet or on your person at all times. You should receive your permanent card in 8 weeks. If you do not receive your permanent card, please call the office at [phone]. TAKING YOUR PULSE         Take your pulse the same time every day, preferably in the morning.  Sit down and rest for 5 minutes prior to taking your pulse.  Take your pulse for 1 full minute, use a clock or stop watch with a second hand.  To feel your pulse, use the first two fingers of one hand; place them on the thumb side of the wrist of the opposite hand. The pulse will be steady, regular and throbbing.  Call the office if your pulse is less than 40 beats per minute. SYMPTOMS THAT NEED TO BE REPORTED IMMEDIATELY         Temperature more than 100.4 F     Redness or warmth at the incision site, or pain for longer than the first 5 days after the implant.  Drainage from the incision site.  Swelling around the incision site.  Shortness of breath.      Rapid heart rate or palpitations.  Dizziness, lightheadedness, fainting.  Slow pulse below 40 beats per minute.  REMEMBER: If you feel something is an emergency or cannot be handled over the phone, call 911 or go to the closest emergency room. WHAT TO DO IF YOUR ICD DELIVERS A SHOCK     · If you ICD delivers a shock, you should seek attention at the nearest emergency room, call our office or call 911.           Fern Donald MD  Cardiac Electrophysiology / Cardiology    [address] [address], [address] [facility], [address]  [place] [address] [place] [address], [facility]  [phone] / [phone] Fax       [phone] / [phone] Fax

## 2017-12-05 NOTE — PROGRESS NOTES
Primary Nurse Bryan Sol and Shellie Brito, RN performed a dual skin assessment on this patient No impairment noted, other than left chest incision for pacemaker insertion, which is covered by intact dressing.     Moises score is 22

## 2017-12-05 NOTE — PROGRESS NOTES
Bedside and Verbal shift change report given to [address] [place] [address] (oncoming nurse) by Renan Bray (offgoing nurse). Report included the following information SBAR, Kardex, Procedure Summary, Intake/Output, MAR, Recent Results and Med Rec Status.

## 2017-12-06 NOTE — PROGRESS NOTES
Visit charted 12/6/17  Spiritual Care Partner Volunteer visited patient in Med Surg on 12/5/17.   Documented by:  Hetal See [id number] [place] ([id number])

## 2017-12-13 ENCOUNTER — OFFICE VISIT (OUTPATIENT)
Dept: CARDIOLOGY CLINIC | Age: 62
End: 2017-12-13

## 2017-12-13 VITALS
HEART RATE: 88 BPM | SYSTOLIC BLOOD PRESSURE: 152 MMHG | WEIGHT: 248.6 LBS | OXYGEN SATURATION: 95 % | BODY MASS INDEX: 35.59 KG/M2 | RESPIRATION RATE: 18 BRPM | HEIGHT: 70 IN | DIASTOLIC BLOOD PRESSURE: 84 MMHG

## 2017-12-13 DIAGNOSIS — I25.5 ISCHEMIC CARDIOMYOPATHY: Primary | ICD-10-CM

## 2017-12-13 DIAGNOSIS — Z95.810 ICD (IMPLANTABLE CARDIOVERTER-DEFIBRILLATOR) IN PLACE: ICD-10-CM

## 2017-12-13 DIAGNOSIS — Z51.89 VISIT FOR WOUND CHECK: ICD-10-CM

## 2017-12-13 NOTE — PROGRESS NOTES
Patient presents for wound check post-device implantation. The dressing was removed and the site was inspected. The site appeared to be well-healing without ecchymosis/tenderness/erythema. Denies pain, fevers, discharge. Small pea-sized area expressed minimal sero-sanguinous drainage. He has completed antibiotic course at home. Plan: Will have him come back in one week for incision check and then continue follow up in device clinic as planned.        Arnoldo Guzman NP

## 2017-12-13 NOTE — PROGRESS NOTES
Visit Vitals    /84 (BP 1 Location: Right arm, BP Patient Position: Sitting)    Pulse 88    Resp 18    Ht 5' 10\" (1.778 m)    Wt 248 lb 9.6 oz (112.8 kg)    SpO2 95%    BMI 35.67 kg/m2

## 2017-12-15 ENCOUNTER — TELEPHONE (OUTPATIENT)
Dept: CARDIOLOGY CLINIC | Age: 62
End: 2017-12-15

## 2017-12-20 ENCOUNTER — OFFICE VISIT (OUTPATIENT)
Dept: CARDIOLOGY CLINIC | Age: 62
End: 2017-12-20

## 2017-12-20 VITALS
RESPIRATION RATE: 17 BRPM | BODY MASS INDEX: 35.85 KG/M2 | SYSTOLIC BLOOD PRESSURE: 130 MMHG | WEIGHT: 250.4 LBS | HEART RATE: 73 BPM | HEIGHT: 70 IN | DIASTOLIC BLOOD PRESSURE: 80 MMHG | OXYGEN SATURATION: 98 %

## 2017-12-20 DIAGNOSIS — Z51.89 VISIT FOR WOUND CHECK: ICD-10-CM

## 2017-12-20 DIAGNOSIS — Z95.810 ICD (IMPLANTABLE CARDIOVERTER-DEFIBRILLATOR) IN PLACE: Primary | ICD-10-CM

## 2017-12-20 NOTE — PROGRESS NOTES
Patient presents for wound check post-device implantation. The dressing was removed and the site was inspected. The site appeared to be well-healing without ecchymosis/tenderness/erythema. Denies pain, fevers, discharge. Last week, a small pea-sized area was noted and expressed minimal sero-sanguinous drainage. He presents today for follow up and incision is healing well without drainage, or fever. He has completed antibiotic therapy. Plan:  Continue follow up in device clinic as planned.     Alise Logan NP

## 2017-12-20 NOTE — PROGRESS NOTES
Visit Vitals    /80 (BP 1 Location: Right arm, BP Patient Position: Sitting)    Pulse 73    Resp 17    Ht 5' 10\" (1.778 m)    Wt 250 lb 6.4 oz (113.6 kg)    SpO2 98%    BMI 35.93 kg/m2

## 2017-12-27 ENCOUNTER — TELEPHONE (OUTPATIENT)
Dept: CARDIOLOGY CLINIC | Age: 62
End: 2017-12-27

## 2017-12-27 DIAGNOSIS — I50.22 CHRONIC SYSTOLIC CONGESTIVE HEART FAILURE (HCC): ICD-10-CM

## 2017-12-27 NOTE — TELEPHONE ENCOUNTER
Patient would like to  samples of Entresto, please call when ready.  He can be reached at [phone]

## 2018-01-10 ENCOUNTER — CLINICAL SUPPORT (OUTPATIENT)
Dept: CARDIOLOGY CLINIC | Age: 63
End: 2018-01-10

## 2018-01-10 ENCOUNTER — OFFICE VISIT (OUTPATIENT)
Dept: CARDIOLOGY CLINIC | Age: 63
End: 2018-01-10

## 2018-01-10 VITALS
HEART RATE: 68 BPM | SYSTOLIC BLOOD PRESSURE: 130 MMHG | DIASTOLIC BLOOD PRESSURE: 84 MMHG | BODY MASS INDEX: 36.65 KG/M2 | OXYGEN SATURATION: 94 % | HEIGHT: 70 IN | RESPIRATION RATE: 18 BRPM | WEIGHT: 256 LBS

## 2018-01-10 DIAGNOSIS — F10.10 ETOH ABUSE: Chronic | ICD-10-CM

## 2018-01-10 DIAGNOSIS — Z95.810 ICD (IMPLANTABLE CARDIOVERTER-DEFIBRILLATOR) IN PLACE: Primary | ICD-10-CM

## 2018-01-10 DIAGNOSIS — I25.5 ISCHEMIC CARDIOMYOPATHY: ICD-10-CM

## 2018-01-10 DIAGNOSIS — F17.208 NICOTINE DEPENDENCE WITH OTHER NICOTINE-INDUCED DISORDER, UNSPECIFIED NICOTINE PRODUCT TYPE: Chronic | ICD-10-CM

## 2018-01-10 DIAGNOSIS — I25.10 CORONARY ARTERY DISEASE INVOLVING NATIVE CORONARY ARTERY OF NATIVE HEART WITHOUT ANGINA PECTORIS: ICD-10-CM

## 2018-01-10 DIAGNOSIS — E11.59 CONTROLLED TYPE 2 DIABETES MELLITUS WITH OTHER CIRCULATORY COMPLICATION, WITHOUT LONG-TERM CURRENT USE OF INSULIN (HCC): Chronic | ICD-10-CM

## 2018-01-10 DIAGNOSIS — J44.9 CHRONIC OBSTRUCTIVE PULMONARY DISEASE, UNSPECIFIED COPD TYPE (HCC): ICD-10-CM

## 2018-01-10 DIAGNOSIS — I50.22 CHRONIC SYSTOLIC CONGESTIVE HEART FAILURE (HCC): ICD-10-CM

## 2018-01-10 RX ORDER — FLUTICASONE FUROATE AND VILANTEROL 100; 25 UG/1; UG/1
1 POWDER RESPIRATORY (INHALATION) DAILY
COMMUNITY
End: 2018-12-13

## 2018-01-10 RX ORDER — EZETIMIBE 10 MG/1
10 TABLET ORAL DAILY
Qty: 30 TAB | Refills: 5 | Status: SHIPPED | OUTPATIENT
Start: 2018-01-10 | End: 2019-05-09 | Stop reason: SDUPTHER

## 2018-01-10 NOTE — PATIENT INSTRUCTIONS
Treatment Plan:  Follow up with our office in 1 year. Learning About ICDs (Implantable Cardioverter-Defibrillators)  What is an ICD (implantable cardioverter-defibrillator)? An ICD (implantable cardioverter-defibrillator) is a small, battery-powered device. It fixes serious changes in your heartbeat. ICDs are used in people who have had a life-threatening heart rhythm or are at high risk of having one. The ICD is placed under the skin of the chest. It's attached to one or two wires (called leads). Most of the time, these leads go into the heart through a vein. How does an ICD work? An ICD is always checking your heart rate and rhythm. If the ICD detects a life-threatening, rapid heart rhythm, it tries to slow the rhythm to get it back to normal. If the bad rhythm doesn't stop, the ICD sends an electric shock to the heart. This restores a normal rhythm. The device then goes back to its watchful mode. An ICD also can fix a heart rate that is too fast or too slow. It does this without using a shock. It can send out electrical pulses to speed up a heart rate that is too slow. Or it can slow down a fast heart rate by matching the pace and bringing the heart rate back to normal.  Your doctor will check the ICD regularly to make sure it is working right and isn't causing any problems. Your doctor will also check the battery to see if it needs to be replaced. How is an ICD placed? Your doctor will put the ICD under the skin in your chest during minor surgery. You will likely have medicine to make you feel relaxed and sleepy during the surgery. Your doctor makes a small cut (incision) in your upper chest. He or she puts one or two leads (wires) through the cut. Most of the time, the leads go into a large blood vessel in the upper chest. Then your doctor guides the leads through the blood vessel into your heart. Your doctor connects the leads to the ICD and places it in your chest. Then the incision is closed. Your doctor also programs the ICD. Most people spend the night in the hospital, just to make sure that the device is working and that there are no problems from the surgery. How does it feel to get a shock? The shock from an ICD hurts briefly. People feel it in different ways. It's been described as feeling like a punch in the chest. But the shock is a sign that the ICD is doing its job. It's there to save your life. You won't feel any pain if the ICD uses electrical pulses to fix a heart rate that is too fast or too slow. There's no way to know how often a shock might occur. It might never happen. Not knowing when or if a shock might happen may make you nervous. Knowing what to do if you get shocked can help. Ask your doctor for an action plan. This plan will guide you step-by-step if a shock happens. What else should you know? You can live a normal life with your ICD. Here are a few tips for living well with your ICD. · Avoid strong magnetic and electrical fields. These can keep your device from working right. Most office equipment and home appliances are safe to use. Check with your doctor about which things you should use with caution and which you should stay away from. · Be sure that any doctor, dentist, or other health professional you see knows that you have an ICD. · Always carry a card that tells what kind of device you have. Wear medical alert jewelry that says you have an ICD. You can buy this at most drugstores. · If you do get a shock, follow your action plan for what to do. · You can lead an active life with an ICD. Ask your doctor what sort of activity and intensity is safe for you. As you plan for your future and your end of life, be sure to include plans for your ICD. You can make the decision to turn off your ICD as part of the medical treatment you want at the end of life. Follow-up care is a key part of your treatment and safety.  Be sure to make and go to all appointments, and call your doctor if you are having problems. It's also a good idea to know your test results and keep a list of the medicines you take. Where can you learn more? Go to http://olivier-yon.info/. Enter J473 in the search box to learn more about \"Learning About ICDs (Implantable Cardioverter-Defibrillators). \"  Current as of: September 21, 2016  Content Version: 11.4  © 5664-7155 [employer] Care instructions adapted under license by [employer] (which disclaims liability or warranty for this information). If you have questions about a medical condition or this instruction, always ask your healthcare professional. [address] any warranty or liability for your use of this information.

## 2018-01-10 NOTE — PROGRESS NOTES
Visit Vitals    /84 (BP 1 Location: Left arm, BP Patient Position: Sitting)    Pulse 68    Resp 18    Ht 5' 10\" (1.778 m)    Wt 256 lb (116.1 kg)    SpO2 94%    BMI 36.73 kg/m2

## 2018-01-10 NOTE — PROGRESS NOTES
HISTORY OF PRESENTING ILLNESS      Mikal Spencer is a 57 y.o. male with ICM, LVEF 20%, CAD/PCI, diabetes mellitus, tobacco/alcohol abuse, NYHA class 2-3 symptoms who underwent ICD implantation for primary prevention. He has been on optimal guideline directed medical therapy. His device interrogation today shows normal functioning without events. EKG demonstrates normal sinus rhythm.         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 ([address] 06/01/2017    Nicotine dependence 06/01/2017           PAST MEDICAL HISTORY     Past Medical History:   Diagnosis Date    Controlled type 2 diabetes mellitus with circulatory disorder ([address] 6/1/2017    [facility]     Coronary artery disease involving native coronary artery without angina pectoris 06/01/2017    OOH MI, 2v disease s/p PCI RCA with [facility] June 2017    ETOH abuse 6/1/2017    Ischemic cardiomyopathy     Extensive LAD territory infarction    Nicotine dependence 4/6/8826    Systolic heart failure ([address]            PAST SURGICAL HISTORY     Past Surgical History:   Procedure Laterality Date    HX TONSILLECTOMY      HX WISDOM TEETH EXTRACTION            ALLERGIES     Allergies   Allergen Reactions    Codeine Itching          FAMILY HISTORY     Family History   Problem Relation Age of Onset    Stroke Father     Heart Disease Father     Hypertension Father     negative for cardiac disease       SOCIAL HISTORY     Social History     Social History    Marital status: SINGLE     Spouse name: N/A    Number of children: N/A    Years of education: N/A     Social History Main Topics    Smoking status: Current Every Day Smoker     Packs/day: 1.00     Last attempt to quit: 5/31/2017    Smokeless tobacco: Never Used    Alcohol use 4.8 oz/week     8 Cans of beer per week    Drug use: No    Sexual activity: Yes     Partners: Female     Other Topics Concern    None     Social History Narrative         MEDICATIONS     Current Outpatient Prescriptions   Medication Sig    PHENYLEPHRINE/DM/ACETAMINOP/GG (TYLENOL COLD AND FLU SEVERE PO) Take  by mouth every six (6) hours as needed.  fluticasone-vilanterol (BREO ELLIPTA) 100-25 mcg/dose inhaler Take 1 Puff by inhalation daily.  sacubitril-valsartan (ENTRESTO) 24 mg/26 mg tablet Take 1 Tab by mouth two (2) times a day.  acetaminophen (TYLENOL) 325 mg tablet Take 2 Tabs by mouth every four (4) hours as needed.  bumetanide (BUMEX) 0.5 mg tablet TAKE ONE TABLET BY MOUTH ONCE DAILY    ACETAMINOPHEN/DIPHENHYDRAMINE (TYLENOL PM PO) Take  by mouth.  metoprolol succinate (TOPROL-XL) 25 mg XL tablet TAKE ONE TABLET BY MOUTH ONCE DAILY    clopidogrel (PLAVIX) 75 mg tab Take 1 Tab by mouth daily. Dr. Mini Montgoemry to provide refills  Indications: Thrombosis Prevention after PCI    aspirin 81 mg chewable tablet Take 1 Tab by mouth daily.  ezetimibe (ZETIA) 10 mg tablet Take 1 Tab by mouth daily. No current facility-administered medications for this visit. I have reviewed the nurses notes, vitals, problem list, allergy list, medical history, family, social history and medications. REVIEW OF SYMPTOMS      General: Pt denies excessive weight gain or loss. Pt is able to conduct ADL's  HEENT: Denies blurred vision, headaches, hearing loss, epistaxis and difficulty swallowing. Respiratory: Denies cough, congestion, shortness of breath, BERNABE, wheezing or stridor.   Cardiovascular: Denies precordial pain, palpitations, edema or PND  Gastrointestinal: Denies poor appetite, indigestion, abdominal pain or blood in stool  Genitourinary: Denies hematuria, dysuria, increased urinary frequency  Musculoskeletal: Denies joint pain or swelling from muscles or joints  Neurologic: Denies tremor, paresthesias, headache, or sensory motor disturbance  Psychiatric: Denies confusion, insomnia, depression  Integumentray: Denies rash, itching or ulcers. Hematologic: Denies easy bruising, bleeding       PHYSICAL EXAMINATION      Vitals:    01/10/18 1113   BP: 130/84   Pulse: 68   Resp: 18   SpO2: 94%   Weight: 256 lb (116.1 kg)   Height: 5' 10\" (1.778 m)     General: Well developed, in no acute distress. HEENT: No jaundice, oral mucosa moist, no oral ulcers  Neck: Supple, no stiffness, no lymphadenopathy, supple  Heart:  Normal S1/S2 negative S3 or S4. Regular, no murmur, gallop or rub, no jugular venous distention  Respiratory: Clear bilaterally x 4, no wheezing or rales  Abdomen:   Soft, non-tender, bowel sounds are active.   Extremities:  No edema, normal cap refill, no cyanosis. Musculoskeletal: No clubbing, no deformities  Neuro: A&Ox3, speech clear, gait stable, cooperative, no focal neurologic deficits  Skin: Skin color is normal. No rashes or lesions.  Non diaphoretic, moist.  Vascular: 2+ pulses symmetric in all extremities       DIAGNOSTIC DATA      EKG: normal sinus rhythm        LABORATORY DATA      Lab Results   Component Value Date/Time    WBC 9.8 11/27/2017 11:47 AM    HGB 16.4 11/27/2017 11:47 AM    HCT 46.1 11/27/2017 11:47 AM    PLATELET 053 38/33/8680 11:47 AM    MCV 92 11/27/2017 11:47 AM      Lab Results   Component Value Date/Time    Sodium 137 11/27/2017 11:47 AM    Potassium 4.3 11/27/2017 11:47 AM    Chloride 101 11/27/2017 11:47 AM    CO2 23 11/27/2017 11:47 AM    Anion gap 10 07/27/2017 09:50 AM    Glucose 138 11/27/2017 11:47 AM    BUN 12 11/27/2017 11:47 AM    Creatinine 0.91 11/27/2017 11:47 AM    BUN/Creatinine ratio 13 11/27/2017 11:47 AM    GFR est  11/27/2017 11:47 AM    GFR est non-AA 89 11/27/2017 11:47 AM    Calcium 9.0 11/27/2017 11:47 AM    Bilirubin, total 0.6 07/27/2017 09:50 AM    AST (SGOT) 9 07/27/2017 09:50 AM    Alk. phosphatase 97 07/27/2017 09:50 AM    Protein, total 7.7 07/27/2017 09:50 AM    Albumin 4.3 07/27/2017 09:50 AM    Globulin 3.4 07/27/2017 09:50 AM    A-G Ratio 1.3 07/27/2017 09:50 AM    ALT (SGPT) 20 07/27/2017 09:50 AM           ASSESSMENT      1. Cardiomyopathy                        A. Ischemic                        B. NYHA class 2-3                        C. LVEF 15-20%  2. Diabetes mellitus  3. CAD, native  4. Percutaneous transluminal angioplasty  5. Myocardial infarction, previous  6. COPD      PLAN     Continue per device clinic checks and with current medical therapy. FOLLOW-UP   1 year    Thank you, None and Dr. Kimberly Sood for allowing me to participate in the care of this extraordinarily pleasant male. Please do not hesitate to contact me for further questions/concerns.      ALEX Segundo [facility].  [address] [facility], [facility], [address] [facility]  [place]     Ayleen Da Silva  [phone] / [phone] Fax   [phone] / [phone] Fax

## 2018-01-12 ENCOUNTER — OFFICE VISIT (OUTPATIENT)
Dept: CARDIOLOGY CLINIC | Age: 63
End: 2018-01-12

## 2018-01-12 VITALS
WEIGHT: 252 LBS | SYSTOLIC BLOOD PRESSURE: 130 MMHG | OXYGEN SATURATION: 98 % | HEART RATE: 82 BPM | DIASTOLIC BLOOD PRESSURE: 74 MMHG | RESPIRATION RATE: 18 BRPM | BODY MASS INDEX: 36.16 KG/M2

## 2018-01-12 DIAGNOSIS — I25.10 CORONARY ARTERY DISEASE INVOLVING NATIVE CORONARY ARTERY OF NATIVE HEART WITHOUT ANGINA PECTORIS: ICD-10-CM

## 2018-01-12 DIAGNOSIS — I50.22 CHRONIC SYSTOLIC CONGESTIVE HEART FAILURE (HCC): ICD-10-CM

## 2018-01-12 DIAGNOSIS — E11.59 CONTROLLED TYPE 2 DIABETES MELLITUS WITH OTHER CIRCULATORY COMPLICATION, WITHOUT LONG-TERM CURRENT USE OF INSULIN (HCC): Chronic | ICD-10-CM

## 2018-01-12 DIAGNOSIS — I25.5 ISCHEMIC CARDIOMYOPATHY: Primary | ICD-10-CM

## 2018-01-12 DIAGNOSIS — Z95.810 ICD (IMPLANTABLE CARDIOVERTER-DEFIBRILLATOR) IN PLACE: ICD-10-CM

## 2018-01-12 DIAGNOSIS — F17.208 NICOTINE DEPENDENCE WITH OTHER NICOTINE-INDUCED DISORDER, UNSPECIFIED NICOTINE PRODUCT TYPE: Chronic | ICD-10-CM

## 2018-01-12 DIAGNOSIS — F10.10 ETOH ABUSE: Chronic | ICD-10-CM

## 2018-01-12 NOTE — MR AVS SNAPSHOT
Visit Information Date & Time Provider Department Dept. Phone Encounter #  
 1/12/2018 12:40 PM Mady Gonzalez MD [facility] [phone] [id number] Follow-up Instructions Return in about 3 months (around 4/12/2018). Your Appointments 2/20/2018  1:00 PM  
ESTABLISHED PATIENT with Mady Gonzalez MD  
[facility] ([facility] SCHEDULING) Appt Note: 1mo f/u  
 [address] [place] [address] [facility]  
[phone]  
  
   
 [address] [place] [address] [address] [place]  
  
    
 1/21/2019 11:40 AM  
ESTABLISHED PATIENT with Marlo Mayorga MD  
[facility] ([address]) Appt Note: annual  
 [address] [place] [address] [place]  
[address] [place] [address] [address] [place] 4/19/2018  1:15 PM  
REMOTE OFFICE VISIT with Carlyn Johnson [facility] ([facility] SCHEDULING) Appt Note: lat icd/stf  
 [address] [place] [address] [facility]  
[phone]  
  
   
 [address] [place] [address] [facility] [address]  
  
    
 7/26/2018 10:00 AM  
REMOTE OFFICE VISIT with Carlyn Johnson [facility] ([facility] SCHEDULING) Appt Note: lat icd/stf  
 [address] [place] [address] [facility]  
[phone]  
  
    
 10/30/2018 11:00 AM  
REMOTE OFFICE VISIT with Carlyn Johnson [facility] ([place] SCHEDULING) Appt Note: lat icd/stf  
 [address] [place] [address] [facility]  
[phone] Upcoming Health Maintenance Date Due Hepatitis C Screening 1955 EYE EXAM RETINAL OR DILATED Q1 12/10/1963 Pneumococcal 19-64 Medium Risk (1 of 1 - PPSV23) 12/10/1972 DTaP/Tdap/Td series (1 - Tdap) 12/10/1974 FOBT Q 1 YEAR AGE 50-75 12/10/2003 ZOSTER VACCINE AGE 60> 10/10/2013 Influenza Age 5 to Adult 8/1/2017 HEMOGLOBIN A1C Q6M 1/27/2018 FOOT EXAM Q1 6/16/2018 MICROALBUMIN Q1 6/16/2018 LIPID PANEL Q1 7/27/2018 Allergies as of 1/12/2018  Review Complete On: 1/10/2018 By: Didi Liriano LPN Severity Noted Reaction Type Reactions Codeine  06/02/2017    Itching Current Immunizations  Never Reviewed No immunizations on file. Not reviewed this visit Vitals BP Pulse Resp Weight(growth percentile) SpO2 BMI  
 130/74 82 18 252 lb (114.3 kg) 98% 36.16 kg/m2 Smoking Status Current Every Day Smoker Vitals History BMI and BSA Data Body Mass Index Body Surface Area  
 36.16 kg/m 2 2.38 m 2 Preferred Pharmacy Pharmacy Name Phone [address] [phone] Your Updated Medication List  
  
   
This list is accurate as of: 1/12/18 12:56 PM.  Always use your most recent med list.  
  
  
  
  
 acetaminophen 325 mg tablet Commonly known as:  TYLENOL Take 2 Tabs by mouth every four (4) hours as needed. aspirin 81 mg chewable tablet Take 1 Tab by mouth daily. BREO ELLIPTA 100-25 mcg/dose inhaler Generic drug:  fluticasone-vilanterol Take 1 Puff by inhalation daily. bumetanide 0.5 mg tablet Commonly known as:  Michail Bogdan TAKE ONE TABLET BY MOUTH ONCE DAILY  
  
 clopidogrel 75 mg Tab Commonly known as:  PLAVIX Take 1 Tab by mouth daily. Dr. Radha Shepherd to provide refills  Indications: Thrombosis Prevention after PCI  
  
 ezetimibe 10 mg tablet Commonly known as:  Araseli Sole Take 1 Tab by mouth daily. metoprolol succinate 25 mg XL tablet Commonly known as:  TOPROL-XL  
TAKE ONE TABLET BY MOUTH ONCE DAILY  
  
 sacubitril-valsartan 24-26 mg tablet Commonly known as:  ENTRESTO Take 1 Tab by mouth two (2) times a day.   
  
 TYLENOL COLD AND FLU SEVERE PO  
 Take  by mouth every six (6) hours as needed. TYLENOL PM PO Take  by mouth. Follow-up Instructions Return in about 3 months (around 4/12/2018). Introducing [facility] SERVICES! [employer] Quick introduces [employer] patient portal. Now you can access parts of your medical record, email your doctor's office, and request medication refills online. 1. In your internet browser, go to [email or web address] 2. Click on the First Time User? Click Here link in the Sign In box. You will see the New Member Sign Up page. 3. Enter your Germmatters Access Code exactly as it appears below. You will not need to use this code after youve completed the sign-up process. If you do not sign up before the expiration date, you must request a new code. · [employer]ters Access Code: [email or web address] Expires: 3/17/2018 12:56 PM 
 
4. Enter the last four digits of your Social Security Number (xxxx) and Date of Birth (mm/dd/yyyy) as indicated and click Submit. You will be taken to the next sign-up page. 5. Create a Germmatters ID. This will be your Germmatters login ID and cannot be changed, so think of one that is secure and easy to remember. 6. Create a [employer]ters password. You can change your password at any time. 7. Enter your Password Reset Question and Answer. This can be used at a later time if you forget your password. 8. Enter your e-mail address. You will receive e-mail notification when new information is available in [address] 9. Click Sign Up. You can now view and download portions of your medical record. 10. Click the Download Summary menu link to download a portable copy of your medical information. If you have questions, please visit the Frequently Asked Questions section of the Germ[employer]ters website. Remember, Germmatters is NOT to be used for urgent needs. For medical emergencies, dial 911. Now available from your iPhone and Android! Please provide this summary of care documentation to your next provider. Your primary care clinician is listed as NONE. If you have any questions after today's visit, please call [phone].

## 2018-01-12 NOTE — PROGRESS NOTES
[address]  [address] Lina[address], [address]  [place], [address] [facility]    Office [phone]  Fax [phone]  Cell [phone]        Kennedy Ford is a 57 y.o. male last seen 2 weeks ago. Assessment  Encounter Diagnoses   Name Primary?  Ischemic cardiomyopathy Yes    Coronary artery disease involving native coronary artery of native heart without angina pectoris     Chronic systolic congestive heart failure (HCC)     ETOH abuse     Controlled type 2 diabetes mellitus with other circulatory complication, without long-term current use of insulin (HCC)     Nicotine dependence with other nicotine-induced disorder, unspecified nicotine product type     ICD (implantable cardioverter-defibrillator) in place      Recommendations:    Kennedy Ford has ischemic cardiomyopathy with persistent severe LV dysfunction with stable class 2-3 HF. He suffered an out-of-hospital MI in May 2017 and presented with subacute HF in June. Cadiac MRI demonstrated viability only in the RCA territory. Cardiac cath demonstrated mid %, RCA mid 85%. He underwent PCI RCA with EMORY. Would continue DAPT for a full year. Drivers of CAD include smoking and T2DM. His HF treatment seems reasonable. Optimal medical therapy is limited by low BP. His long term outlook looks guarded given his nicotine and alcohol consumption. We emphasized the critical importance to drastically reduce his alcohol consumption. He is on Zetia alone, apparently statin therapy was discontinued. Would defer to Dr. Shawnee Beverly in this regard. Follow-up Disposition:  Return in about 3 months (around 4/12/2018). Subjective:    Mr. Kimmie Coreas underwent ICD implantation 1 month ago. He has had no ICD discharges. He has a stable pattern of BERNABE with moderate effort. He continues to smoke 1 ppd and drinks 25 oz of beer most days. He is adherent with his medications.  He continues to see Dr. Shawnee Beverly at the [facility]. I do not have any recent labs with respect to lipids or DM. Patient denies any exertional chest pain, palpitations, syncope, orthopnea, edema or paroxysmal nocturnal dyspnea. Cardiac risk factors   HTN no  DM  yes  Smoking yes    Cardiac testing  ECHO: 5/31//2017: EF 15-20%, apical AK  ECHO 9/18/17- Dilated LV, EF 20%, apical DK       MRI: 6/1/2017: LV dilated LVEF 32%, severe HK ant wall, RVEF 55%, mild MR; anterior infarct- no viability RCA/LCA: viable     CATH 6/2/2017: LM: nl, LAD: m100%, LCX codom normal, RCA:m 85%, LVEDP 25, no AV gradient, dilated LV, apical AK, EF 20%  -- s/p PCI pRCA: 2.5x18 Xience (EMORY). AICD 12/5/2017; [employer]    Past Medical History:   Diagnosis Date    Controlled type 2 diabetes mellitus with circulatory disorder ([facility] [address] 6/1/2017    COPD (chronic obstructive pulmonary disease) ([facility] [address] [facility]     Coronary artery disease involving native coronary artery without angina pectoris 06/01/2017    OOH MI, 2v disease s/p PCI RCA with EMORY June 2017    ETOH abuse 6/1/2017    Ischemic cardiomyopathy     Extensive LAD territory infarction    Nicotine dependence 4/9/1009    Systolic heart failure (HCC)         Current Outpatient Prescriptions   Medication Sig Dispense Refill    ezetimibe (ZETIA) 10 mg tablet Take 1 Tab by mouth daily. 30 Tab 5    PHENYLEPHRINE/DM/ACETAMINOP/GG (TYLENOL COLD AND FLU SEVERE PO) Take  by mouth every six (6) hours as needed.  fluticasone-vilanterol (BREO ELLIPTA) 100-25 mcg/dose inhaler Take 1 Puff by inhalation daily.  sacubitril-valsartan (ENTRESTO) 24 mg/26 mg tablet Take 1 Tab by mouth two (2) times a day. 56 Tab 0    acetaminophen (TYLENOL) 325 mg tablet Take 2 Tabs by mouth every four (4) hours as needed.  bumetanide (BUMEX) 0.5 mg tablet TAKE ONE TABLET BY MOUTH ONCE DAILY 30 Tab 2    ACETAMINOPHEN/DIPHENHYDRAMINE (TYLENOL PM PO) Take  by mouth.       metoprolol succinate (TOPROL-XL) 25 mg XL tablet TAKE ONE TABLET BY MOUTH ONCE DAILY 30 Tab 6    clopidogrel (PLAVIX) 75 mg tab Take 1 Tab by mouth daily. Dr. Mady Gonzalez to provide refills  Indications: Thrombosis Prevention after PCI 90 Tab 3    aspirin 81 mg chewable tablet Take 1 Tab by mouth daily. Allergies   Allergen Reactions    Codeine Itching      Retired. Review of Systems  Constitutional: Negative for fever, chills, malaise/fatigue and diaphoresis. Respiratory: Negative for cough, hemoptysis, sputum production, and wheezing. Positive for BERNABE. Cardiovascular: Negative for chest pain, palpitations, orthopnea, claudication, leg swelling and PND. Gastrointestinal: Negative for heartburn, nausea, vomiting, blood in stool and melena. Genitourinary: Negative for dysuria and flank pain. Musculoskeletal: Negative for joint pain and back pain. Skin: Negative for rash. Neurological: Negative for focal weakness, seizures, loss of consciousness, weakness and headaches. Endo/Heme/Allergies: Does not bruise/bleed easily. Psychiatric/Behavioral: Negative for memory loss. The patient does not have insomnia. Physical Exam    Visit Vitals    /74    Pulse 82    Resp 18    Wt 252 lb (114.3 kg)    SpO2 98%    BMI 36.16 kg/m2     Wt Readings from Last 3 Encounters:   01/12/18 252 lb (114.3 kg)   01/10/18 256 lb (116.1 kg)   12/20/17 250 lb 6.4 oz (113.6 kg)      General - well developed well nourished  Neck - JVP normal, thyroid nl  Cardiac - normal S1, S2, no murmurs, rubs or gallops. No clicks  Vascular - carotids without bruits, radials, femorals and pedal pulses equal bilateral  Lungs - clear to auscultation bilaterals, no rales, wheezing.  Scattered rhonchi posteriorly   Abd - soft nontender, no HSM, no abd bruits  Extremities - no edema  Skin - no rash  Neuro - nonfocal  Psych - normal mood and affect      Cardiographics  ECG 6/14/17 - SR 75, TWI; unchanged  Echo 9/18/17- Dilated LV, EF 20%, apical dyskinesis    Written by Zuri Chu, Brennon Louis, as dictated by Ayleen Lau MD.  Ayleen Lau MD

## 2018-01-17 ENCOUNTER — DOCUMENTATION ONLY (OUTPATIENT)
Dept: CARDIOLOGY CLINIC | Age: 63
End: 2018-01-17

## 2018-02-01 ENCOUNTER — HOSPITAL ENCOUNTER (OUTPATIENT)
Dept: LAB | Age: 63
Discharge: HOME | End: 2018-02-01

## 2018-02-01 LAB
ALBUMIN SERPL-MCNC: 4 G/DL (ref 3.5–5)
ALBUMIN/GLOB SERPL: 1.2 {RATIO} (ref 1.1–2.2)
ALP SERPL-CCNC: 82 U/L (ref 45–117)
ALT SERPL-CCNC: 26 U/L (ref 12–78)
ANION GAP SERPL CALC-SCNC: 11 MMOL/L (ref 5–15)
AST SERPL-CCNC: 13 U/L (ref 15–37)
BILIRUB SERPL-MCNC: 0.5 MG/DL (ref 0.2–1)
BUN SERPL-MCNC: 17 MG/DL (ref 6–20)
BUN/CREAT SERPL: 17 (ref 12–20)
CALCIUM SERPL-MCNC: 8.7 MG/DL (ref 8.5–10.1)
CHLORIDE SERPL-SCNC: 104 MMOL/L (ref 97–108)
CHOLEST SERPL-MCNC: 183 MG/DL
CO2 SERPL-SCNC: 23 MMOL/L (ref 21–32)
CREAT SERPL-MCNC: 0.99 MG/DL (ref 0.7–1.3)
ERYTHROCYTE [DISTWIDTH] IN BLOOD BY AUTOMATED COUNT: 12.9 % (ref 11.5–14.5)
EST. AVERAGE GLUCOSE BLD GHB EST-MCNC: 160 MG/DL
GLOBULIN SER CALC-MCNC: 3.4 G/DL (ref 2–4)
GLUCOSE SERPL-MCNC: 177 MG/DL (ref 65–100)
HBA1C MFR BLD: 7.2 % (ref 4.2–6.3)
HCT VFR BLD AUTO: 44.4 % (ref 36.6–50.3)
HDLC SERPL-MCNC: 46 MG/DL
HDLC SERPL: 4 {RATIO} (ref 0–5)
HGB BLD-MCNC: 15.9 G/DL (ref 12.1–17)
LDLC SERPL CALC-MCNC: 107.2 MG/DL (ref 0–100)
LIPID PROFILE,FLP: ABNORMAL
MCH RBC QN AUTO: 33.8 PG (ref 26–34)
MCHC RBC AUTO-ENTMCNC: 35.8 G/DL (ref 30–36.5)
MCV RBC AUTO: 94.5 FL (ref 80–99)
NRBC # BLD: 0 K/UL (ref 0–0.01)
NRBC BLD-RTO: 0 PER 100 WBC
PLATELET # BLD AUTO: 153 K/UL (ref 150–400)
PMV BLD AUTO: 10.2 FL (ref 8.9–12.9)
POTASSIUM SERPL-SCNC: 4.1 MMOL/L (ref 3.5–5.1)
PROT SERPL-MCNC: 7.4 G/DL (ref 6.4–8.2)
RBC # BLD AUTO: 4.7 M/UL (ref 4.1–5.7)
SODIUM SERPL-SCNC: 138 MMOL/L (ref 136–145)
TRIGL SERPL-MCNC: 149 MG/DL (ref ?–150)
TSH SERPL DL<=0.05 MIU/L-ACNC: 1.46 UIU/ML (ref 0.36–3.74)
VLDLC SERPL CALC-MCNC: 29.8 MG/DL
WBC # BLD AUTO: 8.2 K/UL (ref 4.1–11.1)

## 2018-02-01 PROCEDURE — 85027 COMPLETE CBC AUTOMATED: CPT | Performed by: EMERGENCY MEDICINE

## 2018-02-01 PROCEDURE — 83036 HEMOGLOBIN GLYCOSYLATED A1C: CPT | Performed by: EMERGENCY MEDICINE

## 2018-02-01 PROCEDURE — 80053 COMPREHEN METABOLIC PANEL: CPT | Performed by: EMERGENCY MEDICINE

## 2018-02-01 PROCEDURE — 84443 ASSAY THYROID STIM HORMONE: CPT | Performed by: EMERGENCY MEDICINE

## 2018-02-01 PROCEDURE — 80061 LIPID PANEL: CPT | Performed by: EMERGENCY MEDICINE

## 2018-02-15 ENCOUNTER — TELEPHONE (OUTPATIENT)
Dept: CARDIOLOGY CLINIC | Age: 63
End: 2018-02-15

## 2018-02-15 NOTE — TELEPHONE ENCOUNTER
Sean Staff with the [facility] called, the patient needs dental work and they need a cardiac clearance.  You can also speak with nata if [place] is not in   Phone: [phone]  Fax: [phone]

## 2018-02-20 ENCOUNTER — OFFICE VISIT (OUTPATIENT)
Dept: CARDIOLOGY CLINIC | Age: 63
End: 2018-02-20

## 2018-02-20 VITALS
SYSTOLIC BLOOD PRESSURE: 122 MMHG | WEIGHT: 261 LBS | DIASTOLIC BLOOD PRESSURE: 80 MMHG | BODY MASS INDEX: 37.37 KG/M2 | OXYGEN SATURATION: 98 % | HEART RATE: 81 BPM | HEIGHT: 70 IN

## 2018-02-20 DIAGNOSIS — F17.208 NICOTINE DEPENDENCE WITH OTHER NICOTINE-INDUCED DISORDER, UNSPECIFIED NICOTINE PRODUCT TYPE: Chronic | ICD-10-CM

## 2018-02-20 DIAGNOSIS — F10.10 ETOH ABUSE: Chronic | ICD-10-CM

## 2018-02-20 DIAGNOSIS — I25.10 CORONARY ARTERY DISEASE INVOLVING NATIVE CORONARY ARTERY OF NATIVE HEART WITHOUT ANGINA PECTORIS: ICD-10-CM

## 2018-02-20 DIAGNOSIS — I25.5 ISCHEMIC CARDIOMYOPATHY: Primary | ICD-10-CM

## 2018-02-20 DIAGNOSIS — E11.59 CONTROLLED TYPE 2 DIABETES MELLITUS WITH OTHER CIRCULATORY COMPLICATION, WITHOUT LONG-TERM CURRENT USE OF INSULIN (HCC): Chronic | ICD-10-CM

## 2018-02-20 DIAGNOSIS — I50.22 CHRONIC SYSTOLIC CONGESTIVE HEART FAILURE (HCC): ICD-10-CM

## 2018-02-20 DIAGNOSIS — Z95.810 ICD (IMPLANTABLE CARDIOVERTER-DEFIBRILLATOR) IN PLACE: ICD-10-CM

## 2018-02-20 RX ORDER — PYRIDOXINE HCL (VITAMIN B6) 100 MG
100 TABLET ORAL DAILY
COMMUNITY
End: 2018-12-13

## 2018-02-20 RX ORDER — LANOLIN ALCOHOL/MO/W.PET/CERES
500 CREAM (GRAM) TOPICAL DAILY
COMMUNITY
End: 2018-12-13

## 2018-02-20 NOTE — PROGRESS NOTES
Visit Vitals    /80 (BP 1 Location: Right arm, BP Patient Position: Sitting)    Pulse 81    Ht 5' 10\" (1.778 m)    Wt 261 lb (118.4 kg)    SpO2 98%    BMI 37.45 kg/m2   ;

## 2018-02-20 NOTE — MR AVS SNAPSHOT
[address] [facility] [address] [facility] 
[phone] Patient: uYnior Randolph MRN: [id number] RCA:[birth year] Visit Information Date & Time Provider Department Dept. Phone Encounter #  
 2/20/2018  1:00 PM [facility] [phone] [id number] Follow-up Instructions Return in about 6 months (around 8/20/2018). Your Appointments 1/21/2019 11:40 AM  
ESTABLISHED PATIENT with Marlo Mayorga MD  
[facility] [place] (Cinthia Stinson) Appt Note: annual  
 [address] [facility] [address] [facility]  
54 [facility] [address] [address] 4/19/2018  1:15 PM  
REMOTE OFFICE VISIT with Carlyn Johnson [facility] ([facility]) Appt Note: lat icd/stf  
 [address] [facility] [address] [facility]  
[phone]  
  
   
 [address] [place] [address]  
  
    
 7/26/2018 10:00 AM  
REMOTE OFFICE VISIT with Carlyn Johnson [facility] ([facility] SCHEDULING) Appt Note: lat icd/stf  
 [address] [facility] [address] [facility]  
[phone]  
  
    
 10/30/2018 11:00 AM  
REMOTE OFFICE VISIT with Carlyn Johnson [facility] ([facility] SCHEDULING) Appt Note: lat icd/stf  
 [address] [facility] [address] [address] [facility]  
[phone] Upcoming Health Maintenance Date Due Hepatitis C Screening 1955 EYE EXAM RETINAL OR DILATED Q1 12/10/1963 Pneumococcal 19-64 Medium Risk (1 of 1 - PPSV23) 12/10/1972 DTaP/Tdap/Td series (1 - Tdap) 12/10/1974 FOBT Q 1 YEAR AGE 50-75 12/10/2003 ZOSTER VACCINE AGE 60> 10/10/2013 Influenza Age 5 to Adult 8/1/2017 FOOT EXAM Q1 6/16/2018 MICROALBUMIN Q1 6/16/2018 HEMOGLOBIN A1C Q6M 8/1/2018 LIPID PANEL Q1 2/1/2019 Allergies as of 2/20/2018  Review Complete On: 1/10/2018 By: Cristobal Tierney LPN Severity Noted Reaction Type Reactions Codeine  06/02/2017    Itching Current Immunizations  Never Reviewed No immunizations on file. Not reviewed this visit You Were Diagnosed With   
  
 Codes Comments Ischemic cardiomyopathy    -  Primary ICD-10-CM: I25.5 ICD-9-CM: 414.8 Coronary artery disease involving native coronary artery of native heart without angina pectoris     ICD-10-CM: I25.10 ICD-9-CM: 414.01 Chronic systolic congestive heart failure (HCC)     ICD-10-CM: I50.22 ICD-9-CM: 428.22, 428.0 ETOH abuse     ICD-10-CM: F10.10 ICD-9-CM: 305.00 Controlled type 2 diabetes mellitus with other circulatory complication, without long-term current use of insulin (HCC)     ICD-10-CM: E11.59 
ICD-9-CM: 250.70 ICD (implantable cardioverter-defibrillator) in place     ICD-10-CM: Z95.810 ICD-9-CM: V45.02 Nicotine dependence with other nicotine-induced disorder, unspecified nicotine product type     ICD-10-CM: F17.208 ICD-9-CM: 292.89, 305.1 Vitals BP Pulse Height(growth percentile) Weight(growth percentile) SpO2 BMI  
 122/80 (BP 1 Location: Right arm, BP Patient Position: Sitting) 81 5' 10\" (1.778 m) 261 lb (118.4 kg) 98% 37.45 kg/m2 Smoking Status Current Every Day Smoker Vitals History BMI and BSA Data Body Mass Index Body Surface Area  
 37.45 kg/m 2 2.42 m 2 Preferred Pharmacy Pharmacy Name Phone Destiny Olmedo [address] [place] [address] [phone] Your Updated Medication List  
  
   
This list is accurate as of: 2/20/18  1:16 PM.  Always use your most recent med list.  
  
  
  
  
 acetaminophen 325 mg tablet Commonly known as:  TYLENOL Take 2 Tabs by mouth every four (4) hours as needed. aspirin 81 mg chewable tablet Take 1 Tab by mouth daily. BREO ELLIPTA 100-25 mcg/dose inhaler Generic drug:  fluticasone-vilanterol Take 1 Puff by inhalation daily. bumetanide 0.5 mg tablet Commonly known as:  Lowella Erie TAKE ONE TABLET BY MOUTH ONCE DAILY  
  
 clopidogrel 75 mg Tab Commonly known as:  PLAVIX Take 1 Tab by mouth daily. Dr. Armando Hahn to provide refills  Indications: Thrombosis Prevention after PCI  
  
 ezetimibe 10 mg tablet Commonly known as:  Beckey Salen Take 1 Tab by mouth daily. metoprolol succinate 25 mg XL tablet Commonly known as:  TOPROL-XL  
TAKE ONE TABLET BY MOUTH ONCE DAILY  
  
 sacubitril-valsartan 24-26 mg tablet Commonly known as:  ENTRESTO Take 1 Tab by mouth two (2) times a day. TYLENOL COLD AND FLU SEVERE PO Take  by mouth every six (6) hours as needed. TYLENOL PM PO Take  by mouth. VENTOLIN HFA IN Take  by inhalation. VITAMIN B-12 500 mcg tablet Generic drug:  cyanocobalamin Take 500 mcg by mouth daily. VITAMIN B-6 100 mg tablet Generic drug:  pyridoxine (vitamin B6) Take 100 mg by mouth daily. Follow-up Instructions Return in about 6 months (around 8/20/2018). Introducing [facility]! [facility] introduces [employer] patient portal. Now you can access parts of your medical record, email your doctor's office, and request medication refills online. 1. In your internet browser, go to [email or web address] 2. Click on the First Time User? Click Here link in the Sign In box. You will see the New Member Sign Up page. 3. Enter your OnTheList Access Code exactly as it appears below. You will not need to use this code after youve completed the sign-up process. If you do not sign up before the expiration date, you must request a new code. · [employer] Access Code: [email or web address] Expires: 3/17/2018 12:56 PM 
 
4.  Enter the last four digits of your Social Security Number (xxxx) and Date of Birth (mm/dd/yyyy) as indicated and click Submit. You will be taken to the next sign-up page. 5. Create a [employer] ID. This will be your [employer] login ID and cannot be changed, so think of one that is secure and easy to remember. 6. Create a [employer] password. You can change your password at any time. 7. Enter your Password Reset Question and Answer. This can be used at a later time if you forget your password. 8. Enter your e-mail address. You will receive e-mail notification when new information is available in [address] 9. Click Sign Up. You can now view and download portions of your medical record. 10. Click the Download Summary menu link to download a portable copy of your medical information. If you have questions, please visit the Frequently Asked Questions section of the [employer] website. Remember, [employer] is NOT to be used for urgent needs. For medical emergencies, dial 911. Now available from your iPhone and Android! Please provide this summary of care documentation to your next provider. Your primary care clinician is listed as NONE. If you have any questions after today's visit, please call [phone].

## 2018-02-20 NOTE — PROGRESS NOTES
Manny Fernandez, [address]  [address],  [address]  [place], [address] [facility]    Office [phone]  Fax [phone]  Cell [phone]        Estefany Chinchilla is a 57 y.o. male last seen 1 month ago. Assessment  Encounter Diagnoses   Name Primary?  Ischemic cardiomyopathy Yes    Coronary artery disease involving native coronary artery of native heart without angina pectoris     Chronic systolic congestive heart failure (HCC)     ETOH abuse     Controlled type 2 diabetes mellitus with other circulatory complication, without long-term current use of insulin (HCC)     ICD (implantable cardioverter-defibrillator) in place     Nicotine dependence with other nicotine-induced disorder, unspecified nicotine product type      Recommendations:    Estefany Chinchilla has ischemic cardiomyopathy with severe LV dysfunction s/p ICD. He suffered an out-of-hospital MI in May 2017 and presented with subacute HF in June. Cadiac MRI demonstrated viability only in the RCA territory. Cardiac cath demonstrated mid %, RCA mid 85%. He underwent PCI RCA with EMORY. Would continue DAPT for a full year. Drivers of CAD include smoking and T2DM    He continues to have class 2-3 HF. His volume status seems fine. He also has COPD from smoking. Optimal medical therapy is limited by low BP. His long term outlook is guarded given his nicotine and alcohol consumption. We again emphasized the critical importance to drastically reduce his alcohol consumption. Lipids and DM monitored by Dr. Phyllis Gates. He has been intolerant of statin therapy. I do not see the value of Zetia monotherapy. Will extend his visit to 6 months and reassess LV function at that time. Follow-up Disposition:  Return in about 6 months (around 8/20/2018). With echo    Subjective:    Mr. Jeremy Hanson has stable BERNABE with his routine activities. He continues to smoke 1/2 ppd.  He drinks 4-5 beers a day, but sometimes stops for a week.  Patient denies any exertional chest pain, palpitations, syncope, orthopnea, edema or paroxysmal nocturnal dyspnea. He denies any ICD discharges. He has a remote monitor at home. Metformin and Lipitor have caused GI upset in the past.     Cardiac risk factors   HTN no  DM  yes  Smoking yes    Cardiac testing  ECHO: 5/31//2017: EF 15-20%, apical AK  ECHO 9/18/17- Dilated LV, EF 20%, apical DK       MRI: 6/1/2017: LV dilated LVEF 32%, severe HK ant wall, RVEF 55%, mild MR; anterior infarct- no viability RCA/LCA: viable     CATH 6/2/2017: LM: nl, LAD: m100%, LCX codom normal, RCA:m 85%, LVEDP 25, no AV gradient, dilated LV, apical AK, EF 20%  -- s/p PCI pRCA: 2.5x18 Xience (EMORY). AICD 12/5/2017; [place] [facility]    Past Medical History:   Diagnosis Date    Controlled type 2 diabetes mellitus with circulatory disorder ([facility] [address] 6/1/2017    COPD (chronic obstructive pulmonary disease) ([facility] [address]     Coronary artery disease involving native coronary artery without angina pectoris 06/01/2017    OO[facility] MI, 2v disease s/p PCI RCA with EMORY June 2017    ETOH abuse 6/1/2017    Ischemic cardiomyopathy     Extensive LAD territory infarction    Nicotine dependence 9/4/9815    Systolic heart failure (HCC)         Current Outpatient Prescriptions   Medication Sig Dispense Refill    cyanocobalamin (VITAMIN B-12) 500 mcg tablet Take 500 mcg by mouth daily.  pyridoxine, vitamin B6, (VITAMIN B-6) 100 mg tablet Take 100 mg by mouth daily.  ALBUTEROL SULFATE (VENTOLIN HFA IN) Take  by inhalation.  ezetimibe (ZETIA) 10 mg tablet Take 1 Tab by mouth daily. 30 Tab 5    fluticasone-vilanterol (BREO ELLIPTA) 100-25 mcg/dose inhaler Take 1 Puff by inhalation daily.  sacubitril-valsartan (ENTRESTO) 24 mg/26 mg tablet Take 1 Tab by mouth two (2) times a day. 56 Tab 0    acetaminophen (TYLENOL) 325 mg tablet Take 2 Tabs by mouth every four (4) hours as needed.       bumetanide (BUMEX) 0.5 mg tablet TAKE ONE TABLET BY MOUTH ONCE DAILY 30 Tab 2    ACETAMINOPHEN/DIPHENHYDRAMINE (TYLENOL PM PO) Take  by mouth.  metoprolol succinate (TOPROL-XL) 25 mg XL tablet TAKE ONE TABLET BY MOUTH ONCE DAILY 30 Tab 6    clopidogrel (PLAVIX) 75 mg tab Take 1 Tab by mouth daily. Dr. Sherren Cruise to provide refills  Indications: Thrombosis Prevention after PCI 90 Tab 3    aspirin 81 mg chewable tablet Take 1 Tab by mouth daily.  PHENYLEPHRINE/DM/ACETAMINOP/GG (TYLENOL COLD AND FLU SEVERE PO) Take  by mouth every six (6) hours as needed. Allergies   Allergen Reactions    Codeine Itching      Retired. Review of Systems  Constitutional: Negative for fever, chills, malaise/fatigue and diaphoresis. Respiratory: Negative for cough, hemoptysis, sputum production, and wheezing. Positive for BERNABE. Cardiovascular: Negative for chest pain, palpitations, orthopnea, claudication, leg swelling and PND. Gastrointestinal: Negative for heartburn, nausea, vomiting, blood in stool and melena. Genitourinary: Negative for dysuria and flank pain. Musculoskeletal: Negative for joint pain and back pain. Skin: Negative for rash. Neurological: Negative for focal weakness, seizures, loss of consciousness, weakness and headaches. Endo/Heme/Allergies: Does not bruise/bleed easily. Psychiatric/Behavioral: Negative for memory loss. The patient does not have insomnia. Physical Exam    Visit Vitals    /80 (BP 1 Location: Right arm, BP Patient Position: Sitting)    Pulse 81    Ht 5' 10\" (1.778 m)    Wt 261 lb (118.4 kg)    SpO2 98%    BMI 37.45 kg/m2     Wt Readings from Last 3 Encounters:   02/20/18 261 lb (118.4 kg)   01/12/18 252 lb (114.3 kg)   01/10/18 256 lb (116.1 kg)      General - well developed well nourished  Neck - JVP normal, thyroid nl  Cardiac - normal S1, S2, no murmurs, rubs or gallops.  No clicks  Vascular - carotids without bruits, radials, femorals and pedal pulses equal bilateral  Lungs - clear to auscultation bilaterals, no rales, wheezing.  Scattered rhonchi posteriorly   Abd - soft nontender, no HSM, no abd bruits  Extremities - no edema  Skin - no rash  Neuro - nonfocal  Psych - normal mood and affect      Cardiographics  ECG 6/14/17 - SR 75, TWI; unchanged  Echo 9/18/17- Dilated LV, EF 20%, apical dyskinesis    Written by Laura Lozano, as dictated by Karen Will MD.  Francesco Rodríguez

## 2018-02-27 ENCOUNTER — DOCUMENTATION ONLY (OUTPATIENT)
Dept: CARDIOLOGY CLINIC | Age: 63
End: 2018-02-27

## 2018-02-27 NOTE — TELEPHONE ENCOUNTER
Received phone call from Mayo Emilia Awad- he is asking for assistance with getting information back to the clinic so he can proceed with planned dental work on 3/15/18- I asked him to get back in touch with me by first of the week if the information has not been sent to the clinic. Review of chart shows that a message has been sent to provider- I will follow up with his nurse to ask for this to be completed.

## 2018-02-28 ENCOUNTER — TELEPHONE (OUTPATIENT)
Dept: CARDIOLOGY CLINIC | Age: 63
End: 2018-02-28

## 2018-03-15 ENCOUNTER — TELEPHONE (OUTPATIENT)
Dept: CARDIOLOGY CLINIC | Age: 63
End: 2018-03-15

## 2018-03-16 NOTE — TELEPHONE ENCOUNTER
Celine Hudson  Male, 57 y.o., 1955  Weight:   261 lb (118.4 kg)  Phone:   T:[phone]  PCP:   None  MRN:   [id number]  MyChart:   Pending  Next Appt (With Me)  None  Next Appt (Any Provider)  04/19/2018    Message  Received: Today       ALEX Steinberg LPN       Caller: Unspecified Jen Esposito,  3:37 PM)                     Cath was 6/2017 with PCI. [facility] needs DAPT x 1 year.       His dentist will need to contact our office officially and then I will need to discuss this further with Dr. Brit Gimenez.  In short, if its not emergent/urgent, we would likely rather he wait the full year, but if it is more urgent, he is past the 6 month time frame and we can consider it.            Previous Messages       ----- Message -----   Jim Webb From: Zakia Terry LPN      Sent: 0/44/7658   3:39 PM        To: Adalid Villalobos NP     ----- Message from Zakia Terry LPN sent at 5/77/8419  3:39 PM EDT -----   Needs to have a tooth pulled Pt in pain and tooth is infected Per dentist,he needs to have it pulled. Also 4 teeth have infection Per last office note -DAPT for 1 year Can Plavix be held to pull tooth Please advise                       Patient Calls            You 1 minute ago (8:50 AM)               Will notify pt and fax to dental practice                  Documentation                          Adalid Villalobos NP   You 39 minutes ago (8:12 AM)                   Cath was 6/2017 with PCI. [facility] needs DAPT x 1 year.       His dentist will need to contact our office officially and then I will need to discuss this further with Dr. Brit Gimenez.  In short, if its not emergent/urgent, we would likely rather he wait the full year, but if it is more urgent, he is past the 6 month time frame and we can consider it.   (Routing comment)                        You  Adalid Villalobos NP 17 hours ago (3:39 PM)                   Needs to have a tooth pulled Pt in pain and tooth is infected Per dentist,he needs to have it pulled.  Also 4 teeth have infection Per last office note -DAPT for 1 year Can Plavix be held to pull tooth Please advise (Routing comment)                        You 17 hours ago (3:37 PM)                 Msg sent                  Documentation

## 2018-04-04 NOTE — PROGRESS NOTES
[id number]  [place], [address] [facility]    Office [phone]  Fax [phone]       Keegan Reid is a 59 y.o. male admitted to [facility] 5/30/17 - 6/1/17 with acute systolic heart failure following recent anterior MI. Assessment  Encounter Diagnoses   Name Primary?  Coronary artery disease involving native coronary artery of native heart without angina pectoris Yes    Chronic systolic congestive heart failure (HCC)     Non-ST elevation (NSTEMI) myocardial infarction (HCC)     Controlled type 2 diabetes mellitus with other circulatory complication, unspecified long term insulin use status     ETOH abuse     Nicotine dependence with other nicotine-induced disorder, unspecified nicotine product type      Recommendations:  Mr. Schuyler Mason has newly diagnosed CAD s/p anterior MI. Cardiac catheterization showed multivessel CAD with Cardiac MRI only showing viability in RCA territory. Now s/p EMORY. He denies any recurrent anginal symptoms. Continue Toprol XL and Plavix. Advised that he begin ASA 81 mg daily today. 1 month supply provided. Discussed the importance of statin therapy. Noted that recent lipid panel was within normal limits, however discussed the need for anti-inflammatory properties of the drug. Plan to trial lower dose of Lipitor 10 mg daily and suggested the addition of CoQ 10 - 100 mg BID. Plan to repeat lipid studies again in 6 months. Advised that he begin Cardiac rehab ASAP - will have our office help facilitate this to ensure success and compliance. He has stable Class 2 HF symptoms at his current limited functional capacity. His volume status is stable on current diuretic regimen. Continue Bumex 0.5 mg daily. Continue BB. Unable to tolerate ACE/ARB in hospital due to hypotension. Normotensive in the office today. Will consider the addition of Valsartan (and possibly Entresto) in the future.   [employer] representative in the visit with Mr. Schuyler Mason returned call.   D/w patient .     discussed try not to call after hours for controlled substances or this will put him on a suspicious behavior list.         Navjot Laguna MD     today.  Plan established for her to visit him to ensure fit size is correct. We had a long discussion about the risk of SCD due to low EF/post MI and the importance of wearing this device. He voices understanding and plans to continue with full compliance. Plan to repeat Echo again in 3 months to re-evaluate EF. The patient was encouraged to continue current medications, exercise, lose weight and call with any new complaints or concerns. Literature provided for heart failure management, low sodium diet, post PCI and CAD, CAD and tobacco.     Encouraged him to follow up with PCP new patient visit on 6/16/17 - to discuss headache, insomnia, new dx of DM and COPD and for other routine health screening. The patient was contacted by the nurse navigator by phone within 48 hours of discharge, underwent medication reconciliation and disease education coupled with care coordination. Follow-up Disposition:  Return in about 3 months (around 9/14/2017). Subjective:  Mr. Karie Padilla reports feeling overwhelmed since hospital discharge. He is frustrated with his current quality of life. \"This is worse than having the heart attack\". He remains sedentary at home. He has not been contact by cardiac rehab yet. He reports significant insomnia. Not sleeping well at night due to fear of recent MI which he states occurred at 3 in the morning. Feels that he gets good sleep once he passes 3 am.  He denies any recurrent exertional dyspnea or chest pain. Not currently wearing Life vest - initially explained that it was due to fit size, but later he reports just feeling \"overwhelmed with it all\". He reports that it \"beeps\" during the night when the patches aren't contacting his skin and this annoys him. No palpitations or tachycardia. He reports medication compliance, however he has not started taking ASA or Metformin. He reports taking Plavix daily. Denies any bleeding or bruising.       He has not been monitoring his weight daily. He denies any edema. No post prandial nausea. No orthopnea or PND. No cough or wheezing. Intermittent positional dizziness. No syncope or near syncope. He reports complete cessation of tobacco and ETOH since discharge. Feels very stressed. Reports having a headache since he was discharged. He feels this is stemming form chronic neck pain, which he attributes to his new statin. He is here today with his sister. Cardiac risk factors   HTN no  DM  yes  Smoking yes    Cardiac testing  ECHO: 5/31//2017: EF 15-20%, apical AK    MRI: 6/1/2017: LV dilated LVEF 32%, severe HK ant wall, RVEF 55%, mild MR; anterior infarct- no viability RCA/LCA: viable     CATH 6/2/2017: LM: nl, LAD: m100%, LCX codom normal, RCA:m 85%, LVEDP 25, no AV gradient, dilated LV, apical AK, EF 20%  -- s/p PCI pRCA: 2.5x18 Xience (EMORY). R radial TR band    Past Medical History:   Diagnosis Date    Acute on chronic systolic heart failure ([facility] [address] 6/1/2017    Controlled type 2 diabetes mellitus with circulatory disorder ([facility] [address] 6/1/2017    Coronary artery disease involving native coronary artery without angina pectoris 6/1/2017    ETOH abuse 6/1/2017    MI (myocardial infarction) ([facility] [address] 6/1/2017    Nicotine dependence 6/1/2017      Current Outpatient Prescriptions   Medication Sig Dispense Refill    bumetanide (BUMEX) 0.5 mg tablet Take 1 Tab by mouth daily. Dr. Simeon Gomes to provide refills 30 Tab 0    clopidogrel (PLAVIX) 75 mg tab Take 1 Tab by mouth daily. Dr. Simeon Gomes to provide refills  Indications: Thrombosis Prevention after PCI 30 Tab 0    fluticasone-vilanterol (BREO ELLIPTA) 100-25 mcg/dose inhaler Take 1 Puff by inhalation daily. Dr Angelina Zamorano to provide refills 1 Inhaler 0    metoprolol succinate (TOPROL-XL) 25 mg XL tablet Take 1 Tab by mouth daily. Dr. Simeon Gomes to provide refills 30 Tab 0    nicotine (NICODERM CQ) 21 mg/24 hr 1 Patch by TransDERmal route daily for 30 days.  Dr Ava Montesinos susanne to provide refills 1 Patch 0    atorvastatin (LIPITOR) 20 mg tablet Take 1 Tab by mouth daily. Dr. Siva Abdi to provide refills 30 Tab 0    aspirin 81 mg chewable tablet Take 1 Tab by mouth daily. Allergies   Allergen Reactions    Codeine Itching      Review of Systems  Constitutional: Negative for fever, chills, malaise/fatigue and diaphoresis. Respiratory: Negative for cough, hemoptysis, sputum production, shortness of breath and wheezing. Cardiovascular: Negative for chest pain, palpitations, orthopnea, claudication, leg swelling and PND. Gastrointestinal: Negative for heartburn, nausea, vomiting, blood in stool and melena. Genitourinary: Negative for dysuria and flank pain. Musculoskeletal: Negative for joint pain. Positive for muscle and and back pain. Skin: Negative for rash. Neurological: Negative for focal weakness, seizures, loss of consciousness, weakness. Positive for headache, radiating from neck pain. Endo/Heme/Allergies: Does not bruise/bleed easily. Psychiatric/Behavioral: Negative for memory loss. The patient does have insomnia. Physical Exam  Visit Vitals    /70 (BP 1 Location: Right arm, BP Patient Position: Sitting)    Pulse 75    Resp 20    Ht 5' 10\" (1.778 m)    Wt 226 lb 6.4 oz (102.7 kg)    SpO2 95%    BMI 32.49 kg/m2     Wt Readings from Last 3 Encounters:   06/02/17 226 lb 12.8 oz (102.9 kg)   05/31/17 229 lb 9 oz (104.1 kg)      Extended / Orthostatic Vitals:  Supine - 122/76 P 75  Sitting 122/70 P 76  Standing 128/78 P 78    General - well developed well nourished  Neck - JVP normal, thyroid nl  Cardiac - normal S1,S2, no murmurs, rubs or gallops.  No clicks  Vascular - carotids without bruits, radials, femorals and pedal pulses equal bilateral  Lungs - clear to auscultation bilaterals, no rales ,wheezing or rhonchi  Abd - obese, soft, nontender, no HSM, no abd bruits  Extremities - no edema, nicotine patch in place on left shoulder. Skin - no rash  Neuro - nonfocal  Psych - normal affect. Easily angered and frustrated during visit.      Cardiographics  ECG 6/14/17 - SR 75, TWI; unchanged    Lissette Yuan NP

## 2018-04-05 ENCOUNTER — HOSPITAL ENCOUNTER (OUTPATIENT)
Dept: LAB | Age: 63
Discharge: HOME | End: 2018-04-05

## 2018-04-05 LAB
BASOPHILS # BLD: 0.1 K/UL (ref 0–0.1)
BASOPHILS NFR BLD: 1 % (ref 0–1)
DIFFERENTIAL METHOD BLD: NORMAL
EOSINOPHIL # BLD: 0.4 K/UL (ref 0–0.4)
EOSINOPHIL NFR BLD: 5 % (ref 0–7)
ERYTHROCYTE [DISTWIDTH] IN BLOOD BY AUTOMATED COUNT: 12.8 % (ref 11.5–14.5)
HCT VFR BLD AUTO: 46.4 % (ref 36.6–50.3)
HGB BLD-MCNC: 16 G/DL (ref 12.1–17)
IMM GRANULOCYTES # BLD: 0 K/UL (ref 0–0.04)
IMM GRANULOCYTES NFR BLD AUTO: 0 % (ref 0–0.5)
LYMPHOCYTES # BLD: 2 K/UL (ref 0.8–3.5)
LYMPHOCYTES NFR BLD: 25 % (ref 12–49)
MCH RBC QN AUTO: 32.9 PG (ref 26–34)
MCHC RBC AUTO-ENTMCNC: 34.5 G/DL (ref 30–36.5)
MCV RBC AUTO: 95.3 FL (ref 80–99)
MONOCYTES # BLD: 0.6 K/UL (ref 0–1)
MONOCYTES NFR BLD: 7 % (ref 5–13)
NEUTS SEG # BLD: 4.9 K/UL (ref 1.8–8)
NEUTS SEG NFR BLD: 62 % (ref 32–75)
NRBC # BLD: 0 K/UL (ref 0–0.01)
NRBC BLD-RTO: 0 PER 100 WBC
PLATELET # BLD AUTO: 194 K/UL (ref 150–400)
PMV BLD AUTO: 10.2 FL (ref 8.9–12.9)
RBC # BLD AUTO: 4.87 M/UL (ref 4.1–5.7)
WBC # BLD AUTO: 8 K/UL (ref 4.1–11.1)

## 2018-04-05 PROCEDURE — 84153 ASSAY OF PSA TOTAL: CPT | Performed by: EMERGENCY MEDICINE

## 2018-04-05 PROCEDURE — 84443 ASSAY THYROID STIM HORMONE: CPT | Performed by: EMERGENCY MEDICINE

## 2018-04-05 PROCEDURE — 85025 COMPLETE CBC W/AUTO DIFF WBC: CPT | Performed by: EMERGENCY MEDICINE

## 2018-04-05 PROCEDURE — 80053 COMPREHEN METABOLIC PANEL: CPT | Performed by: EMERGENCY MEDICINE

## 2018-04-05 PROCEDURE — 80061 LIPID PANEL: CPT | Performed by: EMERGENCY MEDICINE

## 2018-04-05 PROCEDURE — 83036 HEMOGLOBIN GLYCOSYLATED A1C: CPT | Performed by: EMERGENCY MEDICINE

## 2018-04-06 LAB
ALBUMIN SERPL-MCNC: 3.7 G/DL (ref 3.5–5)
ALBUMIN/GLOB SERPL: 1.1 {RATIO} (ref 1.1–2.2)
ALP SERPL-CCNC: 85 U/L (ref 45–117)
ALT SERPL-CCNC: 26 U/L (ref 12–78)
ANION GAP SERPL CALC-SCNC: 5 MMOL/L (ref 5–15)
AST SERPL-CCNC: 14 U/L (ref 15–37)
BILIRUB SERPL-MCNC: 0.4 MG/DL (ref 0.2–1)
BUN SERPL-MCNC: 10 MG/DL (ref 6–20)
BUN/CREAT SERPL: 10 (ref 12–20)
CALCIUM SERPL-MCNC: 8.6 MG/DL (ref 8.5–10.1)
CHLORIDE SERPL-SCNC: 102 MMOL/L (ref 97–108)
CHOLEST SERPL-MCNC: 160 MG/DL
CO2 SERPL-SCNC: 30 MMOL/L (ref 21–32)
CREAT SERPL-MCNC: 0.96 MG/DL (ref 0.7–1.3)
EST. AVERAGE GLUCOSE BLD GHB EST-MCNC: 174 MG/DL
GLOBULIN SER CALC-MCNC: 3.4 G/DL (ref 2–4)
GLUCOSE SERPL-MCNC: 143 MG/DL (ref 65–100)
HBA1C MFR BLD: 7.7 % (ref 4.2–6.3)
HDLC SERPL-MCNC: 40 MG/DL
HDLC SERPL: 4 {RATIO} (ref 0–5)
LDLC SERPL CALC-MCNC: 90.2 MG/DL (ref 0–100)
LIPID PROFILE,FLP: NORMAL
POTASSIUM SERPL-SCNC: 4.7 MMOL/L (ref 3.5–5.1)
PROT SERPL-MCNC: 7.1 G/DL (ref 6.4–8.2)
PSA SERPL-MCNC: 0.4 NG/ML (ref 0.01–4)
SODIUM SERPL-SCNC: 137 MMOL/L (ref 136–145)
TRIGL SERPL-MCNC: 149 MG/DL (ref ?–150)
TSH SERPL DL<=0.05 MIU/L-ACNC: 1.72 UIU/ML (ref 0.36–3.74)
VLDLC SERPL CALC-MCNC: 29.8 MG/DL

## 2018-04-19 ENCOUNTER — OFFICE VISIT (OUTPATIENT)
Dept: CARDIOLOGY CLINIC | Age: 63
End: 2018-04-19

## 2018-04-19 DIAGNOSIS — Z95.810 ICD (IMPLANTABLE CARDIOVERTER-DEFIBRILLATOR) IN PLACE: Primary | ICD-10-CM

## 2018-07-26 ENCOUNTER — OFFICE VISIT (OUTPATIENT)
Dept: CARDIOLOGY CLINIC | Age: 63
End: 2018-07-26

## 2018-07-26 DIAGNOSIS — Z95.810 ICD (IMPLANTABLE CARDIOVERTER-DEFIBRILLATOR) IN PLACE: Primary | ICD-10-CM

## 2018-08-09 ENCOUNTER — HOSPITAL ENCOUNTER (OUTPATIENT)
Dept: LAB | Age: 63
Discharge: HOME | End: 2018-08-09

## 2018-08-09 LAB
ALBUMIN SERPL-MCNC: 3.9 G/DL (ref 3.5–5)
ALBUMIN/GLOB SERPL: 1.3 {RATIO} (ref 1.1–2.2)
ALP SERPL-CCNC: 73 U/L (ref 45–117)
ALT SERPL-CCNC: 17 U/L (ref 12–78)
ANION GAP SERPL CALC-SCNC: 10 MMOL/L (ref 5–15)
AST SERPL-CCNC: 13 U/L (ref 15–37)
BASOPHILS # BLD: 0 K/UL (ref 0–0.1)
BASOPHILS NFR BLD: 1 % (ref 0–1)
BILIRUB SERPL-MCNC: 0.5 MG/DL (ref 0.2–1)
BUN SERPL-MCNC: 12 MG/DL (ref 6–20)
BUN/CREAT SERPL: 11 (ref 12–20)
CALCIUM SERPL-MCNC: 8.8 MG/DL (ref 8.5–10.1)
CHLORIDE SERPL-SCNC: 102 MMOL/L (ref 97–108)
CHOLEST SERPL-MCNC: 156 MG/DL
CO2 SERPL-SCNC: 25 MMOL/L (ref 21–32)
CREAT SERPL-MCNC: 1.13 MG/DL (ref 0.7–1.3)
DIFFERENTIAL METHOD BLD: NORMAL
EOSINOPHIL # BLD: 0.3 K/UL (ref 0–0.4)
EOSINOPHIL NFR BLD: 4 % (ref 0–7)
ERYTHROCYTE [DISTWIDTH] IN BLOOD BY AUTOMATED COUNT: 14.2 % (ref 11.5–14.5)
EST. AVERAGE GLUCOSE BLD GHB EST-MCNC: 157 MG/DL
GLOBULIN SER CALC-MCNC: 3.1 G/DL (ref 2–4)
GLUCOSE SERPL-MCNC: 171 MG/DL (ref 65–100)
HBA1C MFR BLD: 7.1 % (ref 4.2–6.3)
HCT VFR BLD AUTO: 49 % (ref 36.6–50.3)
HDLC SERPL-MCNC: 32 MG/DL
HDLC SERPL: 4.9 {RATIO} (ref 0–5)
HGB BLD-MCNC: 16.4 G/DL (ref 12.1–17)
IMM GRANULOCYTES # BLD: 0 K/UL (ref 0–0.04)
IMM GRANULOCYTES NFR BLD AUTO: 0 % (ref 0–0.5)
LDLC SERPL CALC-MCNC: 90.8 MG/DL (ref 0–100)
LIPID PROFILE,FLP: ABNORMAL
LYMPHOCYTES # BLD: 1.8 K/UL (ref 0.8–3.5)
LYMPHOCYTES NFR BLD: 24 % (ref 12–49)
MCH RBC QN AUTO: 32.3 PG (ref 26–34)
MCHC RBC AUTO-ENTMCNC: 33.5 G/DL (ref 30–36.5)
MCV RBC AUTO: 96.5 FL (ref 80–99)
MONOCYTES # BLD: 0.5 K/UL (ref 0–1)
MONOCYTES NFR BLD: 6 % (ref 5–13)
NEUTS SEG # BLD: 4.7 K/UL (ref 1.8–8)
NEUTS SEG NFR BLD: 65 % (ref 32–75)
NRBC # BLD: 0 K/UL (ref 0–0.01)
NRBC BLD-RTO: 0 PER 100 WBC
PLATELET # BLD AUTO: 161 K/UL (ref 150–400)
PMV BLD AUTO: 10.2 FL (ref 8.9–12.9)
POTASSIUM SERPL-SCNC: 4.4 MMOL/L (ref 3.5–5.1)
PROT SERPL-MCNC: 7 G/DL (ref 6.4–8.2)
RBC # BLD AUTO: 5.08 M/UL (ref 4.1–5.7)
SODIUM SERPL-SCNC: 137 MMOL/L (ref 136–145)
TRIGL SERPL-MCNC: 166 MG/DL (ref ?–150)
VLDLC SERPL CALC-MCNC: 33.2 MG/DL
WBC # BLD AUTO: 7.2 K/UL (ref 4.1–11.1)

## 2018-08-09 PROCEDURE — 80061 LIPID PANEL: CPT | Performed by: EMERGENCY MEDICINE

## 2018-08-09 PROCEDURE — 83036 HEMOGLOBIN GLYCOSYLATED A1C: CPT | Performed by: EMERGENCY MEDICINE

## 2018-08-09 PROCEDURE — 80053 COMPREHEN METABOLIC PANEL: CPT | Performed by: EMERGENCY MEDICINE

## 2018-08-09 PROCEDURE — 85025 COMPLETE CBC W/AUTO DIFF WBC: CPT | Performed by: EMERGENCY MEDICINE

## 2018-08-21 ENCOUNTER — CLINICAL SUPPORT (OUTPATIENT)
Dept: CARDIOLOGY CLINIC | Age: 63
End: 2018-08-21

## 2018-08-21 ENCOUNTER — OFFICE VISIT (OUTPATIENT)
Dept: CARDIOLOGY CLINIC | Age: 63
End: 2018-08-21

## 2018-08-21 VITALS
RESPIRATION RATE: 16 BRPM | BODY MASS INDEX: 37.94 KG/M2 | HEART RATE: 62 BPM | WEIGHT: 265 LBS | DIASTOLIC BLOOD PRESSURE: 88 MMHG | SYSTOLIC BLOOD PRESSURE: 120 MMHG | HEIGHT: 70 IN

## 2018-08-21 DIAGNOSIS — I25.10 CORONARY ARTERY DISEASE INVOLVING NATIVE CORONARY ARTERY OF NATIVE HEART WITHOUT ANGINA PECTORIS: ICD-10-CM

## 2018-08-21 DIAGNOSIS — F17.208 NICOTINE DEPENDENCE WITH OTHER NICOTINE-INDUCED DISORDER, UNSPECIFIED NICOTINE PRODUCT TYPE: ICD-10-CM

## 2018-08-21 DIAGNOSIS — Z95.810 ICD (IMPLANTABLE CARDIOVERTER-DEFIBRILLATOR) IN PLACE: ICD-10-CM

## 2018-08-21 DIAGNOSIS — I25.5 ISCHEMIC CARDIOMYOPATHY: ICD-10-CM

## 2018-08-21 DIAGNOSIS — E11.59 CONTROLLED TYPE 2 DIABETES MELLITUS WITH OTHER CIRCULATORY COMPLICATION, WITHOUT LONG-TERM CURRENT USE OF INSULIN (HCC): ICD-10-CM

## 2018-08-21 DIAGNOSIS — I50.22 CHRONIC SYSTOLIC CONGESTIVE HEART FAILURE (HCC): ICD-10-CM

## 2018-08-21 DIAGNOSIS — I50.22 CHRONIC SYSTOLIC CONGESTIVE HEART FAILURE (HCC): Primary | ICD-10-CM

## 2018-08-21 DIAGNOSIS — I25.5 ISCHEMIC CARDIOMYOPATHY: Primary | ICD-10-CM

## 2018-08-21 RX ORDER — OMEPRAZOLE 20 MG/1
20 CAPSULE, DELAYED RELEASE ORAL DAILY
COMMUNITY
End: 2019-05-20

## 2018-08-21 RX ORDER — LANOLIN ALCOHOL/MO/W.PET/CERES
5 CREAM (GRAM) TOPICAL
COMMUNITY
End: 2018-12-21 | Stop reason: SDUPTHER

## 2018-08-21 RX ORDER — GLIMEPIRIDE 2 MG/1
2 TABLET ORAL
COMMUNITY
End: 2019-03-08 | Stop reason: SDUPTHER

## 2018-08-21 RX ORDER — SIMVASTATIN 40 MG/1
TABLET, FILM COATED ORAL
COMMUNITY
End: 2019-05-09 | Stop reason: SDUPTHER

## 2018-08-21 NOTE — PROGRESS NOTES
Per Dr. Cara Baird, utilize Definity. ID verified per protocol. Test and risks explained to patient. Consent signed after all questions answered. Started saline lock #20 gauge in Right ACF. 1 stick. Good blood return and flushed without difficulty. At 1:30 pm, activated Definity (1.3 mL in 8.7 ml NS) injected  X 1. (Total 2.2mLs given). No complaint of voiced. Echo images obtained. Removed saline lock and applied pressure until homeostasis achieved. Dressing applied. Patient instructed to leave dressing on times 1 hr. Verbalized understanding. Patient waited in exam room until seen by Dr. Cara Baird. Patient without complaints of voiced.

## 2018-08-21 NOTE — PROGRESS NOTES
Chief Complaint   Patient presents with    Coronary Artery Disease     6 mo f/u      1. Have you been to the ER, urgent care clinic since your last visit? Hospitalized since your last visit? No    2. Have you seen or consulted any other health care providers outside of the [address] [place] [address] since your last visit? Include any pap smears or colon screening.  Yes Reason for visit: PCP - [place], [facility] Geronimo [place]

## 2018-08-21 NOTE — PROGRESS NOTES
[address], [address]  [address] Chris Linares, Jr [place], [address] [facility]    Office [phone]  Fax [phone]  Cell [phone]        Athena Hamman is a 57 y.o. male last seen 6 months ago. Assessment  Encounter Diagnoses   Name Primary?  Ischemic cardiomyopathy     Coronary artery disease involving native coronary artery of native heart without angina pectoris     Chronic systolic congestive heart failure (HCC) Yes    ICD (implantable cardioverter-defibrillator) in place     Nicotine dependence with other nicotine-induced disorder, unspecified nicotine product type     Controlled type 2 diabetes mellitus with other circulatory complication, without long-term current use of insulin (HCC)      Recommendations:  Athena Hamman has ischemic cardiomyopathy, severe LV dysfunction, HFrEF s/p ICD. He suffered an out-of-hospital MI in May 2017 and presented with subacute HF in June. [facility] MRI demonstrated viability only in the RCA territory. Cardiac cath demonstrated mid %, RCA mid 85%. He underwent PCI RCA with [facility]. Would continue DAPT indefinitely. Drivers of CAD include smoking and T2DM    Updated echo today showed an LVEF 15-20% with apical akinesis. He continues to have class 2-3 HF. His volume status seems fine. He also has COPD from smoking. Optimal medical therapy is limited by low BP. He ran out of Entresto samples, but should be getting some in the mail shortly. I am excited that he is alcohol free. His long term outlook is guarded based on his comorbid conditions and poor social support. Will refer him to the advanced HF clinic to help with management. I do not think he is a good candidate for advanced therapies. Lipids and DM monitored by Dr. Roverto Nunez. He has been intolerant of statin therapy. I do not see the value of Zetia monotherapy. Follow-up Disposition:  Return in about 4 months (around 12/21/2018).       Subjective:  Mr. Magana Karina admits to a fairly sedentary lifestyle. He lives by himself. He notes he frequently sits and watches TV. He does his own shopping and cooking, and helps take care of a lady's cat. He states he continues to become BERNABE after walking up 15 steps. He is almost out of Entresto. He states the company is in the process of shipping a refill to him. He requests samples for the mean time. He states he does not sleep well. He uses Tylenol PM and Melatonin, but states he wakes up after 3-4 hours. He naps from 12PM - 3PM daily. He states his fasting blood sugars have improved from 140 to 98. He continues to smokes 1 ppd of cigarettes. He no longer drinks alcohol. Patient denies any exertional chest pain, palpitations, syncope, orthopnea, edema or paroxysmal nocturnal dyspnea. Of note, he will have Medicare coverage starting in 12/2018. Cardiac risk factors   HTN no  DM  yes  Smoking yes    Cardiac testing  ECHO: 5/31//2017: EF 15-20%, apical AK  ECHO 9/18/17- Dilated LV, EF 20%, apical DK       MRI: 6/1/2017: LV dilated LVEF 32%, severe HK ant wall, RVEF 55%, mild MR; anterior infarct- no viability RCA/LCA: viable     CATH 6/2/2017: LM: nl, LAD: m100%, LCX codom normal, RCA:m 85%, LVEDP 25, no AV gradient, dilated LV, apical AK, EF 20%  -- s/p PCI pRCA: 2.5x18 Xience ([facility]).       AICD 12/5/2017; [facility]    Past Medical History:   Diagnosis Date    Controlled type 2 diabetes mellitus with circulatory disorder ([address] 6/1/2017    COPD (chronic obstructive pulmonary disease) [address] [address]     Coronary artery disease involving native coronary artery without angina pectoris 06/01/2017    [facility] MI, 2v disease s/p PCI RCA with [facility] June 2017    ETOH abuse 6/1/2017    Ischemic cardiomyopathy     Extensive LAD territory infarction    Nicotine dependence 0/4/9891    Systolic heart failure (HCC)         Current Outpatient Prescriptions   Medication Sig Dispense Refill    melatonin 3 mg tablet Take 3 mg by mouth nightly.  glimepiride (AMARYL) 2 mg tablet Take 2 mg by mouth every morning.  omeprazole (PRILOSEC) 20 mg capsule Take 20 mg by mouth daily.  SITagliptin (JANUVIA) 25 mg tablet Take 25 mg by mouth daily.  simvastatin (ZOCOR) 40 mg tablet Take  by mouth every morning.  cyanocobalamin (VITAMIN B-12) 500 mcg tablet Take 500 mcg by mouth daily.  pyridoxine, vitamin B6, (VITAMIN B-6) 100 mg tablet Take 100 mg by mouth daily.  ALBUTEROL SULFATE (VENTOLIN HFA IN) Take  by inhalation.  ezetimibe (ZETIA) 10 mg tablet Take 1 Tab by mouth daily. 30 Tab 5    fluticasone-vilanterol (BREO ELLIPTA) 100-25 mcg/dose inhaler Take 1 Puff by inhalation daily. Indications: 200 MCG - 25 MCG      sacubitril-valsartan (ENTRESTO) 24 mg/26 mg tablet Take 1 Tab by mouth two (2) times a day. 56 Tab 0    bumetanide (BUMEX) 0.5 mg tablet TAKE ONE TABLET BY MOUTH ONCE DAILY 30 Tab 2    ACETAMINOPHEN/DIPHENHYDRAMINE (TYLENOL PM PO) Take  by mouth.  metoprolol succinate (TOPROL-XL) 25 mg XL tablet TAKE ONE TABLET BY MOUTH ONCE DAILY 30 Tab 6    clopidogrel (PLAVIX) 75 mg tab Take 1 Tab by mouth daily. Dr. Amalia Pagan to provide refills  Indications: Thrombosis Prevention after PCI 90 Tab 3    aspirin 81 mg chewable tablet Take 1 Tab by mouth daily.  PHENYLEPHRINE/DM/ACETAMINOP/GG (TYLENOL COLD AND FLU SEVERE PO) Take  by mouth every six (6) hours as needed.  acetaminophen (TYLENOL) 325 mg tablet Take 2 Tabs by mouth every four (4) hours as needed. Allergies   Allergen Reactions    Codeine Itching      Retired. Review of Systems  Constitutional: Negative for fever, chills, malaise/fatigue and diaphoresis. Respiratory: Negative for cough, hemoptysis, sputum production, and wheezing. Positive for BERNABE. Cardiovascular: Negative for chest pain, palpitations, orthopnea, claudication, leg swelling and PND.   Gastrointestinal: Negative for heartburn, nausea, vomiting, blood in stool and melena. Genitourinary: Negative for dysuria and flank pain. Musculoskeletal: Negative for joint pain and back pain. Skin: Negative for rash. Neurological: Negative for focal weakness, seizures, loss of consciousness, weakness and headaches. Endo/Heme/Allergies: Does not bruise/bleed easily. Psychiatric/Behavioral: Negative for memory loss. The patient does not have insomnia. Physical Exam    Visit Vitals    /88 (BP 1 Location: Right arm, BP Patient Position: Sitting)    Pulse 62    Resp 16    Ht 5' 10\" (1.778 m)    Wt 265 lb (120.2 kg)    BMI 38.02 kg/m2     Wt Readings from Last 3 Encounters:   08/21/18 265 lb (120.2 kg)   02/20/18 261 lb (118.4 kg)   01/12/18 252 lb (114.3 kg)      General - well developed well nourished  Neck - JVP normal, thyroid nl  Cardiac - normal S1, S2, no murmurs, rubs or gallops. No clicks  Vascular - carotids without bruits, radials, femorals and pedal pulses equal bilateral  Lungs - clear to auscultation bilaterals, no rales, wheezing. Scattered rhonchi posteriorly   Abd - soft nontender, no HSM, no abd bruits  Extremities - no edema  Skin - no rash  Neuro - nonfocal  Psych - normal mood and affect      Cardiographics  ECG 6/14/17 - SR 75, TWI; unchanged  Echo 9/18/17- Dilated LV, EF 20%, apical dyskinesis  Echo 8/21/18 - EF 15-20%, apical akineses, dilated LV    Written by Harry Reddy, as dictated by Dr. Margot Mathews.      Margot Mathews MD

## 2018-08-30 PROBLEM — E66.01 SEVERE OBESITY (BMI 35.0-39.9): Status: ACTIVE | Noted: 2018-08-30

## 2018-09-18 ENCOUNTER — OFFICE VISIT (OUTPATIENT)
Dept: CARDIOLOGY CLINIC | Age: 63
End: 2018-09-18

## 2018-09-18 ENCOUNTER — DOCUMENTATION ONLY (OUTPATIENT)
Dept: CARDIOLOGY CLINIC | Age: 63
End: 2018-09-18

## 2018-09-18 VITALS
HEIGHT: 70 IN | SYSTOLIC BLOOD PRESSURE: 130 MMHG | WEIGHT: 270 LBS | TEMPERATURE: 98.1 F | DIASTOLIC BLOOD PRESSURE: 80 MMHG | BODY MASS INDEX: 38.65 KG/M2 | HEART RATE: 83 BPM | RESPIRATION RATE: 24 BRPM | OXYGEN SATURATION: 97 %

## 2018-09-18 DIAGNOSIS — E66.01 SEVERE OBESITY (BMI 35.0-39.9): ICD-10-CM

## 2018-09-18 DIAGNOSIS — F32.9 REACTIVE DEPRESSION: ICD-10-CM

## 2018-09-18 DIAGNOSIS — I50.22 CHRONIC SYSTOLIC CONGESTIVE HEART FAILURE (HCC): Primary | ICD-10-CM

## 2018-09-18 DIAGNOSIS — F17.210 CIGARETTE NICOTINE DEPENDENCE WITHOUT COMPLICATION: ICD-10-CM

## 2018-09-18 DIAGNOSIS — Z95.810 ICD (IMPLANTABLE CARDIOVERTER-DEFIBRILLATOR) IN PLACE: ICD-10-CM

## 2018-09-18 RX ORDER — BUPROPION HYDROCHLORIDE 150 MG/1
150 TABLET ORAL
Qty: 30 TAB | Refills: 3 | Status: SHIPPED | OUTPATIENT
Start: 2018-09-18 | End: 2018-10-04

## 2018-09-18 RX ORDER — BUPROPION HYDROCHLORIDE 150 MG/1
150 TABLET ORAL
Qty: 30 TAB | Refills: 3 | Status: SHIPPED | OUTPATIENT
Start: 2018-09-18 | End: 2018-09-18 | Stop reason: SDUPTHER

## 2018-09-18 NOTE — PROGRESS NOTES
[facility] Consultation Note DOS:   9/18/2018 NAME:  Shahzad Baeza MRN:   [id number] REFERRING PROVIDER:  Pat Dunn MD 
PRIMARY CARE PHYSICIAN: Galina Moon MD 
PRIMARY CARDIOLOGIST: Pat Dunn MD 
 
 
Chief Complaint:  
Chief Complaint Patient presents with  Shortness of Breath  
  at rest and with activity  Dizziness HPI: 57y.o. year old male with a history of CAD s/p PCI RCA with [facility] 6/17, T2D, COPD, ETOH abuse, tobacco abuse, ICM who presents for further evaluation of his chronic systolic heart failure. He has abstained from alcohol over the past 3 months and [facility] [place] [facility]. He is obsessed with dying but denies suicidal ideation. He does admit to BERNABE, orthopnea, edema, fatigue, and difficulty sleeping. He has significant financial and social stressors. History: 
Past Medical History:  
Diagnosis Date  Controlled type 2 diabetes mellitus with circulatory disorder ([facility] [address] 6/1/2017  COPD (chronic obstructive pulmonary disease) (HCC)  Coronary artery disease involving native coronary artery without angina pectoris 06/01/2017 OOH MI, 2v disease s/p PCI RCA with [facility] June 2017  
 ETOH abuse 6/1/2017  Ischemic cardiomyopathy Extensive LAD territory infarction  Nicotine dependence 6/1/2017  Systolic heart failure ([facility] [address] Past Surgical History:  
Procedure Laterality Date  HX TONSILLECTOMY  HX WISDOM TEETH EXTRACTION Social History Social History  Marital status: SINGLE Spouse name: N/A  
 Number of children: N/A  
 Years of education: N/A Occupational History  Not on file. Social History Main Topics  Smoking status: Current Every Day Smoker Packs/day: 1.00 Years: 30.00 Types: Cigarettes  Smokeless tobacco: Never Used  Alcohol use No  
   Comment: former drinker-quit 3 months ago-drank a 6 pack per day  Drug use:  No  
  Sexual activity: Yes  
  Partners: Female Other Topics Concern  Not on file Social History Narrative Family History Problem Relation Age of Onset  Stroke Father  Heart Disease Father  Hypertension Father Current Medications:  
Current Outpatient Prescriptions Medication Sig Dispense Refill  melatonin 3 mg tablet Take 3 mg by mouth nightly.  glimepiride (AMARYL) 2 mg tablet Take 2 mg by mouth every morning.  omeprazole (PRILOSEC) 20 mg capsule Take 20 mg by mouth daily.  SITagliptin (JANUVIA) 25 mg tablet Take 25 mg by mouth daily.  simvastatin (ZOCOR) 40 mg tablet Take  by mouth every morning.  cyanocobalamin (VITAMIN B-12) 500 mcg tablet Take 500 mcg by mouth daily.  pyridoxine, vitamin B6, (VITAMIN B-6) 100 mg tablet Take 100 mg by mouth daily.  ALBUTEROL SULFATE (VENTOLIN HFA IN) Take  by inhalation.  ezetimibe (ZETIA) 10 mg tablet Take 1 Tab by mouth daily. 30 Tab 5  
 fluticasone-vilanterol (BREO ELLIPTA) 100-25 mcg/dose inhaler Take 1 Puff by inhalation daily. Indications: 200 MCG - 25 MCG  acetaminophen (TYLENOL) 325 mg tablet Take 2 Tabs by mouth every four (4) hours as needed.  bumetanide (BUMEX) 0.5 mg tablet TAKE ONE TABLET BY MOUTH ONCE DAILY 30 Tab 2  ACETAMINOPHEN/DIPHENHYDRAMINE (TYLENOL PM PO) Take  by mouth.  metoprolol succinate (TOPROL-XL) 25 mg XL tablet TAKE ONE TABLET BY MOUTH ONCE DAILY 30 Tab 6  clopidogrel (PLAVIX) 75 mg tab Take 1 Tab by mouth daily. Dr. Malvin Armstrong to provide refills  Indications: Thrombosis Prevention after PCI 90 Tab 3  
 aspirin 81 mg chewable tablet Take 1 Tab by mouth daily.  sacubitril-valsartan (ENTRESTO) 24 mg/26 mg tablet Take 1 Tab by mouth two (2) times a day. 56 Tab 0 Allergies: Allergies Allergen Reactions  Codeine Itching ROS:   
General:  positive for  - fatigue and sleep disturbance HEENT: negative Pulmonary: positive for - cough and shortness of breath Cardiac: positive for dyspnea, fatigue, orthopnea, lower extremity edema, dyspnea on exertion GI:  no abdominal pain, change in bowel habits, or black or bloody stools Musculo: negative Neuro: no TIA or stroke symptoms Skin:  negative Allergies: REMINDER:DOCUMENT ALLERGIES IN ALLERGY ACTIVITY Psych: negative Admission Weight: Last Weight Weight: 270 lb (122.5 kg) Weight: 270 lb (122.5 kg) Physical Exam:  
Vitals:   
Visit Vitals  /80 (BP 1 Location: Left arm, BP Patient Position: Sitting)  Pulse 83  Temp 98.1 °F (36.7 °C)  Resp 24  
 Ht 5' 10\" (1.778 m)  Wt 270 lb (122.5 kg)  SpO2 97%  BMI 38.74 kg/m2 General:  alert, fatigued, cooperative HEENT: PERRLA, EOMI and Sclera clear, anicteric Neck:  supple, no significant adenopathy, carotids upstroke normal bilaterally, no bruits CVP:  10 cm  ( + ) HJR Cardiac: regular rate, regular rhythm, S4 present and systolic murmur present Chest: Diminished breath sounds bilaterally Abdomen: Distended, normal BS Extremity: edema trace bilaterally Neuro: Alert and oriented to person, place, and time; normal strength and tone. Normal symmetric reflexes. Normal coordination and gait Skin:   no rashes, no ecchymoses, no petechiae Recent Labs:  
Labs Latest Ref Rng & Units 2018 WBC 4.1 - 11.1 K/uL 7.2  
RBC 4.10 - 5.70 M/uL 5.08 Hemoglobin 12.1 - 17.0 g/dL 16.4 Hematocrit 36.6 - 50.3 % 49.0 MCV 80.0 - 99.0 FL 96.5 Platelets 750 - 196 K/uL 161 Lymphocytes 12 - 49 % 24 Monocytes 5 - 13 % 6 Eosinophils 0 - 7 % 4 Basophils 0 - 1 % 1 Albumin 3.5 - 5.0 g/dL 3.9 Calcium 8.5 - 10.1 MG/DL 8.8 SGOT 15 - 37 U/L 13(L) Glucose 65 - 100 mg/dL 171(H) BUN 6 - 20 MG/DL 12 Creatinine 0.70 - 1.30 MG/DL 1.13 Sodium 136 - 145 mmol/L 137 Potassium 3.5 - 5.1 mmol/L 4.4 Some recent data might be hidden [id number] Sinus  Rhythm Low voltage in limb leads.  
 -Left axis -anterior fascicular block.  
 -Anterior infarct -age undetermined. -  T-abnormality  -Possible  Anterolateral  ischemia. Echocardiogram: 
ECHO: 5/31//2017: EF 15-20%, apical AK 
ECHO 9/18/17- Dilated LV, EF 20%, apical DK ECHO 8/21/2018: Dilated LV, LVEF 15-20%, apical AK, LVIDd 6.37 Cardiac MRI:  
MRI: 6/1/2017: LV dilated LVEF 32%, severe HK ant wall, RVEF 55%, mild MR; anterior infarct- no viability RCA/LCA: viable Cardiac Catheterizations: 
CATH 6/2/2017: LM: nl, LAD: m100%, LCX codom normal, RCA:m 85%, LVEDP 25, no AV gradient, dilated LV, apical AK, EF 20% 
-- s/p PCI pRCA: 2.5x18 Xience ([facility]). AICD 12/5/2017; [facility] Impression / Plan: 1. ICM - Stage C, NYHA Class III, LVEF 15-20% On Toprol XL 25 mg daily and bumex 0.5 mg daily Resume entresto 24/26 mg, 1 tablet twice daily Check BMP, NT proBNP today Follow up in the [facility] in 2 weeks Meet with LCSW for assistance with Medicare Part D and housing 2. CAD s/p MI, s/p PCI with [facility] On ASA, clopidogrel, BB, statin Stable 3. S/p AICD No shocks 4. ETOH Abuse Encourage continued abstinence - last drink 3 weeks ago 5. Nicotine Addiction Smoking cessation counseling Start Wellbutrin  mg daily 5. T2DM On glimepiride, sitagliptin 6. HLD On ezetimibe, simvastatin 7. COPD On Breo, Albuterol MDI 8. Depression Not suicidal 
 Started on Wellbutrin for smoking cessation Refer to palliative care Nataliya Hemphill MD, [facility] Chief of Cardiology, [facility] Medical Director [facility] [address], [address] [place] [address] Office [phone] Fax [phone]

## 2018-09-18 NOTE — PROGRESS NOTES
met with Dallin during his office visit to [facility]. He shared he's single, has no children and lives in a [place] on the second story with his older sister, Jonathan Woods. He is displeased with his landlord who lives on the first floor of the home - he feels that she charges too much rent and should be less stingy with the air conditioning/ heat. However, he denies any safety concerns at home. He's been living in the [facility] for approximately three years - he moved his sister in with him about a year ago as she was struggling financially. He shared he helps her out - he relies on his Social Security for monthly income (approx $1,320/month). He does not receives SNAP and was unable to recall if he's applied for Medicaid. He was given a care card application to complete by . Dallin last worked as a  and also has previous experience in lawn mowing. He enjoyed his work but shared he's too short of breath to be able to work at this time. He acknowledges being diagnosed with heart disease last year when he suffered a heart attack. He continues to smoke 1 ppd but denies consuming any alcohol - reports he's been clean for about a month. He also acknowledges a history of alcohol/drug abuse; he received outpatient rehabilitation for drugs and alcohol. Throughout the conversation he referred to himself as an invalent. He ruminated on his diagnosis and life expectancy. He acknowledges feeling depressed; denies SI/HI. Reports he will try the wellbutrin but denies wanting to seek ongoing counseling support at this time.  will continue to follow and offer support.     Henna Ferrara, MSW, LCSW    Clinical    [facility] [id number]   [employer]ing Choices ® ACP Facilitator

## 2018-09-18 NOTE — MR AVS SNAPSHOT
[facility] [facility], [address] 
[phone] Patient: Rolf Asencio MRN: [id number] Visit Information Date & Time Provider Department Dept. Phone Encounter #  
 9/18/2018  2:00 PM Pino Mcnulty MD [address] [id number] Follow-up Instructions Return in about 2 weeks (around 10/2/2018) for Kvng Mcpherson. Your Appointments 12/21/2018  1:40 PM  
ESTABLISHED PATIENT with Katy Alan MD  
[facility] ([facility] SCHEDULING) Appt Note: 4 month follow up per Dr. Cara Baird [address] [place] [address] [facility] [address]  
[phone]  
  
   
 [address] [place] [address] [facility] [address]  
  
    
 1/21/2019 11:30 AM  
PACEMAKER with PACEMAKER, STFRANCES  
[facility] ([facility] SCHEDULING) Appt Note: renée sci icd/stf/birgit after/new lat schedule [address] [place] [address]  
[phone]  
  
   
 [address] [place] [address] [address] [place], [place] [address]  
  
    
 1/21/2019 11:40 AM  
ESTABLISHED PATIENT with Melissa Dunn MD  
[facility] ([address]) Appt Note: annual  
 [address] [place] [address]  
[phone]  
  
   
 [address] [place] [address] [address]  
  
    
  
 10/30/2018 11:00 AM  
REMOTE OFFICE VISIT with Jo Arthur [facility] ([facility] SCHEDULING) Appt Note: lat icd/stf  
 [address] [place] [address]  
[address] [facility] [facility] [place] [address] [address] Upcoming Health Maintenance Date Due Hepatitis C Screening 1955 EYE EXAM RETINAL OR DILATED Q1 12/10/1963 Pneumococcal 19-64 Medium Risk (1 of 1 - PPSV23) 12/10/1972 DTaP/Tdap/Td series (1 - Tdap) 12/10/1974 FOBT Q 1 YEAR AGE 50-75 12/10/2003 ZOSTER VACCINE AGE 60> 10/10/2013 FOOT EXAM Q1 6/16/2018 MICROALBUMIN Q1 6/16/2018 Influenza Age 5 to Adult 8/1/2018 HEMOGLOBIN A1C Q6M 2/9/2019 LIPID PANEL Q1 8/9/2019 Allergies as of 9/18/2018  Review Complete On: 9/18/2018 By: Kev Adorno RN Severity Noted Reaction Type Reactions Codeine  06/02/2017    Itching Current Immunizations  Never Reviewed No immunizations on file. Not reviewed this visit You Were Diagnosed With   
  
 Codes Comments Chronic systolic congestive heart failure (HCC)    -  Primary ICD-10-CM: P15.96 ICD-9-CM: 428.22, 428.0 ICD (implantable cardioverter-defibrillator) in place     ICD-10-CM: Z95.810 ICD-9-CM: V45.02 Severe obesity (BMI 35.0-39.9) (HCC)     ICD-10-CM: E66.01 
ICD-9-CM: 278.01 Cigarette nicotine dependence without complication     MXH-51-EG: F17.210 ICD-9-CM: 305.1 Reactive depression     ICD-10-CM: F32.9 ICD-9-CM: 300.4 Vitals BP Pulse Temp Resp Height(growth percentile) Weight(growth percentile) 130/80 (BP 1 Location: Left arm, BP Patient Position: Sitting) 83 98.1 °F (36.7 °C) 24 5' 10\" (1.778 m) 270 lb (122.5 kg) SpO2 BMI Smoking Status 97% 38.74 kg/m2 Current Every Day Smoker BMI and BSA Data Body Mass Index Body Surface Area 38.74 kg/m 2 2.46 m 2 Preferred Pharmacy Pharmacy Name Phone Angy Garcia [facility] [phone] Your Updated Medication List  
  
   
This list is accurate as of 9/18/18  4:07 PM.  Always use your most recent med list.  
  
  
  
  
 acetaminophen 325 mg tablet Commonly known as:  TYLENOL Take 2 Tabs by mouth every four (4) hours as needed. aspirin 81 mg chewable tablet Take 1 Tab by mouth daily. BREO ELLIPTA 100-25 mcg/dose inhaler Generic drug:  fluticasone-vilanterol Take 1 Puff by inhalation daily. Indications: 200 MCG - 25 MCG  
  
 bumetanide 0.5 mg tablet Commonly known as:  Assunta Brink TAKE ONE TABLET BY MOUTH ONCE DAILY  
  
 buPROPion  mg tablet Commonly known as:  Lawanda Salm Take 1 Tab by mouth every morning. clopidogrel 75 mg Tab Commonly known as:  PLAVIX Take 1 Tab by mouth daily. Dr. Katy Alan to provide refills  Indications: Thrombosis Prevention after PCI  
  
 ezetimibe 10 mg tablet Commonly known as:  Suellyn Ayaan Take 1 Tab by mouth daily. glimepiride 2 mg tablet Commonly known as:  AMARYL Take 2 mg by mouth every morning. JANUVIA 25 mg tablet Generic drug:  SITagliptin Take 25 mg by mouth daily. melatonin 3 mg tablet Take 3 mg by mouth nightly. metoprolol succinate 25 mg XL tablet Commonly known as:  TOPROL-XL  
TAKE ONE TABLET BY MOUTH ONCE DAILY  
  
 omeprazole 20 mg capsule Commonly known as:  PRILOSEC Take 20 mg by mouth daily. * sacubitril-valsartan 24-26 mg tablet Commonly known as:  ENTRESTO Take 1 Tab by mouth two (2) times a day. * sacubitril-valsartan 24-26 mg tablet Commonly known as:  ENTRESTO Take 1 Tab by mouth two (2) times a day. simvastatin 40 mg tablet Commonly known as:  ZOCOR Take  by mouth every morning. TYLENOL PM PO Take  by mouth. VENTOLIN HFA IN Take  by inhalation. VITAMIN B-12 500 mcg tablet Generic drug:  cyanocobalamin Take 500 mcg by mouth daily. VITAMIN B-6 100 mg tablet Generic drug:  pyridoxine (vitamin B6) Take 100 mg by mouth daily. * Notice: This list has 2 medication(s) that are the same as other medications prescribed for you. Read the directions carefully, and ask your doctor or other care provider to review them with you. Prescriptions Printed Refills buPROPion XL (WELLBUTRIN XL) 150 mg tablet 3 Sig: Take 1 Tab by mouth every morning. Class: Print Route: Oral  
  
Prescriptions Sent to Pharmacy Refills  
 sacubitril-valsartan (ENTRESTO) 24 mg/26 mg tablet 3 Sig: Take 1 Tab by mouth two (2) times a day. Class: Normal  
 Pharmacy: [facility] DR JENNIFER ZIMMERMAN [address] [place] [address] Ally Ph #: [phone] Route: Oral  
  
We Performed the Following AMB POC EKG ROUTINE W/ 12 LEADS, INTER & REP [94622 CPT(R)] METABOLIC PANEL, BASIC [30443 CPT(R)] NT-PRO BNP [id number] CPT(R)] MD PULMONARY STRESS TESTING [01998 CPT(R)] REFERRAL TO PALLIATIVE MEDICINE [SYC636 Custom] Follow-up Instructions Return in about 2 weeks (around 10/2/2018) for Kenia Bond. To-Do List   
 09/18/2018 MD Charge:  MD PULMONARY STRESS TESTING Referral Information Referral ID Referred By Referred To  
  
 [id number] Aspen SILVERIO Not Available Visits Status Start Date End Date 1 New Request 9/18/18 9/18/19 If your referral has a status of pending review or denied, additional information will be sent to support the outcome of this decision. Patient Instructions 1. Resume entresto 24/26 mg, 1 tablet twice daily 2. Start Wellbutrin  mg daily 3. Blood work today 4. Refer to palliative care outpatient clinic 5. Meet with Tenisha Portillo, our LCSW 6. Follow up in the [facility] in 2 weeks Introducing [facility]! [facility] introduces "[employer]" patient portal. Now you can access parts of your medical record, email your doctor's office, and request medication refills online. 1. In your internet browser, go to [email or web address] [email or web address] 2. Click on the First Time User? Click Here link in the Sign In box. You will see the New Member Sign Up page. 3. Enter your "[employer]" Access Code exactly as it appears below. You will not need to use this code after youve completed the sign-up process. If you do not sign up before the expiration date, you must request a new code. · [employer] Access Code: [email or web address] Expires: 10/28/2018  3:08 PM 
 
4. Enter the last four digits of your Social Security Number (xxxx) and Date of Birth (mm/dd/yyyy) as indicated and click Submit. You will be taken to the next sign-up page. 5. Create a [employer] ID. This will be your [employer] login ID and cannot be changed, so think of one that is secure and easy to remember. 6. Create a [employer] password. You can change your password at any time. 7. Enter your Password Reset Question and Answer. This can be used at a later time if you forget your password. 8. Enter your e-mail address. You will receive e-mail notification when new information is available in [address] 9. Click Sign Up. You can now view and download portions of your medical record. 10. Click the Download Summary menu link to download a portable copy of your medical information. If you have questions, please visit the Frequently Asked Questions section of the [employer] website. Remember, [employer] is NOT to be used for urgent needs. For medical emergencies, dial 911. Now available from your iPhone and Android! Please provide this summary of care documentation to your next provider. Your primary care clinician is listed as Phys Other. If you have any questions after today's visit, please call [phone].

## 2018-09-18 NOTE — LETTER
9/18/2018 5:28 PM 
 
Patient:  Rolf Asencio YOB: 1955 Date of Visit: 9/18/2018 Dear MD [address] [facility] 99 [address] VIA In Basket Bee Montesinos MD 
[facility] 27 [facility] 7 [address] VIA Facsimile: [phone] MD Leticia Holguin [address] [place] [phone] [facility] 99 [address] VIA In Basket 
 : Thank you for referring Mr. Gonzalez Brady to me for evaluation/treatment. Below are the relevant portions of my assessment and plan of care. If you have questions, please do not hesitate to call me. I look forward to following Mr. Mariana Kennedy along with you. Sincerely, Pino Mcnulty MD

## 2018-09-18 NOTE — PROGRESS NOTES
Do you have a home blood pressure cuff yes Are you taking your blood pressure at home no Have you been short of breath? With rest_yes__ With Activity_yes__ Do you have swelling in your feet__no_ Abdomen__no_ Have you gained more than 3 pounds in one night or 5 pounds in one week no Do you have a scale at home  yes Are you weighing yourself daily no Do you have a persistant cough no What color is the sputum  n/a Do you have any chest pain or discomfort  no Have you had any lightheadedness or dizziness yes Have you had any syncope no Have you had any palpitations no Have you had profound fatigue and/or weakness yes Have you had difficulty sleeping yes How many pillows do you use to sleep 2 Are you short of breath if you lay flat yes Do you have nausea, stomach upset or loss of appetite yes Do you feel abdominal fullness/early satiety yes Have you been constipated  no Have you had diarrhea no Have you had black or bloody stool  no Have you had muscle pain? Where? No 
How many flights of stairs can you climb without stopping? 1 Can you get dressed without stopping? yes Has any activity become more difficult since your last visit? n/a Do you need refills of any medications?   Which ones? n/a

## 2018-09-19 LAB
KAPPA LC FREE SER-MCNC: 26.9 MG/L (ref 3.3–19.4)
KAPPA LC FREE/LAMBDA FREE SER: 0.91 {RATIO} (ref 0.26–1.65)
LAMBDA LC FREE SERPL-MCNC: 29.6 MG/L (ref 5.7–26.3)

## 2018-09-20 ENCOUNTER — DOCUMENTATION ONLY (OUTPATIENT)
Dept: CARDIOLOGY CLINIC | Age: 63
End: 2018-09-20

## 2018-09-24 LAB
ALBUMIN 24H MFR UR ELPH: 33.4 %
ALPHA1 GLOB 24H MFR UR ELPH: 3.8 %
ALPHA2 GLOB 24H MFR UR ELPH: 16.5 %
B-GLOBULIN MFR UR ELPH: 28.1 %
GAMMA GLOB 24H MFR UR ELPH: 18.2 %
INTERPRETATION UR IFE-IMP: ABNORMAL
M PROTEIN 24H MFR UR ELPH: ABNORMAL %
NOTE, 149533: ABNORMAL
PROT 24H UR-MRATE: 213 MG/24 HR (ref 30–150)
PROT UR-MCNC: 15.2 MG/DL

## 2018-09-25 ENCOUNTER — NURSE NAVIGATOR (OUTPATIENT)
Dept: PALLATIVE CARE | Age: 63
End: 2018-09-25

## 2018-09-25 NOTE — PROGRESS NOTES
[facility]  Nursing Note  [phone]  Fax [phone]      Name:  Mony Jefferson  YOB: 1955    Received outpatient Palliative Medicine referral from Dr. Rosette Velasco to see pt for symptom management. Chart  reviewed. Mony Jefferson is a 57 y.o. male with systolic heart failure (NYHA class III, Echo on 8/21/18:  EF 15-20% and mitral & aortic valve regurgitation. Pt's other PMH includes ischemic cardiomyopathy, MI, COPD, depression, ETOH and tobacco abuse (still smokes but last drink was 3 months ago). Pt's most recent office visit with  was on 9/18/18. See her note for complete HPI, treatment history, and plan. No recent hospitalizations    ACP:     None on file                                                                                                                                                      Nurse called pt to introduce Palliative Medicine services.  No answer on home number, left message.  MAYA Bryant, RN-BC  Clinical Referral Navigator  [phone]

## 2018-10-01 ENCOUNTER — NURSE NAVIGATOR (OUTPATIENT)
Dept: PALLATIVE CARE | Age: 63
End: 2018-10-01

## 2018-10-01 NOTE — PROGRESS NOTES
[facility]   (previously: At Home)  [phone] 4101 [facility] 0, 1189 Dougie Hernandez    Received outpatient Palliative Medicine referral from Dr. Elisha Baker to see pt for symptom management. Chart  reviewed. Scarlett York is a 57 y.o. male with systolic heart failure (NYHA class III, Echo on 8/21/18:  EF 15-20% and mitral & aortic valve regurgitation. Pt's other PMH includes ischemic cardiomyopathy, MI, COPD, depression, ETOH and tobacco abuse (still smokes but last drink was 3 months ago). Pt's most recent office visit with  was on 9/18/18. See her note for complete HPI, treatment history, and plan. No recent hospitalizations     ACP:     None on file                                                                                                                                                       Nurse called pt on 9/25/18 and left message. Pt has not called our office to schedule an appt. Nurse called back today to try to reach pt:  Home number [phone]  No answer, left message. Sister's cell #  [phone]  No answer, voice mailbox has not been set up, unable to leave message.       Leonor Levin RN  Referral Navigator, [facility]

## 2018-10-03 ENCOUNTER — TELEPHONE (OUTPATIENT)
Dept: CARDIOLOGY CLINIC | Age: 63
End: 2018-10-03

## 2018-10-03 NOTE — TELEPHONE ENCOUNTER
Telephone Call RE:  Appointment reminder     Outcome:     [] Patient confirmed appointment   [] Patient rescheduled appointment for    [x] Unable to reach   [] Left message              [] Other:        Could not leave a message on cell phone      Bob Mock

## 2018-10-04 ENCOUNTER — OFFICE VISIT (OUTPATIENT)
Dept: CARDIOLOGY CLINIC | Age: 63
End: 2018-10-04

## 2018-10-04 VITALS
DIASTOLIC BLOOD PRESSURE: 68 MMHG | HEIGHT: 70 IN | BODY MASS INDEX: 39.6 KG/M2 | WEIGHT: 276.6 LBS | OXYGEN SATURATION: 97 % | HEART RATE: 103 BPM | RESPIRATION RATE: 24 BRPM | TEMPERATURE: 98.4 F | SYSTOLIC BLOOD PRESSURE: 104 MMHG

## 2018-10-04 DIAGNOSIS — E11.59 CONTROLLED TYPE 2 DIABETES MELLITUS WITH OTHER CIRCULATORY COMPLICATION, WITHOUT LONG-TERM CURRENT USE OF INSULIN (HCC): Chronic | ICD-10-CM

## 2018-10-04 DIAGNOSIS — E66.01 SEVERE OBESITY WITH BODY MASS INDEX (BMI) OF 35.0 TO 39.9 WITH SERIOUS COMORBIDITY (HCC): ICD-10-CM

## 2018-10-04 DIAGNOSIS — I25.5 ISCHEMIC CARDIOMYOPATHY: ICD-10-CM

## 2018-10-04 DIAGNOSIS — Z95.810 ICD (IMPLANTABLE CARDIOVERTER-DEFIBRILLATOR) IN PLACE: ICD-10-CM

## 2018-10-04 DIAGNOSIS — R06.02 SOB (SHORTNESS OF BREATH): ICD-10-CM

## 2018-10-04 DIAGNOSIS — I50.22 CHRONIC SYSTOLIC CONGESTIVE HEART FAILURE (HCC): Primary | ICD-10-CM

## 2018-10-04 NOTE — LETTER
10/4/2018 5:20 PM 
 
Patient:  Kaden Velez YOB: 1955 Date of Visit: 10/4/2018 Dear Artem Coley MD 
[address] [place] [address] [phone] Latricia Benítezadrian [address] VIA In Basket 
 : Thank you for referring Mr. Mandy Shepherd to me for evaluation/treatment. Below are the relevant portions of my assessment and plan of care. If you have questions, please do not hesitate to call me. I look forward to following Mayo Sanchez along with you. Sincerely, ZACH Norton

## 2018-10-04 NOTE — MR AVS SNAPSHOT
2700 [facility], [address] 
[phone] Patient: Ambar Maurer MRN: [id number] Visit Information Date & Time Provider Department Dept. Phone Encounter #  
 10/4/2018  1:30 PM Jonathan Haney [id number] Follow-up Instructions Return in about 3 weeks (around 10/25/2018) for 1 hour . Your Appointments 12/21/2018  1:40 PM  
ESTABLISHED PATIENT with Andrea Calderon MD  
[facility] ([facility] SCHEDULING) Appt Note: 4 month follow up per Dr. [facility] [address] [place] [address] [facility] [address]  
[phone]  
  
   
 [address] [place] [address] [facility] [address]  
  
    
 1/21/2019 11:30 AM  
PACEMAKER with PACEMAKER, STFRANCES  
[facility] ([facility] SCHEDULING) Appt Note: renée sci icd/stf/birgit after/new lat schedule [address] [place] [address] [facility]  
[phone]  
  
   
 [address] [place] [address] [address]  
  
    
 1/21/2019 11:40 AM  
ESTABLISHED PATIENT with Rosalinda Luong MD  
[facility] ([address]) Appt Note: annual  
 [address] [place] [address] [facility]  
[phone]  
  
   
 [address] [place] [address] [facility] [address]  
  
    
  
 10/30/2018 11:00 AM  
REMOTE OFFICE VISIT with Halley Llanos [facility] ([facility] SCHEDULING) Appt Note: lat icd/stf  
 [address] [place] [address] [facility]  
[address] [place] [address] [address] Upcoming Health Maintenance Date Due Hepatitis C Screening 1955 EYE EXAM RETINAL OR DILATED Q1 12/10/1963 Pneumococcal 19-64 Medium Risk (1 of 1 - PPSV23) 12/10/1972 DTaP/Tdap/Td series (1 - Tdap) 12/10/1974 Shingrix Vaccine Age 50> (1 of 2) 12/10/2003 FOBT Q 1 YEAR AGE 50-75 12/10/2003 FOOT EXAM Q1 6/16/2018 MICROALBUMIN Q1 6/16/2018 Influenza Age 5 to Adult 8/1/2018 HEMOGLOBIN A1C Q6M 2/9/2019 LIPID PANEL Q1 8/9/2019 Allergies as of 10/4/2018  Review Complete On: 10/4/2018 By: ZACH Cruz Severity Noted Reaction Type Reactions Atorvastatin  10/04/2018    Diarrhea Codeine  06/02/2017    Itching Metformin  10/04/2018    Other (comments) Abdominal pain Wellbutrin [Bupropion]  10/04/2018    Other (comments) Night mare Aggression Current Immunizations  Never Reviewed No immunizations on file. Not reviewed this visit You Were Diagnosed With   
  
 Codes Comments Chronic systolic congestive heart failure (HCC)    -  Primary ICD-10-CM: P38.31 ICD-9-CM: 428.22, 428.0 Ischemic cardiomyopathy     ICD-10-CM: I25.5 ICD-9-CM: 414.8 SOB (shortness of breath)     ICD-10-CM: R06.02 
ICD-9-CM: 786.05 Severe obesity with body mass index (BMI) of 35.0 to 39.9 with serious comorbidity (HCC)     ICD-10-CM: E66.01 
ICD-9-CM: 278.01   
 ICD (implantable cardioverter-defibrillator) in place     ICD-10-CM: Z95.810 ICD-9-CM: V45.02 Controlled type 2 diabetes mellitus with other circulatory complication, without long-term current use of insulin (HCC)     ICD-10-CM: E11.59 
ICD-9-CM: 250.70 Vitals BP Pulse Temp Resp Height(growth percentile) Weight(growth percentile) 104/68 (BP 1 Location: Left arm, BP Patient Position: Sitting) (!) 103 98.4 °F (36.9 °C) (Oral) 24 5' 10\" (1.778 m) 276 lb 9.6 oz (125.5 kg) SpO2 BMI Smoking Status 97% 39.69 kg/m2 Current Every Day Smoker Vitals History BMI and BSA Data Body Mass Index Body Surface Area  
 39.69 kg/m 2 2.49 m 2 Preferred Pharmacy Pharmacy Name Phone [address] [phone] Your Updated Medication List  
  
   
This list is accurate as of 10/4/18  3:01 PM.  Always use your most recent med list.  
  
  
  
  
 acetaminophen 325 mg tablet Commonly known as:  TYLENOL Take 2 Tabs by mouth every four (4) hours as needed. aspirin 81 mg chewable tablet Take 1 Tab by mouth daily. BREO ELLIPTA 100-25 mcg/dose inhaler Generic drug:  fluticasone-vilanterol Take 1 Puff by inhalation daily. Indications: 200 MCG - 25 MCG  
  
 bumetanide 0.5 mg tablet Commonly known as:  Maryln Spoon TAKE ONE TABLET BY MOUTH ONCE DAILY  
  
 clopidogrel 75 mg Tab Commonly known as:  PLAVIX Take 1 Tab by mouth daily. Dr. Rosa Covington to provide refills  Indications: Thrombosis Prevention after PCI  
  
 ezetimibe 10 mg tablet Commonly known as:  Shellye Sane Take 1 Tab by mouth daily. glimepiride 2 mg tablet Commonly known as:  AMARYL Take 2 mg by mouth every morning. JANUVIA 25 mg tablet Generic drug:  SITagliptin Take 25 mg by mouth daily. melatonin 3 mg tablet Take 3 mg by mouth nightly. metoprolol succinate 25 mg XL tablet Commonly known as:  TOPROL-XL  
TAKE ONE TABLET BY MOUTH ONCE DAILY  
  
 omeprazole 20 mg capsule Commonly known as:  PRILOSEC Take 20 mg by mouth daily. sacubitril-valsartan 49-51 mg Tab tablet Commonly known as:  ENTRESTO Take 1 Tab by mouth two (2) times a day. Indications: chronic heart failure  
  
 simvastatin 40 mg tablet Commonly known as:  ZOCOR Take  by mouth every morning. TYLENOL PM PO Take  by mouth. VENTOLIN HFA IN Take  by inhalation. VITAMIN B-12 500 mcg tablet Generic drug:  cyanocobalamin Take 500 mcg by mouth daily. VITAMIN B-6 100 mg tablet Generic drug:  pyridoxine (vitamin B6) Take 100 mg by mouth daily. Prescriptions Printed Refills sacubitril-valsartan (ENTRESTO) 49 mg/51 mg tablet 1 Sig: Take 1 Tab by mouth two (2) times a day. Indications: chronic heart failure Class: Print Route: Oral  
  
We Performed the Following AMB POC EKG ROUTINE W/ 12 LEADS, INTER & REP [91332 CPT(R)] METABOLIC PANEL, BASIC [40882 CPT(R)] NT-PRO BNP [id number] CPT(R)] Follow-up Instructions Return in about 3 weeks (around 10/25/2018) for 1 hour . Patient Instructions You are doing better INCREASE  Entresto to 49/51 mg twice a day ( OK to take 2 of the 24/26 mg tablets twice a day to use up what you already have at home) CONTINUE CURRENT medications Take twice a day medications 12 hours apart with food Labs today BMP, proBNP Reduce smoking by 1/2 Limit WHITE foods (flour, sugar, pasta, rice, potatoes) Walk more Keep a positive attitude HF Education: Continue daily weights (in the morning, after voiding). Notify HF team of overnight weight gains > 2 lbs or weekly >5 lbs or if any of the following Sx. Continue to limit sodium intake & monitor your fluid intake . Introducing [facility]! [facility] introduces [employer] patient portal. Now you can access parts of your medical record, email your doctor's office, and request medication refills online. 1. In your internet browser, go to [email or web address] 2. Click on the First Time User? Click Here link in the Sign In box. You will see the New Member Sign Up page. 3. Enter your [employer] Access Code exactly as it appears below. You will not need to use this code after youve completed the sign-up process. If you do not sign up before the expiration date, you must request a new code. · [employer] Access Code: [email or web address] Expires: 10/28/2018  3:08 PM 
 
4. Enter the last four digits of your Social Security Number (xxxx) and Date of Birth (mm/dd/yyyy) as indicated and click Submit.  You will be taken to the next sign-up page. 5. Create a [employer] ID. This will be your [employer] login ID and cannot be changed, so think of one that is secure and easy to remember. 6. Create a [employer] password. You can change your password at any time. 7. Enter your Password Reset Question and Answer. This can be used at a later time if you forget your password. 8. Enter your e-mail address. You will receive e-mail notification when new information is available in [address] 9. Click Sign Up. You can now view and download portions of your medical record. 10. Click the Download Summary menu link to download a portable copy of your medical information. If you have questions, please visit the Frequently Asked Questions section of the [employer] website. Remember, [employer] is NOT to be used for urgent needs. For medical emergencies, dial 911. Now available from your iPhone and Android! Please provide this summary of care documentation to your next provider. Your primary care clinician is listed as Phys Other. If you have any questions after today's visit, please call [phone].

## 2018-10-04 NOTE — PATIENT INSTRUCTIONS
You are doing better       INCREASE  Entresto to 49/51 mg twice a day ( OK to take 2 of the 24/26 mg tablets twice a day to use up what you already have at home)      CONTINUE CURRENT medications    Take twice a day medications 12 hours apart with food    Labs today BMP, proBNP    Reduce smoking by 1/2       Limit WHITE foods (flour, sugar, pasta, rice, potatoes)    Walk more    Keep a positive attitude       HF Education: Continue daily weights (in the morning, after voiding). Notify HF team of overnight weight gains > 2 lbs or weekly >5 lbs or if any of the following Sx. Continue to limit sodium intake & monitor your fluid intake .

## 2018-10-04 NOTE — PROGRESS NOTES
[facility] Consultation Note DOS:   10/4/2018 NAME:  John Daniels MRN:   [id number] REFERRING PROVIDER:  Rosa Covington MD 
PRIMARY CARE PHYSICIAN: Galina Moon MD 
PRIMARY CARDIOLOGIST: Rosa Covington MD 
 
 IMPRESSION/PLAN 
 ICM - Stage C, NYHA Class III, LVEF 15-20% Increase Entresto 49/51 mg BID-  
 Continue  Toprol XL 25 mg daily and bumex 0.5 mg daily Check BMP, NT proBNP today Follow up in the [facility] in 3 weeks Meet with LCSW for assistance with Medicare Part D in December  and housing- rents room Cardiac rehab for HF at [address] [place] [address] Suspect SHERI- witnessed apnea in hospital daytime somnolence CAD s/p MI, s/p PCI with [facility]- no angina On ASA, clopidogrel, BB, statin Stable S/p AICD Dr Maine Rapp No shocks ETOH Abuse Encourage continued abstinence - last drink 40 days ago Nicotine Addiction Smoking cessation counseling 
   
T2DM A1C On glimepiride, sitagliptin Nicholerosalinda Mejia HLD Dr. Jesus Alberto Johnson On ezetimibe, simvastatin COPD On Breo, Albuterol MDI Depression Not suicidal 
 Intolerant to wellbutrin/chantix Refer to counseling Chief Complaint:  
Chief Complaint Patient presents with  Follow-up  Shortness of Breath  Fatigue HPI:Marco Celaya is a  57y.o. year old   male with a history of HFrEF in the setting of ICM s/p AICD with CAD s/p PCI RCA with [facility] 9/62 complicated by  D5LZ, COPD, persistent tobacco use, ETOH abuse, depression with anxiety, and noncompliance who presents for further evaluation of his chronic systolic heart failure. He suffered an out-of-hospital MI in May 2017 and presented with subacute HF in June. Cadiac MRI demonstrated viability only in the RCA territory. Cardiac cath demonstrated mid %, RCA mid 85%. He underwent PCI RCA with [facility].  Would continue DAPT for a full year.    
 
 He has abstained from alcohol over the past 40 days,  months and accesses the [facility]  for his medications. He is obsessed with dying but denies suicidal ideation He feels better since resuming Entresto- more stamina  He is working in the yard, more than he has been able to do in 6 months. He notes BERNABE after several blocks, or from the parking lot to the door of [address]. He has difficulty sleeping, reporting he awakens with \"anxiety\", and never been tested for SHERI. He reports the nurses witnessed apnea when in the hospital . Denies orthopnea or PND. Naps daily. Daily weight in the 253 range. Denies LE edema or abdominal bloating. No adding salt, but does not restrict foods. He is still smoking 1 ppd,and has stopped wellbutrin 2/2 increased agitation and night terrell. Home BP  101-118/60-70 range, HR 80 range Denies exertional chest pain, palpitations, lightheadedness, dizziness, syncope, near syncope, AICD shock, bleeding. Entresto from [employer] Medicare in December, care card application pending. He rents a room. Review of Systems:    
General:  positive for  - fatigue and sleep disturbance HEENT: negative Pulmonary: positive for - cough and shortness of breath Cardiac: Per HPI  
GI:  Denies abdominal pain, change in bowel habits, or black or bloody stools Musculo: negative Neuro: no TIA or stroke symptoms Skin:  negative Allergies: REMINDER:DOCUMENT ALLERGIES IN ALLERGY ACTIVITY Psych: Positive for depression CARDIAC EVALUATION  
ECHO: 5/31//2017: EF 15-20%, apical AK 
ECHO 9/18/17- Dilated LV, EF 20%, apical DK MRI: 6/1/2017: LV dilated LVEF 32%, severe HK ant wall, RVEF 55%, mild MR; anterior infarct- no viability RCA/LCA: viable CATH 6/2/2017: LM: nl, LAD: m100%, LCX codom normal, RCA:m 85%, LVEDP 25, no AV gradient, dilated LV, apical AK, EF 20% 
-- s/p PCI pRCA: 2.5x18 Xience ([facility]). AICD 12/5/2017; [place] [facility] single lead   EKG:   
 9/18/2018: Sinus  Rhythm, Low voltage in limb leads.   -Left axis -anterior fascicular block.  
 -Anterior infarct -age undetermined. T-abnormality  -Possible  Anterolateral  ischemia. 10/4/2018: SR, LAD, PRWP, QRD 96 md, QTc 389 History: 
Past Medical History:  
Diagnosis Date  Congestive heart failure ([facility]  Controlled type 2 diabetes mellitus with circulatory disorder ([facility] 6/1/2017  COPD (chronic obstructive pulmonary disease) (HCC)  Coronary artery disease involving native coronary artery without angina pectoris 06/01/2017 OOH MI, 2v disease s/p PCI RCA with [facility] June 2017  
 ETOH abuse 6/1/2017  Ischemic cardiomyopathy Extensive LAD territory infarction  Nicotine dependence 6/1/2017  
 SOB (shortness of breath) 10/4/2018  Systolic heart failure ([facility] Past Surgical History:  
Procedure Laterality Date  HX CORONARY STENT PLACEMENT    
 HX HEART CATHETERIZATION    
 HX TONSILLECTOMY  HX WISDOM TEETH EXTRACTION Social History Social History  Marital status: SINGLE Spouse name: N/A  
 Number of children: N/A  
 Years of education: N/A Occupational History  Not on file. Social History Main Topics  Smoking status: Current Every Day Smoker Packs/day: 1.00 Years: 30.00 Types: Cigarettes  Smokeless tobacco: Never Used  Alcohol use No  
   Comment: former drinker-quit 3 months ago-drank a 6 pack per day  Drug use: No  
 Sexual activity: Yes  
  Partners: Female Other Topics Concern  Not on file Social History Narrative Family History Problem Relation Age of Onset  Stroke Father  Heart Disease Father  Hypertension Father Current Medications:  
Current Outpatient Prescriptions Medication Sig Dispense Refill  sacubitril-valsartan (ENTRESTO) 24 mg/26 mg tablet Take 1 Tab by mouth two (2) times a day. 28 Tab 0  
 melatonin 3 mg tablet Take 3 mg by mouth nightly.  glimepiride (AMARYL) 2 mg tablet Take 2 mg by mouth every morning.  omeprazole (PRILOSEC) 20 mg capsule Take 20 mg by mouth daily.  SITagliptin (JANUVIA) 25 mg tablet Take 25 mg by mouth daily.  simvastatin (ZOCOR) 40 mg tablet Take  by mouth every morning.  cyanocobalamin (VITAMIN B-12) 500 mcg tablet Take 500 mcg by mouth daily.  pyridoxine, vitamin B6, (VITAMIN B-6) 100 mg tablet Take 100 mg by mouth daily.  ALBUTEROL SULFATE (VENTOLIN HFA IN) Take  by inhalation.  ezetimibe (ZETIA) 10 mg tablet Take 1 Tab by mouth daily. 30 Tab 5  
 fluticasone-vilanterol (BREO ELLIPTA) 100-25 mcg/dose inhaler Take 1 Puff by inhalation daily. Indications: 200 MCG - 25 MCG  acetaminophen (TYLENOL) 325 mg tablet Take 2 Tabs by mouth every four (4) hours as needed.  bumetanide (BUMEX) 0.5 mg tablet TAKE ONE TABLET BY MOUTH ONCE DAILY 30 Tab 2  ACETAMINOPHEN/DIPHENHYDRAMINE (TYLENOL PM PO) Take  by mouth.  metoprolol succinate (TOPROL-XL) 25 mg XL tablet TAKE ONE TABLET BY MOUTH ONCE DAILY 30 Tab 6  clopidogrel (PLAVIX) 75 mg tab Take 1 Tab by mouth daily. Dr. Roc Chin to provide refills  Indications: Thrombosis Prevention after PCI 90 Tab 3  
 aspirin 81 mg chewable tablet Take 1 Tab by mouth daily. Allergies: Allergies Allergen Reactions  Atorvastatin Diarrhea  Codeine Itching  Metformin Other (comments) Abdominal pain  Wellbutrin [Bupropion] Other (comments) Night mare Aggression Admission Weight: Last Weight Weight: 276 lb 9.6 oz (125.5 kg) Weight: 276 lb 9.6 oz (125.5 kg) Physical Exam:  
Vitals:   
Visit Vitals  /68 (BP 1 Location: Left arm, BP Patient Position: Sitting)  Pulse (!) 103  Temp 98.4 °F (36.9 °C) (Oral)  Resp 24  
 Ht 5' 10\" (1.778 m)  Wt 276 lb 9.6 oz (125.5 kg)  SpO2 97%  BMI 39.69 kg/m2 General:  Well developed WM, obese,alert,  Cooperative, Talkative with tangent of thoughts HEENT: PERRLA, and Sclera clear, anicteric Neck:  supple, no significant adenopathy, carotids upstroke normal bilaterally, no bruits CVP:  10 cm  ( + ) HJR Cardiac: AICD left infraclavicular space, regular rate, regular rhythm, S4 present and systolic murmur present Chest: BERNABE noted, Diminished breath sounds bilaterally Abdomen: Obese, protuberant, soft, nontender, mildly Distended, normal BS Extremity: edema trace bilaterally Neuro: A&O x 3 x 3, normal strength and tone. Normal coordination and gait Skin:   no rashes, no ecchymoses, no petechiae Recent Labs:  
Lab Results Component Value Date/Time WBC 7.2 08/09/2018 10:26 AM  
HGB 16.4 08/09/2018 10:26 AM  
HCT 49.0 08/09/2018 10:26 AM  
PLATELET 465 24/94/6429 10:26 AM  
MCV 96.5 08/09/2018 10:26 AM  
 
Lab Results Component Value Date/Time TSH 1.72 04/05/2018 09:10 AM  
  
Lab Results Component Value Date/Time NT pro-BNP 6586 (H) 06/02/2017 01:40 AM  
 NT pro-BNP 7025 (H) 05/30/2017 03:37 PM  
  
Lab Results Component Value Date/Time Sodium 137 08/09/2018 10:26 AM  
 Potassium 4.4 08/09/2018 10:26 AM  
 Chloride 102 08/09/2018 10:26 AM  
 CO2 25 08/09/2018 10:26 AM  
 Anion gap 10 08/09/2018 10:26 AM  
 Glucose 171 (H) 08/09/2018 10:26 AM  
 BUN 12 08/09/2018 10:26 AM  
 Creatinine 1.13 08/09/2018 10:26 AM  
 BUN/Creatinine ratio 11 (L) 08/09/2018 10:26 AM  
 GFR est AA >60 08/09/2018 10:26 AM  
 GFR est non-AA >60 08/09/2018 10:26 AM  
 Calcium 8.8 08/09/2018 10:26 AM  
 Bilirubin, total 0.5 08/09/2018 10:26 AM  
 ALT (SGPT) 17 08/09/2018 10:26 AM  
 AST (SGOT) 13 (L) 08/09/2018 10:26 AM  
 Alk. phosphatase 73 08/09/2018 10:26 AM  
 Protein, total 7.0 08/09/2018 10:26 AM  
 Albumin 3.9 08/09/2018 10:26 AM  
 Globulin 3.1 08/09/2018 10:26 AM  
 A-G Ratio 1.3 08/09/2018 10:26 AM  
  
Lab Results Component Value Date/Time Hemoglobin A1c 7.1 (H) 08/09/2018 10:26 AM  
  
  
 
 
Impression / Plan: I have discussed the diagnosis with  Harriet Alcantar and the intended plan as seen in the above orders. Questions were answered concerning future plans, and written instructions provided. I have discussed medication side effects and warnings with the patient as well. Thank you for allowing me to participate in the care of this pleasant man. Please do not hesitate to call with any questions. Selena Rinaldi RN, ACNP-BC, [facility] [address] [facility] [id number] [phone] 
34 hour VAD/HF Pager: [phone]

## 2018-10-05 LAB
BUN SERPL-MCNC: 10 MG/DL (ref 8–27)
BUN/CREAT SERPL: 10 (ref 10–24)
CALCIUM SERPL-MCNC: 9.4 MG/DL (ref 8.6–10.2)
CHLORIDE SERPL-SCNC: 106 MMOL/L (ref 96–106)
CO2 SERPL-SCNC: 19 MMOL/L (ref 20–29)
CREAT SERPL-MCNC: 0.97 MG/DL (ref 0.76–1.27)
GLUCOSE SERPL-MCNC: 72 MG/DL (ref 65–99)
NT-PROBNP SERPL-MCNC: 136 PG/ML (ref 0–210)
POTASSIUM SERPL-SCNC: 4.1 MMOL/L (ref 3.5–5.2)
SODIUM SERPL-SCNC: 142 MMOL/L (ref 134–144)

## 2018-10-08 ENCOUNTER — DOCUMENTATION ONLY (OUTPATIENT)
Dept: CARDIOLOGY CLINIC | Age: 63
End: 2018-10-08

## 2018-10-08 NOTE — PROGRESS NOTES
received a message from Jhonathan Son regarding reaching out to the patient for counseling resources.  attempted to reach the patient to no avail - he does not have a voicemail box set up yet.     Jamey Martinez, MSW, LCSW    Clinical    [address] [phone]   [employer] ® ACP Facilitator

## 2018-10-12 ENCOUNTER — DOCUMENTATION ONLY (OUTPATIENT)
Dept: CARDIOLOGY CLINIC | Age: 63
End: 2018-10-12

## 2018-10-12 NOTE — PROGRESS NOTES
attempted to call the patient to touch base - he did not answer and his voicemail box is not set up.     Chari White, MSW, LCSW    Clinical    Vu Mckay   [employer] ® ACP Facilitator

## 2018-10-25 ENCOUNTER — OFFICE VISIT (OUTPATIENT)
Dept: CARDIOLOGY CLINIC | Age: 63
End: 2018-10-25

## 2018-10-25 VITALS
HEIGHT: 70 IN | WEIGHT: 277.4 LBS | DIASTOLIC BLOOD PRESSURE: 72 MMHG | RESPIRATION RATE: 20 BRPM | OXYGEN SATURATION: 93 % | SYSTOLIC BLOOD PRESSURE: 110 MMHG | BODY MASS INDEX: 39.71 KG/M2 | TEMPERATURE: 99 F | HEART RATE: 71 BPM

## 2018-10-25 DIAGNOSIS — I25.10 CORONARY ARTERY DISEASE INVOLVING NATIVE CORONARY ARTERY OF NATIVE HEART WITHOUT ANGINA PECTORIS: ICD-10-CM

## 2018-10-25 DIAGNOSIS — E11.59 CONTROLLED TYPE 2 DIABETES MELLITUS WITH OTHER CIRCULATORY COMPLICATION, WITHOUT LONG-TERM CURRENT USE OF INSULIN (HCC): Chronic | ICD-10-CM

## 2018-10-25 DIAGNOSIS — I50.20 SYSTOLIC CONGESTIVE HEART FAILURE WITH REDUCED LEFT VENTRICULAR FUNCTION, NYHA CLASS 3 (HCC): ICD-10-CM

## 2018-10-25 DIAGNOSIS — R06.02 SOB (SHORTNESS OF BREATH): ICD-10-CM

## 2018-10-25 DIAGNOSIS — I25.5 ISCHEMIC CARDIOMYOPATHY: Primary | ICD-10-CM

## 2018-10-25 DIAGNOSIS — E66.01 SEVERE OBESITY WITH BODY MASS INDEX (BMI) OF 35.0 TO 39.9 WITH SERIOUS COMORBIDITY (HCC): ICD-10-CM

## 2018-10-25 DIAGNOSIS — Z95.810 ICD (IMPLANTABLE CARDIOVERTER-DEFIBRILLATOR) IN PLACE: ICD-10-CM

## 2018-10-25 DIAGNOSIS — I50.22 CHRONIC SYSTOLIC CONGESTIVE HEART FAILURE (HCC): ICD-10-CM

## 2018-10-25 NOTE — PATIENT INSTRUCTIONS
You are doing some better      INCREASE entresto to 1 tablet ( 24/26 mg) in the morning and 2 tablets in the evening. Take with food containing protein. Use tylenol for headaches      CONTINUE CURRENT medications    Take twice a day medications 12 hours apart with food    Labs today BMP, proBNP     I will reach out to Dr. Fred Dubose about the cardioMems implant     Limit WHITE foods ( flour, sugar, pasta, rice, potatoes) and food with salt  Cardiac rehab referral   we can hold on the sleep apnea referral until you get your care card and/or Medicare    Walk more    Keep a positive attitude       HF Education: Continue daily weights (in the morning, after voiding). Notify HF team of overnight weight gains > 2 lbs or weekly >5 lbs or if any of the following Sx. Continue to limit sodium intake & monitor your fluid intake .

## 2018-10-25 NOTE — PROGRESS NOTES
[facility] Consultation Note DOS:   10/25/2018 NAME:  Ambar Maurer MRN:   [id number] REFERRING PROVIDER:  Andrea Calderon MD 
PRIMARY CARE PHYSICIAN: Galina Moon MD 
PRIMARY CARDIOLOGIST: Andrea Calderon MD 
 
 IMPRESSION/PLAN 
 ICM - Stage C, NYHA Class III, LVEF 15-20% Increase Entresto 49/51 mg hs and continue the 24/26 dose q am, advised to take with food contain protein  And a glass of water Continue  Toprol XL 25 mg daily and bumex 0.5 mg daily Check BMP, NT proBNP today Follow up in the [facility] in 1 week Discussed CardioMems- will discuss with Dr. Ranjan Alvarado Medicare becomes effective 12/10/18 Cardiac rehab for HF at [facility] Sudden cardiac death px- s/p AICD- no shocks recently Suspect SHERI- witnessed apnea in hospital daytime somnolence Will refer for sleep study once care card/Medicare aproved CAD s/p MI, s/p PCI with [facility]- no angina On ASA, clopidogrel, BB, statin Stable ETOH Abuse Continues to abstinence Nicotine Addiction Smoking cessation counseling 
   
T2DM A1C On glimepiride, sitagliptin Jordana Dotson HLD Dr. Erin Schwarz On ezetimibe, simvastatin COPD On Breo, Albuterol MDI Depression/anxiety - per [facility] Not suicidal 
 Intolerant to wellbutrin/chantix Refer to counseling Thank you for allowing me to participate in the care of this pleasant gentleman. Please do not hesitate to call with any questions. Selena Kumar RN, ACNP-BC, [facility] Chief Complaint:  
Chief Complaint Patient presents with  Follow-up HPI:Marco Mcadams is a  57y.o. year old   male with a history of HFrEF in the setting of ICM s/p AICD with CAD s/p PCI RCA with [facility] 7/82 complicated by  V3CF, COPD, persistent tobacco use, ETOH abuse, depression with anxiety, and noncompliance who presents for further evaluation of his chronic systolic heart failure. He suffered an out-of-hospital MI in May 2017 and presented with subacute HF in June. [place] MRI demonstrated viability only in the RCA territory. Cardiac cath demonstrated mid %, RCA mid 85%. He underwent PCI RCA with [facility]. Would continue DAPT for a full year.    
 He was last seen 3 weeks ago. Poor historian. He did not tolerate increasing the Entresto to 49/51 mg - sx of palpitations, chest pain at rest, and anxiety  after 3 doses. He stopped entresto for several days and resumed and reported headaches, lightheaded, and diaphoretic. BP variable 110-169 /60-90 range He is generally sedentary. Stable BERNABE with ADLs and after walking several blocks. He continues to smoke 1 ppd. He is afraid to \"push\" himself. He is not following daily wts 2/2 dead batteries in his scale. His weight is stable compared with the last visit. He is sleeping better with APAP-PM and melatonin. Never been tested for SHERI. He reports the nurses witnessed apnea when in the hospital . Denies orthopnea or PND. Naps most days. Denies LE edema or abdominal bloating. No adding salt, but does not restrict foods. He eats a [employer]-egg- cheese biscuit daily. He has abstained from alcohol over the past 40 days,  months and accesses the [facility]  for his medications. He is obsessed with dying but denies suicidal ideation Denies exertional chest pain, palpitations, lightheadedness, dizziness, syncope, near syncope, AICD shock, bleeding. Entresto from [employer]. Medicare in December, care card application pending. He rents a room. Review of Systems:    
General:  positive for  - fatigue and sleep disturbance HEENT: Negative- denies angioedema sx Pulmonary: positive for - cough and shortness of breath Cardiac: Per HPI  
GI:  Denies abdominal pain, change in bowel habits, or black or bloody stools Musculo: negative Neuro: no TIA or stroke symptoms Skin:  negative Allergies: REMINDER:DOCUMENT ALLERGIES IN ALLERGY ACTIVITY Psych: Positive for depression and anxiety CARDIAC EVALUATION  
ECHO: 2017: EF 15-20%, apical AK 
ECHO 17- Dilated LV, EF 20%, apical DK MRI: 2017: LV dilated LVEF 32%, severe HK ant wall, RVEF 55%, mild MR; anterior infarct- no viability RCA/LCA: viable CATH 2017: LM: nl, LAD: m100%, LCX codom normal, RCA:m 85%, LVEDP 25, no AV gradient, dilated LV, apical AK, EF 20% 
-- s/p PCI pRCA: 2.5x18 Xience ([facility]). AICD 2017; [place] [facility] single lead EK2018: Sinus  Rhythm, Low voltage in limb leads.   -Left axis -anterior fascicular block.  
 -Anterior infarct -age undetermined. T-abnormality  -Possible  Anterolateral  ischemia. 10/4/2018: SR, LAD, PRWP, QRD 96 md, QTc 389 History: 
Past Medical History:  
Diagnosis Date  Controlled type 2 diabetes mellitus with circulatory disorder ([facility] 2017  COPD (chronic obstructive pulmonary disease) ([facility])  Coronary artery disease involving native coronary artery without angina pectoris 2017 OOH MI, 2v disease s/p PCI RCA with [facility] 2017  
 ETOH abuse 2017  Ischemic cardiomyopathy Extensive LAD territory infarction  Nicotine dependence 2017  
 SOB (shortness of breath) 10/4/2018  Systolic congestive heart failure with reduced left ventricular function, NYHA class 3 ([address] 10/25/2018 Past Surgical History:  
Procedure Laterality Date  HX CORONARY STENT PLACEMENT    
 HX HEART CATHETERIZATION    
 HX TONSILLECTOMY  HX WISDOM TEETH EXTRACTION Social History Socioeconomic History  Marital status: SINGLE Spouse name: Not on file  Number of children: Not on file  Years of education: Not on file  Highest education level: Not on file Social Needs  Financial resource strain: Not on file  Food insecurity - worry: Not on file  Food insecurity - inability: Not on file  Transportation needs - medical: Not on file  Transportation needs - non-medical: Not on file Occupational History  Not on file Tobacco Use  Smoking status: Current Every Day Smoker Packs/day: 1.00 Years: 30.00 Pack years: 30.00 Types: Cigarettes  Smokeless tobacco: Never Used Substance and Sexual Activity  Alcohol use: No  
  Comment: former drinker-quit 3 months ago-drank a 6 pack per day  Drug use: No  
 Sexual activity: Yes  
  Partners: Female Other Topics Concern  Not on file Social History Narrative  Not on file Family History Problem Relation Age of Onset  Stroke Father  Heart Disease Father  Hypertension Father Current Medications:  
Current Outpatient Medications Medication Sig Dispense Refill  melatonin 3 mg tablet Take 3 mg by mouth nightly.  glimepiride (AMARYL) 2 mg tablet Take 2 mg by mouth every morning.  omeprazole (PRILOSEC) 20 mg capsule Take 20 mg by mouth daily.  SITagliptin (JANUVIA) 25 mg tablet Take 25 mg by mouth daily.  simvastatin (ZOCOR) 40 mg tablet Take  by mouth every morning.  cyanocobalamin (VITAMIN B-12) 500 mcg tablet Take 500 mcg by mouth daily.  ALBUTEROL SULFATE (VENTOLIN HFA IN) Take  by inhalation.  ezetimibe (ZETIA) 10 mg tablet Take 1 Tab by mouth daily. 30 Tab 5  
 fluticasone-vilanterol (BREO ELLIPTA) 100-25 mcg/dose inhaler Take 1 Puff by inhalation daily. Indications: 200 MCG - 25 MCG  bumetanide (BUMEX) 0.5 mg tablet TAKE ONE TABLET BY MOUTH ONCE DAILY 30 Tab 2  ACETAMINOPHEN/DIPHENHYDRAMINE (TYLENOL PM PO) Take  by mouth.  metoprolol succinate (TOPROL-XL) 25 mg XL tablet TAKE ONE TABLET BY MOUTH ONCE DAILY 30 Tab 6  clopidogrel (PLAVIX) 75 mg tab Take 1 Tab by mouth daily. Dr. Contreras Mei to provide refills  Indications: Thrombosis Prevention after PCI 90 Tab 3  
 aspirin 81 mg chewable tablet Take 1 Tab by mouth daily.  sacubitril-valsartan (ENTRESTO) 49 mg/51 mg tablet Take 1 Tab by mouth two (2) times a day. Indications: chronic heart failure 60 Tab 1  pyridoxine, vitamin B6, (VITAMIN B-6) 100 mg tablet Take 100 mg by mouth daily.  acetaminophen (TYLENOL) 325 mg tablet Take 2 Tabs by mouth every four (4) hours as needed. Allergies: Allergies Allergen Reactions  Atorvastatin Diarrhea  Codeine Itching  Metformin Other (comments) Abdominal pain  Wellbutrin [Bupropion] Other (comments) Night mare Aggression Admission Weight: Last Weight Weight: 277 lb 6.4 oz (125.8 kg) Weight: 277 lb 6.4 oz (125.8 kg) Physical Exam:  
Vitals:   
Visit Vitals /72 (BP 1 Location: Left arm, BP Patient Position: Sitting) Pulse 71 Temp 99 °F (37.2 °C) (Oral) Resp 20 Ht 5' 10\" (1.778 m) Wt 277 lb 6.4 oz (125.8 kg) SpO2 93% BMI 39.80 kg/m² General:  Well developed WM, obese,alert,  Cooperative, Talkative with tangent of thoughts HEENT: Normocephalic, PERRLA, and Sclera clear, anicteric Neck:  supple, no significant adenopathy, carotids upstroke normal bilaterally, no bruits CVP:  10 cm ( + ) HJR Cardiac: AICD left infraclavicular space, RRR, +  S4 present and systolic murmur present Chest/Pulm:  resp unlabored, , Diminished breath sounds bilaterally Abdomen: Obese, protuberant, soft, nontender, mildly Distended, normal BS Extremity: No edema Neuro: A&O x3, , normal strength and tone. Normal coordination and gait Skin:   no rashes, no ecchymoses, no petechiae Recent Labs:  
Lab Results Component Value Date/Time WBC 7.2 08/09/2018 10:26 AM  
 HGB 16.4 08/09/2018 10:26 AM  
 HCT 49.0 08/09/2018 10:26 AM  
 PLATELET 385 54/46/1308 10:26 AM  
 MCV 96.5 08/09/2018 10:26 AM  
 
Lab Results Component Value Date/Time TSH 1.72 04/05/2018 09:10 AM  
  
Lab Results Component Value Date/Time  NT pro-BNP 6,586 (H) 06/02/2017 01:40 AM  
 NT pro-BNP 7,025 (H) 05/30/2017 03:37 PM  
 PROBNP 136 10/04/2018 12:00 AM  
  
Lab Results Component Value Date/Time Sodium 142 10/04/2018 12:00 AM  
 Potassium 4.1 10/04/2018 12:00 AM  
 Chloride 106 10/04/2018 12:00 AM  
 CO2 19 (L) 10/04/2018 12:00 AM  
 Anion gap 10 08/09/2018 10:26 AM  
 Glucose 72 10/04/2018 12:00 AM  
 BUN 10 10/04/2018 12:00 AM  
 Creatinine 0.97 10/04/2018 12:00 AM  
 BUN/Creatinine ratio 10 10/04/2018 12:00 AM  
 GFR est AA 95 10/04/2018 12:00 AM  
 GFR est non-AA 82 10/04/2018 12:00 AM  
 Calcium 9.4 10/04/2018 12:00 AM  
 Bilirubin, total 0.5 08/09/2018 10:26 AM  
 ALT (SGPT) 17 08/09/2018 10:26 AM  
 AST (SGOT) 13 (L) 08/09/2018 10:26 AM  
 Alk. phosphatase 73 08/09/2018 10:26 AM  
 Protein, total 7.0 08/09/2018 10:26 AM  
 Albumin 3.9 08/09/2018 10:26 AM  
 Globulin 3.1 08/09/2018 10:26 AM  
 A-G Ratio 1.3 08/09/2018 10:26 AM  
  
Lab Results Component Value Date/Time Hemoglobin A1c 7.1 (H) 08/09/2018 10:26 AM  
  
  
 
 
Impression / Plan:  
 
I have discussed the diagnosis with  Rocio Yip and the intended plan as seen in the above orders. Questions were answered concerning future plans, and written instructions provided. I have discussed medication side effects and warnings with the patient as well. Thank you for allowing me to participate in the care of this pleasant man. Please do not hesitate to call with any questions. Selena Mandujano  RN, ACNP-BC, [facility] [address] [facility] [id number] 
[phone] 
83 hour VAD/HF Pager: [phone]

## 2018-10-25 NOTE — LETTER
10/25/2018 1:26 PM 
 
Patient:  Lexy Velázquez YOB: 1955 Date of Visit: 10/25/2018 Dear MD Leticia Guaman [address] [phone] [phone] [id number] VIA In Basket 
 : Thank you for referring Mr. Miya Zamora to me for evaluation/treatment. Below are the relevant portions of my assessment and plan of care. If you have questions, please do not hesitate to call me. I look forward to following Mr. Freeman Concrete along with you. Sincerely, Kimmy Reid, JANEP

## 2018-10-30 ENCOUNTER — OFFICE VISIT (OUTPATIENT)
Dept: CARDIOLOGY CLINIC | Age: 63
End: 2018-10-30

## 2018-10-30 DIAGNOSIS — Z95.810 ICD (IMPLANTABLE CARDIOVERTER-DEFIBRILLATOR) IN PLACE: Primary | ICD-10-CM

## 2018-11-01 ENCOUNTER — TELEPHONE (OUTPATIENT)
Dept: CARDIOLOGY CLINIC | Age: 63
End: 2018-11-01

## 2018-11-01 ENCOUNTER — OFFICE VISIT (OUTPATIENT)
Dept: CARDIOLOGY CLINIC | Age: 63
End: 2018-11-01

## 2018-11-01 ENCOUNTER — DOCUMENTATION ONLY (OUTPATIENT)
Dept: CARDIOLOGY CLINIC | Age: 63
End: 2018-11-01

## 2018-11-01 VITALS
HEART RATE: 64 BPM | DIASTOLIC BLOOD PRESSURE: 74 MMHG | OXYGEN SATURATION: 97 % | SYSTOLIC BLOOD PRESSURE: 116 MMHG | TEMPERATURE: 99.5 F | BODY MASS INDEX: 40.2 KG/M2 | RESPIRATION RATE: 24 BRPM | HEIGHT: 70 IN | WEIGHT: 280.8 LBS

## 2018-11-01 DIAGNOSIS — I25.5 ISCHEMIC CARDIOMYOPATHY: Primary | ICD-10-CM

## 2018-11-01 DIAGNOSIS — I50.20 SYSTOLIC CONGESTIVE HEART FAILURE WITH REDUCED LEFT VENTRICULAR FUNCTION, NYHA CLASS 3 (HCC): ICD-10-CM

## 2018-11-01 DIAGNOSIS — I50.22 CHRONIC SYSTOLIC CONGESTIVE HEART FAILURE (HCC): ICD-10-CM

## 2018-11-01 DIAGNOSIS — Z95.810 ICD (IMPLANTABLE CARDIOVERTER-DEFIBRILLATOR) IN PLACE: ICD-10-CM

## 2018-11-01 DIAGNOSIS — R06.81 APNEA: ICD-10-CM

## 2018-11-01 DIAGNOSIS — R06.09 DOE (DYSPNEA ON EXERTION): ICD-10-CM

## 2018-11-01 DIAGNOSIS — I50.20 SYSTOLIC HEART FAILURE, ACC/AHA STAGE C (HCC): ICD-10-CM

## 2018-11-01 RX ORDER — BUMETANIDE 0.5 MG/1
0.5 TABLET ORAL
Qty: 30 TAB | Refills: 2
Start: 2018-11-01 | End: 2018-12-21 | Stop reason: SDUPTHER

## 2018-11-01 NOTE — PROGRESS NOTES
Frankie Hansen transferred the patient to [facility]  to discuss his care card application. The patient reports he's submitted the application on two separate occasions.  will f/u with Haresh Hilton regarding the status.     Jamila Piña, MSW, LCSW    Clinical    [facility] [address]   [employer]ing Choices ® ACP Facilitator

## 2018-11-01 NOTE — PROGRESS NOTES
[place] Note DOS:   11/1/2018 NAME:  Al Bolaños MRN:   [id number] REFERRING PROVIDER:  Gurvinder Adair MD 
PRIMARY CARE PHYSICIAN: Galina Moon MD 
PRIMARY CARDIOLOGIST: Gurvinder Adair MD 
 
 IMPRESSION/PLAN 
 ICM - Stage C, NYHA Class III, LVEF 15-20% Continue  Entresto 49/51 mg BID-  advised to take with food contain protein  And a glass of water Continue  Toprol XL 25 mg daily Change bumex to prn Check BMP, NT proBNP today Follow up in the [facility] in 2 weeks Discussed CardioMems- pt is hesitant, will discuss with Dr. Gary Contreras Medicare becomes effective 12/10/18 Cardiac rehab for HF at [facility] Sleep study - ordered Medicare becomes effective Dec 1 Sudden cardiac death px- s/p AICD- no shocks recently Suspect SHERI- witnessed apnea in hospital daytime somnolence Sleep study once care card/Medicare aproved - Dec 1  
 
CAD s/p MI, s/p PCI with [facility]- no angina Ran out of plavix- advised he call Dr. Gary Contreras On ASA,BB, statin Stable ETOH Abuse Continues to abstinence Nicotine Addiction Smoking cessation counseling 
   
T2DM A1C On glimepiride, sitagliptin Angie Dierks Dr. Brittany Whitney HLD Dr. Junaid Liu On ezetimibe, simvastatin COPD On Breo, Albuterol MDI Depression/anxiety - per [facility] - AIDE Not suicidal 
 Intolerant to wellbutrin/chantix Refer to counseling Thank you for allowing me to participate in the care of this pleasant gentleman. Please do not hesitate to call with any questions. Selena Whiting RN, ACNP-BC, [facility] Chief Complaint:  
Chief Complaint Patient presents with  Follow-up  Dizziness HPI:Marco Jiménez is a  57y.o. year old   male with a history of HFrEF in the setting of ICM s/p AICD with CAD s/p PCI RCA with [facility] 0/97 complicated by  O3LF, COPD, persistent tobacco use, ETOH abuse, depression with anxiety, and noncompliance who presents for further evaluation of his chronic systolic heart failure. He suffered an out-of-hospital MI in May 2017 and presented with subacute HF in June. Cadiac MRI demonstrated viability only in the RCA territory. Cardiac cath demonstrated mid %, RCA mid 85%. He underwent PCI RCA with [facility].   He was last seen 1 week ago. Poor historian. His increased the Entresto gently 49/51 mg BID most days Notes lightheadedness and headaches after taking entresto, sx resolve. Taking with food. Fatigue and sleeps a lot during the day. BERNABE stable with ADLs and after walking several blocks Continues to smoke 1 ppd 
 -115/ He is not following daily wts 2/2 dead batteries in his scale. Never been tested for SHERI. He reports the nurses witnessed apnea when in the hospital . No adding salt, but does not restrict foods. He eats a [employer]-egg- cheese biscuit daily. He has abstained from alcohol over the past 40 days,  [place]  for his medications. He is obsessed with dying but denies suicidal ideation Entresto from [employer]. Medicare in December, care card application pending. He Is moving this weekend. Review of Systems:    
General:  positive for  - fatigue and sleep disturbance HEENT: Negative- denies angioedema sx Pulmonary: positive for - cough and shortness of breath Cardiac: Denies exertional chest pain, palpitations, syncope, near  Syncope. Edema, abdominal bloating, AICD shocks. I  
GI:  Denies abdominal pain, change in bowel habits, or black or bloody stools Musculo: negative Neuro: no TIA or stroke symptoms Skin:  negative Allergies: REMINDER:DOCUMENT ALLERGIES IN ALLERGY ACTIVITY Psych: Positive for depression and anxiety CARDIAC EVALUATION  
ECHO: 5/31//2017: EF 15-20%, apical AK 
ECHO 9/18/17- Dilated LV, EF 20%, apical DK  
 
 
 MRI: 2017: LV dilated LVEF 32%, severe HK ant wall, RVEF 55%, mild MR; anterior infarct- no viability RCA/LCA: viable CATH 2017: LM: nl, LAD: m100%, LCX codom normal, RCA:m 85%, LVEDP 25, no AV gradient, dilated LV, apical AK, EF 20% 
-- s/p PCI pRCA: 2.5x18 Xience ([facility]). AICD 2017; [facility] single lead EK2018: Sinus  Rhythm, Low voltage in limb leads.   -Left axis -anterior fascicular block.  
 -Anterior infarct -age undetermined. T-abnormality  -Possible  Anterolateral  ischemia. 10/4/2018: SR, LAD, PRWP, QRD 96 md, QTc 389 History: 
Past Medical History:  
Diagnosis Date  Controlled type 2 diabetes mellitus with circulatory disorder ([facility] [address] 2017  COPD (chronic obstructive pulmonary disease) (HCC)  Coronary artery disease involving native coronary artery without angina pectoris 2017 OOH MI, 2v disease s/p PCI RCA with [facility] 2017  
 ETOH abuse 2017  Ischemic cardiomyopathy Extensive LAD territory infarction  Nicotine dependence 2017  
 SOB (shortness of breath) 10/4/2018  Systolic congestive heart failure with reduced left ventricular function, NYHA class 3 ([facility] [address] 10/25/2018 Past Surgical History:  
Procedure Laterality Date  HX CORONARY STENT PLACEMENT    
 HX HEART CATHETERIZATION    
 HX TONSILLECTOMY  HX WISDOM TEETH EXTRACTION Social History Socioeconomic History  Marital status: SINGLE Spouse name: Not on file  Number of children: Not on file  Years of education: Not on file  Highest education level: Not on file Social Needs  Financial resource strain: Not on file  Food insecurity - worry: Not on file  Food insecurity - inability: Not on file  Transportation needs - medical: Not on file  Transportation needs - non-medical: Not on file Occupational History  Not on file Tobacco Use  Smoking status: Current Every Day Smoker   Packs/day: 1.00  
 Years: 30.00 Pack years: 30.00 Types: Cigarettes  Smokeless tobacco: Never Used Substance and Sexual Activity  Alcohol use: No  
  Comment: former drinker-quit 3 months ago-drank a 6 pack per day  Drug use: No  
 Sexual activity: Yes  
  Partners: Female Other Topics Concern  Not on file Social History Narrative  Not on file Family History Problem Relation Age of Onset  Stroke Father  Heart Disease Father  Hypertension Father Current Medications:  
Current Outpatient Medications Medication Sig Dispense Refill  sacubitril-valsartan (ENTRESTO) 49 mg/51 mg tablet Take 1 Tab by mouth two (2) times a day. Indications: chronic heart failure 60 Tab 1  
 melatonin 3 mg tablet Take 3 mg by mouth nightly.  glimepiride (AMARYL) 2 mg tablet Take 2 mg by mouth every morning.  omeprazole (PRILOSEC) 20 mg capsule Take 20 mg by mouth daily.  SITagliptin (JANUVIA) 25 mg tablet Take 25 mg by mouth daily.  simvastatin (ZOCOR) 40 mg tablet Take  by mouth every morning.  cyanocobalamin (VITAMIN B-12) 500 mcg tablet Take 500 mcg by mouth daily.  ALBUTEROL SULFATE (VENTOLIN HFA IN) Take  by inhalation.  ezetimibe (ZETIA) 10 mg tablet Take 1 Tab by mouth daily. 30 Tab 5  
 fluticasone-vilanterol (BREO ELLIPTA) 100-25 mcg/dose inhaler Take 1 Puff by inhalation daily. Indications: 200 MCG - 25 MCG  bumetanide (BUMEX) 0.5 mg tablet TAKE ONE TABLET BY MOUTH ONCE DAILY 30 Tab 2  ACETAMINOPHEN/DIPHENHYDRAMINE (TYLENOL PM PO) Take  by mouth.  metoprolol succinate (TOPROL-XL) 25 mg XL tablet TAKE ONE TABLET BY MOUTH ONCE DAILY 30 Tab 6  clopidogrel (PLAVIX) 75 mg tab Take 1 Tab by mouth daily. Dr. Rees Left to provide refills  Indications: Thrombosis Prevention after PCI 90 Tab 3  
 aspirin 81 mg chewable tablet Take 1 Tab by mouth daily.  pyridoxine, vitamin B6, (VITAMIN B-6) 100 mg tablet Take 100 mg by mouth daily.  acetaminophen (TYLENOL) 325 mg tablet Take 2 Tabs by mouth every four (4) hours as needed. Allergies: Allergies Allergen Reactions  Atorvastatin Diarrhea  Codeine Itching  Metformin Other (comments) Abdominal pain  Wellbutrin [Bupropion] Other (comments) Night mare Aggression Admission Weight: Last Weight Weight: 280 lb 12.8 oz (127.4 kg) Weight: 280 lb 12.8 oz (127.4 kg) Physical Exam:  
Vitals:   
Visit Vitals /74 (BP 1 Location: Left arm, BP Patient Position: Sitting) Pulse 64 Temp 99.5 °F (37.5 °C) (Oral) Resp 24 Ht 5' 10\" (1.778 m) Wt 280 lb 12.8 oz (127.4 kg) SpO2 97% BMI 40.29 kg/m² General:  Well developed WM, obese,alert,  Cooperative, Talkative with tangent of thoughts HEENT: Normocephalic, PERRLA, and Sclera clear, anicteric Neck:  supple, no significant adenopathy, carotids upstroke normal bilaterally, no bruits CVP:  10 cm ( - ) HJR Cardiac: AICD left infraclavicular space, RRR, +  S4 present and systolic murmur present Chest/Pulm:  resp unlabored, , Diminished breath sounds bilaterally Abdomen: Obese, protuberant, soft, nontender, mildly Distended, normal BS Extremity: No edema Neuro: A&O x3, , normal strength and tone. Normal coordination and gait Skin:   no rashes, no ecchymoses, no petechiae Recent Labs:  
Lab Results Component Value Date/Time WBC 7.2 08/09/2018 10:26 AM  
 HGB 16.4 08/09/2018 10:26 AM  
 HCT 49.0 08/09/2018 10:26 AM  
 PLATELET 337 79/23/7983 10:26 AM  
 MCV 96.5 08/09/2018 10:26 AM  
 
Lab Results Component Value Date/Time TSH 1.72 04/05/2018 09:10 AM  
  
Lab Results Component Value Date/Time NT pro-BNP 6,586 (H) 06/02/2017 01:40 AM  
 NT pro-BNP 7,025 (H) 05/30/2017 03:37 PM  
 PROBNP 136 10/04/2018 12:00 AM  
  
Lab Results Component Value Date/Time  Sodium 142 10/04/2018 12:00 AM  
 Potassium 4.1 10/04/2018 12:00 AM  
 Chloride 106 10/04/2018 12:00 AM  
 CO2 19 (L) 10/04/2018 12:00 AM  
 Anion gap 10 08/09/2018 10:26 AM  
 Glucose 72 10/04/2018 12:00 AM  
 BUN 10 10/04/2018 12:00 AM  
 Creatinine 0.97 10/04/2018 12:00 AM  
 BUN/Creatinine ratio 10 10/04/2018 12:00 AM  
 GFR est AA 95 10/04/2018 12:00 AM  
 GFR est non-AA 82 10/04/2018 12:00 AM  
 Calcium 9.4 10/04/2018 12:00 AM  
 Bilirubin, total 0.5 08/09/2018 10:26 AM  
 ALT (SGPT) 17 08/09/2018 10:26 AM  
 AST (SGOT) 13 (L) 08/09/2018 10:26 AM  
 Alk. phosphatase 73 08/09/2018 10:26 AM  
 Protein, total 7.0 08/09/2018 10:26 AM  
 Albumin 3.9 08/09/2018 10:26 AM  
 Globulin 3.1 08/09/2018 10:26 AM  
 A-G Ratio 1.3 08/09/2018 10:26 AM  
  
Lab Results Component Value Date/Time Hemoglobin A1c 7.1 (H) 08/09/2018 10:26 AM  
  
 
I have discussed the diagnosis with  Daphnemax Gongora and the intended plan as seen in the above orders. Questions were answered concerning future plans, and written instructions provided. I have discussed medication side effects and warnings with the patient as well. Thank you for allowing me to participate in the care of this pleasant man. Please do not hesitate to call with any questions. Selena Seymour RN, ACNP-BC, [facility] [address] [facility] [id number] 
[phone] 
26 hour VAD/HF Pager: [phone]

## 2018-11-01 NOTE — PATIENT INSTRUCTIONS
You are doing better   Continue Entresto 49/51 mg ( 2 of the 24/26 mg tablets) twice a day with food containing protein and a big glass of water. Use Bumex only if needed for increased fluid     CONTINUE CURRENT medications    Take twice a day medications 12 hours apart with food    Labs today BMP, proBNP     Call Dr. Johnney Dancer for refill on clopidogrel- [facility] had it for $ 7.50 for 90 days  Cardiac rehab      Limit WHITE foods ( flour, sugar, pasta, rice, potatoes)    Walk more  Stop smoking  Sleep study schedule for December    Keep a positive attitude       HF Education: Continue daily weights (in the morning, after voiding). Notify HF team of overnight weight gains > 2 lbs or weekly >5 lbs or if any of the following Sx. Continue to limit sodium intake & monitor your fluid intake .

## 2018-11-01 NOTE — TELEPHONE ENCOUNTER
Referral for cardiac rehab faxed to [facility] and prescription for entresto sent to [facility] specialty as patient states he has been receiving entresto from [facility] for a year.

## 2018-11-01 NOTE — LETTER
11/1/2018 3:37 PM 
 
Patient:  Arnoldo Arriaga YOB: 1955 Date of Visit: 11/1/2018 Dear Amaya Osorio MD 
[address] [facility] [phone] [facility] [address] VIA In Basket 
 : Thank you for referring Mr. Tung Diop to me for evaluation/treatment. Below are the relevant portions of my assessment and plan of care. If you have questions, please do not hesitate to call me. I look forward to following Mr. Jorge Luis Mejia along with you. Sincerely, JANE MessinaP

## 2018-11-06 ENCOUNTER — DOCUMENTATION ONLY (OUTPATIENT)
Dept: CARDIOLOGY CLINIC | Age: 63
End: 2018-11-06

## 2018-11-06 NOTE — PROGRESS NOTES
Per Inocencia Soriano the patient's care card application was received and indexed last week.     Mateusz Sawyer, MSW, LCSW    Clinical    [address]   [employer] ® ACP Facilitator

## 2018-11-07 ENCOUNTER — DOCUMENTATION ONLY (OUTPATIENT)
Dept: CARDIOLOGY CLINIC | Age: 63
End: 2018-11-07

## 2018-11-15 ENCOUNTER — HOSPITAL ENCOUNTER (OUTPATIENT)
Dept: LAB | Age: 63
Discharge: HOME | End: 2018-11-15

## 2018-11-15 ENCOUNTER — OFFICE VISIT (OUTPATIENT)
Dept: CARDIOLOGY CLINIC | Age: 63
End: 2018-11-15

## 2018-11-15 VITALS
HEIGHT: 70 IN | DIASTOLIC BLOOD PRESSURE: 62 MMHG | RESPIRATION RATE: 24 BRPM | BODY MASS INDEX: 39.4 KG/M2 | WEIGHT: 275.2 LBS | SYSTOLIC BLOOD PRESSURE: 94 MMHG | OXYGEN SATURATION: 97 % | HEART RATE: 72 BPM | TEMPERATURE: 99.4 F

## 2018-11-15 DIAGNOSIS — I50.20 SYSTOLIC HEART FAILURE, ACC/AHA STAGE C (HCC): Primary | ICD-10-CM

## 2018-11-15 DIAGNOSIS — I25.5 ISCHEMIC CARDIOMYOPATHY: ICD-10-CM

## 2018-11-15 DIAGNOSIS — R06.02 SOB (SHORTNESS OF BREATH): ICD-10-CM

## 2018-11-15 DIAGNOSIS — I50.20 SYSTOLIC CONGESTIVE HEART FAILURE WITH REDUCED LEFT VENTRICULAR FUNCTION, NYHA CLASS 3 (HCC): ICD-10-CM

## 2018-11-15 DIAGNOSIS — Z95.810 ICD (IMPLANTABLE CARDIOVERTER-DEFIBRILLATOR) IN PLACE: ICD-10-CM

## 2018-11-15 DIAGNOSIS — I25.10 CORONARY ARTERY DISEASE INVOLVING NATIVE CORONARY ARTERY OF NATIVE HEART WITHOUT ANGINA PECTORIS: ICD-10-CM

## 2018-11-15 LAB
ALBUMIN SERPL-MCNC: 4 G/DL (ref 3.5–5)
ALBUMIN/GLOB SERPL: 1.1 {RATIO} (ref 1.1–2.2)
ALP SERPL-CCNC: 75 U/L (ref 45–117)
ALT SERPL-CCNC: 26 U/L (ref 12–78)
ANION GAP SERPL CALC-SCNC: 9 MMOL/L (ref 5–15)
AST SERPL-CCNC: 17 U/L (ref 15–37)
BILIRUB SERPL-MCNC: 0.6 MG/DL (ref 0.2–1)
BUN SERPL-MCNC: 12 MG/DL (ref 6–20)
BUN/CREAT SERPL: 12 (ref 12–20)
CALCIUM SERPL-MCNC: 8.6 MG/DL (ref 8.5–10.1)
CHLORIDE SERPL-SCNC: 105 MMOL/L (ref 97–108)
CHOLEST SERPL-MCNC: 108 MG/DL
CO2 SERPL-SCNC: 24 MMOL/L (ref 21–32)
CREAT SERPL-MCNC: 1.02 MG/DL (ref 0.7–1.3)
EST. AVERAGE GLUCOSE BLD GHB EST-MCNC: 146 MG/DL
GLOBULIN SER CALC-MCNC: 3.6 G/DL (ref 2–4)
GLUCOSE SERPL-MCNC: 115 MG/DL (ref 65–100)
HBA1C MFR BLD: 6.7 % (ref 4.2–6.3)
HDLC SERPL-MCNC: 32 MG/DL
HDLC SERPL: 3.4 {RATIO} (ref 0–5)
LDLC SERPL CALC-MCNC: 54.2 MG/DL (ref 0–100)
LIPID PROFILE,FLP: NORMAL
POTASSIUM SERPL-SCNC: 4.4 MMOL/L (ref 3.5–5.1)
PROT SERPL-MCNC: 7.6 G/DL (ref 6.4–8.2)
SODIUM SERPL-SCNC: 138 MMOL/L (ref 136–145)
TRIGL SERPL-MCNC: 109 MG/DL (ref ?–150)
TSH SERPL DL<=0.05 MIU/L-ACNC: 1.9 UIU/ML (ref 0.36–3.74)
VLDLC SERPL CALC-MCNC: 21.8 MG/DL

## 2018-11-15 PROCEDURE — 80061 LIPID PANEL: CPT

## 2018-11-15 PROCEDURE — 83036 HEMOGLOBIN GLYCOSYLATED A1C: CPT

## 2018-11-15 PROCEDURE — 84443 ASSAY THYROID STIM HORMONE: CPT

## 2018-11-15 PROCEDURE — 80053 COMPREHEN METABOLIC PANEL: CPT

## 2018-11-15 NOTE — PATIENT INSTRUCTIONS
You are doing better      INCREASE entresto 1 tablet ( 49/51 mg ) in the morning and 2 tablets (97/103 mg) at bedtime for 3 days then if you feel OK. Take 2 tablets 97/103 mg twice a day       CONTINUE CURRENT medications    Take twice a day medications 12 hours apart with food    Labs today BMP, proBNP     Cardiac rehab   Sleep study  See Dr China Bonilla foods (flour, sugar, pasta, rice, potatoes)    Be more active     Keep a positive attitude       HF Education: Continue daily weights (in the morning, after voiding). Notify HF team of overnight weight gains > 2 lbs or weekly >5 lbs or if any of the following Sx. Continue to limit sodium intake & monitor your fluid intake .

## 2018-11-15 NOTE — PROGRESS NOTES
[place]yler Note DOS:   11/15/2018 NAME:  Lyssa Rdz MRN:   [id number] REFERRING PROVIDER:  Tatyana Sterling MD 
PRIMARY CARE PHYSICIAN: Galina Moon MD 
PRIMARY CARDIOLOGIST: Tatyana Sterling MD 
 
 IMPRESSION/PLAN 
 ICM - Stage C, NYHA Class III, LVEF 15-20% Increase    Entresto gently 49/51 mg AM and 97/103 mg PM x 3 days then increase to 97/103 mg  BID-  advised to take with food contain protein and a glass of water- watch labs Continue  Toprol XL 25 mg daily Add aldactone at next visit if labs ok Bumex to prn Follow daily weights- we gave him batteries today Check BMP, NT proBNP today Follow up in the [facility] in 2-3  Weeks Repeat echo once meds optimized Discussed CardioMems- pt is hesitant, will discuss with Dr. Mike Celaya at 12/21 appointment Medicare becomes effective 12/10/18 Cardiac rehab for HF at [address] [place] [address] once Medicare becomes active Sleep study - ordered Medicare becomes effective Dec 1 Sudden cardiac death px- s/p AICD- no shocks recently Follow up Dr. Bull Points Suspect SHERI- witnessed apnea in hospital daytime somnolence Sleep study once care card/Medicare aproved - Dec 1  
 
CAD s/p MI, s/p PCI with [facility]- no angina Continue  plavix On ASA,BB, statin Stable Follow up Dr. Mike Celaya in December ETOH Abuse Continues to abstinence Nicotine Addiction Smoking cessation counseling 
   
T2DM A1C On glimepiride, sitagliptin Paolo Lozano Most HLD Dr. Taryn Pichardo On ezetimibe, simvastatin COPD On Breo, Albuterol MDI Depression/anxiety - [facility] - better Not suicidal 
 Intolerant to wellbutrin/chantix Refer to counseling Thank you for allowing me to participate in the care of this pleasant gentleman. Please do not hesitate to call with any questions. Selena Fowler  RN, ACNP-BC, [facility] Chief Complaint:  
Chief Complaint Patient presents with  Follow-up HPI:Marco Ogden is a  57y.o. year old   male with a history of HFrEF in the setting of ICM s/p AICD with CAD s/p PCI RCA with [facility] 2/25 complicated by  S5LN, COPD, persistent tobacco use, ETOH abuse, depression with anxiety, and noncompliance who presents for further evaluation of his chronic systolic heart failure. He suffered an out-of-hospital MI in May 2017 and presented with subacute HF in June. Cadiac MRI demonstrated viability only in the RCA territory. Cardiac cath demonstrated mid %, RCA mid 85%. He underwent PCI RCA with [facility].   He was last seen 2 weeks ago. Poor historian. He increased the Entresto gently 49/51 mg BID and is feeing better. More energy, sleeping better. Less BERNABE, notes BERNABE with moderate activity. Not following daily weight 2/2 needs batteries- Not taking bumex He has moved, new house Continues to smoke 1 ppd 
 /68 at home Never been tested for SHERI. He reports the nurses witnessed apnea when in the hospital . He feels less depressed and more hopeful. No adding salt, but does not restrict foods. He is living with his sister who cooks for him- \"trying' to limit the sodium. He continue to abstaine from alcohol. Pedro  for his medications. 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 Entresto from "[employer]. Medicare in December, care card application pending. Review of Systems:    
General:  positive for  - fatigue and sleep disturbance HEENT: Negative- denies angioedema sx Pulmonary: positive for - cough and shortness of breath Cardiac: Denies exertional chest pain, palpitations, syncope, near  Syncope. Edema, abdominal bloating, AICD shocks. I  
GI:  Denies abdominal pain, change in bowel habits, or black or bloody stools Musculo: negative Neuro: no TIA or stroke symptoms Skin:  negative Allergies: REMINDER:DOCUMENT ALLERGIES IN ALLERGY ACTIVITY Psych: Positive for depression and anxiety CARDIAC EVALUATION  
ECHO: 2017: EF 15-20%, apical AK 
ECHO 17- Dilated LV, EF 20%, apical DK MRI: 2017: LV dilated LVEF 32%, severe HK ant wall, RVEF 55%, mild MR; anterior infarct- no viability RCA/LCA: viable CATH 2017: LM: nl, LAD: m100%, LCX codom normal, RCA:m 85%, LVEDP 25, no AV gradient, dilated LV, apical AK, EF 20% 
-- s/p PCI pRCA: 2.5x18 Xience ([facility]). AICD 2017; [place] [facility] single lead EK2018: Sinus  Rhythm, Low voltage in limb leads.   -Left axis -anterior fascicular block.  
 -Anterior infarct -age undetermined. T-abnormality  -Possible  Anterolateral  ischemia. 10/4/2018: SR, LAD, PRWP, QRD 96 md, QTc 389 History: 
Past Medical History:  
Diagnosis Date  Controlled type 2 diabetes mellitus with circulatory disorder ([address] 2017  COPD [facility]chronic obstructive pulmonary disease) [facility]  Coronary artery disease involving native coronary artery without angina pectoris 2017 OOH MI, 2v disease s/p PCI RCA with [facility] 2017  
 ETOH abuse 2017  Ischemic cardiomyopathy Extensive LAD territory infarction  Nicotine dependence 2017  
 SOB (shortness of breath) 10/4/2018  Systolic congestive heart failure with reduced left ventricular function, NYHA class 3 ([address] 10/25/2018 Past Surgical History:  
Procedure Laterality Date  HX CORONARY STENT PLACEMENT    
 HX HEART CATHETERIZATION    
 HX TONSILLECTOMY  HX WISDOM TEETH EXTRACTION Social History Socioeconomic History  Marital status: SINGLE Spouse name: Not on file  Number of children: Not on file  Years of education: Not on file  Highest education level: Not on file Social Needs  Financial resource strain: Not on file  Food insecurity - worry: Not on file  Food insecurity - inability: Not on file  Transportation needs - medical: Not on file  Transportation needs - non-medical: Not on file Occupational History  Not on file Tobacco Use  Smoking status: Current Every Day Smoker Packs/day: 1.00 Years: 30.00 Pack years: 30.00 Types: Cigarettes  Smokeless tobacco: Never Used Substance and Sexual Activity  Alcohol use: No  
  Comment: former drinker-quit 3 months ago-drank a 6 pack per day  Drug use: No  
 Sexual activity: Yes  
  Partners: Female Other Topics Concern  Not on file Social History Narrative  Not on file Family History Problem Relation Age of Onset  Stroke Father  Heart Disease Father  Hypertension Father Current Medications:  
Current Outpatient Medications Medication Sig Dispense Refill  sacubitril-valsartan (ENTRESTO) 49 mg/51 mg tablet Take 1 Tab by mouth two (2) times a day. 180 Tab 1  
 melatonin 3 mg tablet Take 5 mg by mouth nightly.  glimepiride (AMARYL) 2 mg tablet Take 2 mg by mouth every morning.  omeprazole (PRILOSEC) 20 mg capsule Take 20 mg by mouth daily.  SITagliptin (JANUVIA) 25 mg tablet Take 25 mg by mouth daily.  simvastatin (ZOCOR) 40 mg tablet Take  by mouth every morning.  cyanocobalamin (VITAMIN B-12) 500 mcg tablet Take 500 mcg by mouth daily.  ALBUTEROL SULFATE (VENTOLIN HFA IN) Take  by inhalation.  ezetimibe (ZETIA) 10 mg tablet Take 1 Tab by mouth daily. 30 Tab 5  
 fluticasone-vilanterol (BREO ELLIPTA) 100-25 mcg/dose inhaler Take 1 Puff by inhalation daily. Indications: 200 MCG - 25 MCG  acetaminophen (TYLENOL) 325 mg tablet Take 2 Tabs by mouth every four (4) hours as needed.  metoprolol succinate (TOPROL-XL) 25 mg XL tablet TAKE ONE TABLET BY MOUTH ONCE DAILY 30 Tab 6  clopidogrel (PLAVIX) 75 mg tab Take 1 Tab by mouth daily. Dr. Sher Oh to provide refills  Indications: Thrombosis Prevention after PCI 90 Tab 3  
 aspirin 81 mg chewable tablet Take 1 Tab by mouth daily.  bumetanide (BUMEX) 0.5 mg tablet Take 1 Tab by mouth daily as needed. 30 Tab 2  pyridoxine, vitamin B6, (VITAMIN B-6) 100 mg tablet Take 100 mg by mouth daily.  ACETAMINOPHEN/DIPHENHYDRAMINE (TYLENOL PM PO) Take  by mouth. Allergies: Allergies Allergen Reactions  Atorvastatin Diarrhea  Codeine Itching  Metformin Other (comments) Abdominal pain  Wellbutrin [Bupropion] Other (comments) Night mare Aggression Admission Weight: Last Weight Weight: 275 lb 3.2 oz (124.8 kg) Weight: 275 lb 3.2 oz (124.8 kg) Physical Exam:  
Vitals:   
Visit Vitals BP 94/62 (BP 1 Location: Left arm, BP Patient Position: Sitting) Pulse 72 Temp 99.4 °F (37.4 °C) (Oral) Resp 24 Ht 5' 10\" (1.778 m) Wt 275 lb 3.2 oz (124.8 kg) SpO2 97% BMI 39.49 kg/m² General:  Well developed WM, obese,alert,  Cooperative, Talkative with tangent of thoughts HEENT: Normocephalic, PERRLA, and Sclera clear, anicteric Neck:  supple, no significant adenopathy, carotids upstroke normal bilaterally, no bruits CVP:  12 cm ( - ) HJR Cardiac: AICD left infraclavicular space, RRR, +  S4 present and systolic murmur present Chest/Pulm:  resp unlabored, faint end inspiratory wheeze on the right, , Diminished breath sounds bilaterally Abdomen: Obese, protuberant, soft, nontender, mildly Distended, normal BS Extremity: Trace pretib edema, no clubbing, cyanosis Neuro: A&O x3, , normal strength and tone. Normal coordination and gait Skin:   no rashes, no ecchymoses, no petechiae Recent Labs:  
Lab Results Component Value Date/Time WBC 7.2 08/09/2018 10:26 AM  
 HGB 16.4 08/09/2018 10:26 AM  
 HCT 49.0 08/09/2018 10:26 AM  
 PLATELET 907 34/72/0127 10:26 AM  
 MCV 96.5 08/09/2018 10:26 AM  
 
Lab Results Component Value Date/Time TSH 1.72 04/05/2018 09:10 AM  
  
Lab Results Component Value Date/Time  NT pro-BNP 6,586 (H) 06/02/2017 01:40 AM  
 NT pro-BNP 7,025 (H) 05/30/2017 03:37 PM  
 PROBNP 136 10/04/2018 12:00 AM  
  
Lab Results Component Value Date/Time Sodium 142 10/04/2018 12:00 AM  
 Potassium 4.1 10/04/2018 12:00 AM  
 Chloride 106 10/04/2018 12:00 AM  
 CO2 19 (L) 10/04/2018 12:00 AM  
 Anion gap 10 08/09/2018 10:26 AM  
 Glucose 72 10/04/2018 12:00 AM  
 BUN 10 10/04/2018 12:00 AM  
 Creatinine 0.97 10/04/2018 12:00 AM  
 BUN/Creatinine ratio 10 10/04/2018 12:00 AM  
 GFR est AA 95 10/04/2018 12:00 AM  
 GFR est non-AA 82 10/04/2018 12:00 AM  
 Calcium 9.4 10/04/2018 12:00 AM  
 Bilirubin, total 0.5 08/09/2018 10:26 AM  
 ALT (SGPT) 17 08/09/2018 10:26 AM  
 AST (SGOT) 13 (L) 08/09/2018 10:26 AM  
 Alk. phosphatase 73 08/09/2018 10:26 AM  
 Protein, total 7.0 08/09/2018 10:26 AM  
 Albumin 3.9 08/09/2018 10:26 AM  
 Globulin 3.1 08/09/2018 10:26 AM  
 A-G Ratio 1.3 08/09/2018 10:26 AM  
  
Lab Results Component Value Date/Time Hemoglobin A1c 7.1 (H) 08/09/2018 10:26 AM  
  
 
I have discussed the diagnosis with  Maldonado Dickey and the intended plan as seen in the above orders. Questions were answered concerning future plans, and written instructions provided. I have discussed medication side effects and warnings with the patient as well. Thank you for allowing me to participate in the care of this pleasant man. Please do not hesitate to call with any questions. Selena Huang RN, ACNP-BC, [facility] [address] [facility] [address] [id number] 
[phone] 
29 hour VAD/HF Pager: [phone] Future Appointments Date Time Provider Catalina Dineroi 12/6/2018  1:00 PM ZACH [facility]  
12/21/2018  1:40 PM Megan Bryan MD [address] [facility]  
1/21/2019 11:30 AM PACEMAKER, STFRRED CAVSF [facility]  
1/21/2019 11:40 AM Kadi Marinelli MD [address] [place] [address]

## 2018-11-15 NOTE — LETTER
11/15/2018 2:47 PM 
 
Patient:  Deep Iglesias YOB: 1955 Date of Visit: 11/15/2018 Dear Miladys Hall MD 
[address] [facility] [phone] [facility] [address] VIA In Basket Shira Paris MD 
[address] [facility] [address] VIA In Basket 
 : Thank you for referring Mr. Janie Gonzales to me for evaluation/treatment. Below are the relevant portions of my assessment and plan of care. If you have questions, please do not hesitate to call me. I look forward to following Mr. Mansi Castro along with you. Sincerely, JANE MejiaP

## 2018-11-16 LAB
BUN SERPL-MCNC: 12 MG/DL (ref 8–27)
BUN/CREAT SERPL: 11 (ref 10–24)
CALCIUM SERPL-MCNC: 9.1 MG/DL (ref 8.6–10.2)
CHLORIDE SERPL-SCNC: 103 MMOL/L (ref 96–106)
CO2 SERPL-SCNC: 24 MMOL/L (ref 20–29)
CREAT SERPL-MCNC: 1.06 MG/DL (ref 0.76–1.27)
GLUCOSE SERPL-MCNC: 123 MG/DL (ref 65–99)
NT-PROBNP SERPL-MCNC: 111 PG/ML (ref 0–210)
POTASSIUM SERPL-SCNC: 4.3 MMOL/L (ref 3.5–5.2)
SODIUM SERPL-SCNC: 140 MMOL/L (ref 134–144)

## 2018-11-19 ENCOUNTER — TELEPHONE (OUTPATIENT)
Dept: CARDIOLOGY CLINIC | Age: 63
End: 2018-11-19

## 2018-11-19 NOTE — TELEPHONE ENCOUNTER
----- Message from ZACH Rodríguez sent at 11/19/2018  2:16 PM EST -----  Labs stable  Continue Current plan  Thank you      Attempted to call patient, no voicemail set up. Will continue to try. I called patient and reviewed lab results, he states understanding.

## 2018-11-28 ENCOUNTER — TELEPHONE (OUTPATIENT)
Dept: CARDIAC REHAB | Age: 63
End: 2018-11-28

## 2018-11-28 NOTE — TELEPHONE ENCOUNTER
11/28/2018 Cardiac Rehab: Called Mr. Rocio Yip to discuss participation in the Cardiac Rehab Program. Confirmed intake appointment on 12/7/2018 @ 1pm. Mr. Rocio Yip is without questions or concerns. Advised patient of out location in the hospital, that we would mail out his intake paper work to him to complete. Asked that he bring the completed paperwork and a medication list with him to the appointment.  Thank you for the referral.  Clark Gave

## 2018-12-03 ENCOUNTER — TELEPHONE (OUTPATIENT)
Dept: CARDIOLOGY CLINIC | Age: 63
End: 2018-12-03

## 2018-12-03 NOTE — TELEPHONE ENCOUNTER
Patient called regarding cardiac rehab, states he doesn't have the energy-I gave him the address and phone number to cardiac rehab and he states he will try it.

## 2018-12-06 ENCOUNTER — OFFICE VISIT (OUTPATIENT)
Dept: CARDIOLOGY CLINIC | Age: 63
End: 2018-12-06

## 2018-12-06 ENCOUNTER — HOSPITAL ENCOUNTER (OUTPATIENT)
Dept: LAB | Age: 63
Discharge: HOME | End: 2018-12-06

## 2018-12-06 VITALS
WEIGHT: 274.6 LBS | HEIGHT: 70 IN | DIASTOLIC BLOOD PRESSURE: 68 MMHG | TEMPERATURE: 98.8 F | SYSTOLIC BLOOD PRESSURE: 118 MMHG | RESPIRATION RATE: 24 BRPM | BODY MASS INDEX: 39.31 KG/M2 | OXYGEN SATURATION: 98 % | HEART RATE: 53 BPM

## 2018-12-06 DIAGNOSIS — R06.81 APNEA: ICD-10-CM

## 2018-12-06 DIAGNOSIS — R06.02 SOB (SHORTNESS OF BREATH): ICD-10-CM

## 2018-12-06 DIAGNOSIS — I50.20 SYSTOLIC HEART FAILURE, ACC/AHA STAGE C (HCC): Primary | ICD-10-CM

## 2018-12-06 DIAGNOSIS — E66.01 SEVERE OBESITY WITH BODY MASS INDEX (BMI) OF 35.0 TO 39.9 WITH SERIOUS COMORBIDITY (HCC): ICD-10-CM

## 2018-12-06 DIAGNOSIS — Z95.810 ICD (IMPLANTABLE CARDIOVERTER-DEFIBRILLATOR) IN PLACE: ICD-10-CM

## 2018-12-06 DIAGNOSIS — I25.5 ISCHEMIC CARDIOMYOPATHY: ICD-10-CM

## 2018-12-06 DIAGNOSIS — Z51.81 ENCOUNTER FOR MEDICATION TITRATION: ICD-10-CM

## 2018-12-06 DIAGNOSIS — I25.10 CORONARY ARTERY DISEASE INVOLVING NATIVE CORONARY ARTERY OF NATIVE HEART WITHOUT ANGINA PECTORIS: ICD-10-CM

## 2018-12-06 DIAGNOSIS — I50.20 SYSTOLIC CONGESTIVE HEART FAILURE WITH REDUCED LEFT VENTRICULAR FUNCTION, NYHA CLASS 3 (HCC): ICD-10-CM

## 2018-12-06 PROCEDURE — 80053 COMPREHEN METABOLIC PANEL: CPT

## 2018-12-06 PROCEDURE — 83880 ASSAY OF NATRIURETIC PEPTIDE: CPT

## 2018-12-06 NOTE — PATIENT INSTRUCTIONS
Labs today  Hold Entresto tonight and wait until we get the labs back   then will resume at 97/103 mg twice a day      You are doing well with following your BP, weight, and sugars. Keep a record of these and your medications    CONTINUE CURRENT medications    Take twice a day medications 12 hours apart with food    Start Cardiac rehab    Sleep study  Limit WHITE foods (flour, sugar, pasta, rice, potatoes)    Walk more    Keep a positive attitude       HF Education: Continue daily weights (in the morning, after voiding). Notify HF team of overnight weight gains > 2 lbs or weekly >5 lbs or if any of the following Sx. Continue to limit sodium intake & monitor your fluid intake.

## 2018-12-06 NOTE — LETTER
12/6/2018 2:34 PM 
 
Patient:  Stanton Gonzalez YOB: 1955 Date of Visit: 12/6/2018 Dear Urbano Davalos MD 
[facility] [address] Internal Med [address] VIA In Basket Amanda Morrell MD 
[address] [facility] [phone] [facility] [phone] VIA In Basket Gonzales Barth MD 
[address] [facility] [address] VIA In Basket 
 : Thank you for referring Mr. Greta Acosta to me for evaluation/treatment. Below are the relevant portions of my assessment and plan of care. If you have questions, please do not hesitate to call me. I look forward to following Mr. Miles Sanchez along with you. Sincerely, ZACH Hawley

## 2018-12-06 NOTE — PROGRESS NOTES
Kyler Note DOS:   12/6/2018 NAME:  Zhen Alves MRN:   [id number] REFERRING PROVIDER:  Audra Fisher MD 
PRIMARY CARE PHYSICIAN: Leonor Lee MD 
PRIMARY CARDIOLOGIST: Audra Fisher MD 
 
 IMPRESSION/PLAN 
 ICM - Stage C, NYHA Class III, LVEF 15-20% Ck  labs Continue Entresto 97/103 mg bid if labs OK Continue  Toprol XL 25 mg daily Add aldactone at next visit if labs ok Bumex to prn - he has not needed Continue to follow  daily weights- he has lost a few pounds Check BMP, NT proBNP today Follow up in the [facility] in 1  Weeks Repeat echo once meds optimized Discussed CardioMems- pt is hesitant, will discuss with Dr. Bucky Pak at 12/21 appointment Cardiac rehab for HF at [address] [place] [address] Encouraged to complete Sleep study Sudden cardiac death px- s/p AICD- no shocks recently Follow up Dr. Laure Diez Suspect SHERI- witnessed apnea in hospital daytime somnolence Sleep study once care card/Medicare aproved CAD s/p MI, s/p PCI with [facility]- no angina Continue  plavix On ASA,BB, statin Stable Follow up Dr. Bucky Pak in December ETOH Abuse Continues to abstinence Nicotine Addiction Smoking cessation counseling 
   
T2DM A1C On glimepiride, sitagliptin Margaret Offer Dr. Kareem Cam HLD Dr. Herman Hernadez On ezetimibe, simvastatin COPD On Breo, Albuterol MDI Depression/anxiety - [facility] - better Not suicidal 
 Intolerant to wellbutrin/chantix Refer to counseling Thank you for allowing me to participate in the care of this pleasant gentleman. Please do not hesitate to call with any questions. Selena Carlson RN, ACNP-BC, [facility] Chief Complaint:  
Chief Complaint Patient presents with  Follow-up  Fatigue  Dizziness HPI:Marco Solomon is a  57y.o. year old   male with a history of HFrEF in the setting of ICM s/p AICD with CAD s/p PCI RCA with [facility] 7/91 complicated by  M0OP, COPD, obesity ( BMI 39), persistent tobacco use, ETOH abuse, depression with anxiety, and noncompliance who presents for further evaluation of his chronic systolic heart failure. He suffered an out-of-hospital MI in May 2017 and presented with subacute HF in June. Cadiac MRI demonstrated viability only in the RCA territory. Cardiac cath demonstrated mid %, RCA mid 85%. He underwent PCI RCA with [facility].   
Poor historian. He was last seen 3 weeks ago. He is confused about his meds, and is taking  Entresto   97/103 mg BID and taking 1/2- 2 tabs twice a day and feels lethargic Orthostatic sx at times, no syncope or near syncope Episodes of chest pain in area of AICD at rest 
BERNABE stable with moderate activity He is now following  daily weight and has lost a few pounds. BP stable 106-111 Not taking bumex He is smoking less He has appointment with cardiac rehab tomorrow. Does not currently exercise much. Never been tested for SHERI. He reports the nurses witnessed apnea when in the hospital .   
[facility] He feels less depressed and more hopeful. No adding salt, but does not restrict foods. He is living with his sister who cooks for him- \"trying' to limit the sodium. He continue to abstaine from alcohol. Pedro  for his medications. 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 Entresto from [employer] Medicare  Now activated. care card application pending. Review of Systems:    
General:  positive for  - fatigue and sleep disturbance HEENT: Recent sinus issues, Negative- denies angioedema sx Pulmonary: positive for - cough and shortness of breath Cardiac: Denies exertional chest pain, palpitations, syncope, near  Syncope. Edema, abdominal bloating, AICD shocks.  I  
 GI:  GERD sx, Denies abdominal pain, change in bowel habits, or black or bloody stools Musculo: negative Neuro: no TIA or stroke symptoms Skin:  negative Allergies: REMINDER:DOCUMENT ALLERGIES IN ALLERGY ACTIVITY Psych: Positive for depression and anxiety CARDIAC EVALUATION  
ECHO: 2017: EF 15-20%, apical AK 
ECHO 17- Dilated LV, EF 20%, apical DK MRI: 2017: LV dilated LVEF 32%, severe HK ant wall, RVEF 55%, mild MR; anterior infarct- no viability RCA/LCA: viable CATH 2017: LM: nl, LAD: m100%, LCX codom normal, RCA:m 85%, LVEDP 25, no AV gradient, dilated LV, apical AK, EF 20% 
-- s/p PCI pRCA: 2.5x18 Xience ([facility]). AICD 2017; [place] [facility] single lead EK2018: Sinus  Rhythm, Low voltage in limb leads.   -Left axis -anterior fascicular block.  
 -Anterior infarct -age undetermined. T-abnormality  -Possible  Anterolateral  ischemia. 10/4/2018: SR, LAD, PRWP, QRS 96 md, QTc 389  
2018 SR, LAD, PRWP ,  History: 
Past Medical History:  
Diagnosis Date  Controlled type 2 diabetes mellitus with circulatory disorder ([address] 2017  COPD (chronic obstructive pulmonary disease) (HCC)  Coronary artery disease involving native coronary artery without angina pectoris 2017 OOH MI, 2v disease s/p PCI RCA with [facility] 2017  
 ETOH abuse 2017  ICD (implantable cardioverter-defibrillator) in place  Ischemic cardiomyopathy Extensive LAD territory infarction  Nicotine dependence 2017  
 SOB (shortness of breath) 10/4/2018  Systolic congestive heart failure with reduced left ventricular function, NYHA class 3 ([address] 10/25/2018 Past Surgical History:  
Procedure Laterality Date  HX CORONARY STENT PLACEMENT    
 HX HEART CATHETERIZATION    
 HX TONSILLECTOMY  HX WISDOM TEETH EXTRACTION Social History Socioeconomic History  Marital status: SINGLE Spouse name: Not on file  Number of children: Not on file  Years of education: Not on file  Highest education level: Not on file Social Needs  Financial resource strain: Not on file  Food insecurity - worry: Not on file  Food insecurity - inability: Not on file  Transportation needs - medical: Not on file  Transportation needs - non-medical: Not on file Occupational History  Not on file Tobacco Use  Smoking status: Current Every Day Smoker Packs/day: 1.00 Years: 30.00 Pack years: 30.00 Types: Cigarettes  Smokeless tobacco: Never Used Substance and Sexual Activity  Alcohol use: No  
  Comment: former drinker-quit 3 months ago-drank a 6 pack per day  Drug use: No  
 Sexual activity: Yes  
  Partners: Female Other Topics Concern  Not on file Social History Narrative  Not on file Family History Problem Relation Age of Onset  Stroke Father  Heart Disease Father  Hypertension Father Current Medications:  
Current Outpatient Medications Medication Sig Dispense Refill  sacubitril-valsartan (ENTRESTO) 97 mg/103 mg tablet Take 1 Tab by mouth two (2) times a day. 60 Tab 3  
 melatonin 3 mg tablet Take 5 mg by mouth nightly.  glimepiride (AMARYL) 2 mg tablet Take 2 mg by mouth every morning.  omeprazole (PRILOSEC) 20 mg capsule Take 20 mg by mouth daily.  SITagliptin (JANUVIA) 25 mg tablet Take 25 mg by mouth daily.  simvastatin (ZOCOR) 40 mg tablet Take  by mouth every morning.  cyanocobalamin (VITAMIN B-12) 500 mcg tablet Take 500 mcg by mouth daily.  pyridoxine, vitamin B6, (VITAMIN B-6) 100 mg tablet Take 100 mg by mouth daily.  ALBUTEROL SULFATE (VENTOLIN HFA IN) Take  by inhalation.  ezetimibe (ZETIA) 10 mg tablet Take 1 Tab by mouth daily. 30 Tab 5  
 acetaminophen (TYLENOL) 325 mg tablet Take 2 Tabs by mouth every four (4) hours as needed.  ACETAMINOPHEN/DIPHENHYDRAMINE (TYLENOL PM PO) Take  by mouth.  metoprolol succinate (TOPROL-XL) 25 mg XL tablet TAKE ONE TABLET BY MOUTH ONCE DAILY 30 Tab 6  clopidogrel (PLAVIX) 75 mg tab Take 1 Tab by mouth daily. Dr. Severino Mcmahon to provide refills  Indications: Thrombosis Prevention after PCI 90 Tab 3  
 aspirin 81 mg chewable tablet Take 1 Tab by mouth daily.  bumetanide (BUMEX) 0.5 mg tablet Take 1 Tab by mouth daily as needed. 30 Tab 2  
 fluticasone-vilanterol (BREO ELLIPTA) 100-25 mcg/dose inhaler Take 1 Puff by inhalation daily. Indications: 200 MCG - 25 MCG Allergies: Allergies Allergen Reactions  Atorvastatin Diarrhea  Codeine Itching  Metformin Other (comments) Abdominal pain  Wellbutrin [Bupropion] Other (comments) Night mare Aggression Admission Weight: Last Weight Weight: 274 lb 9.6 oz (124.6 kg) Weight: 274 lb 9.6 oz (124.6 kg) Physical Exam:  
Vitals:   
Visit Vitals /68 (BP 1 Location: Left arm, BP Patient Position: Sitting) Pulse (!) 53 Temp 98.8 °F (37.1 °C) (Oral) Resp 24 Ht 5' 10\" (1.778 m) Wt 274 lb 9.6 oz (124.6 kg) SpO2 98% BMI 39.40 kg/m² General:  Well developed WM, obese,alert,  Cooperative, Talkative with tangent of thoughts HEENT: Normocephalic, PERRLA, and Sclera clear, anicteric Neck:  supple, no significant adenopathy, carotids upstroke normal bilaterally, no bruits CVP:  12 cm ( - ) HJR Cardiac: AICD left infraclavicular space, RRR, +  S4 present and systolic murmur present Chest/Pulm:  resp unlabored,  Diminished breath sounds bilaterally. No rales, rhonchi, wheezing Abdomen: Obese, protuberant, soft, nontender,  normal BS Extremity: No edema, no clubbing, cyanosis Neuro: A&O x3, , normal strength and tone. Normal coordination and gait Skin:   no rashes, no ecchymoses, no petechiae Recent Labs:  
Lab Results Component Value Date/Time  WBC 7.2 08/09/2018 10:26 AM  
 HGB 16.4 08/09/2018 10:26 AM  
 HCT 49.0 08/09/2018 10:26 AM  
 PLATELET 553 54/58/9577 10:26 AM  
 MCV 96.5 08/09/2018 10:26 AM  
 
Lab Results Component Value Date/Time TSH 1.90 11/15/2018 11:30 AM  
  
Lab Results Component Value Date/Time NT pro-BNP 6,586 (H) 06/02/2017 01:40 AM  
 NT pro-BNP 7,025 (H) 05/30/2017 03:37 PM  
 PROBNP 111 11/15/2018 03:14 PM  
 PROBNP 136 10/04/2018 12:00 AM  
  
Lab Results Component Value Date/Time Sodium 140 11/15/2018 03:14 PM  
 Potassium 4.3 11/15/2018 03:14 PM  
 Chloride 103 11/15/2018 03:14 PM  
 CO2 24 11/15/2018 03:14 PM  
 Anion gap 9 11/15/2018 11:30 AM  
 Glucose 123 (H) 11/15/2018 03:14 PM  
 BUN 12 11/15/2018 03:14 PM  
 Creatinine 1.06 11/15/2018 03:14 PM  
 BUN/Creatinine ratio 11 11/15/2018 03:14 PM  
 GFR est AA 85 11/15/2018 03:14 PM  
 GFR est non-AA 74 11/15/2018 03:14 PM  
 Calcium 9.1 11/15/2018 03:14 PM  
 Bilirubin, total 0.6 11/15/2018 11:30 AM  
 ALT (SGPT) 26 11/15/2018 11:30 AM  
 AST (SGOT) 17 11/15/2018 11:30 AM  
 Alk. phosphatase 75 11/15/2018 11:30 AM  
 Protein, total 7.6 11/15/2018 11:30 AM  
 Albumin 4.0 11/15/2018 11:30 AM  
 Globulin 3.6 11/15/2018 11:30 AM  
 A-G Ratio 1.1 11/15/2018 11:30 AM  
  
Lab Results Component Value Date/Time Hemoglobin A1c 6.7 (H) 11/15/2018 11:30 AM  
  
 
I have discussed the diagnosis with  Joyce Vazquez and the intended plan as seen in the above orders. Questions were answered concerning future plans, and written instructions provided. I have discussed medication side effects and warnings with the patient as well. Thank you for allowing me to participate in the care of this pleasant man. Please do not hesitate to call with any questions. Selena Young  RN, ACNP-BC, [facility] [address] [facility] [address] [id number] [phone] 
34 hour VAD/HF Pager: [phone]

## 2018-12-07 ENCOUNTER — TELEPHONE (OUTPATIENT)
Dept: CARDIOLOGY CLINIC | Age: 63
End: 2018-12-07

## 2018-12-07 LAB
BUN SERPL-MCNC: 11 MG/DL (ref 8–27)
BUN/CREAT SERPL: 10 (ref 10–24)
CALCIUM SERPL-MCNC: 9.4 MG/DL (ref 8.6–10.2)
CHLORIDE SERPL-SCNC: 105 MMOL/L (ref 96–106)
CO2 SERPL-SCNC: 21 MMOL/L (ref 20–29)
CREAT SERPL-MCNC: 1.15 MG/DL (ref 0.76–1.27)
GLUCOSE SERPL-MCNC: 84 MG/DL (ref 65–99)
POTASSIUM SERPL-SCNC: 4.1 MMOL/L (ref 3.5–5.2)
SODIUM SERPL-SCNC: 142 MMOL/L (ref 134–144)

## 2018-12-07 NOTE — TELEPHONE ENCOUNTER
----- Message from ZACH Sommer sent at 12/7/2018  9:55 AM EST -----  Labs stable  He is to resume the entresto 97/103 mg 1 tablet BID and call us if he is not feeling well. Encourage drink 16 oz water prior to taking and take with food  Continue Current plan  Thank you    I called patient to review labs, when I asked how he was doing today, he stated \"terrible\". He told me his arms felt numb, he was having chest pain ,and he refused to go to cardiac rehab today. He stated he took 2 of entresto last night , and when I tried to clarify dose, he stated \"you know\" and hung up. I immediately tried to call him back and got his voicemail-I left a voicemail asking for a call back and stating he needs to go to ED. I called him 3 more times and got voicemail. I notified Alyssa Albarran, Blake Wallace and Dr. Amita Akers. Will text his sister to call office. If no response, will send  out for welfare check. I called [employer] and they will send  and rescue squad out to do welfare check.  At 1125 am.

## 2018-12-07 NOTE — PROGRESS NOTES
Labs stable  He is to resume the entresto 97/103 mg 1 tablet BID and call us if he is not feeling well.  Encourage drink 16 oz water prior to taking and take with food  Continue Current plan  Thank you

## 2018-12-13 ENCOUNTER — OFFICE VISIT (OUTPATIENT)
Dept: CARDIOLOGY CLINIC | Age: 63
End: 2018-12-13

## 2018-12-13 VITALS
HEART RATE: 86 BPM | HEIGHT: 70 IN | RESPIRATION RATE: 24 BRPM | DIASTOLIC BLOOD PRESSURE: 70 MMHG | WEIGHT: 275.6 LBS | BODY MASS INDEX: 39.46 KG/M2 | SYSTOLIC BLOOD PRESSURE: 122 MMHG | TEMPERATURE: 99 F | OXYGEN SATURATION: 94 %

## 2018-12-13 DIAGNOSIS — I50.20 SYSTOLIC HEART FAILURE, ACC/AHA STAGE C (HCC): Primary | ICD-10-CM

## 2018-12-13 DIAGNOSIS — Z95.810 ICD (IMPLANTABLE CARDIOVERTER-DEFIBRILLATOR) IN PLACE: ICD-10-CM

## 2018-12-13 DIAGNOSIS — R06.02 SOB (SHORTNESS OF BREATH): ICD-10-CM

## 2018-12-13 DIAGNOSIS — I50.22 CHRONIC SYSTOLIC CONGESTIVE HEART FAILURE (HCC): ICD-10-CM

## 2018-12-13 DIAGNOSIS — I50.20 SYSTOLIC CONGESTIVE HEART FAILURE WITH REDUCED LEFT VENTRICULAR FUNCTION, NYHA CLASS 3 (HCC): ICD-10-CM

## 2018-12-13 DIAGNOSIS — I25.5 ISCHEMIC CARDIOMYOPATHY: ICD-10-CM

## 2018-12-13 RX ORDER — SPIRONOLACTONE 25 MG/1
25 TABLET ORAL DAILY
Qty: 30 TAB | Refills: 3 | Status: SHIPPED | OUTPATIENT
Start: 2018-12-13 | End: 2018-12-21 | Stop reason: SINTOL

## 2018-12-13 RX ORDER — METOPROLOL SUCCINATE 25 MG/1
50 TABLET, EXTENDED RELEASE ORAL DAILY
Qty: 60 TAB | Refills: 3
Start: 2018-12-13 | End: 2018-12-21 | Stop reason: SDUPTHER

## 2018-12-13 NOTE — LETTER
12/14/2018 1:11 PM 
 
Patient:  Susi Smyth YOB: 1955 Date of Visit: 12/13/2018 Dear Kyle Franks MD 
[facility] [address] Internal Med [address] VIA In Basket Sher Oh MD 
[address] [place] [phone] [place] [address] VIA In Basket James Oconnor MD 
[address] [facility] [address] VIA In Basket 
 : Thank you for referring Mr. Ann Rodgers to me for evaluation/treatment. Below are the relevant portions of my assessment and plan of care. If you have questions, please do not hesitate to call me. I look forward to following Mr. Lunael Minda along with you. Sincerely, JANE MyersP

## 2018-12-13 NOTE — PATIENT INSTRUCTIONS
START  aldactone 25 mg daily   INCREASE torpol XL to 50 mg daily ( take 2 of the 25 mg tablets)   DECREASE  Entresto to 24/26 mg twice a day- you have these at home      CONTINUE CURRENT medications    Take twice a day medications 12 hours apart with food    Labs today BMP, proBNP     See Dr. Tijerina Argue for AICD check     Limit WHITE foods ( flour, sugar, pasta, rice, potatoes)    Walk more    Keep a positive attitude       HF Education: Continue daily weights (in the morning, after voiding). Notify HF team of overnight weight gains > 2 lbs or weekly >5 lbs or if any of the following Sx. Continue to limit sodium intake & monitor your fluid intake .

## 2018-12-13 NOTE — PROGRESS NOTES
[facility] Note      DOS:   12/13/2018  NAME:  Justine Lewis   MRN:   [id number]   REFERRING PROVIDER:  Nikunj Vidal MD  PRIMARY CARE PHYSICIAN: Dorothea Olmedo MD  PRIMARY CARDIOLOGIST: Nikunj Vidal MD     IMPRESSION/PLAN  Near syncopal/palpitations  spell- unclear etiology, may be related to anxiety or  glucose issues    Encouraged to remain hydrated    Increase BB       ICM - Stage C, NYHA Class III, LVEF 15-20%   Resume low dose  Entresto 24/26 mg BID  - seems intolerant of higher doses at this time    Increase Toprol XL 50  mg daily    Add aldactone 25 mg daily     Bumex to prn - he has not needed   He may need Ivabradine if his HR remains elevated after adjusting the toprol    Continue to follow  daily weights- he has lost a few pounds    Check BMP, NT proBNP today   Follow up in the [facility] in 2 Weeks- sooner if needed    Repeat echo once meds optimized    Discussed Card[facility]- pt is hesitant, will discuss with Dr. Jazmin Rubio at 12/21 appointment    Cardiac rehab for HF at [address] [place] [address] Dr     Encouraged to complete Sleep study      Sudden cardiac death px- s/p AICD- no shocks recently    Follow up Dr. Michael Dickey - unclear what if his recent event was arrhythmia no AICD shock noted on interrogation    Suspect SHERI- witnessed apnea in hospital daytime somnolence   Sleep study    CAD s/p MI, s/p PCI with [facility]- no angina   Continue  plavix    On ASA,BB, statin   Stable   Follow up Dr. Jazmin Rubio in December     ETOH Abuse   Continues to abstain     Nicotine Addiction   Smoking cessation counseling      T2DM A1C   On glimepiride, sitagliptin    [facility] - Dr. Hiro Drummond    On ezetimibe, simvastatin    COPD   On Breo, Albuterol MDI    Depression/anxiety - [facility] - better    Not suicidal   Intolerant to wellbutrin/chantix    Declined counseling       Thank you for allowing me to participate in the care of this pleasant gentleman.   Please do not hesitate to call with any questions. Selena Pittman  RN, ACNP-BC, [facility]           Chief Complaint:   Chief Complaint   Patient presents with    Follow-up    Palpitations     12-10-18    Dizziness     12-10-18    Shortness of Breath       HPI:Marco Baker is a  70y.o. year old   male with a history of HFrEF in the setting of ICM s/p AICD with CAD s/p PCI RCA with [facility] 9/77 complicated by  K8KM, COPD, obesity ( BMI 39), persistent tobacco use, ETOH abuse, depression with anxiety, and noncompliance who presents for further evaluation of his chronic systolic heart failure. He suffered an out-of-hospital MI in May 2017 and presented with subacute HF in June. Cadiac MRI demonstrated viability only in the RCA territory. Cardiac cath demonstrated mid %, RCA mid 85%. He underwent PCI RCA with [facility].    Poor historian with tangent of thoughts      He was last seen 1 week ago. He was confused about his meds, and was taking  Entresto   97/103 mg BID, labs were drawn and WNL so he was instructed to resume 97/103 mg BID dosing. He has since stopped the entresto all together 12/10/18.   12/10/18 He had a spell of palpitations with dizziness and  Near syncope that  awakened him at 6 am. He had not eating that morning, and did not ck his glucose or  seek medical care. He does not think his AICD fired. AICD check today shows no events. Since the spell, he has stopped the [place]. He feels better off the Entresto - his headaches are better, and less dizziness. He felt OK 24/26 mg BID   BERNABE stable with moderate activity  He is now following  daily weight which remains stable   BP stable 100/70   Not taking bumex   He is still smoking. He is waiting to hear from cardiac rehab. Does no currently exercise regularly. Never been tested for SHERI. He reports the nurses witnessed apnea when in the hospital .      He feels somewhat depressed today   No adding salt, but does not restrict foods.  He is living with his sister who cooks for him- \"trying' to limit the sodium. He continue to abstaine from alcohol. Pedro  for his medications. 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 Entresto from [employer] Medicare  Now activated. care card application pending. Review of Systems:     General:  positive for  - fatigue and sleep disturbance  HEENT: Recent sinus issues, Negative- denies angioedema sx  Pulmonary: positive for - cough and shortness of breath  Cardiac: Palpitations, near syncopal, Denies exertional chest pain, near  Syncope. Edema, abdominal bloating, AICD shocks. GI:  GERD sx, Denies abdominal pain, change in bowel habits, or black or bloody stools  Musculo: negative  Neuro: no TIA or stroke symptoms  Skin:  negative  Allergies: REMINDER:DOCUMENT ALLERGIES IN ALLERGY ACTIVITY  Psych: Positive for depression and anxiety       CARDIAC EVALUATION   ECHO: 2017: EF 15-20%, apical AK  ECHO 17- Dilated LV, EF 20%, apical DK   ECHO 18:LVD, EF 15-20%, severe DHK,  AK akinesis of the apical anterior, apical inferior, apical septal, and apical lateral wall. LAE, mild MR      MRI: 2017: LV dilated LVEF 32%, severe HK ant wall, RVEF 55%, mild MR; anterior infarct- no viability RCA/LCA: viable     CATH 2017: LM: nl, LAD: m100%, LCX codom normal, RCA:m 85%, LVEDP 25, no AV gradient, dilated LV, apical AK, EF 20%  -- s/p PCI pRCA: 2.5x18 Xience ([facility]). AICD 2017; [employer] single lead       EK2018: Sinus  Rhythm, Low voltage in limb leads.   -Left axis -anterior fascicular block.    -Anterior infarct -age undetermined. T-abnormality  -Possible  Anterolateral  ischemia.    10/4/2018: SR, LAD, PRWP, QRS 96 md, QTc 389   2018 SR, LAD, PRWP ,     History:  Past Medical History:   Diagnosis Date    Controlled type 2 diabetes mellitus with circulatory disorder ([address] 2017    COPD (chronic obstructive pulmonary disease) ([facility]     Coronary artery disease involving native coronary artery without angina pectoris 06/01/2017    OOH MI, 2v disease s/p PCI RCA with [facility] June 2017    ETOH abuse 6/1/2017    ICD (implantable cardioverter-defibrillator) in place     Ischemic cardiomyopathy     Extensive LAD territory infarction    Nicotine dependence 6/1/2017    SOB (shortness of breath) 41/3/9720    Systolic congestive heart failure with reduced left ventricular function, NYHA class 3 ([facility] 10/25/2018     Past Surgical History:   Procedure Laterality Date    HX CORONARY STENT PLACEMENT      HX HEART CATHETERIZATION      HX TONSILLECTOMY      HX WISDOM TEETH EXTRACTION       Social History     Socioeconomic History    Marital status: SINGLE     Spouse name: Not on file    Number of children: Not on file    Years of education: Not on file    Highest education level: Not on file   Social Needs    Financial resource strain: Not on file    Food insecurity - worry: Not on file    Food insecurity - inability: Not on file   Calpian needs - medical: Not on file   Calpian needs - non-medical: Not on file   Occupational History    Not on file   Tobacco Use    Smoking status: Current Every Day Smoker     Packs/day: 1.00     Years: 30.00     Pack years: 30.00     Types: Cigarettes    Smokeless tobacco: Never Used   Substance and Sexual Activity    Alcohol use: No     Comment: former drinker-quit 3 months ago-drank a 6 pack per day    Drug use: No    Sexual activity: Yes     Partners: Female   Other Topics Concern    Not on file   Social History Narrative    Not on file     Family History   Problem Relation Age of Onset    Stroke Father     Heart Disease Father     Hypertension Father        Current Medications:   Current Outpatient Medications   Medication Sig Dispense Refill    melatonin 3 mg tablet Take 5 mg by mouth nightly.  glimepiride (AMARYL) 2 mg tablet Take 2 mg by mouth every morning.  omeprazole (PRILOSEC) 20 mg capsule Take 20 mg by mouth daily.  SITagliptin (JANUVIA) 25 mg tablet Take 25 mg by mouth daily.  simvastatin (ZOCOR) 40 mg tablet Take  by mouth every morning.  ALBUTEROL SULFATE (VENTOLIN HFA IN) Take  by inhalation.  ezetimibe (ZETIA) 10 mg tablet Take 1 Tab by mouth daily. 30 Tab 5    acetaminophen (TYLENOL) 325 mg tablet Take 2 Tabs by mouth every four (4) hours as needed.  ACETAMINOPHEN/DIPHENHYDRAMINE (TYLENOL PM PO) Take  by mouth.  metoprolol succinate (TOPROL-XL) 25 mg XL tablet TAKE ONE TABLET BY MOUTH ONCE DAILY 30 Tab 6    clopidogrel (PLAVIX) 75 mg tab Take 1 Tab by mouth daily. Dr. Marga Mcneil to provide refills  Indications: Thrombosis Prevention after PCI 90 Tab 3    aspirin 81 mg chewable tablet Take 1 Tab by mouth daily.  sacubitril-valsartan (ENTRESTO) 97 mg/103 mg tablet Take 1 Tab by mouth two (2) times a day. 60 Tab 3    bumetanide (BUMEX) 0.5 mg tablet Take 1 Tab by mouth daily as needed. 30 Tab 2       Allergies:    Allergies   Allergen Reactions    Atorvastatin Diarrhea    Codeine Itching    Metformin Other (comments)     Abdominal pain     Wellbutrin [Bupropion] Other (comments)     Night mare  Aggression          Admission Weight: Last Weight   Weight: 275 lb 9.6 oz (125 kg) Weight: 275 lb 9.6 oz (125 kg)       Physical Exam:   Vitals:    Visit Vitals  /70 (BP 1 Location: Left arm, BP Patient Position: Sitting)   Pulse 86   Temp 99 °F (37.2 °C) (Oral)   Resp 24   Ht 5' 10\" (1.778 m)   Wt 275 lb 9.6 oz (125 kg)   SpO2 94%   BMI 39.54 kg/m²       General:  Well developed disheveled WM, obese,alert,  Cooperative, Talkative with tangent of thoughts   HEENT: Normocephalic, PERRLA, and Sclera clear, anicteric  Neck:  supple, no significant adenopathy, carotids upstroke normal bilaterally, no bruits  CVP:  14 cm ( + ) HJR  Cardiac: AICD left infraclavicular space, RRR, +  S3 present and 2/6 systolic murmur present  Chest/Pulm:  resp unlabored,  Diminished breath sounds bilaterally. No rales, rhonchi, wheezing  Abdomen: Obese, protuberant, soft, nontender,  normal BS  Extremity: No edema, no clubbing, cyanosis  Neuro: A&O x3,  Normal coordination and gait  Skin:   no rashes, no ecchymoses, no petechiae    Recent Labs:   Lab Results   Component Value Date/Time    WBC 7.2 08/09/2018 10:26 AM    HGB 16.4 08/09/2018 10:26 AM    HCT 49.0 08/09/2018 10:26 AM    PLATELET 385 72/[birth year]/1982 10:26 AM    MCV 96.5 08/09/2018 10:26 AM     Lab Results   Component Value Date/Time    TSH 1.90 11/15/2018 11:30 AM      Lab Results   Component Value Date/Time    NT pro-BNP 6,586 (H) 06/02/2017 01:40 AM    NT pro-BNP 7,025 (H) 05/30/2017 03:37 PM    PROBNP 111 11/15/2018 03:14 PM    PROBNP 136 10/04/2018 12:00 AM      Lab Results   Component Value Date/Time    Sodium 142 12/06/2018 12:00 AM    Potassium 4.1 12/06/2018 12:00 AM    Chloride 105 12/06/2018 12:00 AM    CO2 21 12/06/2018 12:00 AM    Anion gap 9 11/15/2018 11:30 AM    Glucose 84 12/06/2018 12:00 AM    BUN 11 12/06/2018 12:00 AM    Creatinine 1.15 12/06/2018 12:00 AM    BUN/Creatinine ratio 10 12/06/2018 12:00 AM    GFR est AA 77 12/06/2018 12:00 AM    GFR est non-AA 67 12/06/2018 12:00 AM    Calcium 9.4 12/06/2018 12:00 AM    Bilirubin, total 0.6 11/15/2018 11:30 AM    ALT (SGPT) 26 11/15/2018 11:30 AM    AST (SGOT) 17 11/15/2018 11:30 AM    Alk. phosphatase 75 11/15/2018 11:30 AM    Protein, total 7.6 11/15/2018 11:30 AM    Albumin 4.0 11/15/2018 11:30 AM    Globulin 3.6 11/15/2018 11:30 AM    A-G Ratio 1.1 11/15/2018 11:30 AM      Lab Results   Component Value Date/Time    Hemoglobin A1c 6.7 (H) 11/15/2018 11:30 AM          I have discussed the diagnosis with  Joyce Vazquez and the intended plan as seen in the above orders. Questions were answered concerning future plans, and written instructions provided.   I have discussed medication side effects and warnings with the patient as well. Thank you for allowing me to participate in the care of this pleasant man. Please do not hesitate to call with any questions. Selena Pulido RN, [facility], [address] [address] [phone]  24 hour VAD/HF Pager: [phone]

## 2018-12-14 LAB
BUN SERPL-MCNC: 13 MG/DL (ref 8–27)
BUN/CREAT SERPL: 13 (ref 10–24)
CALCIUM SERPL-MCNC: 9.3 MG/DL (ref 8.6–10.2)
CHLORIDE SERPL-SCNC: 105 MMOL/L (ref 96–106)
CO2 SERPL-SCNC: 22 MMOL/L (ref 20–29)
CREAT SERPL-MCNC: 0.99 MG/DL (ref 0.76–1.27)
GLUCOSE SERPL-MCNC: 138 MG/DL (ref 65–99)
POTASSIUM SERPL-SCNC: 4 MMOL/L (ref 3.5–5.2)
SODIUM SERPL-SCNC: 141 MMOL/L (ref 134–144)

## 2018-12-15 LAB
NT-PROBNP SERPL-MCNC: 137 PG/ML (ref 0–376)
SPECIMEN STATUS REPORT, ROLRST: NORMAL

## 2018-12-17 ENCOUNTER — TELEPHONE (OUTPATIENT)
Dept: CARDIOLOGY CLINIC | Age: 63
End: 2018-12-17

## 2018-12-17 NOTE — PROGRESS NOTES
[address]anny Ortega [address]  [address]  [place], [address] [facility]    Office [phone]  Fax [phone]  Cell [phone]        Joyce Vazquez is a 70 y.o. male last seen 4 months ago. Assessment  Encounter Diagnoses   Name Primary?  Systolic heart failure, ACC/AHA stage C ([facility] Yes    Ischemic cardiomyopathy     Systolic congestive heart failure with reduced left ventricular function, NYHA class 3 (HCC)     Coronary artery disease involving native coronary artery of native heart without angina pectoris     Controlled type 2 diabetes mellitus with other circulatory complication, without long-term current use of insulin (HCC)     Non-ST elevation (NSTEMI) myocardial infarction ([address]     Apnea     Pure hypercholesterolemia      Recommendations:  Joyce Vazquez has ischemic cardiomyopathy, severe LV dysfunction, HFrEF s/p ICD. He suffered an out-of-hospital MI in May 2017 and presented with subacute HF in June. Cadiac MRI demonstrated viability only in the RCA territory. Cardiac cath demonstrated mid %, RCA mid 85%. He underwent PCI RCA with [facility]. Would continue DAPT indefinitely. Drivers of CAD include smoking and T2DM    HFrEF, EF 15-20% with apical akinesis, most recently by echo 8/2018. He continues to have class 2-3 HF. He has been followed closely by the advanced HF clinic. Continue Entresto 24/26 (Intolerant of higher doses), increase Toprol from 25 to 50 mg/d as instructed last week, continue Bumex 0.5 mg daily. He is intolerant of Spironolactone. He remains alcohol free. His long term outlook is guarded based on his comorbid conditions and poor social support. Continue follow up the  advanced HF clinic to help with management. I do not think he is a good candidate for advanced therapies. Follow-up Disposition:  Return in about 3 months (around 3/21/2019).       Subjective:  He has been seen by heart failure clinic, most recently by Serina Magaña last week. Enresto was resumed. Mr. Aly Gonzalez states he is doing well overall. He has not noticed any changes in his breathing. He does not have any dyspnea, but states he does \"run out of steam\". His activity is limited somewhat by hip pain/sciatic nerve pain. He reports he felt sick after taking one dose of Aldactone. He has not yet increased Toprol. He continues on Bumex. Patient denies any exertional chest pain, palpitations, syncope, orthopnea, edema or paroxysmal nocturnal dyspnea. Of note, he is a somewhat tangential historian. Cardiac risk factors   HTN no  DM  yes  Smoking yes    Cardiac testing  ECHO: 5/31//2017: EF 15-20%, apical AK  ECHO 9/18/17- Dilated LV, EF 20%, apical DK   ECHO 8/21/18:LVD, EF 15-20%, severe DHK,  AK akinesis of the apical anterior, apical inferior, apical septal, and apical lateral wall. LAE, mild MR      MRI: 6/1/2017: LV dilated LVEF 32%, severe HK ant wall, RVEF 55%, mild MR; anterior infarct- no viability RCA/LCA: viable     CATH 6/2/2017: LM: nl, LAD: m100%, LCX codom normal, RCA:m 85%, LVEDP 25, no AV gradient, dilated LV, apical AK, EF 20%  -- s/p PCI pRCA: 2.5x18 Xience ([facility]).       AICD 12/5/2017; [place] [employer]    Past Medical History:   Diagnosis Date    Controlled type 2 diabetes mellitus with circulatory disorder ([address] 6/1/2017    COPD (chronic obstructive pulmonary disease) ([address]     Coronary artery disease involving native coronary artery without angina pectoris 06/01/2017    OOH MI, 2v disease s/p PCI RCA with [facility] June 2017    ETOH abuse 6/1/2017    ICD (implantable cardioverter-defibrillator) in place     Ischemic cardiomyopathy     Extensive LAD territory infarction    Nicotine dependence 6/1/2017    SOB (shortness of breath) 71/9/5146    Systolic congestive heart failure with reduced left ventricular function, NYHA class 3 ([address] 10/25/2018        Current Outpatient Medications   Medication Sig Dispense Refill    cholecalciferol (VITAMIN D3) 1,000 unit tablet Take  by mouth every seven (7) days.  cyanocobalamin (VITAMIN B-12) 500 mcg tablet Take 500 mcg by mouth every seven (7) days.  melatonin 5 mg cap capsule Take 5 mg by mouth nightly.  bumetanide (BUMEX) 0.5 mg tablet Take  by mouth daily.  metoprolol succinate (TOPROL XL) 25 mg XL tablet Take  by mouth daily.  sacubitril-valsartan (ENTRESTO) 24 mg/26 mg tablet Take 1 Tab by mouth two (2) times a day. 60 Tab 3    glimepiride (AMARYL) 2 mg tablet Take 2 mg by mouth every morning.  omeprazole (PRILOSEC) 20 mg capsule Take 20 mg by mouth daily.  SITagliptin (JANUVIA) 25 mg tablet Take 25 mg by mouth daily.  simvastatin (ZOCOR) 40 mg tablet Take  by mouth every morning.  ALBUTEROL SULFATE (VENTOLIN HFA IN) Take  by inhalation daily.  ezetimibe (ZETIA) 10 mg tablet Take 1 Tab by mouth daily. 30 Tab 5    ACETAMINOPHEN/DIPHENHYDRAMINE (TYLENOL PM PO) Take  by mouth nightly.  clopidogrel (PLAVIX) 75 mg tab Take 1 Tab by mouth daily. Dr. Calvin Confer to provide refills  Indications: Thrombosis Prevention after PCI 90 Tab 3    aspirin 81 mg chewable tablet Take 1 Tab by mouth daily.  spironolactone (ALDACTONE) 25 mg tablet Take 1 Tab by mouth daily. 30 Tab 3    acetaminophen (TYLENOL) 325 mg tablet Take 2 Tabs by mouth every four (4) hours as needed. Allergies   Allergen Reactions    Atorvastatin Diarrhea    Codeine Itching    Metformin Other (comments)     Abdominal pain     Wellbutrin [Bupropion] Other (comments)     Night mare  Aggression       Retired. Review of Systems  Constitutional: Negative for fever, chills, malaise/fatigue and diaphoresis. Respiratory: Negative for cough, hemoptysis, sputum production, and wheezing. Positive for exertional fatigue  Cardiovascular: Negative for chest pain, palpitations, orthopnea, claudication, leg swelling and PND.   Gastrointestinal: Negative for heartburn, nausea, vomiting, blood in stool and melena. Genitourinary: Negative for dysuria and flank pain. Musculoskeletal: Negative for joint pain and back pain. Skin: Negative for rash. Neurological: Negative for focal weakness, seizures, loss of consciousness, weakness and headaches. Endo/Heme/Allergies: Does not bruise/bleed easily. Psychiatric/Behavioral: Negative for memory loss. The patient does not have insomnia. Physical Exam    Visit Vitals  /80 (BP 1 Location: Left arm)   Pulse 72   Resp 20   Ht 5' 10\" (1.778 m)   Wt 275 lb 12.8 oz (125.1 kg)   BMI 39.57 kg/m²     Wt Readings from Last 3 Encounters:   12/21/18 275 lb 12.8 oz (125.1 kg)   12/13/18 275 lb 9.6 oz (125 kg)   12/06/18 274 lb 9.6 oz (124.6 kg)      General - well developed well nourished, obese WM  Neck - JVP normal, thyroid nl  Cardiac - normal S1, S2, no murmurs, rubs or gallops. No clicks  Vascular - carotids without bruits, radials, femorals and pedal pulses equal bilateral  Lungs - clear to auscultation bilaterals, no rales, wheezing. Scattered rhonchi posteriorly   Abd - soft nontender, no HSM, no abd bruits  Extremities - no edema  Skin - no rash  Neuro - nonfocal  Psych - normal mood and affect      Cardiographics  ECG 6/14/17 - SR 75, TWI; unchanged  Echo 9/18/17- Dilated LV, EF 20%, apical dyskinesis  Echo 8/21/18 - EF 15-20%, apical akineses, dilated LV    Written by Johnnie Rossi, as dictated by Dr. Tricia June.      Tricia June MD

## 2018-12-17 NOTE — TELEPHONE ENCOUNTER
----- Message from ZACH Acuña sent at 12/17/2018 10:10 AM EST -----  Labs look good. continue plan   he was having a hard time taking the entresto. at a higher dose. Thank you     I called patient and reviewed all lab results. He states understanding, states he is feeling better on lower dose of entresto.

## 2018-12-17 NOTE — PROGRESS NOTES
Labs look good. continue plan   he was having a hard time taking the entresto. at a higher dose.   Thank you

## 2018-12-21 ENCOUNTER — OFFICE VISIT (OUTPATIENT)
Dept: CARDIOLOGY CLINIC | Age: 63
End: 2018-12-21

## 2018-12-21 VITALS
WEIGHT: 275.8 LBS | HEIGHT: 70 IN | DIASTOLIC BLOOD PRESSURE: 80 MMHG | HEART RATE: 72 BPM | RESPIRATION RATE: 20 BRPM | BODY MASS INDEX: 39.48 KG/M2 | SYSTOLIC BLOOD PRESSURE: 128 MMHG

## 2018-12-21 DIAGNOSIS — I21.4 NON-ST ELEVATION (NSTEMI) MYOCARDIAL INFARCTION (HCC): ICD-10-CM

## 2018-12-21 DIAGNOSIS — I25.5 ISCHEMIC CARDIOMYOPATHY: ICD-10-CM

## 2018-12-21 DIAGNOSIS — E11.59 CONTROLLED TYPE 2 DIABETES MELLITUS WITH OTHER CIRCULATORY COMPLICATION, WITHOUT LONG-TERM CURRENT USE OF INSULIN (HCC): ICD-10-CM

## 2018-12-21 DIAGNOSIS — E78.00 PURE HYPERCHOLESTEROLEMIA: ICD-10-CM

## 2018-12-21 DIAGNOSIS — I25.10 CORONARY ARTERY DISEASE INVOLVING NATIVE CORONARY ARTERY OF NATIVE HEART WITHOUT ANGINA PECTORIS: ICD-10-CM

## 2018-12-21 DIAGNOSIS — I50.20 SYSTOLIC CONGESTIVE HEART FAILURE WITH REDUCED LEFT VENTRICULAR FUNCTION, NYHA CLASS 3 (HCC): ICD-10-CM

## 2018-12-21 DIAGNOSIS — R06.81 APNEA: ICD-10-CM

## 2018-12-21 DIAGNOSIS — I50.20 SYSTOLIC HEART FAILURE, ACC/AHA STAGE C (HCC): Primary | ICD-10-CM

## 2018-12-21 RX ORDER — BUMETANIDE 0.5 MG/1
0.5 TABLET ORAL DAILY
COMMUNITY
End: 2019-04-10

## 2018-12-21 RX ORDER — METOPROLOL SUCCINATE 50 MG/1
50 TABLET, EXTENDED RELEASE ORAL DAILY
Qty: 90 TAB | Refills: 3
Start: 2018-12-21 | End: 2019-01-29 | Stop reason: DRUGHIGH

## 2018-12-21 RX ORDER — MELATONIN
1000 DAILY
COMMUNITY
End: 2020-10-01

## 2018-12-21 RX ORDER — MELATONIN 5 MG
3 CAPSULE ORAL
COMMUNITY
End: 2019-03-08

## 2018-12-21 RX ORDER — METOPROLOL SUCCINATE 25 MG/1
TABLET, EXTENDED RELEASE ORAL DAILY
COMMUNITY
End: 2018-12-21 | Stop reason: SDUPTHER

## 2018-12-21 RX ORDER — LANOLIN ALCOHOL/MO/W.PET/CERES
500 CREAM (GRAM) TOPICAL DAILY
COMMUNITY
End: 2020-10-01

## 2018-12-21 NOTE — PROGRESS NOTES
Visit Vitals  /80 (BP 1 Location: Left arm)   Pulse 72   Resp 20   Ht 5' 10\" (1.778 m)   Wt 275 lb 12.8 oz (125.1 kg)   BMI 39.57 kg/m²       Patient is here for follow up. Doing well. No complaints.

## 2018-12-27 ENCOUNTER — OFFICE VISIT (OUTPATIENT)
Dept: CARDIOLOGY CLINIC | Age: 63
End: 2018-12-27

## 2018-12-27 VITALS
HEIGHT: 70 IN | HEART RATE: 79 BPM | WEIGHT: 276 LBS | TEMPERATURE: 98.7 F | DIASTOLIC BLOOD PRESSURE: 72 MMHG | SYSTOLIC BLOOD PRESSURE: 112 MMHG | RESPIRATION RATE: 20 BRPM | BODY MASS INDEX: 39.51 KG/M2 | OXYGEN SATURATION: 96 %

## 2018-12-27 DIAGNOSIS — Z95.810 ICD (IMPLANTABLE CARDIOVERTER-DEFIBRILLATOR) IN PLACE: ICD-10-CM

## 2018-12-27 DIAGNOSIS — I25.10 CORONARY ARTERY DISEASE INVOLVING NATIVE CORONARY ARTERY OF NATIVE HEART WITHOUT ANGINA PECTORIS: ICD-10-CM

## 2018-12-27 DIAGNOSIS — I25.5 ISCHEMIC CARDIOMYOPATHY: ICD-10-CM

## 2018-12-27 DIAGNOSIS — E11.59 CONTROLLED TYPE 2 DIABETES MELLITUS WITH OTHER CIRCULATORY COMPLICATION, WITHOUT LONG-TERM CURRENT USE OF INSULIN (HCC): Chronic | ICD-10-CM

## 2018-12-27 DIAGNOSIS — I50.20 SYSTOLIC CONGESTIVE HEART FAILURE WITH REDUCED LEFT VENTRICULAR FUNCTION, NYHA CLASS 3 (HCC): ICD-10-CM

## 2018-12-27 DIAGNOSIS — I50.20 SYSTOLIC HEART FAILURE, ACC/AHA STAGE C (HCC): Primary | ICD-10-CM

## 2018-12-27 DIAGNOSIS — R06.02 SOB (SHORTNESS OF BREATH): ICD-10-CM

## 2018-12-27 NOTE — PROGRESS NOTES
[facility] Clinic Note      DOS:   12/27/2018  NAME:  Lexy Velázquez   MRN:   [id number]   REFERRING PROVIDER:  Roc Chin MD  PRIMARY CARE PHYSICIAN: Brendan Ac MD  PRIMARY CARDIOLOGIST: Roc Chin MD  [facility]: [facility], [facility]     IMPRESSION/PLAN     ICM - Stage C, NYHA Class III, LVEF 15-20%   Continue  low dose  Entresto 24/26 mg BID  - seems intolerant of higher doses at this time    Continue  Toprol XL 25  mg daily - intolerant of higher doses    Intolerant of  aldactone 25 mg daily     Bumex 0.5 mg daily    Continue to follow  daily weights- he has lost a few pounds    Check BMP, NT proBNP today   Follow up in the [facility] in 2 Weeks- sooner if needed    Repeat echo once meds optimized    [facility]    [facility] HF [facility]  - declined    Encouraged to complete Sleep study - declined     Noncomplinace   Mr. Lydia Davidson appears to have  a great deal if sensitivities to medications and self medicates with many of the doses. Favor conservative approach and minimize the number of medications he has to take and use the most tolerable doses. Sudden cardiac death px- s/p AICD- no shocks recently    Follow up Dr. Tijerina Argue - unclear what if his recent event was arrhythmia no AICD shock noted on interrogation    Suspect SHERI- witnessed apnea in hospital daytime somnolence   Sleep study-declined although we discussed the relationship with heart failure. I am not sure he has the capacity to understand the importance of treatment.     CAD s/p MI, s/p PCI with [facility]- no angina   Continue  plavix    On ASA,BB, statin   Stable    ETOH Abuse   Continues to abstain     Nicotine Addiction   Smoking cessation counseling      T2DM A1C   On glimepiride, Januvia     Now being followed by [facility]    HLD     On ezetimibe, simvastatin    COPD   On Breo, Albuterol MDI    Depression/anxiety - per [facility]  - better    Not suicidal   Intolerant to wellbutrin/chantix    Starting counseling with [facility]       Thank you for allowing me to participate in the care of this pleasant gentleman. Please do not hesitate to call with any questions. Selena Zepeda  RN, ACNP-BC, [facility]           Chief Complaint:   Chief Complaint   Patient presents with    Follow-up    Fatigue       HPI:Marco Celaya is a  70y.o. year old   male with a history of HFrEF in the setting of ICM s/p AICD with CAD s/p PCI RCA with [facility] 2/69 complicated by  Y4OW, COPD, obesity ( BMI 44), persistent tobacco use, ETOH abuse, depression with anxiety, and noncompliance who presents for further evaluation of his chronic systolic heart failure. He suffered an out-of-hospital MI in May 2017 and presented with subacute HF in June. [facility] MRI demonstrated viability only in the RCA territory. Cardiac cath demonstrated mid %, RCA mid 85%. He underwent PCI RCA with [facility].    Poor historian with tangent of thoughts      He was last seen 2 weeks ago. He has seen Dr. Holland Arellano who agreed that   Mr. Vianey Garcia is  Not a candidate for advanced therapies. Also saw ARELIS Morgan, with [facility],. He is getting prosthetics for his shoes. He   takes his medications randomly at random doses. He briefly increased the Toprol but felt poorly so is taking 25 mg dose  He is compliant with Entresto 24/26 mg BID and some times takes the 49/51 mg dose at night although states he \"feels warm in his legs. \"  Stable pattern of BERNABE with  moderate exertion  Stable 2 pillow orthopnea  He naps most days. BP stable  Daily weight stable in the 274 range      He is still smoking 1 ppd, not drinking   He is waiting to hear from cardiac rehab. Does not currently exercise regularly. No adding salt, but does not restrict foods. He is living with his sister who cooks for him- \"trying' to limit the sodium. He continue to abstaine from alcohol.    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 Entresto from [employer] Medicare  Now activated. care card application pending. Review of Systems:     General:  positive for  - fatigue and sleep disturbance  HEENT: Negative- denies angioedema sx  Pulmonary: positive for - NP  cough and shortness of breath  Cardiac:  Denies exertional chest pain, palpitations,  near  Syncope. Edema, abdominal bloating, AICD shocks. GI:  GERD sx, Denies abdominal pain, change in bowel habits, or black or bloody stools  Musculo: negative  Neuro: no TIA or stroke symptoms  Skin:  negative  Allergies: REMINDER:DOCUMENT ALLERGIES IN ALLERGY ACTIVITY  Psych: Positive for depression and anxiety       CARDIAC EVALUATION   ECHO: 2017: EF 15-20%, apical AK  ECHO 17- Dilated LV, EF 20%, apical DK   ECHO 18:LVD, EF 15-20%, severe DHK,  AK akinesis of the apical anterior, apical inferior, apical septal, and apical lateral wall. LAE, mild MR      MRI: 2017: LV dilated LVEF 32%, severe HK ant wall, RVEF 55%, mild MR; anterior infarct- no viability RCA/LCA: viable     CATH 2017: LM: nl, LAD: m100%, LCX codom normal, RCA:m 85%, LVEDP 25, no AV gradient, dilated LV, apical AK, EF 20%  -- s/p PCI pRCA: 2.5x18 Xience ([facility]). AICD 2017; Seltzer Sci single lead       EK2018: Sinus  Rhythm, Low voltage in limb leads. LAD, LAFB, Anterior infarct -age undetermined. T-abnormality  -Possible  Anterolateral  ischemia.    10/4/2018: SR, LAD, PRWP, QRS 96 md, QTc 389   2018 SR, LAD, PRWP ,     History:  Past Medical History:   Diagnosis Date    Controlled type 2 diabetes mellitus with circulatory disorder ([address] 2017    COPD [facility]chronic obstructive pulmonary disease) [facility] [address]     Coronary artery disease involving native coronary artery without angina pectoris 2017    OOH MI, 2v disease s/p PCI RCA with [facility] 2017    ETOH abuse 2017    ICD (implantable cardioverter-defibrillator) in place     Ischemic cardiomyopathy     Extensive LAD territory infarction    Nicotine dependence 6/1/2017    SOB (shortness of breath) 38/1/5782    Systolic congestive heart failure with reduced left ventricular function, NYHA class 3 ([facility] [address] 10/25/2018     Past Surgical History:   Procedure Laterality Date    HX CORONARY STENT PLACEMENT      HX HEART CATHETERIZATION      HX TONSILLECTOMY      HX WISDOM TEETH EXTRACTION       Social History     Socioeconomic History    Marital status: SINGLE     Spouse name: Not on file    Number of children: Not on file    Years of education: Not on file    Highest education level: Not on file   Social Needs    Financial resource strain: Not on file    Food insecurity - worry: Not on file    Food insecurity - inability: Not on file   "[employer] needs - medical: Not on file   "[employer] needs - non-medical: Not on file   Occupational History    Not on file   Tobacco Use    Smoking status: Current Every Day Smoker     Packs/day: 1.00     Years: 30.00     Pack years: 30.00     Types: Cigarettes    Smokeless tobacco: Never Used   Substance and Sexual Activity    Alcohol use: No     Comment: former drinker-quit 3 months ago-drank a 6 pack per day    Drug use: No    Sexual activity: Yes     Partners: Female   Other Topics Concern    Not on file   Social History Narrative    Not on file     Family History   Problem Relation Age of Onset    Stroke Father     Heart Disease Father     Hypertension Father        Current Medications:   Current Outpatient Medications   Medication Sig Dispense Refill    cholecalciferol (VITAMIN D3) 1,000 unit tablet Take  by mouth every seven (7) days.  cyanocobalamin (VITAMIN B-12) 500 mcg tablet Take 500 mcg by mouth every seven (7) days.  melatonin 5 mg cap capsule Take 5 mg by mouth nightly.  bumetanide (BUMEX) 0.5 mg tablet Take  by mouth daily.       metoprolol succinate (TOPROL-XL) 50 mg XL tablet Take 1 Tab by mouth daily. 90 Tab 3    sacubitril-valsartan (ENTRESTO) 24 mg/26 mg tablet Take 1 Tab by mouth two (2) times a day. 60 Tab 3    glimepiride (AMARYL) 2 mg tablet Take 2 mg by mouth every morning.  omeprazole (PRILOSEC) 20 mg capsule Take 20 mg by mouth daily.  SITagliptin (JANUVIA) 25 mg tablet Take 25 mg by mouth daily.  simvastatin (ZOCOR) 40 mg tablet Take  by mouth every morning.  ALBUTEROL SULFATE (VENTOLIN HFA IN) Take  by inhalation daily.  ACETAMINOPHEN/DIPHENHYDRAMINE (TYLENOL PM PO) Take  by mouth nightly.  clopidogrel (PLAVIX) 75 mg tab Take 1 Tab by mouth daily. Dr. Nikunj Vidal to provide refills  Indications: Thrombosis Prevention after PCI 90 Tab 3    aspirin 81 mg chewable tablet Take 1 Tab by mouth daily.  ezetimibe (ZETIA) 10 mg tablet Take 1 Tab by mouth daily. 30 Tab 5    acetaminophen (TYLENOL) 325 mg tablet Take 2 Tabs by mouth every four (4) hours as needed. Allergies:    Allergies   Allergen Reactions    Atorvastatin Diarrhea    Codeine Itching    Metformin Other (comments)     Abdominal pain     Wellbutrin [Bupropion] Other (comments)     Night mare  Aggression          Admission Weight: Last Weight   Weight: 276 lb (125.2 kg) Weight: 276 lb (125.2 kg)       Physical Exam:   Vitals:    Visit Vitals  /72 (BP 1 Location: Left arm, BP Patient Position: Sitting)   Pulse 79   Temp 98.7 °F (37.1 °C) (Oral)   Resp 20   Ht 5' 10\" (1.778 m)   Wt 276 lb (125.2 kg)   SpO2 96%   BMI 39.60 kg/m²       General:  Well developed disheveled WM, obese,alert,  Cooperative, Talkative with tangent of thoughts   HEENT: Normocephalic, PERRLA, and Sclera clear, anicteric  Neck:  supple, no significant adenopathy, carotids upstroke normal bilaterally, no bruits  CVP:  12 cm ( - ) HJR  Cardiac: AICD left infraclavicular space, RRR, +  S3 present and 2/6 systolic murmur  Chest/Pulm:  resp unlabored,  Diminished breath sounds bilaterally. No rales, rhonchi, wheezing  Abdomen: Obese, protuberant, soft, nontender,  normal BS  Extremity: No edema, no clubbing, cyanosis  Neuro: A&O x3,  Normal coordination and gait  Skin:   no rashes, no ecchymoses, no petechiae    Recent Labs:   Lab Results   Component Value Date/Time    WBC 7.2 08/09/2018 10:26 AM    HGB 16.4 08/09/2018 10:26 AM    HCT 49.0 08/09/2018 10:26 AM    PLATELET 962 20/60/5207 10:26 AM    MCV 96.5 08/09/2018 10:26 AM     Lab Results   Component Value Date/Time    TSH 1.90 11/15/2018 11:30 AM      Lab Results   Component Value Date/Time    NT pro-BNP 6,586 (H) 06/02/2017 01:40 AM    NT pro-BNP 7,025 (H) 05/30/2017 03:37 PM    PROBNP 137 12/13/2018 12:00 AM    PROBNP 111 11/15/2018 03:14 PM    PROBNP 136 10/04/2018 12:00 AM      Lab Results   Component Value Date/Time    Sodium 141 12/13/2018 12:00 AM    Potassium 4.0 12/13/2018 12:00 AM    Chloride 105 12/13/2018 12:00 AM    CO2 22 12/13/2018 12:00 AM    Anion gap 9 11/15/2018 11:30 AM    Glucose 138 (H) 12/13/2018 12:00 AM    BUN 13 12/13/2018 12:00 AM    Creatinine 0.99 12/13/2018 12:00 AM    BUN/Creatinine ratio 13 12/13/2018 12:00 AM    GFR est AA 92 12/13/2018 12:00 AM    GFR est non-AA 80 12/13/2018 12:00 AM    Calcium 9.3 12/13/2018 12:00 AM    Bilirubin, total 0.6 11/15/2018 11:30 AM    ALT (SGPT) 26 11/15/2018 11:30 AM    AST (SGOT) 17 11/15/2018 11:30 AM    Alk. phosphatase 75 11/15/2018 11:30 AM    Protein, total 7.6 11/15/2018 11:30 AM    Albumin 4.0 11/15/2018 11:30 AM    Globulin 3.6 11/15/2018 11:30 AM    A-G Ratio 1.1 11/15/2018 11:30 AM      Lab Results   Component Value Date/Time    Hemoglobin A1c 6.7 (H) 11/15/2018 11:30 AM          I have discussed the diagnosis with  Maldonado Dickey and the intended plan as seen in the above orders. Questions were answered concerning future plans, and written instructions provided. I have discussed medication side effects and warnings with the patient as well. Thank you for allowing me to participate in the care of this pleasant man. Please do not hesitate to call with any questions. Selena Whiting RN, [facility], [address] [phone]  24 hour VAD/HF Pager: [phone]

## 2018-12-27 NOTE — PATIENT INSTRUCTIONS
You are doing OK from heart standpoint      CONTINUE CURRENT medications    Take twice a day medications 12 hours apart with food    Labs today BMP, proBNP     Sleep study   Agree with counseling  Cardiac rehab     Limit WHITE foods (flour, sugar, pasta, rice, potatoes)    Walk more    HF Education: Continue daily weights (in the morning, after voiding). Notify HF team of overnight weight gains > 2 lbs or weekly >5 lbs or if any of the following Sx. Continue to limit sodium intake & monitor your fluid intake .

## 2018-12-27 NOTE — LETTER
12/28/2018 1:08 PM 
 
Patient:  Dorota Castellanos YOB: 1955 Date of Visit: 12/27/2018 Dear Fred Torres MD 
[facility] [address] Internal Med [address] VIA In Basket Cabrera Navas MD 
[address] [facility] [phone] [place] [address] VIA In Basket Jacinto Gonsales MD 
[address] [facility] [address] [place] [address] VIA In Basket Leroy Booth, ALEX 
[address] [facility] [address] [phone] VIA Facsimile: [phone] 
 : Thank you for referring Mr. Teresa Mason to me for evaluation/treatment. Below are the relevant portions of my assessment and plan of care. If you have questions, please do not hesitate to call me. I look forward to following Mr. Mera Lovett along with you. Sincerely, Ricky Fay, JANEP

## 2018-12-28 LAB
BUN SERPL-MCNC: 13 MG/DL (ref 8–27)
BUN/CREAT SERPL: 13 (ref 10–24)
CALCIUM SERPL-MCNC: 9.1 MG/DL (ref 8.6–10.2)
CHLORIDE SERPL-SCNC: 104 MMOL/L (ref 96–106)
CO2 SERPL-SCNC: 22 MMOL/L (ref 20–29)
CREAT SERPL-MCNC: 1 MG/DL (ref 0.76–1.27)
GLUCOSE SERPL-MCNC: 140 MG/DL (ref 65–99)
NT-PROBNP SERPL-MCNC: 69 PG/ML (ref 0–376)
POTASSIUM SERPL-SCNC: 4.3 MMOL/L (ref 3.5–5.2)
SODIUM SERPL-SCNC: 139 MMOL/L (ref 134–144)

## 2019-01-18 RX ORDER — CLOPIDOGREL BISULFATE 75 MG/1
75 TABLET ORAL DAILY
Qty: 90 TAB | Refills: 3 | Status: SHIPPED | OUTPATIENT
Start: 2019-01-18 | End: 2020-01-17 | Stop reason: SDUPTHER

## 2019-01-29 ENCOUNTER — OFFICE VISIT (OUTPATIENT)
Dept: CARDIOLOGY CLINIC | Age: 64
End: 2019-01-29

## 2019-01-29 VITALS
RESPIRATION RATE: 20 BRPM | OXYGEN SATURATION: 95 % | SYSTOLIC BLOOD PRESSURE: 110 MMHG | TEMPERATURE: 98.4 F | DIASTOLIC BLOOD PRESSURE: 74 MMHG | BODY MASS INDEX: 39.34 KG/M2 | HEART RATE: 82 BPM | WEIGHT: 274.8 LBS | HEIGHT: 70 IN

## 2019-01-29 DIAGNOSIS — I50.20 SYSTOLIC HEART FAILURE, ACC/AHA STAGE C (HCC): Primary | ICD-10-CM

## 2019-01-29 DIAGNOSIS — Z95.810 ICD (IMPLANTABLE CARDIOVERTER-DEFIBRILLATOR) IN PLACE: ICD-10-CM

## 2019-01-29 DIAGNOSIS — R06.02 SOB (SHORTNESS OF BREATH): ICD-10-CM

## 2019-01-29 DIAGNOSIS — I25.5 ISCHEMIC CARDIOMYOPATHY: ICD-10-CM

## 2019-01-29 DIAGNOSIS — I50.20 SYSTOLIC CONGESTIVE HEART FAILURE WITH REDUCED LEFT VENTRICULAR FUNCTION, NYHA CLASS 3 (HCC): ICD-10-CM

## 2019-01-29 RX ORDER — METOPROLOL SUCCINATE 25 MG/1
25 TABLET, EXTENDED RELEASE ORAL DAILY
COMMUNITY
End: 2019-05-09

## 2019-01-29 NOTE — PROGRESS NOTES
[facility] Clinic Note      DOS:   1/29/2019  NAME:  Marvine Shone   MRN:   [id number]   REFERRING PROVIDER:  Noreen Daniels MD  PRIMARY CARE PHYSICIAN: Tomy Alvarez MD  PRIMARY CARDIOLOGIST: Noreen Daniels MD  [facility]: ARELIS Hu     IMPRESSION/PLAN     ICM - Stage C, NYHA Class III, LVEF 15-20%   Gently increase  Entresto 24/26 mg AM and 49/51 mg HS     Continue  Toprol XL 25  mg daily - intolerant of higher doses    Intolerant of  aldactone 25 mg    Bumex 0.5 mg prn, in favor of up titrating the entresto. Continue to follow  daily weights- he has lost a few pounds    Check BMP, NT proBNP today   Follow up in the [facility] in 2 Weeks- sooner if needed    Repeat echo once meds optimized    [facility]    Cardiac rehab for HF at [address] him to revisit     Encouraged to complete Sleep study - declined       Sudden cardiac death px- s/p AICD- no shocks recently    Follow up Dr. Cuco Saucedo - unclear what if his recent event was arrhythmia no AICD shock noted on interrogation    Suspect SHERI- witnessed apnea in hospital daytime somnolence   Sleep study-declined although we discussed the relationship with heart failure. I am not sure he has the capacity to understand the importance of treatment. CAD s/p MI, s/p PCI with [facility]- no angina   Continue  plavix    On ASA,BB, statin   Stable    ETOH Abuse   Continues to abstain     Nicotine Addiction   Smoking cessation counseling    Noncomplinace- some better since going to [facility]    Favor conservative approach and minimize the number of medications he has to take and use the most tolerable doses. Consider pill pack once we get to his optimized meds.        T2DM A1C   On glimepiride, Januvia     Now being followed by [facility]    HLD     On ezetimibe, simvastatin    COPD   On Breo, Albuterol MDI    Depression/anxiety - per [facility]  -starting counseling soon through [facility]    Not suicidal   Intolerant to wellbutrin/chantix       Thank you for allowing me to participate in the care of this pleasant gentleman. Please do not hesitate to call with any questions. Selena Garcia,  RN, ACNP-BC, [facility]           Chief Complaint:   Chief Complaint   Patient presents with    CHF     follow up       HPI:Marco Eckert is a  70y.o. year old   male with a history of HFrEF in the setting of ICM s/p AICD with CAD s/p PCI RCA with [facility] 4/93 complicated by  M5RH, COPD, obesity ( BMI 44), persistent tobacco use, ETOH abuse, depression with anxiety, and noncompliance who presents for further evaluation of his chronic systolic heart failure. He suffered an out-of-hospital MI in May 2017 and presented with subacute HF in June. [place] MRI demonstrated viability only in the RCA territory. Cardiac cath demonstrated mid %, RCA mid 85%. He underwent PCI RCA with [facility].    Poor historian with tangent of thoughts      He was last seen 1 month ago. He is being followed by  ARELIS Chris, with [facility]. He is feeling some better in terms of BERNABE   BERNABE with moderate exertion. Compliant with meds- taking Entresto 24/26 mg BID and has taken the 49/51 mg dose at night x3, although states he \"feels warm in his legs. \"  He takes the bumex, about 3 times per month, and  he does not notice any change in his sx. He is seeing a nutritionist, and is keeping a diet log- reviewed. Not adding salt, but does not restrict foods. He is living with his sister who cooks for him- \"trying' to limit the sodium. BP in the 115-120/65-75 range, weight stable 271-272#. Stable 2 pillow orthopnea. He is not napping. He has not started cardiac rehab. Does not currently exercise regularly. He  is still smoking 1 ppd and continues to abstaine from alcohol. 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 Entresto from [employer] Medicare  Now activated.   care card application pending. Review of Systems:     General:  positive for  - fatigue and sleep disturbance  HEENT: Negative- denies angioedema sx  Pulmonary:    Denies cough ot wheezing   Cardiac:  Denies exertional chest pain, palpitations,  near  Syncope. Edema, abdominal bloating, AICD shocks. GI:  GERD sx, Denies abdominal pain, change in bowel habits, or black or bloody stools  Musculo: negative  Neuro: no TIA or stroke symptoms  Skin:  negative  Allergies: REMINDER:DOCUMENT ALLERGIES IN ALLERGY ACTIVITY  Psych: Positive for depression and anxiety- he has a counselor he is going to start going to see per [facility]       CARDIAC EVALUATION   ECHO: 2017: EF 15-20%, apical AK  ECHO 17- Dilated LV, EF 20%, apical DK   ECHO 18:LVD, EF 15-20%, severe DHK,  AK akinesis of the apical anterior, apical inferior, apical septal, and apical lateral wall. LAE, mild MR      MRI: 2017: LV dilated LVEF 32%, severe HK ant wall, RVEF 55%, mild MR; anterior infarct- no viability RCA/LCA: viable     CATH 2017: LM: nl, LAD: m100%, LCX codom normal, RCA:m 85%, LVEDP 25, no AV gradient, dilated LV, apical AK, EF 20%  -- s/p PCI pRCA: 2.5x18 Xience ([facility]). AICD 2017; [place] [employer] single lead       EK2018: Sinus  Rhythm, Low voltage in limb leads. LAD, LAFB, Anterior infarct -age undetermined. T-abnormality  -Possible  Anterolateral  ischemia.    10/4/2018: SR, LAD, PRWP, QRS 96 md, QTc 389   2018 SR, LAD, PRWP ,     History:  Past Medical History:   Diagnosis Date    Controlled type 2 diabetes mellitus with circulatory disorder ([address] 2017    [facility]     Coronary artery disease involving native coronary artery without angina pectoris 2017    OOH MI, 2v disease s/p PCI RCA with [facility] 2017    ETOH abuse 2017    ICD (implantable cardioverter-defibrillator) in place     Ischemic cardiomyopathy     Extensive LAD territory infarction    Nicotine dependence 6/1/2017    SOB (shortness of breath) 08/8/5965    Systolic congestive heart failure with reduced left ventricular function, NYHA class 3 ([address] 10/25/2018     Past Surgical History:   Procedure Laterality Date    HX CORONARY STENT PLACEMENT      HX HEART CATHETERIZATION      HX TONSILLECTOMY      HX WISDOM TEETH EXTRACTION       Social History     Socioeconomic History    Marital status: SINGLE     Spouse name: Not on file    Number of children: Not on file    Years of education: Not on file    Highest education level: Not on file   Social Needs    Financial resource strain: Not on file    Food insecurity - worry: Not on file    Food insecurity - inability: Not on file   [employer] needs - medical: Not on file   [employer] needs - non-medical: Not on file   Occupational History    Not on file   Tobacco Use    Smoking status: Current Every Day Smoker     Packs/day: 1.00     Years: 30.00     Pack years: 30.00     Types: Cigarettes    Smokeless tobacco: Never Used   Substance and Sexual Activity    Alcohol use: No     Comment: former drinker-quit 3 months ago-drank a 6 pack per day    Drug use: No    Sexual activity: Yes     Partners: Female   Other Topics Concern    Not on file   Social History Narrative    Not on file     Family History   Problem Relation Age of Onset    Stroke Father     Heart Disease Father     Hypertension Father        Current Medications:   Current Outpatient Medications   Medication Sig Dispense Refill    metoprolol succinate (TOPROL XL) 25 mg XL tablet Take  by mouth daily.  clopidogrel (PLAVIX) 75 mg tab Take 1 Tab by mouth daily. 90 Tab 3    cholecalciferol (VITAMIN D3) 1,000 unit tablet Take  by mouth every seven (7) days.  cyanocobalamin (VITAMIN B-12) 500 mcg tablet Take 500 mcg by mouth every seven (7) days.  melatonin 5 mg cap capsule Take 5 mg by mouth nightly.       sacubitril-valsartan (ENTRESTO) 24 mg/26 mg tablet Take 1 Tab by mouth two (2) times a day. 60 Tab 3    glimepiride (AMARYL) 2 mg tablet Take 2 mg by mouth every morning.  omeprazole (PRILOSEC) 20 mg capsule Take 20 mg by mouth daily.  SITagliptin (JANUVIA) 25 mg tablet Take 25 mg by mouth daily.  simvastatin (ZOCOR) 40 mg tablet Take  by mouth every morning.  ezetimibe (ZETIA) 10 mg tablet Take 1 Tab by mouth daily. 30 Tab 5    ACETAMINOPHEN/DIPHENHYDRAMINE (TYLENOL PM PO) Take  by mouth nightly.  aspirin 81 mg chewable tablet Take 1 Tab by mouth daily.  bumetanide (BUMEX) 0.5 mg tablet Take  by mouth daily.  ALBUTEROL SULFATE (VENTOLIN HFA IN) Take  by inhalation daily.  acetaminophen (TYLENOL) 325 mg tablet Take 2 Tabs by mouth every four (4) hours as needed. Allergies: Allergies   Allergen Reactions    Atorvastatin Diarrhea    Codeine Itching    Metformin Other (comments)     Abdominal pain     Wellbutrin [Bupropion] Other (comments)     Night mare  Aggression          Admission Weight: Last Weight   Weight: 274 lb 12.8 oz (124.6 kg) Weight: 274 lb 12.8 oz (124.6 kg)       Physical Exam:   Vitals:    Visit Vitals  /74 (BP 1 Location: Left arm, BP Patient Position: Sitting)   Pulse 82   Temp 98.4 °F (36.9 °C) (Oral)   Resp 20   Ht 5' 10\" (1.778 m)   Wt 274 lb 12.8 oz (124.6 kg)   SpO2 95%   BMI 39.43 kg/m²       General:  Well developed disheveled WM, obese,alert,  Cooperative, Talkative with tangent of thoughts   HEENT: Normocephalic, PERRLA, and Sclera clear, anicteric  Neck:  supple, no significant adenopathy, carotids upstroke normal bilaterally, no bruits  CVP:  12 cm ( - ) HJR  Cardiac: AICD left infraclavicular space, RRR, +  S3 present and 2/6 systolic murmur  Chest/Pulm:  resp unlabored,  Diminished breath sounds bilaterally.  No rales, rhonchi, wheezing  Abdomen: Obese, protuberant, soft, nontender,  normal BS  Extremity: No edema, no clubbing, cyanosis  Neuro: A&O x3, Normal coordination and gait  Skin:   no rashes, no ecchymoses, no petechiae    Recent Labs:   Lab Results   Component Value Date/Time    WBC 7.2 08/09/2018 10:26 AM    HGB 16.4 08/09/2018 10:26 AM    HCT 49.0 08/09/2018 10:26 AM    PLATELET 852 66/02/9189 10:26 AM    MCV 96.5 08/09/2018 10:26 AM     Lab Results   Component Value Date/Time    TSH 1.90 11/15/2018 11:30 AM      Lab Results   Component Value Date/Time    NT pro-BNP 6,586 (H) 06/02/2017 01:40 AM    NT pro-BNP 7,025 (H) 05/30/2017 03:37 PM    PROBNP 69 12/27/2018 12:40 PM    PROBNP 137 12/13/2018 12:00 AM    PROBNP 111 11/15/2018 03:14 PM    PROBNP 136 10/04/2018 12:00 AM      Lab Results   Component Value Date/Time    Sodium 139 12/27/2018 12:40 PM    Potassium 4.3 12/27/2018 12:40 PM    Chloride 104 12/27/2018 12:40 PM    CO2 22 12/27/2018 12:40 PM    Anion gap 9 11/15/2018 11:30 AM    Glucose 140 (H) 12/27/2018 12:40 PM    BUN 13 12/27/2018 12:40 PM    Creatinine 1.00 12/27/2018 12:40 PM    BUN/Creatinine ratio 13 12/27/2018 12:40 PM    GFR est AA 91 12/27/2018 12:40 PM    GFR est non-AA 79 12/27/2018 12:40 PM    Calcium 9.1 12/27/2018 12:40 PM    Bilirubin, total 0.6 11/15/2018 11:30 AM    ALT (SGPT) 26 11/15/2018 11:30 AM    AST (SGOT) 17 11/15/2018 11:30 AM    Alk. phosphatase 75 11/15/2018 11:30 AM    Protein, total 7.6 11/15/2018 11:30 AM    Albumin 4.0 11/15/2018 11:30 AM    Globulin 3.6 11/15/2018 11:30 AM    A-G Ratio 1.1 11/15/2018 11:30 AM      Lab Results   Component Value Date/Time    Hemoglobin A1c 6.7 (H) 11/15/2018 11:30 AM          I have discussed the diagnosis with  Jet Manning and the intended plan as seen in the above orders. Questions were answered concerning future plans, and written instructions provided. I have discussed medication side effects and warnings with the patient as well. Thank you for allowing me to participate in the care of this pleasant man. Please do not hesitate to call with any questions. Selena BIRD Joe Ch RN, ACNP-BC, [address]  [address] [facility] [phone]  [phone]  24 hour VAD/HF Pager: [phone]

## 2019-01-29 NOTE — PATIENT INSTRUCTIONS
You are doing better with taking your meds and feeling better      INCREASE Entresto 24/26 mg in the AM and 49/51 mg in the PM      CONTINUE CURRENT medications    Take twice a day medications 12 hours apart with food    Labs today BMP, proBNP     See counselor  Consider sleep study     Keep working with the nutritionist  Call cardiac rehab and start. This will help your heart     Walk more    Keep a positive attitude     Follow up  3-4 weeks, sooner if needed     HF Education: Continue daily weights (in the morning, after voiding). Notify HF team of overnight weight gains > 2 lbs or weekly >5 lbs or if any of the following Sx. Continue to limit sodium intake & monitor your fluid intake .

## 2019-01-29 NOTE — LETTER
1/29/2019 2:28 PM 
 
Patient:  Connor Lazcano YOB: 1955 Date of Visit: 1/29/2019 Dear Taylor Boas, MD 
[facility] [address] Internal Med [address] VIA In Basket Eleanor Jimenes MD 
[address] [place] [address] [facility] [address] [id number] VIA In Basket Caron Bazan MD 
[address] [facility] 99 [phone] VIA In Basket Stiven Hartley NP 
[address] [facility] [address] [address] VIA Facsimile: [phone] 
 : Thank you for referring Mr. Yung Ko to me for evaluation/treatment. Below are the relevant portions of my assessment and plan of care. If you have questions, please do not hesitate to call me. I look forward to following Mr. Sandra Keen along with you. Sincerely, ZACH Palma

## 2019-01-29 NOTE — PROGRESS NOTES
Chief Complaint   Patient presents with    CHF     follow up       1. Have you been to the ER, urgent care clinic since your last visit? Hospitalized since your last visit? No    2. Have you seen or consulted any other health care providers outside of the [address] [place] [address] since your last visit? Include any pap smears or colon screening.   [facility] last month

## 2019-01-30 LAB
BUN SERPL-MCNC: 10 MG/DL (ref 8–27)
BUN/CREAT SERPL: 10 (ref 10–24)
CALCIUM SERPL-MCNC: 9.4 MG/DL (ref 8.6–10.2)
CHLORIDE SERPL-SCNC: 103 MMOL/L (ref 96–106)
CO2 SERPL-SCNC: 22 MMOL/L (ref 20–29)
CREAT SERPL-MCNC: 1 MG/DL (ref 0.76–1.27)
GLUCOSE SERPL-MCNC: 107 MG/DL (ref 65–99)
POTASSIUM SERPL-SCNC: 4.3 MMOL/L (ref 3.5–5.2)
SODIUM SERPL-SCNC: 143 MMOL/L (ref 134–144)

## 2019-01-31 ENCOUNTER — TELEPHONE (OUTPATIENT)
Dept: CARDIOLOGY CLINIC | Age: 64
End: 2019-01-31

## 2019-01-31 NOTE — TELEPHONE ENCOUNTER
Patient returned my call to reschedule his appointment. Offered him the same day but a morning appointment, which didn't work for him.     Patient is scheduled on Wednesday February 27th at 2pm with Haylee Gonsales NP

## 2019-02-11 NOTE — PROGRESS NOTES
HISTORY OF PRESENTING ILLNESS      Moreno Akbar is a 70 y.o. male here for follow-up of his ICD. ICD interrogation was normal device functioning without arrhythmias.        ACTIVE PROBLEM LIST     Patient Active Problem List    Diagnosis Date Noted    ICD (implantable cardioverter-defibrillator) in place 12/04/2017     Priority: 1 - One    Systolic congestive heart failure with reduced left ventricular function, NYHA class 3 ([address] 10/25/2018    SOB (shortness of breath) 10/04/2018    Severe obesity with body mass index (BMI) of 35.0 to 39.9 with serious comorbidity ([address] 08/30/2018    Ischemic cardiomyopathy 09/18/2017    Coronary artery disease involving native coronary artery without angina pectoris 45/08/7440    Systolic heart failure, ACC/AHA stage C ([address] 06/01/2017    ETOH abuse 06/01/2017    Controlled type 2 diabetes mellitus with circulatory disorder ([address] 06/01/2017    Nicotine dependence 06/01/2017           PAST MEDICAL HISTORY     Past Medical History:   Diagnosis Date    Controlled type 2 diabetes mellitus with circulatory disorder ([address] 6/1/2017    COPD (chronic obstructive pulmonary disease) ([address]     Coronary artery disease involving native coronary artery without angina pectoris 06/01/2017    OOH MI, 2v disease s/p PCI RCA with [facility] June 2017    ETOH abuse 6/1/2017    ICD (implantable cardioverter-defibrillator) in place     Ischemic cardiomyopathy     Extensive LAD territory infarction    Nicotine dependence 6/1/2017    SOB (shortness of breath) 09/8/3924    Systolic congestive heart failure with reduced left ventricular function, NYHA class 3 ([address] 10/25/2018           PAST SURGICAL HISTORY     Past Surgical History:   Procedure Laterality Date    HX CORONARY STENT PLACEMENT      HX HEART CATHETERIZATION      HX TONSILLECTOMY      HX WISDOM TEETH EXTRACTION            ALLERGIES     Allergies   Allergen Reactions    Atorvastatin Diarrhea    Codeine Itching  Metformin Other (comments)     Abdominal pain     Wellbutrin [Bupropion] Other (comments)     Night mare  Aggression           FAMILY HISTORY     Family History   Problem Relation Age of Onset    Stroke Father     Heart Disease Father     Hypertension Father     negative for cardiac disease       SOCIAL HISTORY     Social History     Socioeconomic History    Marital status: SINGLE     Spouse name: Not on file    Number of children: Not on file    Years of education: Not on file    Highest education level: Not on file   Tobacco Use    Smoking status: Current Every Day Smoker     Packs/day: 1.00     Years: 30.00     Pack years: 30.00     Types: Cigarettes    Smokeless tobacco: Never Used   Substance and Sexual Activity    Alcohol use: No     Comment: former drinker-quit 3 months ago-drank a 6 pack per day    Drug use: No    Sexual activity: Yes     Partners: Female         MEDICATIONS     Current Outpatient Medications   Medication Sig    metoprolol succinate (TOPROL XL) 25 mg XL tablet Take  by mouth daily.  sacubitril-valsartan (ENTRESTO) 24 mg/26 mg tablet Take 1 Tab by mouth every morning.  sacubitril-valsartan (ENTRESTO) 49 mg/51 mg tablet Take 1 Tab by mouth nightly.  clopidogrel (PLAVIX) 75 mg tab Take 1 Tab by mouth daily.  cholecalciferol (VITAMIN D3) 1,000 unit tablet Take  by mouth every seven (7) days.  cyanocobalamin (VITAMIN B-12) 500 mcg tablet Take 500 mcg by mouth every seven (7) days.  melatonin 5 mg cap capsule Take 5 mg by mouth nightly.  bumetanide (BUMEX) 0.5 mg tablet Take  by mouth daily.  glimepiride (AMARYL) 2 mg tablet Take 2 mg by mouth every morning.  omeprazole (PRILOSEC) 20 mg capsule Take 20 mg by mouth daily.  SITagliptin (JANUVIA) 25 mg tablet Take 25 mg by mouth daily.  simvastatin (ZOCOR) 40 mg tablet Take  by mouth every morning.  ALBUTEROL SULFATE (VENTOLIN HFA IN) Take  by inhalation daily.     ezetimibe (ZETIA) 10 mg tablet Take 1 Tab by mouth daily.  acetaminophen (TYLENOL) 325 mg tablet Take 2 Tabs by mouth every four (4) hours as needed.  ACETAMINOPHEN/DIPHENHYDRAMINE (TYLENOL PM PO) Take  by mouth nightly.  aspirin 81 mg chewable tablet Take 1 Tab by mouth daily. No current facility-administered medications for this visit. I have reviewed the nurses notes, vitals, problem list, allergy list, medical history, family, social history and medications. REVIEW OF SYMPTOMS      General: Pt denies excessive weight gain or loss. Pt is able to conduct ADL's  HEENT: Denies blurred vision, headaches, hearing loss, epistaxis and difficulty swallowing. Respiratory: Denies cough, congestion, shortness of breath, BERNABE, wheezing or stridor. Cardiovascular: Denies precordial pain, palpitations, edema or PND  Gastrointestinal: Denies poor appetite, indigestion, abdominal pain or blood in stool  Genitourinary: Denies hematuria, dysuria, increased urinary frequency  Musculoskeletal: Denies joint pain or swelling from muscles or joints  Neurologic: Denies tremor, paresthesias, headache, or sensory motor disturbance  Psychiatric: Denies confusion, insomnia, depression  Integumentray: Denies rash, itching or ulcers. Hematologic: Denies easy bruising, bleeding       PHYSICAL EXAMINATION      There were no vitals filed for this visit. General: Well developed, in no acute distress. HEENT: No jaundice, oral mucosa moist, no oral ulcers  Neck: Supple, no stiffness, no lymphadenopathy, supple  Heart:  Normal S1/S2 negative S3 or S4. Regular, no murmur, gallop or rub, no jugular venous distention  Respiratory: Clear bilaterally x 4, no wheezing or rales  Abdomen:   Soft, non-tender, bowel sounds are active.   Extremities:  No edema, normal cap refill, no cyanosis.   Musculoskeletal: No clubbing, no deformities  Neuro: A&Ox3, speech clear, gait stable, cooperative, no focal neurologic deficits  Skin: Skin color is normal. No rashes or lesions. Non diaphoretic, moist.  Vascular: 2+ pulses symmetric in all extremities       DIAGNOSTIC DATA      EKG:        LABORATORY DATA      Lab Results   Component Value Date/Time    WBC 7.2 08/09/2018 10:26 AM    HGB 16.4 08/09/2018 10:26 AM    HCT 49.0 08/09/2018 10:26 AM    PLATELET 123 87/98/8782 10:26 AM    MCV 96.5 08/09/2018 10:26 AM      Lab Results   Component Value Date/Time    Sodium 143 01/29/2019 12:00 AM    Potassium 4.3 01/29/2019 12:00 AM    Chloride 103 01/29/2019 12:00 AM    CO2 22 01/29/2019 12:00 AM    Anion gap 9 11/15/2018 11:30 AM    Glucose 107 (H) 01/29/2019 12:00 AM    BUN 10 01/29/2019 12:00 AM    Creatinine 1.00 01/29/2019 12:00 AM    BUN/Creatinine ratio 10 01/29/2019 12:00 AM    GFR est AA 91 01/29/2019 12:00 AM    GFR est non-AA 79 01/29/2019 12:00 AM    Calcium 9.4 01/29/2019 12:00 AM    Bilirubin, total 0.6 11/15/2018 11:30 AM    AST (SGOT) 17 11/15/2018 11:30 AM    Alk. phosphatase 75 11/15/2018 11:30 AM    Protein, total 7.6 11/15/2018 11:30 AM    Albumin 4.0 11/15/2018 11:30 AM    Globulin 3.6 11/15/2018 11:30 AM    A-G Ratio 1.1 11/15/2018 11:30 AM    ALT (SGPT) 26 11/15/2018 11:30 AM           ASSESSMENT      1. Cardiomyopathy                        A. Ischemic                        B. NYHA class 2-3                        C. LVEF 15-20%  2. Diabetes mellitus  3. CAD, native  4. Percutaneous transluminal angioplasty  5. Myocardial infarction, previous  6. COPD   7. ICD. A. Single chamber   B. C/ [address]. Primary prevention       PLAN     Continue follow-up in device clinic        Thank you, Kimo Roque MD andDr. Everton Chambers for allowing me to participate in the care of this extraordinarily pleasant male. Please do not hesitate to contact me for further questions/concerns.          Raymond Vegas MD  Cardiac Electrophysiology / Cardiology    [address]  [address] [facility], [facility], [address]  [place] [address] [place] [address], [facility]  [phone] / [phone] Fax   [phone] / [phone] Fax

## 2019-02-15 ENCOUNTER — OFFICE VISIT (OUTPATIENT)
Dept: CARDIOLOGY CLINIC | Age: 64
End: 2019-02-15

## 2019-02-15 ENCOUNTER — CLINICAL SUPPORT (OUTPATIENT)
Dept: CARDIOLOGY CLINIC | Age: 64
End: 2019-02-15

## 2019-02-15 VITALS
DIASTOLIC BLOOD PRESSURE: 72 MMHG | WEIGHT: 273.6 LBS | OXYGEN SATURATION: 94 % | HEART RATE: 78 BPM | SYSTOLIC BLOOD PRESSURE: 110 MMHG | RESPIRATION RATE: 20 BRPM | BODY MASS INDEX: 39.17 KG/M2 | HEIGHT: 70 IN

## 2019-02-15 DIAGNOSIS — Z95.810 ICD (IMPLANTABLE CARDIOVERTER-DEFIBRILLATOR) IN PLACE: Primary | ICD-10-CM

## 2019-02-15 RX ORDER — FLUTICASONE FUROATE AND VILANTEROL 100; 25 UG/1; UG/1
1 POWDER RESPIRATORY (INHALATION) DAILY
COMMUNITY
End: 2019-03-08

## 2019-02-15 NOTE — PROGRESS NOTES
Room # 7 Device Checked Today Follow Up Visit Vitals /72 (BP 1 Location: Left arm, BP Patient Position: Sitting) Pulse 78 Resp 20 Ht 5' 10\" (1.778 m) Wt 273 lb 9.6 oz (124.1 kg) SpO2 94% BMI 39.26 kg/m²

## 2019-02-27 ENCOUNTER — TELEPHONE (OUTPATIENT)
Dept: CARDIOLOGY CLINIC | Age: 64
End: 2019-02-27

## 2019-02-27 NOTE — TELEPHONE ENCOUNTER
Telephone Call RE:  Appointment reminder     Outcome:     [] Patient confirmed appointment   [] Patient rescheduled appointment for    [x] Unable to reach   [] Left message              [] Other:     Couldn't leave a voice message      Neisha Govea

## 2019-03-08 ENCOUNTER — OFFICE VISIT (OUTPATIENT)
Dept: FAMILY MEDICINE CLINIC | Age: 64
End: 2019-03-08

## 2019-03-08 VITALS
SYSTOLIC BLOOD PRESSURE: 118 MMHG | WEIGHT: 270 LBS | HEART RATE: 81 BPM | BODY MASS INDEX: 38.65 KG/M2 | HEIGHT: 70 IN | TEMPERATURE: 99.7 F | DIASTOLIC BLOOD PRESSURE: 62 MMHG | RESPIRATION RATE: 18 BRPM | OXYGEN SATURATION: 96 %

## 2019-03-08 DIAGNOSIS — F17.200 TOBACCO USE DISORDER: ICD-10-CM

## 2019-03-08 DIAGNOSIS — J44.9 CHRONIC OBSTRUCTIVE PULMONARY DISEASE, UNSPECIFIED COPD TYPE (HCC): ICD-10-CM

## 2019-03-08 DIAGNOSIS — E11.59 CONTROLLED TYPE 2 DIABETES MELLITUS WITH OTHER CIRCULATORY COMPLICATION, WITHOUT LONG-TERM CURRENT USE OF INSULIN (HCC): Primary | ICD-10-CM

## 2019-03-08 DIAGNOSIS — H69.83 EUSTACHIAN TUBE DYSFUNCTION, BILATERAL: ICD-10-CM

## 2019-03-08 DIAGNOSIS — Z76.89 ENCOUNTER TO ESTABLISH CARE: ICD-10-CM

## 2019-03-08 RX ORDER — LEVOCETIRIZINE DIHYDROCHLORIDE 5 MG/1
5 TABLET, FILM COATED ORAL
Qty: 90 TAB | Refills: 1 | Status: SHIPPED | OUTPATIENT
Start: 2019-03-08 | End: 2019-05-20

## 2019-03-08 RX ORDER — SPIRONOLACTONE 25 MG/1
25 TABLET ORAL DAILY
COMMUNITY
End: 2019-03-08

## 2019-03-08 RX ORDER — GLIMEPIRIDE 2 MG/1
2 TABLET ORAL
Qty: 90 TAB | Refills: 1 | Status: SHIPPED | OUTPATIENT
Start: 2019-03-08 | End: 2019-04-10 | Stop reason: SINTOL

## 2019-03-08 RX ORDER — BUPROPION HYDROCHLORIDE 150 MG/1
150 TABLET, EXTENDED RELEASE ORAL DAILY
COMMUNITY
End: 2019-03-08

## 2019-03-08 RX ORDER — PYRIDOXINE HCL (VITAMIN B6) 100 MG
100 TABLET ORAL DAILY
COMMUNITY
End: 2019-05-20

## 2019-03-08 NOTE — PROGRESS NOTES
Identified pt with two pt identifiers(name and ). Chief Complaint   Patient presents with   [address]     former PCP [facility].      Medication Evaluation    Heart Problem        Health Maintenance Due   Topic    Hepatitis C Screening     EYE EXAM RETINAL OR DILATED     DTaP/Tdap/Td series (1 - Tdap)    Shingrix Vaccine Age 50> (1 of 2)    FOBT Q 1 YEAR AGE 50-75     FOOT EXAM Q1     MICROALBUMIN Q1     GLAUCOMA SCREENING Q2Y     Pneumococcal 65+ Low/Medium Risk (1 of 2 - PCV13)    AAA Screening 73-67 YO Male Smoking Patients     MEDICARE YEARLY EXAM        Wt Readings from Last 3 Encounters:   19 270 lb (122.5 kg)   02/15/19 273 lb 9.6 oz (124.1 kg)   19 274 lb 12.8 oz (124.6 kg)     Temp Readings from Last 3 Encounters:   19 99.7 °F (37.6 °C) (Oral)   19 98.4 °F (36.9 °C) (Oral)   18 98.7 °F (37.1 °C) (Oral)     BP Readings from Last 3 Encounters:   19 118/62   02/15/19 110/72   19 110/74     Pulse Readings from Last 3 Encounters:   19 81   02/15/19 78   19 82         Learning Assessment:  :     Learning Assessment 2017   PRIMARY LEARNER Patient   HIGHEST LEVEL OF EDUCATION - PRIMARY LEARNER  4 YEARS OF COLLEGE   BARRIERS PRIMARY LEARNER NONE   CO-LEARNER CAREGIVER No   PRIMARY LANGUAGE ENGLISH   LEARNER PREFERENCE PRIMARY DEMONSTRATION   LEARNING SPECIAL TOPICS No   ANSWERED BY Patient    RELATIONSHIP SELF       Depression Screening:  :     3 most recent PHQ Screens 3/8/2019   Little interest or pleasure in doing things Not at all   Feeling down, depressed, irritable, or hopeless Not at all   Total Score PHQ 2 0   Trouble falling or staying asleep, or sleeping too much -   Feeling tired or having little energy -   Poor appetite, weight loss, or overeating -   Feeling bad about yourself - or that you are a failure or have let yourself or your family down -   Trouble concentrating on things such as school, work, reading, or watching TV -   Moving or speaking so slowly that other people could have noticed; or the opposite being so fidgety that others notice -   Thoughts of being better off dead, or hurting yourself in some way -   PHQ 9 Score -   How difficult have these problems made it for you to do your work, take care of your home and get along with others -       Fall Risk Assessment:  :     Fall Risk Assessment, last 12 mths 2/15/2019   Able to walk? Yes   Fall in past 12 months? No       Abuse Screening:  :     Abuse Screening Questionnaire 3/8/2019   Do you ever feel afraid of your partner? N   Are you in a relationship with someone who physically or mentally threatens you? N   Is it safe for you to go home? Y       Coordination of Care Questionnaire:  :     1) Have you been to an emergency room, urgent care clinic since your last visit? no   Hospitalized since your last visit? no             2) Have you seen or consulted any other health care providers outside of [address] [place] [address] since your last visit? no  (Include any pap smears or colon screenings in this section.)    3) Do you have an Advance Directive on file? no  Are you interested in receiving information about Advance Directives? no    Reviewed record in preparation for visit and have obtained necessary documentation. Medication reconciliation up to date and corrected with patient at this time.

## 2019-03-08 NOTE — PATIENT INSTRUCTIONS

## 2019-03-08 NOTE — PROGRESS NOTES
Subjective:      Lashaun Darling is a 70 y.o. male here today to establish care. Transferring care from [facility]. He has been evaluated at [facility] and had labs performed in December. Diabetes mellitus: Hgb A1c 6.4% (12/19/18), LDL 70 (12/19/18), albumin/creatinine ratio 8.0 mg/g creat (normal, 12/19/18)  · Medication compliance: compliant all of the time but has been out of glimeride for a few weeks and Januvia for a month   · Diabetic diet compliance: noncompliant some of the time  · Home glucose monitoring: is performed regularly  · Further diabetic ROS: no polyuria or polydipsia, no chest pain, dyspnea or TIA's, no numbness, tingling or pain in extremities, no unusual visual symptoms. · Eye exam: has not had      COPD: per history. Current smoker 1 ppd. He has not been on Breo therapy in some time. He has albuterol which he only uses PRN. He has used Wellbutrin and Chantix for smoking cessation. He reports nightmares with use of Chantix and nicotine patch. Has been seen by [facility] and reports being told that he may not have this disease. He has had long-standing history of bilateral ear fullness and popping. Associated with mild nasal congestion. Denies ear pain, ear injury. Current Outpatient Medications   Medication Sig Dispense Refill    multivit,iron,min/green tea xt (DAILY-TAO WEIGHT CONTROL PO) Take 1 Tab by mouth daily.  sacubitril-valsartan (ENTRESTO) 24 mg/26 mg tablet Take 1 Tab by mouth two (2) times a day.  pyridoxine, vitamin B6, (VITAMIN B-6) 100 mg tablet Take 100 mg by mouth daily.  metoprolol succinate (TOPROL XL) 25 mg XL tablet Take 25 mg by mouth daily.  clopidogrel (PLAVIX) 75 mg tab Take 1 Tab by mouth daily. 90 Tab 3    cholecalciferol (VITAMIN D3) 1,000 unit tablet Take 1,000 Units by mouth daily.  cyanocobalamin (VITAMIN B-12) 500 mcg tablet Take 500 mcg by mouth daily.       bumetanide (BUMEX) 0.5 mg tablet Take 0.5 mg by mouth daily.      glimepiride (AMARYL) 2 mg tablet Take 2 mg by mouth every morning.  omeprazole (PRILOSEC) 20 mg capsule Take 20 mg by mouth daily.  SITagliptin (JANUVIA) 25 mg tablet Take 25 mg by mouth daily.  simvastatin (ZOCOR) 40 mg tablet Take  by mouth every morning.  ALBUTEROL SULFATE (VENTOLIN HFA IN) Take  by inhalation daily.  ezetimibe (ZETIA) 10 mg tablet Take 1 Tab by mouth daily. 30 Tab 5    acetaminophen (TYLENOL) 325 mg tablet Take 2 Tabs by mouth every four (4) hours as needed.  ACETAMINOPHEN/DIPHENHYDRAMINE (TYLENOL PM PO) Take  by mouth nightly.  aspirin 81 mg chewable tablet Take 1 Tab by mouth daily. Allergies   Allergen Reactions    Atorvastatin Diarrhea    Codeine Itching    Metformin Other (comments)     Abdominal pain     Wellbutrin [Bupropion] Other (comments)     Night mare  Aggression        Past medical history - reviewed. Past Medical History:   Diagnosis Date    Controlled type 2 diabetes mellitus with circulatory disorder ([facility] 6/1/2017    COPD (chronic obstructive pulmonary disease) ([facility]     Coronary artery disease involving native coronary artery without angina pectoris 06/01/2017    OOH MI, 2v disease s/p PCI RCA with [facility] June 2017    ETOH abuse 6/1/2017    ICD (implantable cardioverter-defibrillator) in place     Ischemic cardiomyopathy     Extensive LAD territory infarction    Nicotine dependence 6/1/2017    Pacemaker     [employer] ICD    SOB (shortness of breath) 76/1/8019    Systolic congestive heart failure with reduced left ventricular function, NYHA class 3 ([facility] 10/25/2018       Social history - reviewed.    Social History     Tobacco Use    Smoking status: Current Every Day Smoker     Packs/day: 1.00     Years: 30.00     Pack years: 30.00     Types: Cigarettes    Smokeless tobacco: Never Used   Substance Use Topics    Alcohol use: No     Comment: former drinker-quit 3 months ago-drank a 6 pack per day Family history - reviewed.    Family History   Problem Relation Age of Onset    Stroke Father     Heart Disease Father     Hypertension Father        Review of Systems  Constitutional: negative for fevers and chills  Eyes: negative for visual disturbance and irritation  Ears, nose, mouth, throat, and face: negative for nasal congestion and sore throat  Respiratory: negative for cough, sputum or dyspnea on exertion  Cardiovascular: negative for chest pain, chest pressure/discomfort, palpitations, irregular heart beats, lower extremity edema  Gastrointestinal: negative for nausea, vomiting, change in bowel habits and abdominal pain  Genitourinary:negative for frequency, dysuria and hematuria  Musculoskeletal:negative for myalgias and arthralgias  Neurological: negative for headaches, dizziness and paresthesia      Objective:     Visit Vitals  /62 (BP 1 Location: Right arm, BP Patient Position: Sitting) Comment: Manual   Pulse 81   Temp 99.7 °F (37.6 °C) (Oral) Comment: .   Resp 18   Ht 5' 10\" (1.778 m)   Wt 270 lb (122.5 kg)   SpO2 96%   BMI 38.74 kg/m²      General appearance - alert, well appearing, and in no distress  Eyes - pupils equal and reactive, extraocular eye movements intact, sclera anicteric  Ears - bilateral TM's and external ear canals normal  Oropharyngx - mucous membranes moist, pharynx normal without lesions  Neck - supple, no significant adenopathy  Chest - clear to auscultation, no wheezes, rales or rhonchi, symmetric air entry, no tachypnea, retractions or cyanosis  Heart - normal rate, regular rhythm, normal S1, S2, no murmurs, rubs, clicks or gallops  Abdomen - soft, nontender, nondistended, no masses or organomegaly  bowel sounds normal  Extremities - peripheral pulses normal, no pedal edema, no clubbing or cyanosis  Neurologic - alert, oriented, normal speech, no focal findings or movement disorder noted  Skin - normal coloration and turgor, no rashes, no suspicious skin lesions noted  Mental Status - alert, oriented to person, place, and time, normal mood, behavior, speech, dress, motor activity, and thought processes    Assessment/Plan:   Adelfo Kauffman is a 70 y.o. male seen for:     1. Controlled type 2 diabetes mellitus with other circulatory complication, without long-term current use of insulin ([facility]): controlled, continue with current therapy. Refer for eye examination.   - REFERRAL TO OPHTHALMOLOGY  - glimepiride (AMARYL) 2 mg tablet; Take 1 Tab by mouth every morning. Dispense: 90 Tab; Refill: 1  - SITagliptin (JANUVIA) 25 mg tablet; Take 1 Tab by mouth daily. Dispense: 90 Tab; Refill: 1    2. Chronic obstructive pulmonary disease, unspecified COPD type ([facility]): not on maintenance therapy. Unsure if he has had PFTs. Was recommended that he follow up with Pulmonology after hospitalization in 2017 which did not occur. Will await previous records. 3. Tobacco use disorder: The patient was counseled on the dangers of tobacco use, and was reluctant to quit. Reviewed strategies to maximize success, including removing cigarettes and smoking materials from environment, stress management and substitution of other forms of reinforcement. 4. Eustachian tube dysfunction, bilateral  - levocetirizine (XYZAL) 5 mg tablet; Take 1 Tab by mouth daily as needed for Allergies or Rhinitis. Dispense: 90 Tab; Refill: 1    5. Encounter to establish care: previous medical records requested. I have discussed the diagnosis with the patient and the intended plan as seen in the above orders. The patient has received an after-visit summary and questions were answered concerning future plans. I have discussed medication side effects and warnings with the patient as well. Patient verbalizes understanding of plan of care and denies further questions or concerns at this time. Informed patient to return to the office if symptoms worsen or if new symptoms arise.     Follow-up Disposition:  Return in about 3 months (around 6/8/2019) for diabetes follow up or sooner as needed.

## 2019-03-11 RX ORDER — SIMVASTATIN 40 MG/1
TABLET, FILM COATED ORAL
Status: CANCELLED | OUTPATIENT
Start: 2019-03-11

## 2019-03-13 ENCOUNTER — OFFICE VISIT (OUTPATIENT)
Dept: CARDIOLOGY CLINIC | Age: 64
End: 2019-03-13

## 2019-03-13 VITALS
HEIGHT: 70 IN | HEART RATE: 82 BPM | WEIGHT: 269.8 LBS | BODY MASS INDEX: 38.63 KG/M2 | RESPIRATION RATE: 24 BRPM | DIASTOLIC BLOOD PRESSURE: 84 MMHG | TEMPERATURE: 98.7 F | SYSTOLIC BLOOD PRESSURE: 126 MMHG | OXYGEN SATURATION: 95 %

## 2019-03-13 DIAGNOSIS — Z95.810 ICD (IMPLANTABLE CARDIOVERTER-DEFIBRILLATOR) IN PLACE: ICD-10-CM

## 2019-03-13 DIAGNOSIS — I25.5 ISCHEMIC CARDIOMYOPATHY: ICD-10-CM

## 2019-03-13 DIAGNOSIS — I50.20 SYSTOLIC HEART FAILURE, ACC/AHA STAGE C (HCC): Primary | ICD-10-CM

## 2019-03-13 DIAGNOSIS — R06.02 SOB (SHORTNESS OF BREATH): ICD-10-CM

## 2019-03-13 DIAGNOSIS — E66.01 SEVERE OBESITY WITH BODY MASS INDEX (BMI) OF 35.0 TO 39.9 WITH SERIOUS COMORBIDITY (HCC): ICD-10-CM

## 2019-03-13 DIAGNOSIS — I50.20 SYSTOLIC CONGESTIVE HEART FAILURE WITH REDUCED LEFT VENTRICULAR FUNCTION, NYHA CLASS 3 (HCC): ICD-10-CM

## 2019-03-13 NOTE — PROGRESS NOTES
[facility] Note      DOS:   3/13/2019  NAME:  Marvine Shone   MRN:   [id number]   REFERRING PROVIDER:  Noreen Daniels MD  PRIMARY CARE PHYSICIAN: Marilin Dowd MD  PRIMARY CARDIOLOGIST: Noreen Daniels MD  [facility]: ARELIS Hu     IMPRESSION/PLAN     ICM - Stage C, NYHA Class III, LVEF 15-20%   Increase   Entresto  49/51 mg BID      Change timing of  Toprol XL 25  mg to hs, unable to increase as he is intolerant of higher doses    Intolerant of  aldactone 25 mg    Bumex 0.5 mg daily. Continue to follow  daily weights- he has lost a few pounds    Check BMP, NT proBNP today   Follow up in the [facility] in  4 Weeks- sooner if needed    Repeat echo - he see's Dr. Sherrill Snow 3/21/19     He has declined [facility]    He has declined Cardiac rehab for HF at [address] him [facility]go to [facility]       Sudden cardiac death px- s/p AICD- no shocks recently    Followed by Dr. Cuco Saucedo     Suspect SHERI- witnessed apnea in hospital daytime somnolence   Sleep study-declined although we discussed the relationship with heart failure. I am not sure he has the capacity to understand the importance of treatment. CAD s/p MI, s/p PCI with [facility]- no angina   Continue  plavix    On ASA,BB, statin   Stable    ETOH Abuse   Continues to abstain     Nicotine Addiction- continues to smoke    Smoking cessation counseling    Noncomplinace- some better since going to [facility]    Favor conservative approach and minimize the number of medications he has to take and use the most tolerable doses. Consider pill pack once we get to his optimized meds.        T2DM A1C   On glimepiride, Januvia     Now being followed by [facility]    HLD     On ezetimibe, simvastatin    COPD   On Breo, Albuterol MDI    Depression/anxiety - per [facility]  -encouraged to participate in couunseling soon through [facility]    Not suicidal   Intolerant to wellbutrin/chantix       Thank you for allowing me to participate in the care of this pleasant gentleman. Please do not hesitate to call with any questions. Selena Santiago RN, ACNP-BC, [facility]           Chief Complaint:   Chief Complaint   Patient presents with   Boaz Clifford is a  70y.o. year old   male with a history of HFrEF in the setting of ICM s/p AICD with CAD s/p PCI RCA with [facility] 2/45 complicated by  G3ZY, COPD, obesity ( BMI 44), persistent tobacco use, ETOH abuse, depression with anxiety, and noncompliance who presents for further evaluation of his chronic systolic heart failure. He suffered an out-of-hospital MI in May 2017 and presented with subacute HF in June. [facility] MRI demonstrated viability only in the RCA territory. Cardiac cath demonstrated mid %, RCA mid 85%. He underwent PCI RCA with [facility].    Poor historian with tangent of thoughts      He was last seen  6 weeks ago at which time Nicolasa Habermann was increased. He is being followed by  ARELIS Schofield, with [facility]. He has recently seen Dr. Lennox Rung,   He is feeling some better in terms of BERNABE. BERNABE with walking  ~ 100 yards. Uses inhaler ~ 2x/week. Compliant with meds- taking Entresto  Variable dose 24/26 mg in the morning and 49/51 mg HS but most recently started taking 49/51 mg BID   He takes the bumex daily, denies orhtopnea   He is seeing a nutritionist, and is keeping a diet log- reviewed. Not adding salt, but does not restrict foods. He is living with his sister who cooks for him- \"trying' to limit the sodium. \"aware\" of sodium    BP in the 112-124/70, weight  stable 270#. He is not interested cardiac rehab, and the [facility] program is trying ot encourage him to participate with [facility] at [facility]. . Does not currently exercise regularly. He  is still smoking 1 ppd and continues to abstaine from alcohol. 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   Refuses to see councselor or take depression mediation - scared of side effects     Entresto from [employer] Medicare  Now activated. [place] [facility]  care card application pending. Review of Systems:     General:  positive for  - fatigue and sleep disturbance  HEENT: Negative- denies angioedema sx  Pulmonary:    Denies cough or wheezing, naps some    Cardiac:  Denies exertional chest pain, palpitations,  near  Syncope. Edema, abdominal bloating, AICD shocks. GI:  GERD sx, positive for abdominal pain immediately after eating, denies change in bowel habits, or black or bloody stools  Musculo: negative  Neuro: no TIA or stroke symptoms  Skin:  negative  Allergies: REMINDER:DOCUMENT ALLERGIES IN ALLERGY ACTIVITY  Psych: Positive for depression and anxiety     CARDIAC EVALUATION   ECHO: 2017: EF 15-20%, apical AK  ECHO 17- Dilated LV, EF 20%, apical DK   ECHO 18:LVD, EF 15-20%, severe DHK,  AK akinesis of the apical anterior, apical inferior, apical septal, and apical lateral wall. LAE, mild MR      MRI: 2017: LV dilated LVEF 32%, severe HK ant wall, RVEF 55%, mild MR; anterior infarct- no viability RCA/LCA: viable     CATH 2017: LM: nl, LAD: m100%, LCX codom normal, RCA:m 85%, LVEDP 25, no AV gradient, dilated LV, apical AK, EF 20%  -- s/p PCI pRCA: 2.5x18 Xience ([facility]). AICD 2017; [employer] [employer] single lead       EK2018: Sinus  Rhythm, Low voltage in limb leads. LAD, LAFB, Anterior infarct -age undetermined. T-abnormality  -Possible  Anterolateral  ischemia.    10/4/2018: SR, LAD, PRWP, QRS 96 md, QTc 389   2018 SR, LAD, PRWP ,   3/13/2019: SR low voltage, LAD, PRWP rate 75, QRS 96, QTc 384    History:  Past Medical History:   Diagnosis Date    Controlled type 2 diabetes mellitus with circulatory disorder ([facility] 2017    COPD (chronic obstructive pulmonary disease) ([facility]     Coronary artery disease involving native coronary artery without angina pectoris 2017    OOH MI, 2v disease s/p PCI RCA with [facility] June 2017    ETOH abuse 6/1/2017    ICD (implantable cardioverter-defibrillator) in place     Ischemic cardiomyopathy     Extensive LAD territory infarction    Nicotine dependence 6/1/2017    Pacemaker     [employer] ICD    SOB (shortness of breath) 75/0/3275    Systolic congestive heart failure with reduced left ventricular function, NYHA class 3 ([facility] 10/25/2018     Past Surgical History:   Procedure Laterality Date    HX CORONARY STENT PLACEMENT      HX HEART CATHETERIZATION      HX PACEMAKER      [employer] ICD    HX TONSILLECTOMY      HX WISDOM TEETH EXTRACTION       Social History     Socioeconomic History    Marital status: SINGLE     Spouse name: Not on file    Number of children: Not on file    Years of education: Not on file    Highest education level: Not on file   Social Needs    Financial resource strain: Not on file    Food insecurity - worry: Not on file    Food insecurity - inability: Not on file   [employer] needs - medical: Not on file   [employer] needs - non-medical: Not on file   Occupational History    Not on file   Tobacco Use    Smoking status: Current Every Day Smoker     Packs/day: 1.00     Years: 30.00     Pack years: 30.00     Types: Cigarettes    Smokeless tobacco: Never Used   Substance and Sexual Activity    Alcohol use: No     Comment: former drinker-quit 3 months ago-drank a 6 pack per day    Drug use: No    Sexual activity: Yes     Partners: Female   Other Topics Concern    Not on file   Social History Narrative    Not on file     Family History   Problem Relation Age of Onset    Stroke Father     Heart Disease Father     Hypertension Father        Current Medications:   Current Outpatient Medications   Medication Sig Dispense Refill    sacubitril-valsartan (ENTRESTO) 49 mg/51 mg tablet Take 1 Tab by mouth two (2) times a day.  glimepiride (AMARYL) 2 mg tablet Take 1 Tab by mouth every morning.  90 Tab 1    SITagliptin (JANUVIA) 25 mg tablet Take 1 Tab by mouth daily. 90 Tab 1    metoprolol succinate (TOPROL XL) 25 mg XL tablet Take 25 mg by mouth daily.  clopidogrel (PLAVIX) 75 mg tab Take 1 Tab by mouth daily. 90 Tab 3    cholecalciferol (VITAMIN D3) 1,000 unit tablet Take 1,000 Units by mouth daily.  cyanocobalamin (VITAMIN B-12) 500 mcg tablet Take 500 mcg by mouth daily.  bumetanide (BUMEX) 0.5 mg tablet Take 0.5 mg by mouth daily.  omeprazole (PRILOSEC) 20 mg capsule Take 20 mg by mouth daily.  simvastatin (ZOCOR) 40 mg tablet Take  by mouth every morning.  ALBUTEROL SULFATE (VENTOLIN HFA IN) Take  by inhalation daily.  ezetimibe (ZETIA) 10 mg tablet Take 1 Tab by mouth daily. 30 Tab 5    ACETAMINOPHEN/DIPHENHYDRAMINE (TYLENOL PM PO) Take  by mouth nightly.  aspirin 81 mg chewable tablet Take 1 Tab by mouth daily.  multivit,iron,min/green tea xt (DAILY-TAO WEIGHT CONTROL PO) Take 1 Tab by mouth daily.  pyridoxine, vitamin B6, (VITAMIN B-6) 100 mg tablet Take 100 mg by mouth daily.  levocetirizine (XYZAL) 5 mg tablet Take 1 Tab by mouth daily as needed for Allergies or Rhinitis. 90 Tab 1    acetaminophen (TYLENOL) 325 mg tablet Take 2 Tabs by mouth every four (4) hours as needed. Allergies:    Allergies   Allergen Reactions    Atorvastatin Diarrhea    Codeine Itching    Metformin Other (comments)     Abdominal pain     Wellbutrin [Bupropion] Other (comments)     Night mare  Aggression          Admission Weight: Last Weight   Weight: 269 lb 12.8 oz (122.4 kg) Weight: 269 lb 12.8 oz (122.4 kg)       Physical Exam:   Vitals:    Visit Vitals  /84 (BP 1 Location: Right arm, BP Patient Position: Sitting)   Pulse 82   Temp 98.7 °F (37.1 °C) (Oral)   Resp 24   Ht 5' 10\" (1.778 m)   Wt 269 lb 12.8 oz (122.4 kg)   SpO2 95%   BMI 38.71 kg/m²       General:  Well developed disheveled WM, obese,alert,  Cooperative, Talkative with tangent of thoughts HEENT: Normocephalic, PERRLA, and Sclera clear, anicteric  Neck:  supple, no significant adenopathy, carotids upstroke normal bilaterally, no bruits  CVP:  12 cm ( - ) HJR  Cardiac: AICD left infraclavicular space, RRR, +  S3 present and 2/6 systolic murmur  Chest/Pulm:  resp unlabored,  Diminished breath sounds bilaterally. No rales, rhonchi, wheezing  Abdomen: Obese, protuberant, soft, nontender,  normal BS  Extremity: No edema, no clubbing, cyanosis  Neuro: A&O x3,  Normal coordination and gait  Skin:   no rashes, no ecchymoses, no petechiae    Recent Labs:   Lab Results   Component Value Date/Time    WBC 7.2 08/09/2018 10:26 AM    HGB 16.4 08/09/2018 10:26 AM    HCT 49.0 08/09/2018 10:26 AM    PLATELET 427 21/75/8266 10:26 AM    MCV 96.5 08/09/2018 10:26 AM     Lab Results   Component Value Date/Time    TSH 1.90 11/15/2018 11:30 AM      Lab Results   Component Value Date/Time    NT pro-BNP 6,586 (H) 06/02/2017 01:40 AM    NT pro-BNP 7,025 (H) 05/30/2017 03:37 PM    PROBNP 69 12/27/2018 12:40 PM    PROBNP 137 12/13/2018 12:00 AM    PROBNP 111 11/15/2018 03:14 PM    PROBNP 136 10/04/2018 12:00 AM      Lab Results   Component Value Date/Time    Sodium 143 01/29/2019 12:00 AM    Potassium 4.3 01/29/2019 12:00 AM    Chloride 103 01/29/2019 12:00 AM    CO2 22 01/29/2019 12:00 AM    Anion gap 9 11/15/2018 11:30 AM    Glucose 107 (H) 01/29/2019 12:00 AM    BUN 10 01/29/2019 12:00 AM    Creatinine 1.00 01/29/2019 12:00 AM    BUN/Creatinine ratio 10 01/29/2019 12:00 AM    GFR est AA 91 01/29/2019 12:00 AM    GFR est non-AA 79 01/29/2019 12:00 AM    Calcium 9.4 01/29/2019 12:00 AM    Bilirubin, total 0.6 11/15/2018 11:30 AM    ALT (SGPT) 26 11/15/2018 11:30 AM    AST (SGOT) 17 11/15/2018 11:30 AM    Alk.  phosphatase 75 11/15/2018 11:30 AM    Protein, total 7.6 11/15/2018 11:30 AM    Albumin 4.0 11/15/2018 11:30 AM    Globulin 3.6 11/15/2018 11:30 AM    A-G Ratio 1.1 11/15/2018 11:30 AM      Lab Results   Component Value Date/Time    Hemoglobin A1c 6.7 (H) 11/15/2018 11:30 AM          I have discussed the diagnosis with  Martha Fang and the intended plan as seen in the above orders. Questions were answered concerning future plans, and written instructions provided. I have discussed medication side effects and warnings with the patient as well. Thank you for allowing me to participate in the care of this pleasant man. Please do not hesitate to call with any questions. Selena Shirley RN, ACNP-BC, [facility] [address] [id number]  [phone]  24 hour VAD/HF Pager: [phone]

## 2019-03-13 NOTE — PATIENT INSTRUCTIONS
You are doing better   TAKE  ENTRESTO 49/51 MG TWICE A DAY WITH FOOD   TAKE THE METOPROLOL XL 25 (Toprol XL 25 mg ) at bedtime tonight   CONTINUE CURRENT medications    Take twice a day medications 12 hours apart with food    Labs today BMP, proBNP     See Dr. Renny Trammell as scheduled   Get echo in a few weeks     Limit WHITE foods ( flour, sugar, pasta, rice, potatoes)    Walk more go to [email or web address] after 50     Keep a positive attitude   Stop smoking   Congratulations on stopping drinking    Follow up 4 weeks     HF Education: Continue daily weights (in the morning, after voiding). Notify HF team of overnight weight gains > 2 lbs or weekly >5 lbs or if any of the following Sx. Continue to limit sodium intake & monitor your fluid intake .

## 2019-03-13 NOTE — LETTER
3/13/2019 3:13 PM 
 
Patient:  Loli Muse YOB: 1955 Date of Visit: 3/13/2019 Dear Bernice Tyler MD 
[address] [facility] [phone] VIA In Basket Clary Diaz MD 
[phone] [address] VIA In Basket Edouard De Leon MD 
[address] [address] VIA In Basket Margarita Gonzalez NP 
[address] [id number] VIA Facsimile: [phone] 
 : Thank you for referring Mr. Noel Stevens to me for evaluation/treatment. Below are the relevant portions of my assessment and plan of care. If you have questions, please do not hesitate to call me. I look forward to following Mr. Wilmar Kumar along with you. Sincerely, ZACH Thmoas

## 2019-03-14 ENCOUNTER — TELEPHONE (OUTPATIENT)
Dept: CARDIOLOGY CLINIC | Age: 64
End: 2019-03-14

## 2019-03-14 LAB
BUN SERPL-MCNC: 10 MG/DL (ref 8–27)
BUN/CREAT SERPL: 11 (ref 10–24)
CALCIUM SERPL-MCNC: 9.3 MG/DL (ref 8.6–10.2)
CHLORIDE SERPL-SCNC: 103 MMOL/L (ref 96–106)
CO2 SERPL-SCNC: 19 MMOL/L (ref 20–29)
CREAT SERPL-MCNC: 0.93 MG/DL (ref 0.76–1.27)
GLUCOSE SERPL-MCNC: 93 MG/DL (ref 65–99)
NT-PROBNP SERPL-MCNC: 131 PG/ML (ref 0–376)
POTASSIUM SERPL-SCNC: 4.2 MMOL/L (ref 3.5–5.2)
SODIUM SERPL-SCNC: 139 MMOL/L (ref 134–144)

## 2019-03-14 NOTE — TELEPHONE ENCOUNTER
Sherie can you schedule an ECHO with next clinic visit 3/19/19 (appt at 2pm)  Request from Deb Sotelo    Thank you

## 2019-03-18 NOTE — PROGRESS NOTES
[address]  [address], [address]  [place], [address] [facility]  Office [phone]  Fax [phone]  Cell [phone]    Yoav Weaver is a 70 y.o. male last seen 3 months ago. Assessment  Encounter Diagnoses   Name Primary?  Systolic heart failure, ACC/AHA stage C (HCC) Yes    Ischemic cardiomyopathy     ICD (implantable cardioverter-defibrillator) in place     Controlled type 2 diabetes mellitus with other circulatory complication, without long-term current use of insulin ([facility]     Coronary artery disease involving native coronary artery of native heart without angina pectoris     Nicotine dependence with other nicotine-induced disorder, unspecified nicotine product type     Severe obesity with body mass index (BMI) of 35.0 to 39.9 with serious comorbidity ([facility]       Recommendations:  Yoav Weaver has ischemic cardiomyopathy, severe LV dysfunction, HFrEF s/p ICD. He suffered an out-of-hospital MI in May 2017 and presented with subacute HF in June. [place] MRI demonstrated viability only in the RCA territory. Cardiac cath demonstrated mid %, RCA mid 85%. He underwent PCI RCA with [facility]. Would continue DAPT indefinitely. Drivers of CAD include smoking and T2DM    HFrEF, EF 15-20% with apical akinesis, most recently by echo 8/2018. He continues to have class 2-3 HF. He has been followed closely by the advanced HF clinic, most recently 1 week ago. Continue Entresto 24/26 (Intolerant of higher doses), Toprol 25 mg/d, continue Bumex 0.5 mg daily. He is intolerant of Spironolactone. He remains alcohol free. Ideally, he would benefit from [address] [place] [address] for his DM and HF - would defer initiation to Dr. Iman De La Garza. Perhaps this could replace Januvia and/or Glimepiride. His long term outlook is guarded based on his comorbid conditions and poor social support. Continue follow up the  advanced HF clinic to help with management.  I do not think he is a good candidate for advanced therapies. Reassess EF in 6 months, but will have him see us in 3 months. Subjective:  Mr. Sandra Keen states he is doing well overall. He notes his breathing has improved. He continues to be fairly sedentary at home. He is able to drive himself. He lives with his older sister. He is followed by [facility]. He continues to smoke ~17 cigarettes/day. He has not had any alcohol recently. He states he has not taken Simvastatin recently, as he ran out. He continues to take Bumex regularly. He has also not been taking Januvia regularly. He states his resting pulse is running in the 60s. Patient denies any exertional chest pain, palpitations, syncope, orthopnea, edema or paroxysmal nocturnal dyspnea. Cardiac risk factors   HTN no  DM  yes  Smoking yes    Cardiac testing  ECHO: 5/31//2017: EF 15-20%, apical AK  ECHO 9/18/17- Dilated LV, EF 20%, apical DK   ECHO 8/21/18:LVD, EF 15-20%, severe DHK,  AK akinesis of the apical anterior, apical inferior, apical septal, and apical lateral wall. LAE, mild MR      MRI: 6/1/2017: LV dilated LVEF 32%, severe HK ant wall, RVEF 55%, mild MR; anterior infarct- no viability RCA/LCA: viable     CATH 6/2/2017: LM: nl, LAD: m100%, LCX codom normal, RCA:m 85%, LVEDP 25, no AV gradient, dilated LV, apical AK, EF 20%  -- s/p PCI pRCA: 2.5x18 Xience ([facility]).       AICD 12/5/2017; [employer]    Past Medical History:   Diagnosis Date    Controlled type 2 diabetes mellitus with circulatory disorder ([address] 6/1/2017    COPD (chronic obstructive pulmonary disease) ([address]     Coronary artery disease involving native coronary artery without angina pectoris 06/01/2017    OOH MI, 2v disease s/p PCI RCA with [facility] June 2017    ETOH abuse 6/1/2017    ICD (implantable cardioverter-defibrillator) in place     Ischemic cardiomyopathy     Extensive LAD territory infarction    Nicotine dependence 6/1/2017    Pacemaker     [employer] ICD    SOB (shortness of breath) 13/9/2096    Systolic congestive heart failure with reduced left ventricular function, NYHA class 3 ([facility] 10/25/2018        Current Outpatient Medications   Medication Sig Dispense Refill    sacubitril-valsartan (ENTRESTO) 49 mg/51 mg tablet Take 1 Tab by mouth two (2) times a day.  multivit,iron,min/green tea xt (DAILY-TAO WEIGHT CONTROL PO) Take 1 Tab by mouth daily.  pyridoxine, vitamin B6, (VITAMIN B-6) 100 mg tablet Take 100 mg by mouth daily.  glimepiride (AMARYL) 2 mg tablet Take 1 Tab by mouth every morning. 90 Tab 1    SITagliptin (JANUVIA) 25 mg tablet Take 1 Tab by mouth daily. 90 Tab 1    metoprolol succinate (TOPROL XL) 25 mg XL tablet Take 25 mg by mouth daily.  clopidogrel (PLAVIX) 75 mg tab Take 1 Tab by mouth daily. 90 Tab 3    cholecalciferol (VITAMIN D3) 1,000 unit tablet Take 1,000 Units by mouth daily.  cyanocobalamin (VITAMIN B-12) 500 mcg tablet Take 500 mcg by mouth daily.  bumetanide (BUMEX) 0.5 mg tablet Take 0.5 mg by mouth daily.  omeprazole (PRILOSEC) 20 mg capsule Take 20 mg by mouth daily.  ALBUTEROL SULFATE (VENTOLIN HFA IN) Take  by inhalation.  ezetimibe (ZETIA) 10 mg tablet Take 1 Tab by mouth daily. 30 Tab 5    acetaminophen (TYLENOL) 325 mg tablet Take 2 Tabs by mouth every four (4) hours as needed.  ACETAMINOPHEN/DIPHENHYDRAMINE (TYLENOL PM PO) Take  by mouth nightly.  aspirin 81 mg chewable tablet Take 1 Tab by mouth daily.  levocetirizine (XYZAL) 5 mg tablet Take 1 Tab by mouth daily as needed for Allergies or Rhinitis. 90 Tab 1    simvastatin (ZOCOR) 40 mg tablet Take  by mouth every morning. Allergies   Allergen Reactions    Atorvastatin Diarrhea    Codeine Itching    Metformin Other (comments)     Abdominal pain     Wellbutrin [Bupropion] Other (comments)     Night mare  Aggression       Retired. Review of Systems  Constitutional: Negative for fever, chills, malaise/fatigue and diaphoresis. Respiratory: Negative for cough, hemoptysis, sputum production, and wheezing. Positive for exertional fatigue  Cardiovascular: Negative for chest pain, palpitations, orthopnea, claudication, leg swelling and PND. Gastrointestinal: Negative for heartburn, nausea, vomiting, blood in stool and melena. Genitourinary: Negative for dysuria and flank pain. Musculoskeletal: Negative for joint pain and back pain. Skin: Negative for rash. Neurological: Negative for focal weakness, seizures, loss of consciousness, weakness and headaches. Endo/Heme/Allergies: Does not bruise/bleed easily. Psychiatric/Behavioral: Negative for memory loss. The patient does not have insomnia. Physical Exam    Visit Vitals  /80 (BP 1 Location: Left arm, BP Patient Position: Sitting)   Pulse 88   Ht 5' 10\" (1.778 m)   Wt 269 lb 12.8 oz (122.4 kg)   SpO2 98%   BMI 38.71 kg/m²     Wt Readings from Last 3 Encounters:   03/19/19 269 lb 12.8 oz (122.4 kg)   03/13/19 269 lb 12.8 oz (122.4 kg)   03/08/19 270 lb (122.5 kg)      General - well developed well nourished, obese WM  Neck - JVP normal, thyroid nl  Cardiac - normal S1, S2, no murmurs, rubs or gallops. No clicks  Vascular - carotids without bruits, radials, femorals and pedal pulses equal bilateral  Lungs - clear to auscultation bilaterals, no rales, wheezing. Scattered rhonchi posteriorly   Abd - soft nontender, no HSM, no abd bruits  Extremities - no edema  Skin - no rash  Neuro - nonfocal  Psych - normal mood and affect      Cardiographics  ECG 6/14/17 - SR 75, TWI; unchanged  Echo 9/18/17- Dilated LV, EF 20%, apical dyskinesis  Echo 8/21/18 - EF 15-20%, apical akineses, dilated LV    Written by Jennifer Mcadams, as dictated by Dr. Gladys Rodgers.      Gladys Rodgers MD

## 2019-03-19 ENCOUNTER — OFFICE VISIT (OUTPATIENT)
Dept: CARDIOLOGY CLINIC | Age: 64
End: 2019-03-19

## 2019-03-19 ENCOUNTER — TELEPHONE (OUTPATIENT)
Dept: CARDIOLOGY CLINIC | Age: 64
End: 2019-03-19

## 2019-03-19 VITALS
DIASTOLIC BLOOD PRESSURE: 80 MMHG | HEART RATE: 88 BPM | HEIGHT: 70 IN | OXYGEN SATURATION: 98 % | SYSTOLIC BLOOD PRESSURE: 110 MMHG | WEIGHT: 269.8 LBS | BODY MASS INDEX: 38.63 KG/M2

## 2019-03-19 DIAGNOSIS — I25.10 CORONARY ARTERY DISEASE INVOLVING NATIVE CORONARY ARTERY OF NATIVE HEART WITHOUT ANGINA PECTORIS: ICD-10-CM

## 2019-03-19 DIAGNOSIS — E66.01 SEVERE OBESITY WITH BODY MASS INDEX (BMI) OF 35.0 TO 39.9 WITH SERIOUS COMORBIDITY (HCC): ICD-10-CM

## 2019-03-19 DIAGNOSIS — F17.208 NICOTINE DEPENDENCE WITH OTHER NICOTINE-INDUCED DISORDER, UNSPECIFIED NICOTINE PRODUCT TYPE: Chronic | ICD-10-CM

## 2019-03-19 DIAGNOSIS — I25.5 ISCHEMIC CARDIOMYOPATHY: ICD-10-CM

## 2019-03-19 DIAGNOSIS — E11.59 CONTROLLED TYPE 2 DIABETES MELLITUS WITH OTHER CIRCULATORY COMPLICATION, WITHOUT LONG-TERM CURRENT USE OF INSULIN (HCC): ICD-10-CM

## 2019-03-19 DIAGNOSIS — Z95.810 ICD (IMPLANTABLE CARDIOVERTER-DEFIBRILLATOR) IN PLACE: ICD-10-CM

## 2019-03-19 DIAGNOSIS — I50.20 SYSTOLIC HEART FAILURE, ACC/AHA STAGE C (HCC): Primary | ICD-10-CM

## 2019-03-19 NOTE — PROGRESS NOTES
Labs ok  Continue on current plan      The scheduling department is trying to get in touch with Mr. Sue Sahu for his ECHO

## 2019-03-19 NOTE — PROGRESS NOTES
Patient has no complaints today Visit Vitals /80 (BP 1 Location: Left arm, BP Patient Position: Sitting) Pulse 88 Ht 5' 10\" (1.778 m) Wt 269 lb 12.8 oz (122.4 kg) SpO2 98% BMI 38.71 kg/m²  
 n

## 2019-03-19 NOTE — TELEPHONE ENCOUNTER
----- Message from Morena Osei NP sent at 3/19/2019  4:12 PM EDT -----  Labs ok  Continue on current plan      The scheduling department is trying to get in touch with Mr. Rafi Ritter for his ECHO    Left message for patient to return call for message as noted above.      appt and echo done with Dr. Mauricia Goldberg 3/19

## 2019-04-09 ENCOUNTER — TELEPHONE (OUTPATIENT)
Dept: CARDIOLOGY CLINIC | Age: 64
End: 2019-04-09

## 2019-04-10 ENCOUNTER — OFFICE VISIT (OUTPATIENT)
Dept: CARDIOLOGY CLINIC | Age: 64
End: 2019-04-10

## 2019-04-10 ENCOUNTER — TELEPHONE (OUTPATIENT)
Dept: CARDIOLOGY CLINIC | Age: 64
End: 2019-04-10

## 2019-04-10 VITALS
WEIGHT: 267.7 LBS | BODY MASS INDEX: 38.33 KG/M2 | SYSTOLIC BLOOD PRESSURE: 134 MMHG | DIASTOLIC BLOOD PRESSURE: 84 MMHG | RESPIRATION RATE: 20 BRPM | HEART RATE: 82 BPM | OXYGEN SATURATION: 97 % | HEIGHT: 70 IN | TEMPERATURE: 98.7 F

## 2019-04-10 DIAGNOSIS — F17.208 NICOTINE DEPENDENCE WITH OTHER NICOTINE-INDUCED DISORDER, UNSPECIFIED NICOTINE PRODUCT TYPE: ICD-10-CM

## 2019-04-10 DIAGNOSIS — Z95.810 ICD (IMPLANTABLE CARDIOVERTER-DEFIBRILLATOR) IN PLACE: ICD-10-CM

## 2019-04-10 DIAGNOSIS — I25.5 ISCHEMIC CARDIOMYOPATHY: Primary | ICD-10-CM

## 2019-04-10 DIAGNOSIS — G44.85 PRIMARY STABBING HEADACHE: ICD-10-CM

## 2019-04-10 DIAGNOSIS — E66.01 SEVERE OBESITY WITH BODY MASS INDEX (BMI) OF 35.0 TO 39.9 WITH SERIOUS COMORBIDITY (HCC): ICD-10-CM

## 2019-04-10 DIAGNOSIS — E11.59 CONTROLLED TYPE 2 DIABETES MELLITUS WITH OTHER CIRCULATORY COMPLICATION, WITHOUT LONG-TERM CURRENT USE OF INSULIN (HCC): ICD-10-CM

## 2019-04-10 NOTE — TELEPHONE ENCOUNTER
Prior auth completed for Entresto via covermymeds. Approved, I notified pharmacy, copay will be $8.50.

## 2019-04-10 NOTE — PATIENT INSTRUCTIONS
Increase Entresto to 97/103 mg by mouth twice daily. Stop taking the Januvia and Amaryl (glimepiride). Begin taking Jardiance 25 mg by mouth once daily. Continue to weigh yourself and measure your blood pressure. Please have your echocardiogram (heart ultrasound) completed by your next visit. Return in 1 month for a follow up.

## 2019-04-10 NOTE — PROGRESS NOTES
[facility] Note      DOS:   4/10/2019  NAME:  Isaiah Fish   MRN:   [id number]   REFERRING PROVIDER:  Amalia Pagan MD  PRIMARY CARE PHYSICIAN: Celina Fischer MD  PRIMARY CARDIOLOGIST: Amalia Pagan MD  [facility]: ARELIS Gaming        Chief Complaint:   Chief Complaint   Patient presents with    Follow-up       HPI: Isaiah Fish is a  70y.o. year old   male with a history of HFrEF in the setting of ICM s/p AICD with CAD s/p PCI RCA with [facility] 7/12 complicated by  D3UA, COPD, obesity ( BMI 44), persistent tobacco use, ETOH abuse, depression with anxiety, and noncompliance. He suffered an out-of-hospital MI in May 2017 and presented with subacute HF in June. Cardiac MRI demonstrated viability only in the RCA territory. Cardiac cath demonstrated mid %, RCA mid 85%. He underwent PCI RCA with [facility]. He presents today for routine follow up. At his last visit, his Conda Kerbs was increased to 49/51 mg PO BID. Since that time, his weight has slowly been decreasing, his BPs have been well controlled and he has had more energy. He has not been taking Bumex regularly as he feels that the Conda Kerbs is adequately controlling his volume. He  is still smoking 15 cigarettes a day and continues to abstain from alcohol. He c/o occasional fatigue, headache, and right arm paresthesias, but denies CP, palpitations, syncope, pre-syncope, nausea, vomiting, abdominal pain, bowel or bladder changes or ICD shocks. He is compliant with is medications the majority of the time. He admits to dietary indiscretion with a preference for sweets, but upon review, his BGs have been well controlled.      IMPRESSION/PLAN:     ICM - Stage C, NYHA Class III, LVEF 15-20%   Increase Entresto to 97/103 mg po BID - instructed patient to contact [facility] prior to taking if SBP < 90 mmHg    Continue Toprol XL 12.5 mg PO BID    Intolerant of  AA    D/C Bumex    Continue to follow daily weights    Check CMP, pro-BNP today   Follow up in the [facility] in  4 Weeks   Repeat echo - d/w pt importance of compliance with scheduled testing     High risk of SCD s/p AICD implant   No recent shocks     Followed by Dr. Christianson Like     Suspected SHERI   Refuses PSG    Continue to encourage patient to comply with recommendations     CAD s/p MI, s/p PCI with [facility]- no angina   Continue  plavix     On ASA, BB, statin   Stable    ETOH Abuse   Continues to abstain     Nicotine Addiction    Smoking cessation counseling provided   Encouraged patient to decrease from 15 cigarettes to 12 cigarettes for slow wean if unable to stop cold turkey   Intolerant of Wellbutrin/Chantix       T2DM A1C 6.7     Now being followed by [facility]   BG logs reviewed - BG all < 120 mg/dL    Reports intolerance to Januvia and Amaryl - will D/C   Begin Jardiance 25 mg po daily - counseled on importance of perineal hygiene     HLD     Continue simvastatin   D/C Zetia as patient reports he can not tolerate it and does not want to take it     Right arm paresthesias   Neurology consult placed     Chronic headaches   Neuro consult pending   Likely exacerbated by untreated SHERI   Counseled on importance of limiting Tylenol usage (has been taking high doses)     COPD   On Breo, Albuterol MDI    Review of Systems:     General:  positive for  - occasional fatigue, headache  HEENT: Chronic headache   Pulmonary:   Denies cough or wheezing  Cardiac:  Denies exertional chest pain, palpitations,  near syncope. Edema, abdominal bloating, AICD shocks.    GI:  Denies change in bowel habits, or black or bloody stools  Musculo: negative  Neuro: no TIA or stroke symptoms  Skin:  negative  Allergies: REMINDER:DOCUMENT ALLERGIES IN ALLERGY ACTIVITY  Psych: Positive for anxiety     CARDIAC EVALUATION   ECHO: 5/31//2017: EF 15-20%, apical AK  ECHO 9/18/17- Dilated LV, EF 20%, apical DK   ECHO 8/21/18:LVD, EF 15-20%, severe DHK,  AK akinesis of the apical anterior, apical inferior, apical septal, and apical lateral wall. LAE, mild MR      MRI: 2017: LV dilated LVEF 32%, severe HK ant wall, RVEF 55%, mild MR; anterior infarct- no viability RCA/LCA: viable     CATH 2017: LM: nl, LAD: m100%, LCX codom normal, RCA:m 85%, LVEDP 25, no AV gradient, dilated LV, apical AK, EF 20%  -- s/p PCI pRCA: 2.5x18 Xience ([facility]). AICD 2017; [employer] single lead       EK2018: Sinus  Rhythm, Low voltage in limb leads. LAD, LAFB, Anterior infarct -age undetermined. T-abnormality  -Possible  Anterolateral  ischemia.    10/4/2018: SR, LAD, PRWP, QRS 96 md, QTc 389   2018 SR, LAD, PRWP ,   3/13/2019: SR low voltage, LAD, PRWP rate 75, QRS 96, QTc 384    History:  Past Medical History:   Diagnosis Date    Controlled type 2 diabetes mellitus with circulatory disorder ([address] 2017    COPD (ch[facility] obstructive [facility] disease) [facility]     Coronary artery disease involving native coronary artery without angina pectoris 2017    OOH MI, 2v disease s/p PCI RCA with [facility] 2017    ETOH abuse 2017    ICD (implantable cardioverter-defibrillator) in place     Ischemic cardiomyopathy     Extensive LAD territory infarction    Nicotine dependence 2017    Pacemaker     [employer] ICD    SOB (shortness of breath)     Systolic congestive heart failure with reduced left ventricular function, NYHA class 3 ([address] 10/25/2018     Past Surgical History:   Procedure Laterality Date    HX CORONARY STENT PLACEMENT      HX HEART CATHETERIZATION      HX PACEMAKER      [employer] ICD    HX TONSILLECTOMY      HX WISDOM TEETH EXTRACTION       Social History     Socioeconomic History    Marital status: SINGLE     Spouse name: Not on file    Number of children: Not on file    Years of education: Not on file    Highest education level: Not on file   Occupational History    Not on file   Social Needs    Financial resource strain: Not on file  Food insecurity:     Worry: Not on file     Inability: Not on file    Transportation needs:     Medical: Not on file     Non-medical: Not on file   Tobacco Use    Smoking status: Current Every Day Smoker     Packs/day: 1.00     Years: 30.00     Pack years: 30.00     Types: Cigarettes    Smokeless tobacco: Never Used   Substance and Sexual Activity    Alcohol use: No     Comment: former drinker-quit 3 months ago-drank a 6 pack per day    Drug use: No    Sexual activity: Yes     Partners: Female   Lifestyle    Physical activity:     Days per week: Not on file     Minutes per session: Not on file    Stress: Not on file   Relationships    Social connections:     Talks on phone: Not on file     Gets together: Not on file     Attends Quaker service: Not on file     Active member of club or organization: Not on file     Attends meetings of clubs or organizations: Not on file     Relationship status: Not on file    Intimate partner violence:     Fear of current or ex partner: Not on file     Emotionally abused: Not on file     Physically abused: Not on file     Forced sexual activity: Not on file   Other Topics Concern    Not on file   Social History Narrative    Not on file     Family History   Problem Relation Age of Onset    Stroke Father     Heart Disease Father     Hypertension Father        Current Medications:   Current Outpatient Medications   Medication Sig Dispense Refill    sacubitril-valsartan (ENTRESTO) 49 mg/51 mg tablet Take 1 Tab by mouth two (2) times a day.  pyridoxine, vitamin B6, (VITAMIN B-6) 100 mg tablet Take 100 mg by mouth daily.  glimepiride (AMARYL) 2 mg tablet Take 1 Tab by mouth every morning. 90 Tab 1    SITagliptin (JANUVIA) 25 mg tablet Take 1 Tab by mouth daily. 90 Tab 1    metoprolol succinate (TOPROL XL) 25 mg XL tablet Take 25 mg by mouth daily.  clopidogrel (PLAVIX) 75 mg tab Take 1 Tab by mouth daily.  90 Tab 3    cholecalciferol (VITAMIN D3) 1,000 unit tablet Take 1,000 Units by mouth daily.  cyanocobalamin (VITAMIN B-12) 500 mcg tablet Take 500 mcg by mouth daily.  bumetanide (BUMEX) 0.5 mg tablet Take 0.5 mg by mouth daily.  omeprazole (PRILOSEC) 20 mg capsule Take 20 mg by mouth daily.  ALBUTEROL SULFATE (VENTOLIN HFA IN) Take  by inhalation.  ezetimibe (ZETIA) 10 mg tablet Take 1 Tab by mouth daily. 30 Tab 5    acetaminophen (TYLENOL) 325 mg tablet Take 2 Tabs by mouth every four (4) hours as needed.  ACETAMINOPHEN/DIPHENHYDRAMINE (TYLENOL PM PO) Take  by mouth nightly.  aspirin 81 mg chewable tablet Take 1 Tab by mouth daily.  multivit,iron,min/green tea xt (DAILY-TAO WEIGHT CONTROL PO) Take 1 Tab by mouth daily.  levocetirizine (XYZAL) 5 mg tablet Take 1 Tab by mouth daily as needed for Allergies or Rhinitis. 90 Tab 1    simvastatin (ZOCOR) 40 mg tablet Take  by mouth every morning. Allergies: Allergies   Allergen Reactions    Atorvastatin Diarrhea    Codeine Itching    Metformin Other (comments)     Abdominal pain     Wellbutrin [Bupropion] Other (comments)     Night mare  Aggression          Admission Weight: Last Weight   Weight: 267 lb 11.2 oz (121.4 kg) Weight: 267 lb 11.2 oz (121.4 kg)       Physical Exam:   Vitals:    Visit Vitals  /84 (BP 1 Location: Left arm, BP Patient Position: Sitting)   Pulse 82   Temp 98.7 °F (37.1 °C) (Oral)   Resp 20   Ht 5' 10\" (1.778 m)   Wt 267 lb 11.2 oz (121.4 kg)   SpO2 97%   BMI 38.41 kg/m²       General:  Well developed disheveled WM, obese,alert,  Cooperative, Talkative with tangent of thoughts   HEENT: Normocephalic, PERRLA, and Sclera clear, anicteric  Neck:  supple, no significant adenopathy, carotids upstroke normal bilaterally, no bruits  CVP:  8 cm ( - ) HJR  Cardiac: AICD left infraclavicular space, RRR, +  S3 present and 2/6 systolic murmur  Chest/Pulm:  resp unlabored,  Diminished breath sounds bilaterally.  No rales, rhonchi, wheezing  Abdomen: Obese, protuberant, soft, nontender,  normal BS  Extremity: No edema, no clubbing, cyanosis  Neuro: A&O x3,  Normal coordination and gait  Skin:   no rashes, no ecchymoses, no petechiae    Recent Labs:   Lab Results   Component Value Date/Time    WBC 7.2 08/09/2018 10:26 AM    HGB 16.4 08/09/2018 10:26 AM    HCT 49.0 08/09/2018 10:26 AM    PLATELET 725 17/49/5284 10:26 AM    MCV 96.5 08/09/2018 10:26 AM     Lab Results   Component Value Date/Time    TSH 1.90 11/15/2018 11:30 AM      Lab Results   Component Value Date/Time    NT pro-BNP 6,586 (H) 06/02/2017 01:40 AM    NT pro-BNP 7,025 (H) 05/30/2017 03:37 PM    PROBNP 131 03/13/2019 02:49 PM    PROBNP 69 12/27/2018 12:40 PM    PROBNP 137 12/13/2018 12:00 AM    PROBNP 111 11/15/2018 03:14 PM    PROBNP 136 10/04/2018 12:00 AM      Lab Results   Component Value Date/Time    Sodium 139 03/13/2019 02:49 PM    Potassium 4.2 03/13/2019 02:49 PM    Chloride 103 03/13/2019 02:49 PM    CO2 19 (L) 03/13/2019 02:49 PM    Anion gap 9 11/15/2018 11:30 AM    Glucose 93 03/13/2019 02:49 PM    BUN 10 03/13/2019 02:49 PM    Creatinine 0.93 03/13/2019 02:49 PM    BUN/Creatinine ratio 11 03/13/2019 02:49 PM    GFR est AA 99 03/13/2019 02:49 PM    GFR est non-AA 86 03/13/2019 02:49 PM    Calcium 9.3 03/13/2019 02:49 PM    Bilirubin, total 0.6 11/15/2018 11:30 AM    ALT (SGPT) 26 11/15/2018 11:30 AM    AST (SGOT) 17 11/15/2018 11:30 AM    Alk.  phosphatase 75 11/15/2018 11:30 AM    Protein, total 7.6 11/15/2018 11:30 AM    Albumin 4.0 11/15/2018 11:30 AM    Globulin 3.6 11/15/2018 11:30 AM    A-G Ratio 1.1 11/15/2018 11:30 AM      Lab Results   Component Value Date/Time    Hemoglobin A1c 6.7 (H) 11/15/2018 11:30 AM      ALEX Reagan [address] [place] [address] [phone] [phone]  [phone]  24 hour VAD/HF Pager: [phone]

## 2019-04-11 ENCOUNTER — TELEPHONE (OUTPATIENT)
Dept: CARDIOLOGY CLINIC | Age: 64
End: 2019-04-11

## 2019-04-11 LAB
ALBUMIN SERPL-MCNC: 4.5 G/DL (ref 3.6–4.8)
ALBUMIN/GLOB SERPL: 1.7 {RATIO} (ref 1.2–2.2)
ALP SERPL-CCNC: 86 IU/L (ref 39–117)
ALT SERPL-CCNC: 23 IU/L (ref 0–44)
AST SERPL-CCNC: 21 IU/L (ref 0–40)
BILIRUB SERPL-MCNC: 0.4 MG/DL (ref 0–1.2)
BUN SERPL-MCNC: 11 MG/DL (ref 8–27)
BUN/CREAT SERPL: 12 (ref 10–24)
CALCIUM SERPL-MCNC: 9.4 MG/DL (ref 8.6–10.2)
CHLORIDE SERPL-SCNC: 100 MMOL/L (ref 96–106)
CO2 SERPL-SCNC: 20 MMOL/L (ref 20–29)
CREAT SERPL-MCNC: 0.92 MG/DL (ref 0.76–1.27)
ERYTHROCYTE [DISTWIDTH] IN BLOOD BY AUTOMATED COUNT: 14 % (ref 12.3–15.4)
GLOBULIN SER CALC-MCNC: 2.6 G/DL (ref 1.5–4.5)
GLUCOSE SERPL-MCNC: 104 MG/DL (ref 65–99)
HCT VFR BLD AUTO: 44.9 % (ref 37.5–51)
HGB BLD-MCNC: 15.7 G/DL (ref 13–17.7)
MCH RBC QN AUTO: 32 PG (ref 26.6–33)
MCHC RBC AUTO-ENTMCNC: 35 G/DL (ref 31.5–35.7)
MCV RBC AUTO: 92 FL (ref 79–97)
NT-PROBNP SERPL-MCNC: 131 PG/ML (ref 0–376)
PLATELET # BLD AUTO: 189 X10E3/UL (ref 150–379)
POTASSIUM SERPL-SCNC: 3.9 MMOL/L (ref 3.5–5.2)
PROT SERPL-MCNC: 7.1 G/DL (ref 6–8.5)
RBC # BLD AUTO: 4.9 X10E6/UL (ref 4.14–5.8)
SODIUM SERPL-SCNC: 138 MMOL/L (ref 134–144)
WBC # BLD AUTO: 10.4 X10E3/UL (ref 3.4–10.8)

## 2019-04-11 NOTE — TELEPHONE ENCOUNTER
Telephone Call RE:  Lab Reminder      Outcome:     [] Patient verbalizes understanding    [] Unable to reach   [] Left message              [x]   I spoke with patient's sister regarding appointment for patient to see the neurologist.  He is scheduled to see Dr. Juan Jose Garibay 5[place]20-19 3:[place]pm [place] [facility] She states understanding and will give patient the information.   Fuentes Metzger

## 2019-04-12 ENCOUNTER — DOCUMENTATION ONLY (OUTPATIENT)
Dept: CARDIOLOGY CLINIC | Age: 64
End: 2019-04-12

## 2019-04-12 NOTE — PROGRESS NOTES
Met with the patient during his office visit to [facility]. Discussed applying for assistance with jardiance and provided him with a patient assistance application should he need it.      Esteban Dubin, MSW, LCSW    Clinical    [phone]

## 2019-04-30 ENCOUNTER — OFFICE VISIT (OUTPATIENT)
Dept: CARDIOLOGY CLINIC | Age: 64
End: 2019-04-30

## 2019-04-30 DIAGNOSIS — Z95.810 ICD (IMPLANTABLE CARDIOVERTER-DEFIBRILLATOR) IN PLACE: Primary | ICD-10-CM

## 2019-05-08 ENCOUNTER — TELEPHONE (OUTPATIENT)
Dept: CARDIOLOGY CLINIC | Age: 64
End: 2019-05-08

## 2019-05-08 NOTE — TELEPHONE ENCOUNTER
Telephone Call RE:  Appointment reminder     Outcome:     [x] Patient confirmed appointment   [] Patient rescheduled appointment for    [] Unable to reach   [] Left message              [] Other:       Kita Kennedy

## 2019-05-09 ENCOUNTER — OFFICE VISIT (OUTPATIENT)
Dept: CARDIOLOGY CLINIC | Age: 64
End: 2019-05-09

## 2019-05-09 VITALS
HEART RATE: 86 BPM | TEMPERATURE: 98.5 F | DIASTOLIC BLOOD PRESSURE: 84 MMHG | OXYGEN SATURATION: 95 % | RESPIRATION RATE: 20 BRPM | WEIGHT: 264.6 LBS | HEIGHT: 70 IN | SYSTOLIC BLOOD PRESSURE: 152 MMHG | BODY MASS INDEX: 37.88 KG/M2

## 2019-05-09 DIAGNOSIS — F17.208 NICOTINE DEPENDENCE WITH OTHER NICOTINE-INDUCED DISORDER, UNSPECIFIED NICOTINE PRODUCT TYPE: Chronic | ICD-10-CM

## 2019-05-09 DIAGNOSIS — E11.59 CONTROLLED TYPE 2 DIABETES MELLITUS WITH OTHER CIRCULATORY COMPLICATION, WITHOUT LONG-TERM CURRENT USE OF INSULIN (HCC): Chronic | ICD-10-CM

## 2019-05-09 DIAGNOSIS — I50.22 CHRONIC SYSTOLIC CONGESTIVE HEART FAILURE (HCC): ICD-10-CM

## 2019-05-09 DIAGNOSIS — F10.10 ETOH ABUSE: Chronic | ICD-10-CM

## 2019-05-09 DIAGNOSIS — I25.10 CORONARY ARTERY DISEASE INVOLVING NATIVE CORONARY ARTERY OF NATIVE HEART WITHOUT ANGINA PECTORIS: ICD-10-CM

## 2019-05-09 RX ORDER — EZETIMIBE 10 MG/1
10 TABLET ORAL DAILY
Qty: 30 TAB | Refills: 5 | Status: SHIPPED | OUTPATIENT
Start: 2019-05-09 | End: 2019-05-17 | Stop reason: SDUPTHER

## 2019-05-09 RX ORDER — SIMVASTATIN 40 MG/1
40 TABLET, FILM COATED ORAL
Qty: 30 TAB | Refills: 3 | Status: SHIPPED | OUTPATIENT
Start: 2019-05-09 | End: 2019-05-17 | Stop reason: SDUPTHER

## 2019-05-09 RX ORDER — METOPROLOL SUCCINATE 25 MG/1
12.5 TABLET, EXTENDED RELEASE ORAL 2 TIMES DAILY
Qty: 60 TAB | Refills: 3 | Status: SHIPPED | OUTPATIENT
Start: 2019-05-09 | End: 2019-07-11 | Stop reason: SDUPTHER

## 2019-05-09 NOTE — PATIENT INSTRUCTIONS
DECREASE  Metoprolol to 12.5 mg twice daily, hold morning dose if BP is less than 100 Start taking low dose Entresto 24/26 twice daily CONTINUE CURRENT medications Take twice a day medications 12 hours apart with food Labs today Carotid ultrasound Follow up with [facility] in one month HF Education: Continue daily weights (in the morning, after voiding). Notify HF team of overnight weight gains > 2 lbs or weekly >5 lbs or if any of the following Sx. Continue to limit sodium intake & monitor your fluid intake .

## 2019-05-09 NOTE — PROGRESS NOTES
[address] [facility] [address] [facility] in [facility] Heart Failure Clinic Note Patient name: Maia Cedillo Patient : 1955 Patient MRN: [id number] Date of service: 19 Primary care physician: Joyce Hernandez MD 
Primary cardiologist: Dr. Carolina Guzmán CHIEF COMPLAINT: 
Chronic systolic heart failure PLAN: 
Continue current medical therapy for heart failure; limited by hypotension Continue current dose of beta-blocker, split toprol XL to 12.5mg twice daily (hold dose if SBP<100) Continue current dose of entresto 24/26mg twice daily Cannot tolerate spironolactone due to breast tenderness and poor sex drive Does not require diuretics Continue ASA and statin, check lipid profile, CPK and LFT 
ICD interrogation every 3 months per routine Continue CPAP therapy Schedule annual echocardiogram and EKG Labs with next visit: CBC, CMP, pro-NT-BNP, iron and thyroid profile, vit D level, lipid profile, CPK Reinforced low salt diet Reinforced fluid restriction to 6 x 8oz glasses per day Discussed tobacco cessation and materials dispensed Discussed abstinence from illicit drugs Provided educational materials \"SUSI with heart failure\" and ACP Reinforced importance of weight loss Follow-up with primary cardiologist 
Follow-up with EP cardiologist 
Follow-up with PCP; recommend flu vaccine annually and pneumonia vaccine every 5 years Follow-up with neurology on possible TIA; will order carotid ultrasound prior to visit Return to AHF Clinic in one month with NP/MD 
 
IMPRESSION: 
Chronic systolic heart failure Stage C, NYHA class IIIA symptoms CAD s/p RCA stent + 100% m-LAD Ischemic cardiomyopathy, LVEF 15-20% TIA, possible Morbid obesity HL 
HTN 
SHERI on CPAP EtOH use COPD CARDIAC IMAGING: 
Echo (18) LVEF 15-20%, no valvular pathology EKG (3/13/19) NSR, no ST-T changes LHC (17), 90% RCA and 100% mLAD, s/p [facility] to RCA 
 ICD interrogation (5/1/19) no events, normal device function, good battery HISTORY OF PRESENT ILLNESS: 
I had the pleasure of seeing Jose Juan Scott in [address] at [facility] in [place]. Briefly, Jose Juan Scott is a 70 y.o. male with h/o CAD (90% RCA and 100 m-LAD) s/p PCI RCA with [facility] 6/17, T2D, COPD, ETOH abuse (quit months ago), tobacco abuse, ICM who presents for further evaluation of his chronic systolic heart failure.  
  
INTERVAL HISTORY: 
Today, patient presents for routine clinic visit. Patient is doing very well. He is concerned he had an episode of R arm weakness which resolved spontaneous after few minutes and he called neurologist to see him next week. Patient walked to our clinic from parking garage without having to slow down or stop. Patient can walk more than one block without symptoms of fatigue or shortness of breath. Patient can walk one flight of stairs without symptoms of fatigue or shortness of breath. Patient can perform home activities without problem and routinely participates in daily walking for more than 15 minutes. Patient denies symptoms of volume overload or leg edema. Patient denies abdominal bloating or change of appetite. Patient's weight remained stable. BP runs at home 100-120s. Patient denies orthopnea, PND or nocturia. Patient denies irregular heart rate or palpitations. ICD has not fired. Patient denies other cardiac symptoms such as chest pain or leg pain with walking. Patient is compliant with fluid restriction and taking medications as prescribed. Patient manages his own medications. REVIEW OF SYSTEMS: 
General: Denies fever, night sweats. Ear, nose and throat: Denies difficulty hearing, sinus problems, runny nose, post-nasal drip, ringing in ears, mouth sores, loose teeth, ear pain, nosebleeds, sore throate, facial pain or numbess Cardiovascular: see above in the interval history Respiratory: Denies cough, wheezing, sputum production, hemoptysis. Gastrointestinal: Denies heartburn, constipation, intolerance to certain foods, diarrhea, abdominal pain, nausea, vomiting, difficulty swallowing, blood in stool Kidney and bladder: Denies painful urination, frequent urination, urgency, prostate problems and impotence Musculoskeletal: Denies joint pain, muscle weakness Skin and hair: Denies change in existing skin lesions, hair loss or increase, breast changes PHYSICAL EXAM: 
Visit Vitals /84 (BP 1 Location: Right arm, BP Patient Position: Sitting) Pulse 86 Temp 98.5 °F (36.9 °C) (Oral) Resp 20 Ht 5' 10\" (1.778 m) Wt 264 lb 9.6 oz (120 kg) SpO2 95% BMI 37.97 kg/m² General: Patient is well developed, well-nourished in no acute distress HEENT: Normocephalic and atraumatic. No scleral icterus. Pupils are equal, round and reactive to light and accomodation. No conjunctival injection. Oropharynx is clear. Neck: Supple. No evidence of thyroid enlargements or lymphadenopathy. JVD: Cannot be appreciated Lungs: Breath sounds are equal and clear bilaterally. No wheezes, rhonchi, or rales. Heart: Regular rate and rhythm with normal S1 and S2. No murmurs, gallops or rubs. Abdomen: Soft, no mass or tenderness. No organomegaly or hernia. Bowel sounds present. Genitourinary and rectal: deferred Extremities: No cyanosis, clubbing, or edema. Neurologic: No focal sensory or motor deficits are noted. Grossly intact. Psychiatric: Awake, alert an doriented x 3. Appropriate mood and affect. Skin: Warm, dry and well perfused. No lesions, nodules or rashes are noted. PAST MEDICAL HISTORY: 
Past Medical History:  
Diagnosis Date  Controlled type 2 diabetes mellitus with circulatory disorder ([facility] [address] 6/1/2017  COPD (chronic obstructive pulmonary disease) (HCC)  Coronary artery disease involving native coronary artery without angina pectoris 06/01/2017 OOH MI, 2v disease s/p PCI RCA with [facility] June 2017  
 ETOH abuse 6/1/2017  ICD (implantable cardioverter-defibrillator) in place  Ischemic cardiomyopathy Extensive LAD territory infarction  Nicotine dependence 6/1/2017  Pacemaker [address]  SOB (shortness of breath) 10/4/2018  Systolic congestive heart failure with reduced left ventricular function, NYHA class 3 ([address] 10/25/2018 PAST SURGICAL HISTORY: 
Past Surgical History:  
Procedure Laterality Date  HX CORONARY STENT PLACEMENT    
 HX HEART CATHETERIZATION    
 HX PACEMAKER [address]  HX TONSILLECTOMY  HX WISDOM TEETH EXTRACTION    
 
 
FAMILY HISTORY: 
Family History Problem Relation Age of Onset  Stroke Father  Heart Disease Father  Hypertension Father SOCIAL HISTORY: 
Social History Socioeconomic History  Marital status: SINGLE Spouse name: Not on file  Number of children: Not on file  Years of education: Not on file  Highest education level: Not on file Tobacco Use  Smoking status: Current Every Day Smoker Packs/day: 1.00 Years: 30.00 Pack years: 30.00 Types: Cigarettes  Smokeless tobacco: Never Used Substance and Sexual Activity  Alcohol use: No  
  Comment: former drinker-quit 3 months ago-drank a 6 pack per day  Drug use: No  
 Sexual activity: Yes  
  Partners: Female LABORATORY RESULTS: 
  
Labs Latest Ref Rng & Units 4/10/2019 3/13/2019 WBC 3.4 - 10.8 x10E3/uL 10.4 - RBC 4.14 - 5.80 x10E6/uL 4.90 - Hemoglobin 13.0 - 17.7 g/dL 15.7 - Hematocrit 37.5 - 51.0 % 44.9 - MCV 79 - 97 fL 92 - Platelets 823 - 704 F94S6/ - Albumin 3.6 - 4.8 g/dL 4.5 - Calcium 8.6 - 10.2 mg/dL 9.4 9.3 SGOT 0 - 40 IU/L 21 - Glucose 65 - 99 mg/dL 104(H) 93 BUN 8 - 27 mg/dL 11 10 Creatinine 0.76 - 1.27 mg/dL 0.92 0.93 Sodium 134 - 144 mmol/L 138 139 Potassium 3.5 - 5.2 mmol/L 3.9 4.2 Some recent data might be hidden Lab Results Component Value Date/Time TSH 1.90 11/15/2018 11:30 AM  
 TSH 1.72 04/05/2018 09:10 AM  
 TSH 1.46 02/01/2018 09:45 AM  
 TSH 1.68 07/27/2017 09:50 AM  
 
 
CURRENT MEDICATIONS: 
 
Current Outpatient Medications:  
  levocetirizine (XYZAL) 5 mg tablet, Take 1 Tab by mouth daily as needed for Allergies or Rhinitis., Disp: 90 Tab, Rfl: 1 
  metoprolol succinate (TOPROL XL) 25 mg XL tablet, Take 25 mg by mouth daily. , Disp: , Rfl:  
  clopidogrel (PLAVIX) 75 mg tab, Take 1 Tab by mouth daily. , Disp: 90 Tab, Rfl: 3 
  ezetimibe (ZETIA) 10 mg tablet, Take 1 Tab by mouth daily. , Disp: 30 Tab, Rfl: 5 
  ACETAMINOPHEN/DIPHENHYDRAMINE (TYLENOL PM PO), Take  by mouth nightly., Disp: , Rfl:  
  aspirin 81 mg chewable tablet, Take 1 Tab by mouth daily. , Disp: , Rfl:  
  empagliflozin (JARDIANCE) 25 mg tablet, Take 1 Tab by mouth daily. , Disp: 30 Tab, Rfl: 11 
  sacubitril-valsartan (ENTRESTO) 97 mg/103 mg tablet, Take 1 Tab by mouth two (2) times a day., Disp: 180 Tab, Rfl: 3 
  multivit,iron,min/green tea xt (DAILY-TAO WEIGHT CONTROL PO), Take 1 Tab by mouth daily. , Disp: , Rfl:  
  pyridoxine, vitamin B6, (VITAMIN B-6) 100 mg tablet, Take 100 mg by mouth daily. , Disp: , Rfl:  
  cholecalciferol (VITAMIN D3) 1,000 unit tablet, Take 1,000 Units by mouth daily. , Disp: , Rfl:  
  cyanocobalamin (VITAMIN B-12) 500 mcg tablet, Take 500 mcg by mouth daily. , Disp: , Rfl:  
  omeprazole (PRILOSEC) 20 mg capsule, Take 20 mg by mouth daily. , Disp: , Rfl:  
  simvastatin (ZOCOR) 40 mg tablet, Take  by mouth every morning., Disp: , Rfl:  
  ALBUTEROL SULFATE (VENTOLIN HFA IN), Take  by inhalation. , Disp: , Rfl:  
  acetaminophen (TYLENOL) 325 mg tablet, Take 2 Tabs by mouth every four (4) hours as needed. , Disp: , Rfl:  
 
 
Thank you for your referral and allowing me to participate in this patient's care. Ev Dupree MD PhD 
Regino Hernandez [address] [facility] 
[address], [address] Phone: [phone] Fax: [phone]

## 2019-05-10 LAB
CHOLEST SERPL-MCNC: 140 MG/DL (ref 100–199)
HDLC SERPL-MCNC: 37 MG/DL
LDLC SERPL CALC-MCNC: 73 MG/DL (ref 0–99)
TRIGL SERPL-MCNC: 151 MG/DL (ref 0–149)
TSH SERPL DL<=0.005 MIU/L-ACNC: 1.96 UIU/ML (ref 0.45–4.5)
VLDLC SERPL CALC-MCNC: 30 MG/DL (ref 5–40)

## 2019-05-17 DIAGNOSIS — F17.208 NICOTINE DEPENDENCE WITH OTHER NICOTINE-INDUCED DISORDER, UNSPECIFIED NICOTINE PRODUCT TYPE: Chronic | ICD-10-CM

## 2019-05-17 DIAGNOSIS — F10.10 ETOH ABUSE: Chronic | ICD-10-CM

## 2019-05-17 DIAGNOSIS — I50.22 CHRONIC SYSTOLIC CONGESTIVE HEART FAILURE (HCC): ICD-10-CM

## 2019-05-17 DIAGNOSIS — E11.59 CONTROLLED TYPE 2 DIABETES MELLITUS WITH OTHER CIRCULATORY COMPLICATION, WITHOUT LONG-TERM CURRENT USE OF INSULIN (HCC): Chronic | ICD-10-CM

## 2019-05-17 DIAGNOSIS — I25.10 CORONARY ARTERY DISEASE INVOLVING NATIVE CORONARY ARTERY OF NATIVE HEART WITHOUT ANGINA PECTORIS: ICD-10-CM

## 2019-05-17 RX ORDER — EZETIMIBE 10 MG/1
10 TABLET ORAL DAILY
Qty: 30 TAB | Refills: 5 | Status: SHIPPED | OUTPATIENT
Start: 2019-05-17 | End: 2019-05-20

## 2019-05-17 RX ORDER — SIMVASTATIN 40 MG/1
40 TABLET, FILM COATED ORAL
Qty: 30 TAB | Refills: 3 | Status: SHIPPED | OUTPATIENT
Start: 2019-05-17 | End: 2019-05-20

## 2019-05-20 ENCOUNTER — OFFICE VISIT (OUTPATIENT)
Dept: NEUROLOGY | Age: 64
End: 2019-05-20

## 2019-05-20 VITALS
DIASTOLIC BLOOD PRESSURE: 90 MMHG | OXYGEN SATURATION: 98 % | SYSTOLIC BLOOD PRESSURE: 142 MMHG | WEIGHT: 262 LBS | RESPIRATION RATE: 18 BRPM | HEART RATE: 84 BPM | BODY MASS INDEX: 37.59 KG/M2

## 2019-05-20 DIAGNOSIS — R51.9 CHRONIC DAILY HEADACHE: ICD-10-CM

## 2019-05-20 DIAGNOSIS — Z87.898 HISTORY OF SNORING: ICD-10-CM

## 2019-05-20 DIAGNOSIS — R20.2 ARM PARESTHESIA, RIGHT: ICD-10-CM

## 2019-05-20 DIAGNOSIS — R29.898 RIGHT ARM WEAKNESS: Primary | ICD-10-CM

## 2019-05-20 RX ORDER — NORTRIPTYLINE HYDROCHLORIDE 25 MG/1
25 CAPSULE ORAL
Qty: 30 CAP | Refills: 2 | Status: SHIPPED | OUTPATIENT
Start: 2019-05-20 | End: 2019-07-24 | Stop reason: ALTCHOICE

## 2019-05-20 NOTE — PROGRESS NOTES
[address] PATIENT EVALUATION/CONSULTATION 
  
 
PATIENT NAME: Yash Stephen MRN: [id number] REASON FOR CONSULTATION: Headaches 05/20/19 Previous records (physician notes, laboratory reports, and radiology reports) and imaging studies were reviewed and summarized. My recommendations will be communicated back to the patient's physician(s) via electronic medical record and/or by [address] mail. HISTORY OF PRESENT ILLNESS: 
Yash Stephen is a 70 y.o. right handed male presenting for evaluation of headaches. Headaches have been present over the past 9 months. He associates worsening of headaches with higher dosing of Entresto, better after weaning off of medication. Location: Occipital 
Character: Dull ache Intensity: On average 7/10 Frequency: Daily # HA free days per month: 0 Duration: All day Aura: None Associated Sx with HA: no nausea/vomiting, no phonophotophobia. Denies unilateral ptosis, conjunctival injection, lacrimation, sweating Neurological ROS: Denies focal weakness, vision loss associated with headaches. RUE numbness x 2 months with associated weakness RUE.  +Associated R shoulder pain/cervicalgia. Denies RLE or facial numbness, speech/vision deficits. Systemic ROS:  
H/O Head trauma: None +Snoring, no prior PSG Depressive or anxiety Sx: Denies Any change in pattern of HA? See above Triggers: Unknown. No worsening reported with cough/sneeze, bending over Alleviating factors: None Treatment so far: Tylenol every day Investigations so far: None PAST MEDICAL HISTORY: 
Past Medical History:  
Diagnosis Date  Controlled type 2 diabetes mellitus with circulatory disorder ([facility] [address] 6/1/2017  COPD (chronic obstructive pulmonary disease) (HCC)  Coronary artery disease involving native coronary artery without angina pectoris 06/01/2017 OOH MI, 2v disease s/p PCI RCA with [facility] June 2017  
 ETOH abuse 6/1/2017  ICD (implantable cardioverter-defibrillator) in place  Ischemic cardiomyopathy Extensive LAD territory infarction  Nicotine dependence 6/1/2017  Pacemaker [address]  SOB (shortness of breath) 10/4/2018  Systolic congestive heart failure with reduced left ventricular function, NYHA class 3 ([address] 10/25/2018 PAST SURGICAL HISTORY: 
Past Surgical History:  
Procedure Laterality Date  HX CORONARY STENT PLACEMENT    
 HX HEART CATHETERIZATION    
 HX PACEMAKER [address]  HX TONSILLECTOMY  HX WISDOM TEETH EXTRACTION    
 
 
FAMILY HISTORY:  
Family History Problem Relation Age of Onset  Stroke Father  Heart Disease Father  Hypertension Father SOCIAL HISTORY: 
Social History Socioeconomic History  Marital status: SINGLE Spouse name: Not on file  Number of children: Not on file  Years of education: Not on file  Highest education level: Not on file Tobacco Use  Smoking status: Current Every Day Smoker Packs/day: 1.00 Years: 30.00 Pack years: 30.00 Types: Cigarettes  Smokeless tobacco: Never Used Substance and Sexual Activity  Alcohol use: No  
  Comment: former drinker-quit 3 months ago-drank a 6 pack per day  Drug use: No  
 Sexual activity: Yes  
  Partners: Female MEDICATIONS:  
Current Outpatient Medications Medication Sig Dispense Refill  metoprolol succinate (TOPROL XL) 25 mg XL tablet Take 0.5 Tabs by mouth two (2) times a day. Hold morning dose if SBP less than 100 60 Tab 3  
 multivit,iron,min/green tea xt (DAILY-TAO WEIGHT CONTROL PO) Take 1 Tab by mouth daily.  clopidogrel (PLAVIX) 75 mg tab Take 1 Tab by mouth daily. 90 Tab 3  cholecalciferol (VITAMIN D3) 1,000 unit tablet Take 1,000 Units by mouth daily.  cyanocobalamin (VITAMIN B-12) 500 mcg tablet Take 500 mcg by mouth daily.  ACETAMINOPHEN/DIPHENHYDRAMINE (TYLENOL PM PO) Take  by mouth nightly.  aspirin 81 mg chewable tablet Take 1 Tab by mouth daily. ALLERGIES: 
Allergies Allergen Reactions  Atorvastatin Diarrhea  Codeine Itching  Metformin Other (comments) Abdominal pain  Wellbutrin [Bupropion] Other (comments) Night mare Aggression REVIEW OF SYSTEMS: 
10 point ROS reviewed with patient. Please see scanned document under media. PHYSICAL EXAM: 
Vital Signs:  
Visit Vitals /90 Pulse 84 Resp 18 Wt 118.8 kg (262 lb) SpO2 98% BMI 37.59 kg/m² General Medical Exam: 
General:  Well appearing, comfortable, in no apparent distress. Eyes/ENT: see cranial nerve examination. Neck: No masses appreciated. Full range of motion without tenderness. Respiratory:  Clear to auscultation, good air entry bilaterally. Cardiac:  Regular rate and rhythm, no murmur. GI:  Soft, non-tender, non-distended abdomen. Bowel sounds normal. No masses, organomegaly. Extremities:  No deformities, edema, or skin discoloration. Skin:  No rashes or lesions. Neurological: · Mental Status:  Alert and oriented to person, place, and time with fluent speech. · Cranial Nerves:  
CNII/III/IV/VI: Optic disc w/clear margins b/l, visual fields full to confrontation, EOMI, PERRL, no ptosis or nystagmus. CN V: Facial sensation intact bilaterally, masseter 5/5  
CN VII: Facial muscles symmetric and strong CN VIII: Hears finger rub well bilaterally, intact vestibular function CN IX/X: Normal palatal movement CN XI: Full strength shoulder shrug bilaterally CN XII: Tongue protrusion full and midline without fasciculation or atrophy · Motor: Normal tone and muscle bulk with no pronator drift. No atrophy or fasciculations present on examination. Individual muscle group testing: RUE 4+/5, otherwise 5/5 · MSRs: No crossed adductors or clonus. RIGHT  LEFT Brachioradialis 1+ 1+ Biceps 1+ 1+ Triceps 1+ 1+ Knee 1+ 1+ Achilles 1+ 1+ Plantar response Downward Downward · Sensation: Decreased LT RUE, otherwise intact. · Coordination: No dysmetria. Normal rapid alternating movements; finger-to-nose and heel-to- shin testing are within normal limits. · Gait: Normal native gait ASSESSMENT:   
  ICD-10-CM ICD-9-CM 1. Right arm weakness R29.898 729.89 MRI BRAIN WO CONT  
   CTA HEAD  
   CTA NECK 2. Arm paresthesia, right R20.2 782.0 MRI BRAIN WO CONT  
   CTA HEAD  
   CTA NECK 3. Chronic daily headache R51 784.0 MRI BRAIN WO CONT  
   CTA HEAD  
   CTA NECK SLEEP MEDICINE REFERRAL 4. History of snoring Z87.898 V15.89 SLEEP MEDICINE REFERRAL  
70year old male vasculopath with COPD, DM, prior EtOH abuse, AICD, cardiomyopathy, CHF presenting with chronic daily headaches x 9 months along with subacute onset RUE weakness/paresthesias x 2 months. Examination reveals mild to moderate weakness of the RUE with patchy sensory deficits on examination, otherwise non-focal.  He also reports some rather severe R shoulder pain/cervicalgia, worse with activity. Will obtain neuroimaging to assess for any evidence of acute/subacute ischemia given his multiple stroke risk factors. He is already maintained on dual antiplatelet and statin therapy for stroke prevention. If imaging is unrevealing, may consider electrodiagnostic testing of the RUE. Suspect chronic daily headaches are attributable to medication overuse from daily analgesic use as well as possible undiagnosed SHERI. Advised that he discontinue tylenol PM.  Will start TCA therapy to address his chronic headaches and insomnia. Sleep medicine referral was provided for SHERI evaluation, as this may be contributing to his headaches as well. Headache education Discussed pathophysiology of headache. Discussed treatment options, both abortive and preventive medications. Instructed patient about medications and potential side effects. Discussed medication overuse headache and to limit use of analgesics to less than 2 doses per week. PLAN: 
· MRI Brain WO if PPM is compatible · CTA H/N 
· Continue ASA/Plavix for stroke prevention. Smoking cessation. · Sleep medicine referral for SHERI evaluation · Limit Tylenol use to no more than twice weekly · Trial of Nortriptyline 25mg qhs for [facility] Follow-up and Dispositions · Return in about 1 month (around 6/17/2019). Hipolito Boothe DO Staff Neurologist 
Diplomate, [employer] Referring provider: 
Aisha Duncan MD

## 2019-06-18 ENCOUNTER — TELEPHONE (OUTPATIENT)
Dept: NEUROLOGY | Age: 64
End: 2019-06-18

## 2019-06-18 NOTE — TELEPHONE ENCOUNTER
Left message for sister that patient needs to have imaging done before follow up with , he will need to r/s his follow up. Provided contact information.

## 2019-06-25 ENCOUNTER — TELEPHONE (OUTPATIENT)
Dept: CARDIOLOGY CLINIC | Age: 64
End: 2019-06-25

## 2019-06-26 ENCOUNTER — OFFICE VISIT (OUTPATIENT)
Dept: CARDIOLOGY CLINIC | Age: 64
End: 2019-06-26

## 2019-06-26 VITALS
OXYGEN SATURATION: 96 % | HEIGHT: 70 IN | DIASTOLIC BLOOD PRESSURE: 90 MMHG | TEMPERATURE: 99.2 F | HEART RATE: 102 BPM | BODY MASS INDEX: 37.8 KG/M2 | RESPIRATION RATE: 20 BRPM | SYSTOLIC BLOOD PRESSURE: 122 MMHG | WEIGHT: 264 LBS

## 2019-06-26 DIAGNOSIS — I50.20 SYSTOLIC HEART FAILURE, ACC/AHA STAGE C (HCC): Primary | ICD-10-CM

## 2019-06-26 RX ORDER — SIMVASTATIN 40 MG/1
TABLET, FILM COATED ORAL
COMMUNITY
End: 2020-10-01

## 2019-06-26 RX ORDER — LEVOCETIRIZINE DIHYDROCHLORIDE 5 MG/1
TABLET, FILM COATED ORAL
COMMUNITY
End: 2019-11-14 | Stop reason: SDUPTHER

## 2019-06-26 RX ORDER — PYRIDOXINE HCL (VITAMIN B6) 100 MG
100 TABLET ORAL DAILY
COMMUNITY
End: 2020-10-01

## 2019-06-26 RX ORDER — CHOLECALCIFEROL (VITAMIN D3) 125 MCG
CAPSULE ORAL
COMMUNITY
End: 2020-02-12

## 2019-06-26 RX ORDER — BUMETANIDE 0.5 MG/1
TABLET ORAL DAILY
COMMUNITY
End: 2019-07-18 | Stop reason: SDUPTHER

## 2019-06-26 RX ORDER — OMEPRAZOLE 20 MG/1
20 CAPSULE, DELAYED RELEASE ORAL DAILY
COMMUNITY
End: 2019-07-24 | Stop reason: ALTCHOICE

## 2019-06-26 RX ORDER — EZETIMIBE 10 MG/1
TABLET ORAL
COMMUNITY
End: 2019-11-18 | Stop reason: SDUPTHER

## 2019-06-26 NOTE — PATIENT INSTRUCTIONS
Change Toprol to 12.5 mg twice per day Echocardiogram-we will call you to schedule CT Angiogram-we will call you to schedule Follow up in [facility] in 2 months with Nurse Practitioner

## 2019-06-26 NOTE — PROGRESS NOTES
[facility] in [address] Note    Patient name: Tori Smith  Patient : 1955  Patient MRN: [id number]  Date of service: 19    Primary care physician: Biju Vivas MD  Primary cardiologist: Dr. Liv Engel:  Chronic systolic heart failure     PLAN:  Continue current medical therapy for heart failure; limited by hypotension  Continue current dose of beta-blocker, split toprol XL to 12.5mg twice daily (hold dose if SBP<100)  Continue current dose of entresto 24/26mg twice daily  Cannot tolerate spironolactone due to breast tenderness and decrease libido  Does not require diuretics  Continue ASA and statin (cannot tolerate zetia), check lipid profile, CPK and LFT  ICD interrogation every 3 months per routine, per Dr. Kong Christopher CPAP therapy; cannot tolerate CPAP; headaches likely related (followed by neurology)  We will move up his annual echocardiogram and EKG from 2019 to this week due to recent TIA and patient not followed two previous scheduled imaging studies  Will facilitate ordering head CT angiogram recommended by his neurologist   Labs with next visit: CBC, CMP, pro-NT-BNP, iron and thyroid profile, vit D level, lipid profile, CPK  Reinforced low salt diet  Reinforced fluid restriction to 6 x 8oz glasses per day  Discussed tobacco cessation; continues to smoke, intoleratn of wellbutrin or chantix  Discussed abstinence from illicit drugs; continues to abstein from alcohol  Provided educational materials \"Living with heart failure\" and ACP  Reinforced importance of weight loss  Follow-up with primary cardiologist  Follow-up with EP cardiologist  Follow-up with PCP; recommend flu vaccine annually and pneumonia vaccine every 5 years  Follow-up with neurology on further workup on TIA   consult appreciated (assistance with jardiance and patient assistance application)  Return to [facility] in 2 months with NP/MD     IMPRESSION:  Chronic systolic heart failure  Stage C, NYHA class IIIA symptoms  CAD s/p RCA stent + 100% m-LAD  Ischemic cardiomyopathy, LVEF 15-20%  TIA, possible   Morbid obesity  HL  HTN  SHERI on CPAP  Previous EtOH use, abstinent  COPD  Active tobacco abuse     CARDIAC IMAGING:  Echo (8/21/18) LVEF 15-20%, no valvular pathology  EKG (3/13/19) NSR, no ST-T changes  LHC (6/2/17), 90% RCA and 100% mLAD, s/p [facility] to RCA  ICD interrogation (5/1/19) no events, normal device function, good battery  Cardiac MRI (6/1/17)  . Mildly dilated left ventricle by 3-D volumetric assessment index to body  surface area. Moderate to severe left ventricular systolic dysfunction. Severe  hypokinesis of the mid anterior wall. Dyskinesis and thinning of the distal  anterior wall, anteroapical wall, apex and apical septal wall. 3-D LVEF 32%. 2. Normal right ventricular size with low-normal right ventricular systolic  function. RVEF 55%. 3. Trace to mild mitral regurgitation. 4. On LGE imaging, there is a dense infarct involving greater than 75% thickness  of the thinned out mid to distal anterior wall, anteroapical wall, apex, apical  septal wall with with hardly any viable myocardium subtending this infarct. The  entire lateral wall, inferolateral wall, inferior wall, inferoseptal wall does  not demonstrate any infarct and are completely viable. Based on the viability  information, the LAD territory is not a suitable candidate for revascularization  and will not improve. The RCA and LCx territories are widely viable and should  benefit from revascularization if needed. There is no areas of infiltrative  sarcoidosis or amyloidosis. There is no areas of inflammatory myocarditis. 5. Normal pericardium without significant pericardial effusion. Small to  medium-sized bilateral pleural effusions. 6. Mildly dilated ascending aorta, aortic arch and descending thoracic aorta. No  significant atherosclerotic disease. No aortic dissection. 7. Dilated main pulmonary artery suggests mild pulmonary hypertension.     HISTORY OF PRESENT ILLNESS:  I had the pleasure of seeing Edu Gutiérrez in [facility] at [facility] in [address] [place] [address], Edu Gutiérrez is a 70 y.o. male with h/o CAD (90% RCA and 100 m-LAD) s/p PCI RCA with [facility] 6/17, T2D, COPD, ETOH abuse (quit months ago), tobacco abuse, Saratha Jewels presents for further evaluation of his chronic systolic heart failure.      INTERVAL HISTORY:  Today, patient presents for routine clinic visit.     Patient is doing very well. He is concerned he had an episode of R arm weakness which resolved spontaneous after few minutes and he called neurologist to see him next week.     Patient walked to our clinic from parking garage without having to slow down or stop. Patient can walk more than one block without symptoms of fatigue or shortness of breath. Patient can walk one flight of stairs without symptoms of fatigue or shortness of breath. Patient can perform home activities without problem and routinely participates in daily walking for more than 15 minutes.      Patient denies symptoms of volume overload or leg edema. Patient denies abdominal bloating or change of appetite. Patient's weight remained stable. BP runs at home 100-120s. Patient denies orthopnea, PND or nocturia. Patient denies irregular heart rate or palpitations. ICD has not fired. Patient denies other cardiac symptoms such as chest pain or leg pain with walking. Patient is compliant with fluid restriction and taking medications as prescribed. Patient manages his own medications. REVIEW OF SYSTEMS:  General: Denies fever, night sweats.   Ear, nose and throat: Denies difficulty hearing, sinus problems, runny nose, post-nasal drip, ringing in ears, mouth sores, loose teeth, ear pain, nosebleeds, sore throate, facial pain or numbess  Cardiovascular: see above in the interval history  Respiratory: Denies cough, wheezing, sputum production, hemoptysis. Gastrointestinal: Denies heartburn, constipation, intolerance to certain foods, diarrhea, abdominal pain, nausea, vomiting, difficulty swallowing, blood in stool  Kidney and bladder: Denies painful urination, frequent urination, urgency, prostate problems and impotence  Musculoskeletal: Denies joint pain, muscle weakness  Skin and hair: Denies change in existing skin lesions, hair loss or increase, breast changes    PHYSICAL EXAM:  Vital signs:   Visit Vitals  /90 (BP 1 Location: Right arm, BP Patient Position: Sitting)   Pulse (!) 102   Temp 99.2 °F (37.3 °C) (Oral)   Resp 20   Ht 5' 10\" (1.778 m)   Wt 264 lb (119.7 kg)   SpO2 96%   BMI 37.88 kg/m²     General: Patient is well developed, well-nourished in no acute distress  HEENT: Normocephalic and atraumatic. No scleral icterus. Pupils are equal, round and reactive to light and accomodation. No conjunctival injection. Oropharynx is clear. Neck: Supple. No evidence of thyroid enlargements or lymphadenopathy. JVD: Cannot be appreciated   Lungs: Breath sounds are equal and clear bilaterally. No wheezes, rhonchi, or rales. Heart: Regular rate and rhythm with normal S1 and S2. No murmurs, gallops or rubs. Abdomen: Soft, no mass or tenderness. No organomegaly or hernia. Bowel sounds present. Genitourinary and rectal: deferred  Extremities: No cyanosis, clubbing, or edema. Neurologic: No focal sensory or motor deficits are noted. Grossly intact. Psychiatric: Awake, alert an doriented x 3. Appropriate mood and affect. Skin: Warm, dry and well perfused. No lesions, nodules or rashes are noted.     PAST MEDICAL HISTORY:  Past Medical History:   Diagnosis Date    Controlled type 2 diabetes mellitus with circulatory disorder ([facility] [address] 6/1/2017    COPD (chronic obstructive pulmonary disease) (HCC)     Coronary artery disease involving native coronary artery without angina pectoris 06/01/2017    OOH MI, 2v disease s/p PCI RCA with [facility] June 2017    ETOH abuse 6/1/2017    ICD (implantable cardioverter-defibrillator) in place     Ischemic cardiomyopathy     Extensive LAD territory infarction    Nicotine dependence 6/1/2017    Pacemaker     [employer] ICD    SOB (shortness of breath) 49/6/2249    Systolic congestive heart failure with reduced left ventricular function, NYHA class 3 ([address] 10/25/2018       PAST SURGICAL HISTORY:  Past Surgical History:   Procedure Laterality Date    HX CORONARY STENT PLACEMENT      HX HEART CATHETERIZATION      HX PACEMAKER      [employer] ICD    HX TONSILLECTOMY      HX WISDOM TEETH EXTRACTION         FAMILY HISTORY:  Family History   Problem Relation Age of Onset    Stroke Father     Heart Disease Father     Hypertension Father        SOCIAL HISTORY:  Social History     Socioeconomic History    Marital status: SINGLE     Spouse name: Not on file    Number of children: Not on file    Years of education: Not on file    Highest education level: Not on file   Tobacco Use    Smoking status: Current Every Day Smoker     Packs/day: 1.00     Years: 30.00     Pack years: 30.00     Types: Cigarettes    Smokeless tobacco: Never Used   Substance and Sexual Activity    Alcohol use: No     Comment: former drinker-quit 3 months ago-drank a 6 pack per day    Drug use: No    Sexual activity: Yes     Partners: Female       LABORATORY RESULTS:     Labs Latest Ref Rng & Units 5/9/2019   TSH 0.450 - 4.500 uIU/mL 1.960   Some recent data might be hidden     Lab Results   Component Value Date/Time    TSH 1.960 05/09/2019 02:40 PM    TSH 1.90 11/15/2018 11:30 AM    TSH 1.72 04/05/2018 09:10 AM    TSH 1.46 02/01/2018 09:45 AM    TSH 1.68 07/27/2017 09:50 AM       CURRENT MEDICATIONS:    Current Outpatient Medications:     nortriptyline (PAMELOR) 25 mg capsule, Take 1 Cap by mouth nightly., Disp: 30 Cap, Rfl: 2    metoprolol succinate (TOPROL XL) 25 mg XL tablet, Take 0.5 Tabs by mouth two (2) times a day. Hold morning dose if SBP less than 100, Disp: 60 Tab, Rfl: 3    multivit,iron,min/green tea xt (DAILY-TAO WEIGHT CONTROL PO), Take 1 Tab by mouth daily. , Disp: , Rfl:     clopidogrel (PLAVIX) 75 mg tab, Take 1 Tab by mouth daily. , Disp: 90 Tab, Rfl: 3    cholecalciferol (VITAMIN D3) 1,000 unit tablet, Take 1,000 Units by mouth daily. , Disp: , Rfl:     cyanocobalamin (VITAMIN B-12) 500 mcg tablet, Take 500 mcg by mouth daily. , Disp: , Rfl:     ACETAMINOPHEN/DIPHENHYDRAMINE (TYLENOL PM PO), Take  by mouth nightly., Disp: , Rfl:     aspirin 81 mg chewable tablet, Take 1 Tab by mouth daily. , Disp: , Rfl:       Thank you for your referral and allowing me to participate in this patient's care.     Nafisa Walsh MD PhD  [address] [place] [address], [address]  Phone: [phone]  Fax: [phone]

## 2019-07-01 ENCOUNTER — TELEPHONE (OUTPATIENT)
Dept: CARDIOLOGY CLINIC | Age: 64
End: 2019-07-01

## 2019-07-01 NOTE — TELEPHONE ENCOUNTER
Received a voicemail from the patient on this date indicating he wants  to mail him another copy of the care card application. Called him back and left him a voicemail indicating  has mailed out a copy today.     Daniella Pete, MSW, LCSW    Clinical    [id number]

## 2019-07-11 RX ORDER — METOPROLOL SUCCINATE 25 MG/1
12.5 TABLET, EXTENDED RELEASE ORAL 2 TIMES DAILY
Qty: 60 TAB | Refills: 3 | Status: SHIPPED | OUTPATIENT
Start: 2019-07-11 | End: 2019-11-18 | Stop reason: SDUPTHER

## 2019-07-17 NOTE — PROGRESS NOTES
[address], [address] [facility]    Office [phone]  Fax [phone]    Michelle Strickland is a 70 y.o. male last seen by Dr. Edith Oliva 3 months ago. Seen 3 weeks ago by the Advanced heart failure clinic. Assessment  Encounter Diagnoses   Name Primary?  Controlled type 2 diabetes mellitus with other circulatory complication, without long-term current use of insulin ([facility] [address] Yes    Coronary artery disease involving native coronary artery of native heart without angina pectoris     Systolic heart failure, ACC/AHA stage C (HCC)     Ischemic cardiomyopathy     Systolic congestive heart failure with reduced left ventricular function, NYHA class 3 ([address]     ICD (implantable cardioverter-defibrillator) in place     Severe obesity with body mass index (BMI) of 35.0 to 39.9 with serious comorbidity (HCC)     Nicotine dependence with other nicotine-induced disorder, unspecified nicotine product type     ETOH abuse       Recommendations:  Michelle Strickland has ischemic cardiomyopathy, severe LV dysfunction, HFrEF s/p ICD    1. CAD - He suffered an out-of-hospital MI in May 2017 and presented with subacute HF in June. Cadiac MRI demonstrated viability only in the RCA territory. Cardiac cath demonstrated mid %, RCA mid 85%. He underwent PCI RCA with [facility]. Drivers of CAD include smoking and T2DM. He has no exertional angina at a moderate functional capacity. - Continue DAPT indefinitely  - Continue statin and zetia    2. HFrEF, EF 15-20% with apical akinesis by Echo 8/2018. He has stable class 2-3 HF sxs. Euvolemic on exam today. He has been followed closely by the advanced HF clinic, most recently 3 weeks ago. - Continue Entresto 24/26 (Intolerant of higher doses), Toprol 12.5 mg BID. He is intolerant of Spironolactone. - Encouraged sodium restriction, weight monitoring and s/sxs of HF that would warrant concern. Provided refill for PRN Bumex.    - Annual repeat Echo pending completion in chart per Advanced HF.   - Continue routine ICD checks    Subjective:  Mr. Chang Knows states he is doing well overall. He remains very active with \"odd jobs\". Works primarily as a . Also works for his families business. Reports lifting several A/C units up stairs recently. Reports that he was able to do this, but was very fatigued afterwards. Denies any exertional angina. Reports that he has tried a lot of different medications, but \"is very concerned about doctors trying to overmedicate him. These medications make me have breasts and affect my sex drive\". He reports this has affected his compliance with multiple medications in the past.      States that he is compliant with Entresto. No longer using scheduled Bumex, but is interested in having some at home to use PRN. Monitors weight, BP/HR and BS daily. Reports stable weight. He denies any palpitations, tachycardia, syncope, orthopnea, edema or paroxysmal nocturnal dyspnea. No ICD therapies. Cardiac risk factors   HTN no  DM  yes  Smoking yes    Cardiac testing  ECHO: 5/31//2017: EF 15-20%, apical AK  ECHO 9/18/17- Dilated LV, EF 20%, apical DK   ECHO 8/21/18:LVD, EF 15-20%, severe DHK,  AK akinesis of the apical anterior, apical inferior, apical septal, and apical lateral wall. LAE, mild MR      MRI: 6/1/2017: LV dilated LVEF 32%, severe HK ant wall, RVEF 55%, mild MR; anterior infarct- no viability RCA/LCA: viable     CATH 6/2/2017: LM: nl, LAD: m100%, LCX codom normal, RCA:m 85%, LVEDP 25, no AV gradient, dilated LV, apical AK, EF 20%  -- s/p PCI pRCA: 2.5x18 Xience ([facility]).       AICD 12/5/2017; [place] [employer]    Past Medical History:   Diagnosis Date    Controlled type 2 diabetes mellitus with circulatory disorder ([address] 6/1/2017    COPD (chronic obstructive pulmonary disease) (HCC)     Coronary artery disease involving native coronary artery without angina pectoris 06/01/2017    OOH MI, 2v disease s/p PCI RCA with [facility] June 2017    ETOH abuse 6/1/2017    ICD (implantable cardioverter-defibrillator) in place     Ischemic cardiomyopathy     Extensive LAD territory infarction    Nicotine dependence 6/1/2017    Pacemaker     [employer] ICD    SOB (shortness of breath) 57/8/7557    Systolic congestive heart failure with reduced left ventricular function, NYHA class 3 ([facility] 10/25/2018        Current Outpatient Medications   Medication Sig Dispense Refill    metoprolol succinate (TOPROL XL) 25 mg XL tablet Take 0.5 Tabs by mouth two (2) times a day. Hold morning dose if SBP less than 100 60 Tab 3    melatonin tab tablet Take  by mouth nightly.  omeprazole (PRILOSEC) 20 mg capsule Take 20 mg by mouth daily.  levocetirizine (XYZAL) 5 mg tablet Take  by mouth.  ezetimibe (ZETIA) 10 mg tablet Take  by mouth.  simvastatin (ZOCOR) 40 mg tablet Take  by mouth nightly.  sacubitril-valsartan (ENTRESTO) 24 mg/26 mg tablet Take 1 Tab by mouth two (2) times a day.  pyridoxine, vitamin B6, (VITAMIN B-6) 100 mg tablet Take 100 mg by mouth daily.  bumetanide (BUMEX) 0.5 mg tablet Take  by mouth daily.  nortriptyline (PAMELOR) 25 mg capsule Take 1 Cap by mouth nightly. 30 Cap 2    multivit,iron,min/green tea xt (DAILY-TAO WEIGHT CONTROL PO) Take 1 Tab by mouth daily.  clopidogrel (PLAVIX) 75 mg tab Take 1 Tab by mouth daily. 90 Tab 3    cholecalciferol (VITAMIN D3) 1,000 unit tablet Take 1,000 Units by mouth daily.  cyanocobalamin (VITAMIN B-12) 500 mcg tablet Take 500 mcg by mouth daily.  ACETAMINOPHEN/DIPHENHYDRAMINE (TYLENOL PM PO) Take  by mouth nightly.  aspirin 81 mg chewable tablet Take 1 Tab by mouth daily. Allergies   Allergen Reactions    Atorvastatin Diarrhea    Codeine Itching    Metformin Other (comments)     Abdominal pain     Wellbutrin [Bupropion] Other (comments)     Night mare  Aggression       Retired.     Review of Systems  Constitutional: Negative for fever, chills, malaise/fatigue and diaphoresis. Respiratory: Negative for cough, hemoptysis, sputum production, and wheezing. +BERNABE  Cardiovascular: Negative for chest pain, palpitations, orthopnea, claudication, leg swelling and PND. Gastrointestinal: Negative for heartburn, nausea, vomiting, blood in stool and melena. Genitourinary: Negative for dysuria and flank pain. Musculoskeletal: Negative for joint pain and back pain. Skin: Negative for rash. Neurological: Negative for focal weakness, seizures, loss of consciousness, weakness and headaches. Endo/Heme/Allergies: Does not bruise/bleed easily. Psychiatric/Behavioral: Negative for memory loss. The patient does not have insomnia. Physical Exam  Visit Vitals  /80   Pulse 74   Resp 20   Ht 5' 10\" (1.778 m)   Wt 257 lb (116.6 kg)   SpO2 97%   BMI 36.88 kg/m²     Wt Readings from Last 3 Encounters:   07/18/19 257 lb (116.6 kg)   06/26/19 264 lb (119.7 kg)   05/20/19 262 lb (118.8 kg)      General - well developed well nourished, obese WM  Neck - JVP normal, thyroid nl  Cardiac - normal S1, S2, no murmurs, rubs or gallops. No clicks  Vascular - carotids without bruits, radials, femorals and pedal pulses equal bilateral  Lungs - clear to auscultation bilaterals, no rales, wheezing.    Abd - soft nontender, no HSM, no abd bruits  Extremities - no edema  Skin - no rash  Neuro - nonfocal  Psych - normal mood and affect    Cardiographics  ECG 6/14/17 - SR 75, TWI; unchanged    Mansi Mack, ALEX

## 2019-07-18 ENCOUNTER — OFFICE VISIT (OUTPATIENT)
Dept: CARDIOLOGY CLINIC | Age: 64
End: 2019-07-18

## 2019-07-18 VITALS
WEIGHT: 257 LBS | BODY MASS INDEX: 36.79 KG/M2 | RESPIRATION RATE: 20 BRPM | OXYGEN SATURATION: 97 % | HEART RATE: 74 BPM | HEIGHT: 70 IN | SYSTOLIC BLOOD PRESSURE: 138 MMHG | DIASTOLIC BLOOD PRESSURE: 80 MMHG

## 2019-07-18 DIAGNOSIS — E11.59 CONTROLLED TYPE 2 DIABETES MELLITUS WITH OTHER CIRCULATORY COMPLICATION, WITHOUT LONG-TERM CURRENT USE OF INSULIN (HCC): Primary | Chronic | ICD-10-CM

## 2019-07-18 DIAGNOSIS — I25.10 CORONARY ARTERY DISEASE INVOLVING NATIVE CORONARY ARTERY OF NATIVE HEART WITHOUT ANGINA PECTORIS: ICD-10-CM

## 2019-07-18 DIAGNOSIS — I50.20 SYSTOLIC CONGESTIVE HEART FAILURE WITH REDUCED LEFT VENTRICULAR FUNCTION, NYHA CLASS 3 (HCC): ICD-10-CM

## 2019-07-18 DIAGNOSIS — I50.20 SYSTOLIC HEART FAILURE, ACC/AHA STAGE C (HCC): ICD-10-CM

## 2019-07-18 DIAGNOSIS — E66.01 SEVERE OBESITY WITH BODY MASS INDEX (BMI) OF 35.0 TO 39.9 WITH SERIOUS COMORBIDITY (HCC): ICD-10-CM

## 2019-07-18 DIAGNOSIS — I25.5 ISCHEMIC CARDIOMYOPATHY: ICD-10-CM

## 2019-07-18 DIAGNOSIS — F17.208 NICOTINE DEPENDENCE WITH OTHER NICOTINE-INDUCED DISORDER, UNSPECIFIED NICOTINE PRODUCT TYPE: Chronic | ICD-10-CM

## 2019-07-18 DIAGNOSIS — Z95.810 ICD (IMPLANTABLE CARDIOVERTER-DEFIBRILLATOR) IN PLACE: ICD-10-CM

## 2019-07-18 DIAGNOSIS — F10.10 ETOH ABUSE: Chronic | ICD-10-CM

## 2019-07-18 LAB — HBA1C MFR BLD HPLC: 7.3 %

## 2019-07-18 RX ORDER — BUMETANIDE 0.5 MG/1
0.5 TABLET ORAL AS NEEDED
Qty: 30 TAB | Refills: 3 | Status: SHIPPED | OUTPATIENT
Start: 2019-07-18 | End: 2021-08-02

## 2019-07-18 NOTE — PATIENT INSTRUCTIONS
Continue your same medication regimen. Keep working on healthy eating and keep weighing yourself each day. If you find that you gain 3-5 pounds over a 2-3 day period, please call and we will possibly utilize an as needed Bumex. Continue Aspirin and Plavix. Keep active. Work on quitting smoking . Let me know if you have any change in symptoms prior to your next follow up visit.

## 2019-07-18 NOTE — PROGRESS NOTES
Visit Vitals  /80   Pulse 74   Resp 20   Ht 5' 10\" (1.778 m)   Wt 257 lb (116.6 kg)   SpO2 97%   BMI 36.88 kg/m²

## 2019-08-06 ENCOUNTER — OFFICE VISIT (OUTPATIENT)
Dept: CARDIOLOGY CLINIC | Age: 64
End: 2019-08-06

## 2019-08-06 DIAGNOSIS — Z95.810 ICD (IMPLANTABLE CARDIOVERTER-DEFIBRILLATOR) IN PLACE: Primary | ICD-10-CM

## 2019-10-15 ENCOUNTER — TELEPHONE (OUTPATIENT)
Dept: CARDIOLOGY CLINIC | Age: 64
End: 2019-10-15

## 2019-10-15 NOTE — TELEPHONE ENCOUNTER
Telephone Call RE:  Appointment reminder     Outcome:     [] Patient confirmed appointment   [] Patient rescheduled appointment for    [] Unable to reach   [x] Left message              [] Other:       Abigail Schroeder

## 2019-10-16 ENCOUNTER — OFFICE VISIT (OUTPATIENT)
Dept: CARDIOLOGY CLINIC | Age: 64
End: 2019-10-16

## 2019-10-16 ENCOUNTER — DOCUMENTATION ONLY (OUTPATIENT)
Dept: CARDIOLOGY CLINIC | Age: 64
End: 2019-10-16

## 2019-10-16 VITALS
HEART RATE: 108 BPM | DIASTOLIC BLOOD PRESSURE: 68 MMHG | WEIGHT: 255.2 LBS | HEIGHT: 70 IN | OXYGEN SATURATION: 97 % | TEMPERATURE: 98.3 F | RESPIRATION RATE: 20 BRPM | SYSTOLIC BLOOD PRESSURE: 112 MMHG | BODY MASS INDEX: 36.54 KG/M2

## 2019-10-16 DIAGNOSIS — E78.00 PURE HYPERCHOLESTEROLEMIA: ICD-10-CM

## 2019-10-16 DIAGNOSIS — R06.02 SOB (SHORTNESS OF BREATH): ICD-10-CM

## 2019-10-16 DIAGNOSIS — D50.8 OTHER IRON DEFICIENCY ANEMIA: ICD-10-CM

## 2019-10-16 DIAGNOSIS — Z23 ENCOUNTER FOR IMMUNIZATION: ICD-10-CM

## 2019-10-16 DIAGNOSIS — E66.01 SEVERE OBESITY WITH BODY MASS INDEX (BMI) OF 35.0 TO 39.9 WITH SERIOUS COMORBIDITY (HCC): ICD-10-CM

## 2019-10-16 DIAGNOSIS — F10.10 ETOH ABUSE: ICD-10-CM

## 2019-10-16 DIAGNOSIS — I50.20 SYSTOLIC HEART FAILURE, ACC/AHA STAGE C (HCC): Primary | ICD-10-CM

## 2019-10-16 DIAGNOSIS — R06.09 DOE (DYSPNEA ON EXERTION): ICD-10-CM

## 2019-10-16 RX ORDER — OMEPRAZOLE 20 MG/1
20 CAPSULE, DELAYED RELEASE ORAL DAILY
COMMUNITY
End: 2020-10-01

## 2019-10-16 NOTE — PROGRESS NOTES
Met with  Jibmo Rodriguez during his clinic appointment today. Provided him with a new care card application to complete and return along with his bank statement. Also provided him with the contact number/website to apply for [facility]. He reports he will do so.     Stephani Kumar, MSW, LCSW    Clinical    [facility] [address]

## 2019-10-16 NOTE — PATIENT INSTRUCTIONS
No changes to your medications today    Flu shot today    Echocardiogram to be rescheduled-you will be called for this    Labs-please go to [facility] in next 2 weeks to have labs drawn    Follow up in 3 months with MD/NP

## 2019-10-16 NOTE — PROGRESS NOTES
Luis Rhodes is a 70 y.o. male who presents for routine immunizations. He denies any symptoms , reactions or allergies that would exclude them from being immunized today. Risks and adverse reactions were discussed and the VIS was given to them. All questions were addressed. He was observed for 30 min post injection. There were no reactions observed.     Margarita Day RN

## 2019-10-16 NOTE — PROGRESS NOTES
[facility] in [facility], [address] [place] [address] Note    Patient name: Savi Cisneros  Patient : 1955  Patient MRN: [id number]  Date of service: 10/16/19    Primary care Jhonathan Castellanos MD  Primary cardiologist: Dr. Georgette Lee     CHIEF COMPLAINT:  Chronic systolic heart failure     PLAN:  Continue current medical therapy for heart failure; limited by hypotension  Continue current dose of beta-blocker, split toprol XL to 12.5mg twice daily (hold dose if SBP<100)  Continue current dose of entresto 24/26mg twice daily  Cannot tolerate spironolactone due to breast tenderness and decrease libido  Does not require diuretics; weight today 255lbs (BMI 36.6)  Continue ASA and statin (cannot tolerate zetia), check lipid profile, CPK and LFT  ICD interrogation every 3 months per routine, per Dr. Vero Chapman  Continue CPAP therapy; cannot tolerate CPAP; headaches likely related (followed by neurology)  Schedule annual echocardiogram and EKG (patient not followed 3 previous scheduled imaging studies)  Moriah Sherwood next visit: CBC, CMP, pro-NT-BNP, iron and thyroid profile, vit D level, lipid profile, CPK, hepatitis  Reinforced low salt diet  Reinforced fluid restriction to 6 x 8oz glasses per day  Discussed tobacco cessation; continues to smoke, intoleratn of wellbutrin or chantix  Discussed abstinence from illicit drugs; continues to abstein from alcohol  Reinforced importance of weight loss  Follow-up with primary cardiologist  Follow-up with EP cardiologist  Follow-up with PCP; recommend flu vaccine annually and pneumonia vaccine every 5 years; flu shot today  Follow-up with neurology on further workup on TIA   consult appreciated (assistance with jardiance and patient assistance application)  Return to [facility] in 2 -3 months with NP/MD     IMPRESSION:  Chronic systolic heart failure  Stage C, NYHA class IIIA symptoms  CAD s/p RCA stent + 100% m-LAD  Ischemic cardiomyopathy, LVEF 15-20%  TIA, possible   Morbid obesity  HL  HTN  SHERI on CPAP  Previous EtOH use, abstinent  COPD  Active tobacco abuse     CARDIAC IMAGING:  Echo (8/21/18) LVEF 15-20%, no valvular pathology  EKG (3/13/19) NSR, no ST-T changes  LHC (6/2/17), 90% RCA and 100% mLAD, s/p [facility] to RCA  ICD interrogation (5/1/19) no events, normal device function, good battery  Cardiac MRI (6/1/17)  . Mildly dilated left ventricle by 3-D volumetric assessment index to body  surface area. Moderate to severe left ventricular systolic dysfunction. Severe  hypokinesis of the mid anterior wall. Dyskinesis and thinning of the distal  anterior wall, anteroapical wall, apex and apical septal wall. 3-D LVEF 32%. 2. Normal right ventricular size with low-normal right ventricular systolic  function. RVEF 55%. 3. Trace to mild mitral regurgitation. 4. On LGE imaging, there is a dense infarct involving greater than 75% thickness  of the thinned out mid to distal anterior wall, anteroapical wall, apex, apical  septal wall with with hardly any viable myocardium subtending this infarct. The  entire lateral wall, inferolateral wall, inferior wall, inferoseptal wall does  not demonstrate any infarct and are completely viable. Based on the viability  information, the LAD territory is not a suitable candidate for revascularization  and will not improve. The RCA and LCx territories are widely viable and should  benefit from revascularization if needed. There is no areas of infiltrative  sarcoidosis or amyloidosis. There is no areas of inflammatory myocarditis. 5. Normal pericardium without significant pericardial effusion. Small to  medium-sized bilateral pleural effusions. 6. Mildly dilated ascending aorta, aortic arch and descending thoracic aorta. No  significant atherosclerotic disease. No aortic dissection.   7. Dilated main pulmonary artery suggests mild pulmonary hypertension.     HISTORY OF PRESENT ILLNESS:  I had the pleasure of [address] at [facility] in [facility] 2 a 70 y. o. male with h/o CAD (90% RCA and 100 m-LAD) s/p PCI RCA with [facility] 6/17, T2D, COPD, ETOH abuse (quit months ago), tobacco abuse, Karson Shaw presents for further evaluation of his chronic systolic heart failure.      INTERVAL HISTORY:  Today, patient presents for routine clinic visit.     Patient is doing very well. He is concerned he had an episode of R arm weakness which resolved spontaneous after few minutes and he called neurologist to see him next week.     Patient walked to our clinic from parking garage without having to slow down or stop. Patient can walk more than one block without symptoms of fatigue or shortness of breath. Patient can walk one flight of stairs without symptoms of fatigue or shortness of breath. Patient can perform home activities without problem and routinely participates in daily walking for more than 15 minutes. Patient denies symptoms of volume overload or leg edema. Patient denies abdominal bloating or change of appetite. Patient's weight remained stable. Patient denies orthopnea, PND or nocturia. Patient denies irregular heart rate or palpitations. ICD has not fired. Patient denies other cardiac symptoms such as chest pain or leg pain with walking. Patient is compliant with fluid restriction and taking medications as prescribed. Patient manages his own medications. REVIEW OF SYSTEMS:  General: Denies fever, night sweats. Ear, nose and throat: Denies difficulty hearing, sinus problems, runny nose, post-nasal drip, ringing in ears, mouth sores, loose teeth, ear pain, nosebleeds, sore throate, facial pain or numbess  Cardiovascular: see above in the interval history  Respiratory: Denies cough, wheezing, sputum production, hemoptysis.   Gastrointestinal: Denies heartburn, constipation, intolerance to certain foods, diarrhea, abdominal pain, nausea, vomiting, difficulty swallowing, blood in stool  Kidney and bladder: Denies painful urination, frequent urination, urgency, prostate problems and impotence  Musculoskeletal: Denies joint pain, muscle weakness  Skin and hair: Denies change in existing skin lesions, hair loss or increase, breast changes    PHYSICAL EXAM:  Visit Vitals  /68 (BP 1 Location: Left arm, BP Patient Position: Sitting)   Pulse (!) 108   Temp 98.3 °F (36.8 °C) (Oral)   Resp 20   Ht 5' 10\" (1.778 m)   Wt 255 lb 3.2 oz (115.8 kg)   SpO2 97%   BMI 36.62 kg/m²     General: Patient is well developed, well-nourished in no acute distress  HEENT: Normocephalic and atraumatic. No scleral icterus. Pupils are equal, round and reactive to light and accomodation. No conjunctival injection. Oropharynx is clear. Neck: Supple. No evidence of thyroid enlargements or lymphadenopathy. JVD: Cannot be appreciated   Lungs: Breath sounds are equal and clear bilaterally. No wheezes, rhonchi, or rales. Heart: Regular rate and rhythm with normal S1 and S2. No murmurs, gallops or rubs. Abdomen: Soft, no mass or tenderness. No organomegaly or hernia. Bowel sounds present. Genitourinary and rectal: deferred  Extremities: No cyanosis, clubbing, or edema. Neurologic: No focal sensory or motor deficits are noted. Grossly intact. Psychiatric: Awake, alert an doriented x 3. Appropriate mood and affect. Skin: Warm, dry and well perfused. No lesions, nodules or rashes are noted.     PAST MEDICAL HISTORY:  Past Medical History:   Diagnosis Date    Controlled type 2 diabetes mellitus with circulatory disorder ([facility] [address] 6/1/2017    COPD (chronic obstructive pulmonary disease) (HCC)     Coronary artery disease involving native coronary artery without angina pectoris 06/01/2017    OOH MI, 2v disease s/p PCI RCA with [facility] June 2017    ETOH abuse 6/1/2017    ICD (implantable cardioverter-defibrillator) in place     Ischemic cardiomyopathy     Extensive LAD territory infarction    Nicotine dependence 6/1/2017    Pacemaker     [employer] ICD    SOB (shortness of breath) 63/1/7398    Systolic congestive heart failure with reduced left ventricular function, NYHA class 3 ([address] 10/25/2018       PAST SURGICAL HISTORY:  Past Surgical History:   Procedure Laterality Date    HX CORONARY STENT PLACEMENT      HX HEART CATHETERIZATION      HX PACEMAKER      [employer] ICD    HX TONSILLECTOMY      HX WISDOM TEETH EXTRACTION         FAMILY HISTORY:  Family History   Problem Relation Age of Onset    Stroke Father     Heart Disease Father     Hypertension Father        SOCIAL HISTORY:  Social History     Socioeconomic History    Marital status: SINGLE     Spouse name: Not on file    Number of children: Not on file    Years of education: Not on file    Highest education level: Not on file   Tobacco Use    Smoking status: Current Every Day Smoker     Packs/day: 1.00     Years: 30.00     Pack years: 30.00     Types: Cigarettes    Smokeless tobacco: Never Used   Substance and Sexual Activity    Alcohol use: No     Comment: former drinker-quit 3 months ago-drank a 6 pack per day    Drug use: No    Sexual activity: Yes     Partners: Female       LABORATORY RESULTS:   No flowsheet data found. Lab Results   Component Value Date/Time    TSH 1.960 05/09/2019 02:40 PM    TSH 1.90 11/15/2018 11:30 AM    TSH 1.72 04/05/2018 09:10 AM    TSH 1.46 02/01/2018 09:45 AM    TSH 1.68 07/27/2017 09:50 AM       ALLERGY:  Allergies   Allergen Reactions    Atorvastatin Diarrhea    Codeine Itching    Metformin Other (comments)     Abdominal pain     Wellbutrin [Bupropion] Other (comments)     Night mare  Aggression         CURRENT MEDICATIONS:    Current Outpatient Medications:     bumetanide (BUMEX) 0.5 mg tablet, Take 1 Tab by mouth as needed (weight gain or worsening HF sxs). , Disp: 30 Tab, Rfl: 3    metoprolol succinate (TOPROL XL) 25 mg XL tablet, Take 0.5 Tabs by mouth two (2) times a day. Hold morning dose if SBP less than 100, Disp: 60 Tab, Rfl: 3    melatonin tab tablet, Take  by mouth nightly., Disp: , Rfl:     levocetirizine (XYZAL) 5 mg tablet, Take  by mouth., Disp: , Rfl:     ezetimibe (ZETIA) 10 mg tablet, Take  by mouth., Disp: , Rfl:     simvastatin (ZOCOR) 40 mg tablet, Take  by mouth nightly., Disp: , Rfl:     sacubitril-valsartan (ENTRESTO) 24 mg/26 mg tablet, Take 1 Tab by mouth two (2) times a day., Disp: , Rfl:     pyridoxine, vitamin B6, (VITAMIN B-6) 100 mg tablet, Take 100 mg by mouth daily. , Disp: , Rfl:     multivit,iron,min/green tea xt (DAILY-TAO WEIGHT CONTROL PO), Take 1 Tab by mouth daily. , Disp: , Rfl:     clopidogrel (PLAVIX) 75 mg tab, Take 1 Tab by mouth daily. , Disp: 90 Tab, Rfl: 3    cholecalciferol (VITAMIN D3) 1,000 unit tablet, Take 1,000 Units by mouth daily. , Disp: , Rfl:     cyanocobalamin (VITAMIN B-12) 500 mcg tablet, Take 500 mcg by mouth daily. , Disp: , Rfl:     ACETAMINOPHEN/DIPHENHYDRAMINE (TYLENOL PM PO), Take  by mouth nightly., Disp: , Rfl:     aspirin 81 mg chewable tablet, Take 1 Tab by mouth daily. , Disp: , Rfl:     Thank you for your referral and allowing me to participate in this patient's care.     Giovany Bruno MD PhD  [address] [place] [address], [address]  Phone: [phone]  Fax: [phone]

## 2019-10-22 ENCOUNTER — HOSPITAL ENCOUNTER (OUTPATIENT)
Dept: NON INVASIVE DIAGNOSTICS | Age: 64
Discharge: HOME | End: 2019-10-22
Attending: INTERNAL MEDICINE
Payer: MEDICARE

## 2019-10-22 VITALS
SYSTOLIC BLOOD PRESSURE: 130 MMHG | WEIGHT: 255.29 LBS | HEIGHT: 70 IN | DIASTOLIC BLOOD PRESSURE: 78 MMHG | BODY MASS INDEX: 36.55 KG/M2

## 2019-10-22 DIAGNOSIS — I50.20 SYSTOLIC HEART FAILURE, ACC/AHA STAGE C (HCC): ICD-10-CM

## 2019-10-22 LAB
ECHO AO ASC DIAM: 3.79 CM
ECHO AO ROOT DIAM: 4.24 CM
ECHO IVC SNIFF: 1.6 CM
ECHO LA AREA 4C: 16.3 CM2
ECHO LA MAJOR AXIS: 3.55 CM
ECHO LA TO AORTIC ROOT RATIO: 0.84
ECHO LA VOL 2C: 35.26 ML (ref 18–58)
ECHO LA VOL 4C: 40.13 ML (ref 18–58)
ECHO LA VOL BP: 42.5 ML (ref 18–58)
ECHO LA VOL/BSA BIPLANE: 18.35 ML/M2 (ref 16–28)
ECHO LA VOLUME INDEX A2C: 15.23 ML/M2 (ref 16–28)
ECHO LA VOLUME INDEX A4C: 17.33 ML/M2 (ref 16–28)
ECHO LV E' LATERAL VELOCITY: 6.32 CENTIMETER/SECOND
ECHO LV E' SEPTAL VELOCITY: 4.66 CENTIMETER/SECOND
ECHO LV EDV TEICHHOLZ: 0.97 ML
ECHO LV ESV TEICHHOLZ: 0.71 ML
ECHO LV INTERNAL DIMENSION DIASTOLIC: 5.88 CM (ref 4.2–5.9)
ECHO LV INTERNAL DIMENSION SYSTOLIC: 5.15 CM
ECHO LV IVSD: 1.01 CM (ref 0.6–1)
ECHO LV MASS 2D: 264.8 G (ref 88–224)
ECHO LV MASS INDEX 2D: 114.4 G/M2 (ref 49–115)
ECHO LV POSTERIOR WALL DIASTOLIC: 0.89 CM (ref 0.6–1)
ECHO LVOT DIAM: 2.18 CM
ECHO LVOT PEAK GRADIENT: 2.9 MMHG
ECHO LVOT PEAK VELOCITY: 84.71 CM/S
ECHO MV A VELOCITY: 47.62 CM/S
ECHO MV AREA PHT: 10 CM2
ECHO MV E DECELERATION TIME (DT): 75.5 MS
ECHO MV E VELOCITY: 110.11 CM/S
ECHO MV E/A RATIO: 2.31
ECHO MV PRESSURE HALF TIME (PHT): 21.9 MS
ECHO PV MAX VELOCITY: 108.89 CM/S
ECHO PV PEAK GRADIENT: 4.7 MMHG
ECHO RV INTERNAL DIMENSION: 2.92 CM
ECHO RV TAPSE: 2.2 CM (ref 1.5–2)
ECHO TV REGURGITANT MAX VELOCITY: 246.6 CM/S
ECHO TV REGURGITANT PEAK GRADIENT: 24.3 MMHG
LVFS 2D: 12.49 %
LVSV (TEICH): 19.05 ML
MV DEC SLOPE: 14.57

## 2019-10-22 PROCEDURE — C8929 TTE W OR WO FOL WCON,DOPPLER: HCPCS

## 2019-10-22 PROCEDURE — 74011250636 HC RX REV CODE- 250/636: Performed by: INTERNAL MEDICINE

## 2019-10-22 RX ADMIN — PERFLUTREN 2 ML: 6.52 INJECTION, SUSPENSION INTRAVENOUS at 14:31

## 2019-11-12 ENCOUNTER — OFFICE VISIT (OUTPATIENT)
Dept: CARDIOLOGY CLINIC | Age: 64
End: 2019-11-12

## 2019-11-12 DIAGNOSIS — Z95.810 ICD (IMPLANTABLE CARDIOVERTER-DEFIBRILLATOR) IN PLACE: Primary | ICD-10-CM

## 2019-11-14 NOTE — TELEPHONE ENCOUNTER
----- Message from Stewart Ghosh sent at 11/14/2019  1:33 PM EST -----  Regarding: Dr. Chris Bowers Refill   Pt is requesting a refill on Levocetirizine to be sent to [facility] DR JENNIFER ZIMMERMAN (on file).  Phone: [phone]

## 2019-11-15 RX ORDER — LEVOCETIRIZINE DIHYDROCHLORIDE 5 MG/1
5 TABLET, FILM COATED ORAL DAILY
Qty: 90 TAB | Refills: 1 | Status: SHIPPED | OUTPATIENT
Start: 2019-11-15 | End: 2019-11-19 | Stop reason: SDUPTHER

## 2019-11-18 RX ORDER — METOPROLOL SUCCINATE 25 MG/1
12.5 TABLET, EXTENDED RELEASE ORAL 2 TIMES DAILY
Qty: 60 TAB | Refills: 3 | Status: SHIPPED | OUTPATIENT
Start: 2019-11-18 | End: 2020-07-06

## 2019-11-18 RX ORDER — EZETIMIBE 10 MG/1
10 TABLET ORAL DAILY
Qty: 30 TAB | Refills: 2 | Status: SHIPPED | OUTPATIENT
Start: 2019-11-18 | End: 2020-10-01

## 2019-11-27 RX ORDER — LEVOCETIRIZINE DIHYDROCHLORIDE 5 MG/1
5 TABLET, FILM COATED ORAL DAILY
Qty: 90 TAB | Refills: 3 | Status: SHIPPED | OUTPATIENT
Start: 2019-11-27 | End: 2021-08-02

## 2019-12-09 NOTE — TELEPHONE ENCOUNTER
Requested Prescriptions     Signed Prescriptions Disp Refills    sacubitril-valsartan (ENTRESTO) 24 mg/26 mg tablet 60 Tab 2     Sig: Take 1 Tab by mouth two (2) times a day.      Authorizing Provider: Devon Dexter     Ordering User: Romero Deutsch     Patient notified

## 2020-01-03 LAB
ALBUMIN SERPL-MCNC: 4.5 G/DL (ref 3.6–4.8)
ALBUMIN/GLOB SERPL: 1.7 {RATIO} (ref 1.2–2.2)
ALP SERPL-CCNC: 88 IU/L (ref 39–117)
ALT SERPL-CCNC: 23 IU/L (ref 0–44)
AST SERPL-CCNC: 17 IU/L (ref 0–40)
BILIRUB SERPL-MCNC: 0.4 MG/DL (ref 0–1.2)
BUN SERPL-MCNC: 12 MG/DL (ref 8–27)
BUN/CREAT SERPL: 12 (ref 10–24)
CALCIUM SERPL-MCNC: 9.4 MG/DL (ref 8.6–10.2)
CHLORIDE SERPL-SCNC: 98 MMOL/L (ref 96–106)
CHOLEST SERPL-MCNC: 189 MG/DL (ref 100–199)
CK SERPL-CCNC: 117 U/L (ref 24–204)
CO2 SERPL-SCNC: 23 MMOL/L (ref 20–29)
CREAT SERPL-MCNC: 1.03 MG/DL (ref 0.76–1.27)
ERYTHROCYTE [DISTWIDTH] IN BLOOD BY AUTOMATED COUNT: 13 % (ref 12.3–15.4)
GLOBULIN SER CALC-MCNC: 2.6 G/DL (ref 1.5–4.5)
GLUCOSE SERPL-MCNC: 177 MG/DL (ref 65–99)
HAV IGM SERPL QL IA: NEGATIVE
HBV CORE IGM SERPL QL IA: NEGATIVE
HBV SURFACE AG SERPL QL IA: NEGATIVE
HCT VFR BLD AUTO: 49.9 % (ref 37.5–51)
HCV AB S/CO SERPL IA: 0.1 S/CO RATIO (ref 0–0.9)
HDLC SERPL-MCNC: 39 MG/DL
HGB BLD-MCNC: 16.4 G/DL (ref 13–17.7)
IRON SATN MFR SERPL: 34 % (ref 15–55)
IRON SERPL-MCNC: 110 UG/DL (ref 38–169)
LDLC SERPL CALC-MCNC: 123 MG/DL (ref 0–99)
MCH RBC QN AUTO: 30.4 PG (ref 26.6–33)
MCHC RBC AUTO-ENTMCNC: 32.9 G/DL (ref 31.5–35.7)
MCV RBC AUTO: 92 FL (ref 79–97)
NT-PROBNP SERPL-MCNC: 110 PG/ML (ref 0–376)
PLATELET # BLD AUTO: 220 X10E3/UL (ref 150–450)
POTASSIUM SERPL-SCNC: 4.2 MMOL/L (ref 3.5–5.2)
PROT SERPL-MCNC: 7.1 G/DL (ref 6–8.5)
RBC # BLD AUTO: 5.4 X10E6/UL (ref 4.14–5.8)
SODIUM SERPL-SCNC: 136 MMOL/L (ref 134–144)
T4 FREE SERPL-MCNC: 1.21 NG/DL (ref 0.82–1.77)
TIBC SERPL-MCNC: 320 UG/DL (ref 250–450)
TRIGL SERPL-MCNC: 136 MG/DL (ref 0–149)
TSH SERPL DL<=0.005 MIU/L-ACNC: 1.77 UIU/ML (ref 0.45–4.5)
UIBC SERPL-MCNC: 210 UG/DL (ref 111–343)
VLDLC SERPL CALC-MCNC: 27 MG/DL (ref 5–40)
WBC # BLD AUTO: 7.3 X10E3/UL (ref 3.4–10.8)

## 2020-01-06 ENCOUNTER — OFFICE VISIT (OUTPATIENT)
Dept: CARDIOLOGY CLINIC | Age: 65
End: 2020-01-06

## 2020-01-06 VITALS
DIASTOLIC BLOOD PRESSURE: 90 MMHG | HEIGHT: 70 IN | WEIGHT: 247 LBS | HEART RATE: 74 BPM | SYSTOLIC BLOOD PRESSURE: 142 MMHG | OXYGEN SATURATION: 98 % | BODY MASS INDEX: 35.36 KG/M2

## 2020-01-06 DIAGNOSIS — Z95.810 ICD (IMPLANTABLE CARDIOVERTER-DEFIBRILLATOR) IN PLACE: ICD-10-CM

## 2020-01-06 DIAGNOSIS — F17.210 CIGARETTE NICOTINE DEPENDENCE WITHOUT COMPLICATION: ICD-10-CM

## 2020-01-06 DIAGNOSIS — E66.01 SEVERE OBESITY WITH BODY MASS INDEX (BMI) OF 35.0 TO 39.9 WITH SERIOUS COMORBIDITY (HCC): ICD-10-CM

## 2020-01-06 DIAGNOSIS — I25.10 CORONARY ARTERY DISEASE INVOLVING NATIVE CORONARY ARTERY OF NATIVE HEART WITHOUT ANGINA PECTORIS: Primary | ICD-10-CM

## 2020-01-06 DIAGNOSIS — I50.20 SYSTOLIC HEART FAILURE, ACC/AHA STAGE C (HCC): ICD-10-CM

## 2020-01-06 DIAGNOSIS — I25.5 ISCHEMIC CARDIOMYOPATHY: ICD-10-CM

## 2020-01-06 RX ORDER — ALBUTEROL SULFATE 90 UG/1
2 AEROSOL, METERED RESPIRATORY (INHALATION)
COMMUNITY

## 2020-01-06 NOTE — LETTER
1/6/20 Patient: Meredith Palencia YOB: 1955 Date of Visit: 1/6/2020 Christian Meyer MD 
[address] [facility] [phone] VIA In Basket Dear Christian Meyer MD, Thank you for referring Mr. Maye Patton to [address] for evaluation. My notes for this consultation are attached. If you have questions, please do not hesitate to call me. I look forward to following your patient along with you. Sincerely, Franko Cabrera MD

## 2020-01-06 NOTE — PROGRESS NOTES
Shahzad Baeza is a 75 y.o. male    Chief Complaint   Patient presents with    Follow-up       Chest pain no  SOB no  Dizziness no  Swelling no  Recent hospital visit no  Refills no  Visit Vitals  /90 (BP 1 Location: Left arm, BP Patient Position: Sitting)   Pulse 74   Ht 5' 10\" (1.778 m)   Wt 247 lb (112 kg)   SpO2 98%   BMI 35.44 kg/m²

## 2020-01-06 NOTE — PROGRESS NOTES
Manny Paz MD [facility]  [address]  [place], [address] [facility]    Office [phone]  Fax [phone]  Cell [phone]       Eud Gutiérrez is a 75 y.o. male last seen by Ysabel Sebastian NP 6 months ago and me 10 months ago. Assessment  Encounter Diagnoses   Name Primary?  Coronary artery disease involving native coronary artery of native heart without angina pectoris Yes    Systolic heart failure, ACC/AHA stage C (HCC)     Ischemic cardiomyopathy     Severe obesity with body mass index (BMI) of 35.0 to 39.9 with serious comorbidity ([address]     ICD (implantable cardioverter-defibrillator) in place     Cigarette nicotine dependence without complication       Recommendations:    Ischemic cardiomyopathy, HFrEF, EF 20% with apical akenesis most recently by echo Oct 2019. He has stable Class 2 HF. He has been followed closely by the advanced HF clinic, most recently Oct 2019.   - Continue Entresto 24/26 [facility] of higher doses), Toprol 12.5 mg BID. He is intolerant of Spironolactone. - Continue close f/u in advanced HF clininc    CAD - He suffered an out-of-hospital MI in May 2017 and presented with subacute HF in June. [place] MRI demonstrated viability only in the RCA territory. Cardiac cath demonstrated mid %, RCA mid 85%. He underwent PCI RCA with [facility]. Drivers of CAD include smoking and T2DM. He has no exertional angina at a moderate functional capacity. - Continue DAPT indefinitely  - Continue statin and Zetia    S/p ICD. Continue regular checks    Nicotine addiction. He is not interested in quitting at this time. Hx of alcohol dependence. None x1 year. F/u I 6 months. Follow-up and Dispositions    · Return in about 6 months (around 7/6/2020). Subjective:    Edu Gutiérrez reports he is feeling well overall. He says he is dizzy and lightheaded sometimes. He is active taking care of his 30+ cats.  He denies any palpitations, tachycardia, syncope, orthopnea, edema or paroxysmal nocturnal dyspnea. No ICD therapies. BP's at home have been in th  systolic range. He continues to smoke 1ppd. 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 He is working on losing weight. He two small meal portions daily. Cardiac risk factors   HTN no  DM  yes  Smoking yes    Cardiac testing  ECHO: 5/31//2017: EF 15-20%, apical AK  ECHO 9/18/17- Dilated LV, EF 20%, apical DK   ECHO 8/21/18:LVD, EF 15-20%, severe DHK, AK akinesis of the apical anterior, apical inferior, apical septal, and apical lateral wall. LAE, mild MR      MRI: 6/1/2017: LV dilated LVEF 32%, severe HK ant wall, RVEF 55%, mild MR; anterior infarct- no viability RCA/LCA: viable     CATH 6/2/2017: LM: nl, LAD: m100%, LCX codom normal, RCA:m 85%, LVEDP 25, no AV gradient, dilated LV, apical AK, EF 20%  -- s/p PCI pRCA: 2.5x18 Xience ([facility]). AICD 12/5/2017; [place] [facility]    Echo 10/22/19 - dilated LV, EF 20-25%    Past Medical History:   Diagnosis Date    Controlled type 2 diabetes mellitus with circulatory disorder ([address] 6/1/2017    COPD (chronic obstructive pulmonary disease) ([address]     Coronary artery disease involving native coronary artery without angina pectoris 06/01/2017    OOH MI, 2v disease s/p PCI RCA with [facility] June 2017    ETOH abuse 6/1/2017    ICD (implantable cardioverter-defibrillator) in place     Ischemic cardiomyopathy     Extensive LAD territory infarction    Nicotine dependence 6/1/2017    Pacemaker     [employer] ICD    SOB (shortness of breath) 29/6/0890    Systolic congestive heart failure with reduced left ventricular function, NYHA class 3 ([address] 10/25/2018        Current Outpatient Medications   Medication Sig Dispense Refill    albuterol (VENTOLIN HFA) 90 mcg/actuation inhaler Take  by inhalation.       sacubitril-valsartan (ENTRESTO) 24 mg/26 mg tablet Take 1 Tab by mouth two (2) times a day. 60 Tab 2    levocetirizine (XYZAL) 5 mg tablet Take 1 Tab by mouth daily. 90 Tab 3    metoprolol succinate (TOPROL XL) 25 mg XL tablet Take 0.5 Tabs by mouth two (2) times a day. Hold morning dose if SBP less than 100 60 Tab 3    ezetimibe (ZETIA) 10 mg tablet Take 1 Tab by mouth daily. 30 Tab 2    SITagliptin (JANUVIA) 25 mg tablet Take 25 mg by mouth daily.  omeprazole (PRILOSEC) 20 mg capsule Take 20 mg by mouth daily.  bumetanide (BUMEX) 0.5 mg tablet Take 1 Tab by mouth as needed (weight gain or worsening HF sxs). 30 Tab 3    simvastatin (ZOCOR) 40 mg tablet Take  by mouth nightly.  pyridoxine, vitamin B6, (VITAMIN B-6) 100 mg tablet Take 100 mg by mouth daily.  clopidogrel (PLAVIX) 75 mg tab Take 1 Tab by mouth daily. 90 Tab 3    cholecalciferol (VITAMIN D3) 1,000 unit tablet Take 1,000 Units by mouth daily.  cyanocobalamin (VITAMIN B-12) 500 mcg tablet Take 500 mcg by mouth daily.  ACETAMINOPHEN/DIPHENHYDRAMINE (TYLENOL PM PO) Take  by mouth nightly.  aspirin 81 mg chewable tablet Take 1 Tab by mouth daily.  melatonin tab tablet Take  by mouth nightly. Allergies   Allergen Reactions    Atorvastatin Diarrhea    Codeine Itching    Metformin Other (comments)     Abdominal pain     Wellbutrin [Bupropion] Other (comments)     Night mare  Aggression       Retired. Review of Systems  Constitutional: Negative for fever, chills, malaise/fatigue and diaphoresis.  +dizzy, lightheaded  Respiratory: Negative for cough, hemoptysis, sputum production, and wheezing. +BERNABE  Cardiovascular: Negative for chest pain, palpitations, orthopnea, claudication, leg swelling and PND. Gastrointestinal: Negative for heartburn, nausea, vomiting, blood in stool and melena. Genitourinary: Negative for dysuria and flank pain. Musculoskeletal: Negative for joint pain and back pain. Skin: Negative for rash.   Neurological: Negative for focal weakness, seizures, loss of consciousness, weakness and headaches. Endo/Heme/Allergies: Does not bruise/bleed easily. Psychiatric/Behavioral: Negative for memory loss. The patient does not have insomnia. Physical Exam  Visit Vitals  /90 (BP 1 Location: Left arm, BP Patient Position: Sitting)   Pulse 74   Ht 5' 10\" (1.778 m)   Wt 247 lb (112 kg)   SpO2 98%   BMI 35.44 kg/m²     Wt Readings from Last 3 Encounters:   01/06/20 247 lb (112 kg)   10/22/19 255 lb 4.7 oz (115.8 kg)   10/16/19 255 lb 3.2 oz (115.8 kg)      General - well developed well nourished, obese WM  Neck - JVP normal, thyroid nl  Cardiac - normal S1, S2, no murmurs, rubs or gallops. No clicks  Vascular - carotids without bruits, radials, femorals and pedal pulses equal bilateral  Lungs - clear to auscultation bilaterals, no rales, wheezing. Abd - soft nontender, no HSM, no abd bruits  Extremities - no edema  Skin - no rash  Neuro - nonfocal  Psych - normal mood and affect    Cardiographics  ECG 6/14/17 - SR 75, TWI; unchanged  Echo 10/22/19 - dilated LV, EF 20-25%      Written by Darius Ignacio, as dictated by Rosemary Cabello M.D.      Rosemary Cabello MD

## 2020-01-17 RX ORDER — CLOPIDOGREL BISULFATE 75 MG/1
75 TABLET ORAL DAILY
Qty: 90 TAB | Refills: 3 | Status: SHIPPED | OUTPATIENT
Start: 2020-01-17 | End: 2021-01-22

## 2020-02-12 ENCOUNTER — OFFICE VISIT (OUTPATIENT)
Dept: CARDIOLOGY CLINIC | Age: 65
End: 2020-02-12

## 2020-02-12 VITALS
HEIGHT: 70 IN | BODY MASS INDEX: 35.39 KG/M2 | TEMPERATURE: 98.7 F | DIASTOLIC BLOOD PRESSURE: 88 MMHG | WEIGHT: 247.2 LBS | SYSTOLIC BLOOD PRESSURE: 128 MMHG | RESPIRATION RATE: 24 BRPM | HEART RATE: 79 BPM | OXYGEN SATURATION: 97 %

## 2020-02-12 DIAGNOSIS — E78.00 PURE HYPERCHOLESTEROLEMIA: ICD-10-CM

## 2020-02-12 DIAGNOSIS — D50.8 OTHER IRON DEFICIENCY ANEMIA: ICD-10-CM

## 2020-02-12 DIAGNOSIS — E55.9 VITAMIN D DEFICIENCY: ICD-10-CM

## 2020-02-12 DIAGNOSIS — I50.20 SYSTOLIC HEART FAILURE, ACC/AHA STAGE C (HCC): Primary | ICD-10-CM

## 2020-02-12 DIAGNOSIS — R06.02 SHORTNESS OF BREATH: ICD-10-CM

## 2020-02-12 DIAGNOSIS — Z23 ENCOUNTER FOR IMMUNIZATION: ICD-10-CM

## 2020-02-12 RX ORDER — FLUTICASONE FUROATE AND VILANTEROL TRIFENATATE 100; 25 UG/1; UG/1
POWDER RESPIRATORY (INHALATION)
Qty: 1 INHALER | Refills: 0 | Status: SHIPPED | OUTPATIENT
Start: 2020-02-12 | End: 2021-06-16 | Stop reason: ALTCHOICE

## 2020-02-12 RX ORDER — OXYMETAZOLINE HCL 0.05 %
2 SPRAY, NON-AEROSOL (ML) NASAL
COMMUNITY

## 2020-02-12 RX ORDER — FLUTICASONE FUROATE AND VILANTEROL TRIFENATATE 100; 25 UG/1; UG/1
POWDER RESPIRATORY (INHALATION)
COMMUNITY
Start: 2020-02-01 | End: 2020-02-12 | Stop reason: SDUPTHER

## 2020-02-12 NOTE — PATIENT INSTRUCTIONS
No medication changes A Courtesy refill of you Breo inhaler has been provided. All further refills must go through your primary care provider. Testing Ordered: 
 
Lab work to be done prior to your next appointment has been ordered. Orders provided to you today. Please go to lab azael a few days prior to your next appointment with [facility] with the orders provided to ensure lab work has been received to review at your next appointment. Other Recommendations:  
  
Ensure your drinking an adequate amount of water with a goal of 6-8 eight ounce glasses (1.5-2 liters) of fluid daily. Your urine should be clear and light yellow straw colored. If your blood pressure begins to consistently run below 90/60 and/or you begin to experience dizziness or lightheadedness, please contact the [facility] at [phone]. Follow up 2-3 months with [facility] MD or NP Please monitor your blood pressures daily prior to medications and 2 hours after taking medications. Bring a written record of your blood pressures to your next appointment. Please monitor your weights daily upon waking and after using the bathroom. Keep a written records of your weights and bring to your next appointment. If you have a weight gain of 3 or more pounds overnight OR 5 or more pounds in one week please contact our office. Thank you for allowing us the privilege of being a part of your healthcare team! Please do not hesitate to contact our office at [phone] with any questions or concerns. Vaccine Information Statement Pneumococcal Conjugate Vaccine (PCV13): What You Need to Know Many Vaccine Information Statements are available in Citizen of [place] and other languages. See www.immunize.org/vis Hojas de información sobre vacunas están disponibles en español y en muchos otros idiomas. Visite www.immunize.org/vis 1. Why get vaccinated? Pneumococcal conjugate vaccine (PCV13) can prevent pneumococcal disease. Pneumococcal disease refers to any illness caused by pneumococcal bacteria. These bacteria can cause many types of illnesses, including pneumonia, which is an infection of the lungs. Pneumococcal bacteria are one of the most common causes of pneumonia. Besides pneumonia, pneumococcal bacteria can also cause: 
 Ear infections  Sinus infections  Meningitis (infection of the tissue covering the brain and spinal cord)  Bacteremia (bloodstream infection) Anyone can get pneumococcal disease, but children under 3years of age, people with certain medical conditions, adults 72 years or older, and cigarette smokers are at the highest risk. Most pneumococcal infections are mild. However, some can result in long-term problems, such as brain damage or hearing loss. Meningitis, bacteremia, and pneumonia caused by pneumococcal disease can be fatal.  
 
2. PCV13 PCV13 protects against 13 types of bacteria that cause pneumococcal disease. Infants and young children usually need 4 doses of pneumococcal conjugate vaccine, at 2, 4, 6, and 1515 months of age. In some cases, a child might need fewer than 4 doses to complete PCV13 vaccination. A dose of PCV13 is also recommended for anyone 2 years or older with certain medical conditions if they did not already receive PCV13. This vaccine may be given to adults 72 years or older based on discussions between the patient and health care provider. 3. Talk with your health care provider Tell your vaccine provider if the person getting the vaccine: 
 Has had an allergic reaction after a previous dose of PCV13, to an earlier pneumococcal conjugate vaccine known as PCV7, or to any vaccine containing diphtheria toxoid (for example, DTaP), or has any severe, life-threatening allergies.   
 
In some cases, your health care provider may decide to postpone PCV13 vaccination to a future visit. People with minor illnesses, such as a cold, may be vaccinated. People who are moderately or severely ill should usually wait until they recover before getting PCV13. Your health care provider can give you more information. 4. Risks of a vaccine reaction  Redness, swelling, pain, or tenderness where the shot is given, and fever, loss of appetite, fussiness (irritability), feeling tired, headache, and chills can happen after PCV13. [place] children may be at increased risk for seizures caused by fever after PCV13 if it is administered at the same time as inactivated influenza vaccine. Ask your health care provider for more information. People sometimes faint after medical procedures, including vaccination. Tell your provider if you feel dizzy or have vision changes or ringing in the ears. As with any medicine, there is a very remote chance of a vaccine causing a severe allergic reaction, other serious injury, or death. 5. What if there is a serious problem? An allergic reaction could occur after the vaccinated person leaves the clinic. If you see signs of a severe allergic reaction (hives, swelling of the face and throat, difficulty breathing, a fast heartbeat, dizziness, or weakness), call 9-1-1 and get the person to the nearest hospital. 
 
For other signs that concern you, call your health care provider. Adverse reactions should be reported to the Vaccine Adverse Event Reporting System (VAERS). Your health care provider will usually file this report, or you can do it yourself. Visit the VAERS website at www.vaers. hhs.gov or call 4-821.255.6175. VAERS is only for reporting reactions, and VAERS staff do not give medical advice.  
 
6. The National Vaccine Injury Compensation Program 
 
The National Vaccine Injury Compensation Program (VICP) is a federal program that was created to compensate people who may have been injured by certain vaccines. Visit the VICP website at www.[facility].gov/vaccinecompensation or call 0-818.941.9733 to learn about the program and about filing a claim. There is a time limit to file a claim for compensation. 7. How can I learn more?  Ask your health care provider.  Call your local or state health department.  Contact the Centers for Disease Control and Prevention (CDC): 
- Call 6-626.628.1411 (1-800-CDC-INFO) or 
- Visit CDCs website at www.cdc.gov/vaccines Vaccine Information Statement (Interim) PCV13  
10/30/2019 
[address] Javier Poles [id number]-10 Department of [facility] and [facility] Centers for Disease Control and Prevention Office Use Only

## 2020-02-12 NOTE — PROGRESS NOTES
Lab work to be done prior to next appointment with [facility] ordered. Lab orders provided to patient. He was advised to present to lab azael with orders provided a few days prior to next appointment. Pneumovax provided. Education provided on Prevnar 13. Patient verbalized understanding and had no further questions. Harjinder Callahan RN. Tamanna Bradley is a 75 y.o. male who presents for routine immunizations. He denies any symptoms , reactions or allergies that would exclude them from being immunized today. Risks and adverse reactions were discussed and the VIS was given to them. All questions were addressed. He was observed for 15 min post injection. There were no reactions observed.     Amber Morejon

## 2020-02-12 NOTE — PROGRESS NOTES
[facility] in [address] [facility], [address] [place] [address] Note    Patient name: Quinten Finley  Patient : 1955  Patient MRN: [id number]  Date of service: 20    Primary care Halina Kessler MD  Primary cardiologist: Dr. Asif Cavazos     CHIEF COMPLAINT:  Chronic systolic heart failure     PLAN:  Continue current medical therapy for heart failure; limited by hypotension  Continue current dose of beta-blocker, split toprol XL to 12.5mg twice daily (hold dose if SBP<100)  Continue current dose of entresto 24/26mg twice daily  Cannot tolerate spironolactone due to breast tenderness and decrease libido  Does not require diuretics; weight today 247 from 255lbs (BMI 35.5)  ICD was interrogated in our office today for volume status; and he appears dry  Continue ASA and statin (cannot tolerate zetia + too expensive), check lipid profile, CPK and LFT  ICD interrogation every 3 months per routine, per Dr. Vega Lav  Does not use; cannot tolerate CPAP; headaches likely related (followed by neurology)  Schedule annual echocardiogram and EKG in 10/2020   Pamela Lim next visit: CBC, CMP, pro-NT-BNP, iron and thyroid profile, vit D level, lipid profile, CPK, hepatitis  Reinforced low salt diet  Reinforced fluid restriction to 6 x 8oz glasses per day  Discussed tobacco cessation; continues to smoke, intoleratn of wellbutrin or chantix  Discussed abstinence from illicit drugs; continues to abstein from alcohol  Reinforced importance of weight loss  Follow-up with primary cardiologist, Dr. Joseph Estevez; next visit; we will alternate  Follow-up with EP cardiologist  Follow-up with PCP; recommend flu vaccine annually and pneumonia vaccine every 5 years; pneumonia shot given today  We gave refill on Breo 100/25 x once  Follow-up with neurology on further workup on TIA   consult appreciated (assistance with jardiance and patient assistance application)  Return to [facility] in 6 months with NP/MD     IMPRESSION:  Chronic systolic heart failure  Stage C, NYHA class IIIA symptoms  CAD s/p RCA stent + 100% m-LAD  Ischemic cardiomyopathy, LVEF 15-20%  TIA, possible   Morbid obesity  HL  HTN  SHREI on CPAP  Previous EtOH use, abstinent  COPD  Active tobacco abuse     CARDIAC IMAGING:  Echo (10/22/19) LVEF 21-25%, cannot rule out bicuspic valve, no other valvular dz, multiple WMA+  Echo (8/21/18) LVEF 15-20%, no valvular pathology  EKG (3/13/19) NSR, no ST-T changes  LHC (6/2/17), 90% RCA and 100% mLAD, s/p [facility] to RCA  ICD interrogation (5/1/19) no events, normal device function, good battery  Cardiac MRI (6/1/17)  . Mildly dilated left ventricle by 3-D volumetric assessment index to body surface area. Moderate to severe left ventricular systolic dysfunction. Severe hypokinesis of the mid anterior wall. Dyskinesis and thinning of the distal anterior wall, anteroapical wall, apex and apical septal wall. 3-D LVEF 32%. 2. Normal right ventricular size with low-normal right ventricular systolic function. RVEF 55%. 3. Trace to mild mitral regurgitation. 4. On LGE imaging, there is a dense infarct involving greater than 75% thickness  of the thinned out mid to distal anterior wall, anteroapical wall, apex, apical septal wall with with hardly any viable myocardium subtending this infarct. The entire lateral wall, inferolateral wall, inferior wall, inferoseptal wall does not demonstrate any infarct and are completely viable. Based on the viability information, the LAD territory is not a suitable candidate for revascularization and will not improve. The RCA and LCx territories are widely viable and should benefit from revascularization if needed. There is no areas of infiltrative sarcoidosis or amyloidosis. There is no areas of inflammatory myocarditis. 5. Normal pericardium without significant pericardial effusion. Small to  medium-sized bilateral pleural effusions.  6. Mildly dilated ascending aorta, aortic arch and descending thoracic aorta. No significant atherosclerotic disease. No aortic dissection. 7. Dilated main pulmonary artery suggests mild pulmonary hypertension.     HISTORY OF PRESENT ILLNESS:  I had the pleasure of [address] [facility] at [address] [facility] in [facility] a 70 y. o. male with h/o CAD (90% RCA and 100 m-LAD) s/p PCI RCA with [facility] 6/17, T2D, COPD, ETOH abuse (quit months ago), tobacco abuse, [place] presents for further evaluation of his chronic systolic heart failure.      INTERVAL HISTORY:  Today, patient presents for routine clinic visit.     Patient is doing very well; recovering from recent URI.      Patient walked to our clinic from parking garage without having to slow down or stop. Patient can walk more than one block without symptoms of fatigue or shortness of breath. Patient can walk one flight of stairs without symptoms of fatigue or shortness of breath. Patient can perform home activities without problem and routinely participates in daily walking for more than 15 minutes. Patient denies symptoms of volume overload or leg edema. Patient denies abdominal bloating or change of appetite. Patient's weight remained stable. Patient denies orthopnea, PND or nocturia. Patient denies irregular heart rate or palpitations. ICD has not fired. Patient denies other cardiac symptoms such as chest pain or leg pain with walking. Patient is compliant with fluid restriction and taking medications as prescribed. Patient manages his own medications. REVIEW OF SYSTEMS:  General: Denies fever, night sweats.   Ear, nose and throat: Denies difficulty hearing, sinus problems, runny nose, post-nasal drip, ringing in ears, mouth sores, loose teeth, ear pain, nosebleeds, sore throate, facial pain or numbess  Cardiovascular: see above in the interval history  Respiratory: Denies cough, wheezing, sputum production, hemoptysis. Gastrointestinal: Denies heartburn, constipation, intolerance to certain foods, diarrhea, abdominal pain, nausea, vomiting, difficulty swallowing, blood in stool  Kidney and bladder: Denies painful urination, frequent urination, urgency, prostate problems and impotence  Musculoskeletal: Denies joint pain, muscle weakness  Skin and hair: Denies change in existing skin lesions, hair loss or increase, breast changes    PHYSICAL EXAM:  Visit Vitals  /88 (BP 1 Location: Right arm, BP Patient Position: Sitting)   Pulse 79   Temp 98.7 °F (37.1 °C) (Oral)   Resp 24   Ht 5' 10\" (1.778 m)   Wt 247 lb 3.2 oz (112.1 kg)   SpO2 97%   BMI 35.47 kg/m²     General: Patient is well developed, morbidly obese in no acute distress  HEENT: Normocephalic and atraumatic. No scleral icterus. Pupils are equal, round and reactive to light and accomodation. No conjunctival injection. Oropharynx is clear. Neck: Supple. No evidence of thyroid enlargements or lymphadenopathy. JVD: Cannot be appreciated   Lungs: Breath sounds are equal and clear bilaterally. No wheezes, rhonchi, or rales. Heart: Regular rate and rhythm with normal S1 and S2. No murmurs, gallops or rubs. Abdomen: Soft, no mass or tenderness. No organomegaly or hernia. Bowel sounds present. Genitourinary and rectal: deferred  Extremities: No cyanosis, clubbing, or edema. Neurologic: No focal sensory or motor deficits are noted. Grossly intact. Psychiatric: Awake, alert an doriented x 3. Appropriate mood and affect. Skin: Warm, dry and well perfused. No lesions, nodules or rashes are noted.     PAST MEDICAL HISTORY:  Past Medical History:   Diagnosis Date    Controlled type 2 diabetes mellitus with circulatory disorder ([facility] 6/1/2017    COPD (chronic obstructive pulmonary disease) ([facility]     Coronary artery disease involving native coronary artery without angina pectoris 06/01/2017    OOH MI, 2v disease s/p PCI RCA with [facility] June 2017  ETOH abuse 6/1/2017    ICD (implantable cardioverter-defibrillator) in place     Ischemic cardiomyopathy     Extensive LAD territory infarction    Nicotine dependence 6/1/2017    Pacemaker     [employer] ICD    SOB (shortness of breath) 43/2/9055    Systolic congestive heart failure with reduced left ventricular function, NYHA class 3 ([address] 10/25/2018       PAST SURGICAL HISTORY:  Past Surgical History:   Procedure Laterality Date    HX CORONARY STENT PLACEMENT      HX HEART CATHETERIZATION      HX PACEMAKER      [employer] ICD    HX TONSILLECTOMY      HX WISDOM TEETH EXTRACTION         FAMILY HISTORY:  Family History   Problem Relation Age of Onset    Stroke Father     Heart Disease Father     Hypertension Father        SOCIAL HISTORY:  Social History     Socioeconomic History    Marital status: SINGLE     Spouse name: Not on file    Number of children: Not on file    Years of education: Not on file    Highest education level: Not on file   Tobacco Use    Smoking status: Current Every Day Smoker     Packs/day: 1.00     Years: 30.00     Pack years: 30.00     Types: Cigarettes    Smokeless tobacco: Never Used   Substance and Sexual Activity    Alcohol use: No     Comment: former drinker-quit 3 months ago-drank a 6 pack per day    Drug use: No    Sexual activity: Yes     Partners: Female       LABORATORY RESULTS:     Labs Latest Ref Rng & Units 1/2/2020   WBC 3.4 - 10.8 x10E3/uL 7.3   RBC 4.14 - 5.80 x10E6/uL 5.40   Hemoglobin 13.0 - 17.7 g/dL 16.4   Hematocrit 37.5 - 51.0 % 49.9   MCV 79 - 97 fL 92   Platelets 885 - 762 [id number]/   Albumin 3.6 - 4.8 g/dL 4.5   Calcium 8.6 - 10.2 mg/dL 9.4   SGOT 0 - 40 IU/L 17   Glucose 65 - 99 mg/dL 177(H)   BUN 8 - 27 mg/dL 12   Creatinine 0.76 - 1.27 mg/dL 1.03   Sodium 134 - 144 mmol/L 136   Potassium 3.5 - 5.2 mmol/L 4.2   TSH 0.450 - 4.500 uIU/mL 1.770   Some recent data might be hidden     Lab Results   Component Value Date/Time TSH 1.770 01/02/2020 10:08 AM    TSH 1.960 05/09/2019 02:40 PM    TSH 1.90 11/15/2018 11:30 AM    TSH 1.72 04/05/2018 09:10 AM    TSH 1.46 02/01/2018 09:45 AM    TSH 1.68 07/27/2017 09:50 AM       ALLERGY:  Allergies   Allergen Reactions    Atorvastatin Diarrhea    Codeine Itching    Metformin Other (comments)     Abdominal pain     Wellbutrin [Bupropion] Other (comments)     Night mare  Aggression         CURRENT MEDICATIONS:    Current Outpatient Medications:     sacubitril-valsartan (ENTRESTO) 24 mg/26 mg tablet, Take 1 Tab by mouth two (2) times a day., Disp: 60 Tab, Rfl: 2    levocetirizine (XYZAL) 5 mg tablet, Take 1 Tab by mouth daily. , Disp: 90 Tab, Rfl: 3    metoprolol succinate (TOPROL XL) 25 mg XL tablet, Take 0.5 Tabs by mouth two (2) times a day. Hold morning dose if SBP less than 100, Disp: 60 Tab, Rfl: 3    bumetanide (BUMEX) 0.5 mg tablet, Take 1 Tab by mouth as needed (weight gain or worsening HF sxs). , Disp: 30 Tab, Rfl: 3    simvastatin (ZOCOR) 40 mg tablet, Take  by mouth nightly., Disp: , Rfl:     ACETAMINOPHEN/DIPHENHYDRAMINE (TYLENOL PM PO), Take  by mouth nightly., Disp: , Rfl:     clopidogreL (PLAVIX) 75 mg tab, Take 1 Tab by mouth daily. Indications: blood clot prevention following percutaneous coronary intervention, Disp: 90 Tab, Rfl: 3    albuterol (VENTOLIN HFA) 90 mcg/actuation inhaler, Take  by inhalation. , Disp: , Rfl:     ezetimibe (ZETIA) 10 mg tablet, Take 1 Tab by mouth daily. , Disp: 30 Tab, Rfl: 2    SITagliptin (JANUVIA) 25 mg tablet, Take 25 mg by mouth daily. , Disp: , Rfl:     omeprazole (PRILOSEC) 20 mg capsule, Take 20 mg by mouth daily. , Disp: , Rfl:     melatonin tab tablet, Take  by mouth nightly., Disp: , Rfl:     pyridoxine, vitamin B6, (VITAMIN B-6) 100 mg tablet, Take 100 mg by mouth daily. , Disp: , Rfl:     cholecalciferol (VITAMIN D3) 1,000 unit tablet, Take 1,000 Units by mouth daily. , Disp: , Rfl:     cyanocobalamin (VITAMIN B-12) 500 mcg tablet, Take 500 mcg by mouth daily. , Disp: , Rfl:     aspirin 81 mg chewable tablet, Take 1 Tab by mouth daily. , Disp: , Rfl:     Thank you for your referral and allowing me to participate in this patient's care.     Ancelmo Lewis MD PhD  [address] [place] [address], [address]  Phone: [phone]  Fax: [phone]

## 2020-02-13 ENCOUNTER — DOCUMENTATION ONLY (OUTPATIENT)
Dept: CARDIOLOGY CLINIC | Age: 65
End: 2020-02-13

## 2020-02-13 NOTE — PROGRESS NOTES
Faxed the patient's care card application on this date to Aspen Hayes, [address] [place] [address] financial counselor, as well as Lucille Padron 51.     Francesco Salamanca, MSW, LCSW    Clinical    [phone]

## 2020-04-07 ENCOUNTER — OFFICE VISIT (OUTPATIENT)
Dept: CARDIOLOGY CLINIC | Age: 65
End: 2020-04-07

## 2020-04-07 DIAGNOSIS — Z95.810 ICD (IMPLANTABLE CARDIOVERTER-DEFIBRILLATOR) IN PLACE: Primary | ICD-10-CM

## 2020-06-29 RX ORDER — SACUBITRIL AND VALSARTAN 24; 26 MG/1; MG/1
TABLET, FILM COATED ORAL
Qty: 60 TAB | Refills: 0 | Status: SHIPPED | OUTPATIENT
Start: 2020-06-29 | End: 2020-08-04

## 2020-06-29 NOTE — TELEPHONE ENCOUNTER
Requested Prescriptions     Signed Prescriptions Disp Refills    Entresto 24-26 mg tablet 60 Tab 0     Sig: Take 1 tablet by mouth twice daily     Authorizing Provider: Lucy Maddox     Ordering User: Luciana Brought     No further refills without appt

## 2020-07-02 LAB
25(OH)D3+25(OH)D2 SERPL-MCNC: 32.2 NG/ML (ref 30–100)
BUN SERPL-MCNC: 12 MG/DL (ref 8–27)
BUN/CREAT SERPL: 13 (ref 10–24)
CALCIUM SERPL-MCNC: 9.4 MG/DL (ref 8.6–10.2)
CHLORIDE SERPL-SCNC: 97 MMOL/L (ref 96–106)
CHOLEST SERPL-MCNC: 189 MG/DL (ref 100–199)
CK SERPL-CCNC: 205 U/L (ref 41–331)
CO2 SERPL-SCNC: 22 MMOL/L (ref 20–29)
CREAT SERPL-MCNC: 0.96 MG/DL (ref 0.76–1.27)
ERYTHROCYTE [DISTWIDTH] IN BLOOD BY AUTOMATED COUNT: 13.5 % (ref 11.6–15.4)
FERRITIN SERPL-MCNC: 268 NG/ML (ref 30–400)
GLUCOSE SERPL-MCNC: 130 MG/DL (ref 65–99)
HCT VFR BLD AUTO: 45.7 % (ref 37.5–51)
HDLC SERPL-MCNC: 39 MG/DL
HGB BLD-MCNC: 15.9 G/DL (ref 13–17.7)
IRON SATN MFR SERPL: 31 % (ref 15–55)
IRON SERPL-MCNC: 102 UG/DL (ref 38–169)
LDLC SERPL CALC-MCNC: 126 MG/DL (ref 0–99)
MAGNESIUM SERPL-MCNC: 2.1 MG/DL (ref 1.6–2.3)
MCH RBC QN AUTO: 31.4 PG (ref 26.6–33)
MCHC RBC AUTO-ENTMCNC: 34.8 G/DL (ref 31.5–35.7)
MCV RBC AUTO: 90 FL (ref 79–97)
NT-PROBNP SERPL-MCNC: 195 PG/ML (ref 0–376)
PLATELET # BLD AUTO: 233 X10E3/UL (ref 150–450)
POTASSIUM SERPL-SCNC: 4.4 MMOL/L (ref 3.5–5.2)
RBC # BLD AUTO: 5.06 X10E6/UL (ref 4.14–5.8)
SODIUM SERPL-SCNC: 136 MMOL/L (ref 134–144)
TIBC SERPL-MCNC: 329 UG/DL (ref 250–450)
TRIGL SERPL-MCNC: 122 MG/DL (ref 0–149)
UIBC SERPL-MCNC: 227 UG/DL (ref 111–343)
VLDLC SERPL CALC-MCNC: 24 MG/DL (ref 5–40)
WBC # BLD AUTO: 9.1 X10E3/UL (ref 3.4–10.8)

## 2020-07-06 ENCOUNTER — CLINICAL SUPPORT (OUTPATIENT)
Dept: CARDIOLOGY CLINIC | Age: 65
End: 2020-07-06

## 2020-07-06 ENCOUNTER — OFFICE VISIT (OUTPATIENT)
Dept: CARDIOLOGY CLINIC | Age: 65
End: 2020-07-06

## 2020-07-06 VITALS
HEIGHT: 70 IN | BODY MASS INDEX: 33.93 KG/M2 | HEART RATE: 68 BPM | SYSTOLIC BLOOD PRESSURE: 118 MMHG | DIASTOLIC BLOOD PRESSURE: 72 MMHG | OXYGEN SATURATION: 96 % | WEIGHT: 237 LBS

## 2020-07-06 DIAGNOSIS — I25.5 ISCHEMIC CARDIOMYOPATHY: ICD-10-CM

## 2020-07-06 DIAGNOSIS — Z95.810 CARDIAC DEFIBRILLATOR IN SITU: Primary | ICD-10-CM

## 2020-07-06 DIAGNOSIS — I25.10 CORONARY ARTERY DISEASE INVOLVING NATIVE CORONARY ARTERY OF NATIVE HEART WITHOUT ANGINA PECTORIS: Primary | ICD-10-CM

## 2020-07-06 DIAGNOSIS — I50.20 SYSTOLIC CONGESTIVE HEART FAILURE WITH REDUCED LEFT VENTRICULAR FUNCTION, NYHA CLASS 3 (HCC): ICD-10-CM

## 2020-07-06 RX ORDER — ACETAMINOPHEN 500 MG
TABLET ORAL
COMMUNITY

## 2020-07-06 RX ORDER — METOPROLOL SUCCINATE 25 MG/1
12.5 TABLET, EXTENDED RELEASE ORAL
Qty: 60 TAB | Refills: 3
Start: 2020-07-06

## 2020-07-06 NOTE — PROGRESS NOTES
Visit Vitals  /72 (BP 1 Location: Left arm, BP Patient Position: Sitting)   Pulse 68   Ht 5' 10\" (1.778 m)   Wt 237 lb (107.5 kg)   SpO2 96%   BMI 34.01 kg/m²         ?  plavix and bleeding     Metoprolol drops his bp    Goes to [facility]    Chest pain: no  Shortness of breath: no  Edema: no  Palpitations: no  Dizziness: no    New diagnosis/Surgeries: no    ER/Hospitalizations: no    Refills: no

## 2020-07-06 NOTE — PROGRESS NOTES
Manny Espino MD [facility]  [address]  [place], [address] [facility]    Office [phone]  Fax [phone]  Cell [phone]       Guzman Castellanos is a 75 y.o. male last seen 6 months ago    Assessment  Encounter Diagnoses   Name Primary?  Coronary artery disease involving native coronary artery of native heart without angina pectoris Yes    Ischemic cardiomyopathy     Systolic congestive heart failure with reduced left ventricular function, NYHA class 3 ([facility])       Recommendations:    Ischemic cardiomyopathy, HFrEF, EF 20% with apical akenesis most recently by echo Oct 2019. He has stable Class 2 HF. He has been followed closely by the advanced HF clinic, most recently Feb 2020. Volume status seems fine on prn bumex. However his spells of hypotension are likely related to consecutive days of diuretic use which takes more for increasing urinary stream. I encouraged him to stay hydrated since he is working outside more, and drink less iced tea  - Continue Entresto 24/26 (Intolerant of higher doses)  - reduce Toprol 12.5 mg once dailyh. He is intolerant of Spironolactone. - reinforced when to take bumex  - echo in 3 months    CAD - He suffered an out-of-hospital MI in May 2017 and presented with subacute HF in June. Cadiac MRI demonstrated viability only in the RCA territory. Cardiac cath demonstrated mid %, RCA mid 85%. He underwent PCI RCA with [facility]. Drivers of CAD include smoking and T2DM. He has no exertional angina at a moderate functional capacity. - Continue DAPT indefinitely  - Continue statin and Zetia    S/p ICD. Continue regular checks. In person check today is fine    Hx of alcohol dependence. None x1 year. Follow-up and Dispositions    · Return in about 3 months (around 10/6/2020). Subjective:    Guzman Castellanos reports he is feeling well overall. Active outside repairing lawnmowers. Drinks a lot of iced tea.  Sometimes bumex several days in a row to improve his urinary stream. Denies worsening BERNABE, edema or weight gain. In fact he has lost weight. He gets BPs in 68-44 systolic from time to time. He says he is dizzy and lightheaded sometimes. He denies any palpitations, tachycardia, syncope, orthopnea, edema or paroxysmal nocturnal dyspnea. No ICD therapies. He continues to smoke 1ppd. He stopped drinking > one year ago    He is working on losing weight. He two small meal portions daily. Cardiac risk factors   HTN no  DM  yes  Smoking yes    Cardiac testing  ECHO: 5/31//2017: EF 15-20%, apical AK  ECHO 9/18/17- Dilated LV, EF 20%, apical DK   ECHO 8/21/18:LVD, EF 15-20%, severe DHK, AK akinesis of the apical anterior, apical inferior, apical septal, and apical lateral wall. LAE, mild MR      MRI: 6/1/2017: LV dilated LVEF 32%, severe HK ant wall, RVEF 55%, mild MR; anterior infarct- no viability RCA/LCA: viable     CATH 6/2/2017: LM: nl, LAD: m100%, LCX codom normal, RCA:m 85%, LVEDP 25, no AV gradient, dilated LV, apical AK, EF 20%  -- s/p PCI pRCA: 2.5x18 Xience ([facility]).       AICD 12/5/2017; [employer]    Echo 10/22/19 - dilated LV, EF 20-25%    Past Medical History:   Diagnosis Date    Controlled type 2 diabetes mellitus with circulatory disorder ([address] 6/1/2017    COPD (chronic obstructive pulmonary disease) ([address]     Coronary artery disease involving native coronary artery without angina pectoris 06/01/2017    OOH MI, 2v disease s/p PCI RCA with [facility] June 2017    ETOH abuse 6/1/2017    ICD (implantable cardioverter-defibrillator) in place     Ischemic cardiomyopathy     Extensive LAD territory infarction    Nicotine dependence 6/1/2017    Pacemaker     [employer] ICD    SOB (shortness of breath) 50/2/7176    Systolic congestive heart failure with reduced left ventricular function, NYHA class 3 ([address] 10/25/2018        Current Outpatient Medications   Medication Sig Dispense Refill    pseudoephed/acetaminophen/cpm (ALLERGY-SINUS PO) Take  by mouth.  folic acid/multivit-min/lutein (CENTRUM SILVER PO) Take  by mouth.  triamcinolone acetonide (NASAL ALLERGY NA) by Nasal route.  acetaminophen (TYLENOL) 500 mg tablet Take  by mouth every six (6) hours as needed for Pain.  tetrahydrozoline/polyethyl gly (EYE DROPS OP) Apply  to eye.  Entresto 24-26 mg tablet Take 1 tablet by mouth twice daily (Patient taking differently: Take 1 Tab by mouth two (2) times a day.) 60 Tab 0    BREO ELLIPTA 100-25 mcg/dose inhaler INHALE 1 PUFF BY MOUTH ONCE DAILY 1 Inhaler 0    clopidogreL (PLAVIX) 75 mg tab Take 1 Tab by mouth daily. Indications: blood clot prevention following percutaneous coronary intervention 90 Tab 3    albuterol (VENTOLIN HFA) 90 mcg/actuation inhaler Take  by inhalation.  metoprolol succinate (TOPROL XL) 25 mg XL tablet Take 0.5 Tabs by mouth two (2) times a day. Hold morning dose if SBP less than 100 60 Tab 3    omeprazole (PRILOSEC) 20 mg capsule Take 20 mg by mouth daily.  bumetanide (BUMEX) 0.5 mg tablet Take 1 Tab by mouth as needed (weight gain or worsening HF sxs). 30 Tab 3    aspirin 81 mg chewable tablet Take 1 Tab by mouth daily.  acetaminophen/diphenhydramine (ACETAMINOPHEN PM PO) Take  by mouth.  oxymetazoline (AFRIN) 0.05 % nasal spray 2 Sprays two (2) times a day.  levocetirizine (XYZAL) 5 mg tablet Take 1 Tab by mouth daily. 90 Tab 3    ezetimibe (ZETIA) 10 mg tablet Take 1 Tab by mouth daily. 30 Tab 2    SITagliptin (JANUVIA) 25 mg tablet Take 25 mg by mouth daily.  simvastatin (ZOCOR) 40 mg tablet Take  by mouth nightly.  pyridoxine, vitamin B6, (VITAMIN B-6) 100 mg tablet Take 100 mg by mouth daily.  cholecalciferol (VITAMIN D3) 1,000 unit tablet Take 1,000 Units by mouth daily.  cyanocobalamin (VITAMIN B-12) 500 mcg tablet Take 500 mcg by mouth daily.       ACETAMINOPHEN/DIPHENHYDRAMINE (TYLENOL PM PO) Take  by mouth nightly. Allergies   Allergen Reactions    Atorvastatin Diarrhea    Codeine Itching    Metformin Other (comments)     Abdominal pain     Wellbutrin [Bupropion] Other (comments)     Night mare  Aggression       Retired. Review of Systems  Constitutional: Negative for fever, chills, malaise/fatigue and diaphoresis.  +dizzy, lightheaded  Respiratory: Negative for cough, hemoptysis, sputum production, and wheezing. +BERNABE  Cardiovascular: Negative for chest pain, palpitations, orthopnea, claudication, leg swelling and PND. Gastrointestinal: Negative for heartburn, nausea, vomiting, blood in stool and melena. Genitourinary: Negative for dysuria and flank pain. Musculoskeletal: Negative for joint pain and back pain. Skin: Negative for rash. Neurological: Negative for focal weakness, seizures, loss of consciousness, weakness and headaches. Endo/Heme/Allergies: Does not bruise/bleed easily. Psychiatric/Behavioral: Negative for memory loss. The patient does not have insomnia. Physical Exam  Visit Vitals  /72 (BP 1 Location: Left arm, BP Patient Position: Sitting)   Pulse 68   Ht 5' 10\" (1.778 m)   Wt 237 lb (107.5 kg)   SpO2 96%   BMI 34.01 kg/m²     Wt Readings from Last 3 Encounters:   07/06/20 237 lb (107.5 kg)   02/12/20 247 lb 3.2 oz (112.1 kg)   01/06/20 247 lb (112 kg)      General - well developed well nourished, obese WM  Neck - JVP normal, thyroid nl  Cardiac - normal S1, S2, no murmurs, rubs or gallops. No clicks  Vascular - carotids without bruits, radials, femorals and pedal pulses equal bilateral  Lungs - clear to auscultation bilaterals, no rales, wheezing.    Abd - soft nontender, no HSM, no abd bruits  Extremities - no edema  Skin - no rash  Neuro - nonfocal  Psych - normal mood and affect    Cardiographics  ECG 6/14/17 - SR 75, TWI; unchanged  Echo 10/22/19 - dilated LV, EF 20-25%        Ant Bennett MD

## 2020-07-06 NOTE — PATIENT INSTRUCTIONS
Take Bumex (fluid pill) only when you your weight is up, you feel bloated or have swelling in your legs Take metoprolol 1/2 tab once daily at bedtime only

## 2020-08-04 RX ORDER — SACUBITRIL AND VALSARTAN 24; 26 MG/1; MG/1
TABLET, FILM COATED ORAL
Qty: 60 TAB | Refills: 0 | Status: SHIPPED | OUTPATIENT
Start: 2020-08-04 | End: 2020-09-14 | Stop reason: SDUPTHER

## 2020-09-14 NOTE — TELEPHONE ENCOUNTER
Pharmacy confirmed. Please call patient if there is an issue with refill.        Phone: [phone]

## 2020-09-17 RX ORDER — SACUBITRIL AND VALSARTAN 24; 26 MG/1; MG/1
TABLET, FILM COATED ORAL
Qty: 60 TAB | Refills: 3 | Status: SHIPPED | OUTPATIENT
Start: 2020-09-17 | End: 2021-02-09

## 2020-10-01 PROBLEM — Z13.31 POSITIVE DEPRESSION SCREENING: Status: ACTIVE | Noted: 2020-10-01

## 2020-10-01 PROBLEM — Z72.0 TOBACCO USER: Status: ACTIVE | Noted: 2020-10-01

## 2020-10-01 PROBLEM — F41.9 ANXIETY: Status: ACTIVE | Noted: 2020-10-01

## 2020-10-01 PROBLEM — R06.02 SHORTNESS OF BREATH: Status: ACTIVE | Noted: 2020-10-01

## 2020-10-01 PROBLEM — Z23 ENCOUNTER FOR IMMUNIZATION: Status: ACTIVE | Noted: 2020-10-01

## 2020-10-02 PROBLEM — E55.9 VITAMIN D DEFICIENCY: Status: ACTIVE | Noted: 2020-10-02

## 2020-10-02 PROBLEM — Z23 NEEDS FLU SHOT: Status: ACTIVE | Noted: 2020-10-02

## 2020-10-09 ENCOUNTER — TELEPHONE (OUTPATIENT)
Dept: CARDIOLOGY CLINIC | Age: 65
End: 2020-10-09

## 2020-10-09 NOTE — TELEPHONE ENCOUNTER
I attempted to reach patient several times but his phone is not accepting calls. Patient is scheduled to see   Dr. Kell Miguel (primary care) 10- at 11:00am.  [facility], [address] [place] [address]    Per Dr. Zay العلي office, patient will need to contact his insurance company prior to his visit and select Dr. Sukhdeep Ingram as his PCP. This needs to be done in order for his insurance to pay for his visit. I have mailed patient a letter with all of this information.

## 2020-10-31 PROBLEM — Z23 ENCOUNTER FOR IMMUNIZATION: Status: RESOLVED | Noted: 2020-10-01 | Resolved: 2020-10-31

## 2020-11-01 PROBLEM — Z23 NEEDS FLU SHOT: Status: RESOLVED | Noted: 2020-10-02 | Resolved: 2020-11-01

## 2020-12-31 ENCOUNTER — TELEPHONE (OUTPATIENT)
Dept: CARDIOLOGY CLINIC | Age: 65
End: 2020-12-31

## 2020-12-31 NOTE — TELEPHONE ENCOUNTER
I called patient to schedule mo f/u. Patient would like to see Dr. Sydelle Bosworth. Patient has been scheduled for 4/1/21 at 11am as a split visit with Gennaro Parikh and Dr. Sydelle Bosworth. Appointment reminder mailed to patient.

## 2021-01-07 ENCOUNTER — OFFICE VISIT (OUTPATIENT)
Dept: CARDIOLOGY CLINIC | Age: 66
End: 2021-01-07
Payer: MEDICARE

## 2021-01-07 DIAGNOSIS — Z95.810 AUTOMATIC IMPLANTABLE CARDIAC DEFIBRILLATOR IN SITU: Primary | ICD-10-CM

## 2021-01-07 PROCEDURE — 93295 DEV INTERROG REMOTE 1/2/MLT: CPT | Performed by: INTERNAL MEDICINE

## 2021-01-07 NOTE — LETTER
1/11/2021 8:40 AM 
 
Mr. Ashley Loyola [address] [facility] [address] [id number] Dear Patient, We have received your recent remote monitor check of your implanted device scheduled on  1/7/2021. Your remaining estimated battery life is 12 years and your device is working normally & appropriately. Your next remote monitor check is scheduled for 4/8/2021. Your next clinic/office check is scheduled for Wednesday, 7/14/2021 at 2:45am.  Bing Goldberg will have your device checked then see the nurse practitioner. Please be advised that due to [employer] there is NO  PARKING. We kindly request that you keep this in mind when planning your arrival time. Thank you for helping us keep timely appointments for everyone. If you have any questions, please call the Pacemaker/ICD clinic at the [facility] location at [phone]. Sincerely, 
 
 
Marilynn TREJO, RN Cardiac Device Clinic Coordinator [facility] [address]. [address] [facility] [id number] [place] [address] 
[phone]

## 2021-01-11 NOTE — PROGRESS NOTES
c/BS ICD/pacer remote    Normal & appropriate ICD/pacer function. No episodes recorded. No tachy therapies delivered. 0% RV pacing. See scanned document for details.

## 2021-01-22 RX ORDER — CLOPIDOGREL BISULFATE 75 MG/1
TABLET ORAL
Qty: 90 TAB | Refills: 0 | Status: SHIPPED | OUTPATIENT
Start: 2021-01-22 | End: 2021-05-18 | Stop reason: SDUPTHER

## 2021-03-29 ENCOUNTER — TELEPHONE (OUTPATIENT)
Dept: CARDIOLOGY CLINIC | Age: 66
End: 2021-03-29

## 2021-03-29 ENCOUNTER — TRANSCRIBE ORDER (OUTPATIENT)
Dept: SCHEDULING | Age: 66
End: 2021-03-29

## 2021-03-29 NOTE — TELEPHONE ENCOUNTER
Called pt using two pt identifiers. notified pt of an echo order that has not been completed and  needs to be completed prior to office visit on 4/1/21. Pt scheduled through care coordination for echo at 1000am at [facility] location. tstated understanding on 4/1/21 and had no further questions. Zenon Perdue RN.

## 2021-03-29 NOTE — TELEPHONE ENCOUNTER
Called pt in regards to having echo done on 4/1/21. Insurance pre authorization takes 10 to 14 days. Echo on 4/1/21 will have to be rescheduled. Pt is still to present to office visit with [facility] on 4/1/21 at 11am. Pt did not answer. A voicemail was left with call back number for [facility]. Luz Urbina RN.

## 2021-03-30 ENCOUNTER — TELEPHONE (OUTPATIENT)
Dept: CARDIOLOGY CLINIC | Age: 66
End: 2021-03-30

## 2021-03-30 NOTE — TELEPHONE ENCOUNTER
Patient returned Maryanne's call. He can be reached at [phone]. Per Aisha's note 3/29/21 I did inform patient that he needs to reschedule his echo because it takes 10-14 days for authorization. I provided patient with coordination of care's number to reschedule his echo. I also informed him that he still needs to come to his appointment 4/1/21.

## 2021-04-01 ENCOUNTER — OFFICE VISIT (OUTPATIENT)
Dept: CARDIOLOGY CLINIC | Age: 66
End: 2021-04-01
Payer: MEDICARE

## 2021-04-01 VITALS
WEIGHT: 233 LBS | TEMPERATURE: 98.6 F | HEART RATE: 81 BPM | HEIGHT: 70 IN | BODY MASS INDEX: 33.36 KG/M2 | SYSTOLIC BLOOD PRESSURE: 110 MMHG | RESPIRATION RATE: 20 BRPM | OXYGEN SATURATION: 98 % | DIASTOLIC BLOOD PRESSURE: 62 MMHG

## 2021-04-01 DIAGNOSIS — I50.20 NYHA CLASS 3 HEART FAILURE WITH REDUCED EJECTION FRACTION (HCC): Primary | ICD-10-CM

## 2021-04-01 DIAGNOSIS — R06.09 DOE (DYSPNEA ON EXERTION): ICD-10-CM

## 2021-04-01 DIAGNOSIS — E55.9 VITAMIN D DEFICIENCY: ICD-10-CM

## 2021-04-01 DIAGNOSIS — R07.9 CHEST PAIN, UNSPECIFIED TYPE: ICD-10-CM

## 2021-04-01 PROCEDURE — G8417 CALC BMI ABV UP PARAM F/U: HCPCS | Performed by: NURSE PRACTITIONER

## 2021-04-01 PROCEDURE — 99214 OFFICE O/P EST MOD 30 MIN: CPT | Performed by: NURSE PRACTITIONER

## 2021-04-01 PROCEDURE — 3017F COLORECTAL CA SCREEN DOC REV: CPT | Performed by: NURSE PRACTITIONER

## 2021-04-01 PROCEDURE — 1101F PT FALLS ASSESS-DOCD LE1/YR: CPT | Performed by: NURSE PRACTITIONER

## 2021-04-01 PROCEDURE — G8427 DOCREV CUR MEDS BY ELIG CLIN: HCPCS | Performed by: NURSE PRACTITIONER

## 2021-04-01 PROCEDURE — 93000 ELECTROCARDIOGRAM COMPLETE: CPT | Performed by: NURSE PRACTITIONER

## 2021-04-01 PROCEDURE — G8536 NO DOC ELDER MAL SCRN: HCPCS | Performed by: NURSE PRACTITIONER

## 2021-04-01 PROCEDURE — G8432 DEP SCR NOT DOC, RNG: HCPCS | Performed by: NURSE PRACTITIONER

## 2021-04-01 RX ORDER — NITROGLYCERIN 0.3 MG/1
0.4 TABLET SUBLINGUAL
COMMUNITY

## 2021-04-01 RX ORDER — PYRIDOXINE HCL (VITAMIN B6) 100 MG
100 TABLET ORAL DAILY
COMMUNITY
End: 2021-06-16

## 2021-04-01 RX ORDER — SITAGLIPTIN 25 MG/1
25 TABLET, FILM COATED ORAL DAILY
COMMUNITY
Start: 2021-03-17 | End: 2021-08-02

## 2021-04-01 NOTE — PROGRESS NOTES
[facility] [place], [address] [place] [address] Note    Patient name: Chandrakant Kang  Patient : 1955  Patient MRN: [id number]  Date of service: 21    Primary care Luís Beltran MD  Primary cardiologist: Dr. Doug Duvall     CHIEF COMPLAINT:  Chronic systolic heart failure     PLAN:  Continue current medical therapy for heart failure; limited by hypotension  Continue current dose of beta-blocker, split toprol XL to 12.5mg twice daily (hold dose if SBP<100)  Continue current dose of entresto 24/26mg twice daily  Cannot tolerate spironolactone due to breast tenderness and decrease libido  Does not require diuretics; weight today 241lbs from 255lbs (Body mass index is 33.43 kg/m².   Continue ASA and statin (cannot tolerate zetia + too expensive)  ICD interrogation every 3 months per routine, per Dr. Yakov Neumann tolerate CPAP; headaches likely related (followed by neurology)  Scheduled annual echocardiogram on (21)   EKG today  Check HF labs, uric acid, vitamin D, troponin i  Reinforced low salt diet  Reinforced fluid restriction to 6 x 8oz glasses per day  Discussed tobacco cessation; continues to smoke, intolerant of wellbutrin or chantix,   Discussed abstinence from illicit drugs; continues to abstain from alcohol  Reinforced importance of weight loss  Follow-up with primary cardiologist, Dr. Chris Christiansen; next visit; we will alternate  Follow-up with EP cardiologist  Referral for PCP;Paulo Tejeda MD for depression and anxiety, Depression screening positive, and inhalers likely COPD  Up-to-date with Flu vaccine   Follow-up with neurology on further workup on TIA   consult appreciated (assistance with jardiance and patient assistance application)  Return to AHF Clinic in 4-6 weeks with NP/MD     IMPRESSION:  Chronic systolic heart failure  Stage C, NYHA class IIIA symptoms  CAD s/p RCA stent + 100% m-LAD  Ischemic cardiomyopathy, LVEF 15-20%  TIA, possible   Morbid obesity  HL  HTN  SHERI on CPAP  Previous EtOH use, abstinent  COPD  Active tobacco abuse     CARDIAC IMAGING:  Echo (10/22/19) LVEF 21-25%, cannot rule out bicuspic valve, no other valvular dz, multiple WMA+  Echo (8/21/18) LVEF 15-20%, no valvular pathology  EKG (3/13/19) NSR, no ST-T changes  LHC (6/2/17), 90% RCA and 100% mLAD, s/p [facility] to RCA  ICD interrogation (5/1/19) no events, normal device function, good battery  Cardiac MRI (6/1/17)  . Mildly dilated left ventricle by 3-D volumetric assessment index to body surface area. Moderate to severe left ventricular systolic dysfunction. Severe hypokinesis of the mid anterior wall. Dyskinesis and thinning of the distal anterior wall, anteroapical wall, apex and apical septal wall. 3-D LVEF 32%. 2. Normal right ventricular size with low-normal right ventricular systolic function. RVEF 55%. 3. Trace to mild mitral regurgitation. 4. On LGE imaging, there is a dense infarct involving greater than 75% thickness  of the thinned out mid to distal anterior wall, anteroapical wall, apex, apical septal wall with with hardly any viable myocardium subtending this infarct. The entire lateral wall, inferolateral wall, inferior wall, inferoseptal wall does not demonstrate any infarct and are completely viable. Based on the viability information, the LAD territory is not a suitable candidate for revascularization and will not improve. The RCA and LCx territories are widely viable and should benefit from revascularization if needed. There is no areas of infiltrative sarcoidosis or amyloidosis. There is no areas of inflammatory myocarditis. 5. Normal pericardium without significant pericardial effusion. Small to  medium-sized bilateral pleural effusions. 6. Mildly dilated ascending aorta, aortic arch and descending thoracic aorta. No significant atherosclerotic disease. No aortic dissection.  7. Dilated main pulmonary artery suggests mild pulmonary hypertension.     HISTORY OF PRESENT ILLNESS:  I had the pleasure of [address] [facility] at [address] [place] [address] in [facility] 2 a 70 y. o. male with h/o CAD (90% RCA and 100 m-LAD) s/p PCI RCA with [facility] 6/17, T2D, COPD, ETOH abuse (quit months ago), tobacco abuse, Jordyn Ochoa presents for further evaluation of his chronic systolic heart failure.      INTERVAL HISTORY:  Today, Mr. Skeet Lefort presents for routine clinic visit.     He is anxious and unhappy about his current living situation. He discusses this for most of the clinic visit. Would like to quit smoking cigarettes but is stressed and continues to smoke.     Patient walked to our clinic from parking garage and had to stop once to catch his breath. Patient cannot walk more than one block without symptoms of fatigue or shortness of breath. Patient cannot walk one flight of stairs without symptoms of fatigue or shortness of breath. Patient can perform home activities without problem and routinely participates in daily walking for more than 15 minutes. Patient denies symptoms of volume overload or leg edema. Patient denies abdominal bloating or change of appetite. Patient's weight id down to 233lbs from 241lbs per [facility] scale    Patient denies orthopnea, PND or nocturia. He is unable to tolerate CPAP. Patient denies irregular heart rate or palpitations. ICD has not fired. Patient has recurrent +chest pain with rest and exertion. Has sL-NTG but afraid to take due to side effects. When chest pain or discomfort occurs he reports uploading his ICD report to his EP cardiologist and waiting for further instructions. Patient is not compliant with fluid restriction and taking medications as prescribed. Patient manages his own medications. REVIEW OF SYSTEMS:  General: Denies fever, night sweats.  +Increased stress due to finances and living situation  Ear, nose and throat: Denies difficulty hearing, sinus problems, runny nose, post-nasal drip, ringing in ears, mouth sores, loose teeth, ear pain, nosebleeds, sore throate, facial pain or numbess  Cardiovascular: see above in the interval history  Respiratory: +wheezing, denies cough, sputum production, hemoptysis. Gastrointestinal: Denies heartburn, constipation, intolerance to certain foods, diarrhea, abdominal pain, nausea, vomiting, difficulty swallowing, blood in stool  Kidney and bladder: Denies painful urination, frequent urination, urgency, prostate problems and impotence  Musculoskeletal: Denies joint pain, muscle weakness  Skin and hair: Denies change in existing skin lesions, hair loss or increase, breast changes    PHYSICAL EXAM:  Visit Vitals  /62 (BP 1 Location: Right arm, BP Patient Position: Sitting, BP Cuff Size: Adult)   Pulse 81   Temp 98.6 °F (37 °C) (Oral)   Resp 20   Ht 5' 10\" (1.778 m)   Wt 233 lb (105.7 kg)   SpO2 98%   BMI 33.43 kg/m²     General: Patient is well developed, morbidly obese in no acute distress, very talkative, smells of strong cigarette smoke  HEENT: Normocephalic and atraumatic. No scleral icterus. Pupils are equal, round and reactive to light and accomodation. No conjunctival injection. Oropharynx is clear. Neck: Supple. No evidence of thyroid enlargements or lymphadenopathy. JVD: Cannot be appreciated   Lungs: Breath sounds are equal and clear anteriorly,  +scattered wheezes posterior, rhonchi, or rales. Heart: Regular rate and rhythm with normal S1 and S2. No murmurs, gallops or rubs. +ICD MANUEL chest  Abdomen: Soft, obese, no mass or tenderness. No organomegaly or hernia. Bowel sounds present. Genitourinary and rectal: deferred  Extremities: No cyanosis, clubbing, or edema. Neurologic: No focal sensory or motor deficits are noted. Grossly intact +anxious   Skin: Warm, dry and well perfused. No lesions, nodules or rashes are noted.  Dirty long fingernails    PAST MEDICAL HISTORY:  Past Medical History:   Diagnosis Date    Controlled type 2 diabetes mellitus with circulatory disorder ([facility] 6/1/2017    COPD (chronic obstructive pulmonary disease) (H[facility])     Coronary artery disease involving native coronary artery without angina pectoris 06/01/2017    OOH MI, 2v disease s/p PCI RCA with [facility] June 2017    ETOH abuse 6/1/2017    ICD (implantable cardioverter-defibrillator) in place     Ischemic cardiomyopathy     Extensive LAD territory infarction    Nicotine dependence 6/1/2017    Pacemaker     [employer] ICD    SOB (shortness of breath) 15/4/6788    Systolic congestive heart failure with reduced left ventricular function, NYHA class 3 ([facility] 10/25/2018       PAST SURGICAL HISTORY:  Past Surgical History:   Procedure Laterality Date    HX CORONARY STENT PLACEMENT      HX HEART CATHETERIZATION      HX PACEMAKER      [employer] ICD    HX TONSILLECTOMY      HX WISDOM TEETH EXTRACTION         FAMILY HISTORY:  Family History   Problem Relation Age of Onset    Stroke Father     Heart Disease Father     Hypertension Father        SOCIAL HISTORY:  Social History     Socioeconomic History    Marital status: SINGLE     Spouse name: Not on file    Number of children: Not on file    Years of education: Not on file    Highest education level: Not on file   Tobacco Use    Smoking status: Current Every Day Smoker     Packs/day: 1.00     Years: 30.00     Pack years: 30.00     Types: Cigarettes    Smokeless tobacco: Never Used   Substance and Sexual Activity    Alcohol use: No     Comment: former drinker-quit 3 months ago-drank a 6 pack per day    Drug use: No    Sexual activity: Yes     Partners: Female       LABORATORY RESULTS:     No flowsheet data found.   Lab Results   Component Value Date/Time    TSH 1.770 01/02/2020 10:08 AM    TSH 1.960 05/09/2019 02:40 PM    TSH 1.90 11/15/2018 11:30 AM    TSH 1.72 04/05/2018 09:10 AM    TSH 1.46 02/01/2018 09:45 AM    TSH 1.68 07/27/2017 09:50 AM       ALLERGY:  Allergies   Allergen Reactions    Atorvastatin Diarrhea    Codeine Itching    Metformin Other (comments)     Abdominal pain     Wellbutrin [Bupropion] Other (comments)     Night mare  Aggression         CURRENT MEDICATIONS:    Current Outpatient Medications:     Januvia 25 mg tablet, , Disp: , Rfl:     pyridoxine, vitamin B6, (Vitamin B-6) 100 mg tablet, Take 100 mg by mouth daily. Takes sometimes, Disp: , Rfl:     nitroglycerin (NITROSTAT) 0.3 mg SL tablet, 0.4 mg by SubLINGual route every five (5) minutes as needed for Chest Pain., Disp: , Rfl:     sacubitriL-valsartan (Entresto) 24-26 mg tablet, TAKE 1 TABLET BY MOUTH TWICE DAILY . , Disp: 60 Tab, Rfl: 2    clopidogreL (PLAVIX) 75 mg tab, TAKE 1 TABLET BY MOUTH ONCE DAILY FOR BLOOD CLOT PREVENTION FOLLOWING PERCUTANEOUS CORONARY INTERVENTION, Disp: 90 Tab, Rfl: 0    nortriptyline (PAMELOR) 25 mg capsule, Take  by mouth nightly., Disp: , Rfl:     pseudoephed/acetaminophen/cpm (ALLERGY-SINUS PO), Take  by mouth., Disp: , Rfl:     folic acid/multivit-min/lutein (CENTRUM SILVER PO), Take  by mouth., Disp: , Rfl:     acetaminophen (TYLENOL) 500 mg tablet, Take  by mouth every six (6) hours as needed for Pain., Disp: , Rfl:     tetrahydrozoline/polyethyl gly (EYE DROPS OP), Apply  to eye., Disp: , Rfl:     metoprolol succinate (Toprol XL) 25 mg XL tablet, Take 0.5 Tabs by mouth nightly. Hold morning dose if SBP less than 90, Disp: 60 Tab, Rfl: 3    oxymetazoline (AFRIN) 0.05 % nasal spray, 2 Sprays two (2) times a day., Disp: , Rfl:     BREO ELLIPTA 100-25 mcg/dose inhaler, INHALE 1 PUFF BY MOUTH ONCE DAILY, Disp: 1 Inhaler, Rfl: 0    albuterol (VENTOLIN HFA) 90 mcg/actuation inhaler, Take  by inhalation. , Disp: , Rfl:     levocetirizine (XYZAL) 5 mg tablet, Take 1 Tab by mouth daily. , Disp: 90 Tab, Rfl: 3    bumetanide (BUMEX) 0.5 mg tablet, Take 1 Tab by mouth as needed (weight gain or worsening HF sxs). , Disp: 30 Tab, Rfl: 3    melatonin 3 mg tablet, Take  by mouth., Disp: , Rfl:     aspirin 81 mg chewable tablet, Take 1 Tab by mouth daily. , Disp: , Rfl:     Thank you for your referral and allowing me to participate in this patient's care.     Tammy Adamson NP  [address] [place] [address], [address]  Phone: [phone]  Fax: [phone]

## 2021-04-01 NOTE — PATIENT INSTRUCTIONS
Medication changes: No changes. Testing Ordered: An order for Echo has been placed. The echo is scheduled for 4/19/21 @10:30. Please arrive at 10:00 at [address], [address] Labs drawn in clinic today. You will be contacted about any abnormal results EKG was performed in clinic today. Other Recommendations: Follow up with Dr. Mandy Luong Our , Olesya Amezcua, will be calling you. Ensure your drinking an adequate amount of water with a goal of 6-8 eight ounce glasses (1.5-2 liters) of fluid daily. Your urine should be clear and light yellow straw colored. If your blood pressure begins to consistently run below 90/60 and/or you begin to experience dizziness or lightheadedness, please contact the [facility] at [phone]. Follow up 4-6 weeks with [facility] NP Please monitor your weights daily upon waking and after using the bathroom. Keep a written records of your weights and bring to your next appointment. If you have a weight gain of 3 or more pounds overnight OR 5 or more pounds in one week please contact our office. .Nitroglycerin (By mouth) Nitroglycerin ([id number]-[id number]-[id number]-in) Treats or prevents angina (chest pain). This medicine is a nitrate. Brand Name(s): Nitro-Time, Nitrostat There may be other brand names for this medicine. When This Medicine Should Not Be Used: This medicine is not right for everyone. Do not use it if you had an allergic reaction to nitroglycerin or similar medicines. How to Use This Medicine:  
Long Acting Capsule, Tablet, Long Acting Tablet · Your doctor will tell you how much medicine to use. Do not use more than directed. Sit down before you take this medicine, because it could make you lightheaded. · Most people use this medicine for only part of the day or as needed.  
· Extended-release capsule or extended-release tablet:  
¨ Take on an empty stomach with a full glass of water. ¨ Swallow whole. Do not crush, break, or chew it. · Buccal tablet:  
¨ Place it between your gum and upper cheek or upper lip. Let the tablet dissolve slowly in your mouth over several hours. Do not chew, crush, or swallow the tablet or put it under your tongue. ¨ Avoid drinking anything hot while the tablet is in your mouth and do not touch the tablet with your tongue. ¨ Do not go to sleep with a buccal tablet in your mouth. · Sublingual tablet:  
¨ Wet the tablet with saliva and place it under your tongue or inside your cheek. Let the tablet dissolve. Do not chew, crush, or swallow the tablet. Wait 5 minutes. If you still have pain, take a second tablet. Do not take more than 3 tablets in 15 minutes. If you still have pain after you take a total of 3 tablets, this is an emergency. Call 911. Do not drive yourself to the hospital. 
¨ You may use this medicine 5 to 10 minutes before an activity that can cause angina. This may help prevent the attack. · Read and follow the patient instructions that come with this medicine. Talk to your doctor or pharmacist if you have any questions. · Missed dose: Take a dose as soon as you remember. If it is almost time for your next dose, wait until then and take a regular dose. Do not take extra medicine to make up for a missed dose. · Store the medicine in a closed container at room temperature, away from heat, moisture, and direct light. Store the sublingual tablets at room temperature in the original glass container, away from heat, moisture, and direct light. How to Store and Dispose of This Medicine: · Store the medicine at room temperature in a closed container, away from heat, moisture, and direct light. Ask your pharmacist, doctor, or health caregiver about the best way to dispose of any outdated medicine or medicine no longer needed. · Keep all medicine out of the reach of children and never share your medicine with anyone.  
Drugs and Foods to Avoid: Ask your doctor or pharmacist before using any other medicine, including over-the-counter medicines, vitamins, and herbal products. · Do not use this medicine if you are also using avanafil, riociguat, sildenafil, tadalafil, or vardenafil. · Some medicines can affect how nitroglycerin works. Tell your doctor if you are using any of the following: ¨ Alteplase, aspirin, heparin ¨ Blood pressure medicine ¨ Diuretic (water pill) ¨ Ergot medicine · Tell your doctor if you are using any medicine that makes your mouth dry, such as medicines that treat depression. · Do not drink alcohol while you are using this medicine. Warnings While Using This Medicine: · Tell your doctor if you are pregnant or breastfeeding, or if you have kidney disease, anemia, congestive heart failure, enlarged heart, low blood pressure, or a recent heart attack. Tell your doctor if you have a history of a head injury. · This medicine may make you dizzy or lightheaded. Do not drive or do anything else that could be dangerous until you know how this medicine affects you. These symptoms may be worse if you drink alcohol. · Medicines that treat chest pain sometimes cause headaches when you first start using it. This is normal. Do not stop using the medicine or change the time you use it to avoid headaches. Ask your doctor if you can take aspirin or acetaminophen to treat the headache. · Tell any doctor or dentist who treats you that you are using this medicine. This medicine may affect certain medical test results. · Keep all medicine out of the reach of children. Never share your medicine with anyone. Possible Side Effects While Using This Medicine:  
Call your doctor right away if you notice any of these side effects: · Allergic reaction: Itching or hives, swelling in your face or hands, swelling or tingling in your mouth or throat, chest tightness, trouble breathing · Blurred vision, dry mouth · Increased chest pain, fast or slow heartbeat · Severe or ongoing dizziness, lightheadedness, or fainting · Throbbing, severe, or ongoing headache, confusion, low fever, or trouble seeing · Trouble breathing, cold sweat, blue skin, lips, or nails · Warmth or redness in your face, neck, arms, or upper chest 
If you notice these less serious side effects, talk with your doctor: · Nausea, vomiting, weakness If you notice other side effects that you think are caused by this medicine, tell your doctor. Call your doctor for medical advice about side effects. You may report side effects to FDA at 8-996-JBB-5025 © 2017 [facility] Information is for End User's use only and may not be sold, redistributed or otherwise used for commercial purposes. The above information is an  only. It is not intended as medical advice for individual conditions or treatments. Talk to your doctor, nurse or pharmacist before following any medical regimen to see if it is safe and effective for you. Thank you for allowing us the privilege of being a part of your healthcare team! Please do not hesitate to contact our office at [phone] with any questions or concerns. Low Sodium Diet (2,000 Milligram): Care Instructions Overview Limiting sodium can be an important part of managing some health problems. The most common source of sodium is salt. People get most of the salt in their diet from canned, prepared, and packaged foods. Fast food and restaurant meals also are very high in sodium. Your doctor will probably limit your sodium to less than 2,000 milligrams (mg) a day. This limit counts all the sodium in prepared and packaged foods and any salt you add to your food. Follow-up care is a key part of your treatment and safety. Be sure to make and go to all appointments, and call your doctor if you are having problems. It's also a good idea to know your test results and keep a list of the medicines you take.  
How can you care for yourself at home? Read food labels Read labels on cans and food packages. The labels tell you how much sodium is in each serving. Make sure that you look at the serving size. If you eat more than the serving size, you have eaten more sodium. Food labels also tell you the Percent Daily Value for sodium. Choose products with low Percent Daily Values for sodium. Be aware that sodium can come in forms other than salt, including monosodium glutamate (MSG), sodium citrate, and sodium bicarbonate (baking soda). MSG is often added to Asian food. When you eat out, you can sometimes ask for food without MSG or added salt. Buy low-sodium foods Buy foods that are labeled \"unsalted\" (no salt added), \"sodium-free\" (less than 5 mg of sodium per serving), or \"low-sodium\" (140 mg or less of sodium per serving). Foods labeled \"reduced-sodium\" and \"light sodium\" may still have too much sodium. Be sure to read the label to see how much sodium you are getting. Buy fresh vegetables, or frozen vegetables without added sauces. Buy low-sodium versions of canned vegetables, soups, and other canned goods. Prepare low-sodium meals Cut back on the amount of salt you use in cooking. This will help you adjust to the taste. Do not add salt after cooking. One teaspoon of salt has about 2,300 mg of sodium. Take the salt shaker off the table. Flavor your food with garlic, lemon juice, onion, vinegar, herbs, and spices. Do not use soy sauce, lite soy sauce, steak sauce, onion salt, garlic salt, celery salt, or ketchup on your food. Use low-sodium salad dressings, sauces, and ketchup. Or make your own salad dressings and sauces without adding salt. Use less salt (or none) when recipes call for it. You can often use half the salt a recipe calls for without losing flavor. Other foods such as rice, pasta, and grains do not need added salt. Rinse canned vegetables, and cook them in fresh water. This removes somebut not allof the salt. Avoid water that is naturally high in sodium or that has been treated with water softeners, which add sodium. If you buy bottled water, read the label and choose a sodium-free brand. Avoid high-sodium foods Avoid eating: 
Smoked, cured, salted, and canned meat, fish, and poultry. Ham, nguyen, hot dogs, and luncheon meats. Regular, hard, and processed cheese and regular peanut butter. Crackers with salted tops, and other salted snack foods such as pretzels, chips, and salted popcorn. Frozen prepared meals, unless labeled low-sodium. Canned and dried soups, broths, and bouillon, unless labeled sodium-free or low-sodium. Canned vegetables, unless labeled sodium-free or low-sodium. [place] fries, pizza, tacos, and other fast foods. Pickles, olives, ketchup, and other condiments, especially soy sauce, unless labeled sodium-free or low-sodium. Where can you learn more? Go to http://www.gray.com/ Enter L896 in the search box to learn more about \"Low Sodium Diet (2,000 Milligram): Care Instructions. \" Current as of: December 17, 2020               Content Version: 12.8 © 9123-8267 [employer] Not Give Back. Care instructions adapted under license by [employer] (which disclaims liability or warranty for this information). If you have questions about a medical condition or this instruction, always ask your healthcare professional. [place] [address] any warranty or liability for your use of this information. Virtual Heart Failure Support Group [facility] invites you to learn more about heart failure and to share your questions, ideas, and experiences with others. Each month, the Heart Failure Support Group features a new educational topic and a guest speaker, followed by an interactive discussion. Our Heart Failure Nurse Navigator will moderate each session. You will be able to participate by phone, tablet or computer through [address] [place] [address].  This support group takes place on the 3rd Thursday of each month from 6:00-7:30PM. All individuals living with heart failure and their caregivers are welcome to join. If you are interested in participating, please contact us at [email or web address] and you will be sent the link to join the [employer].

## 2021-04-02 LAB
25(OH)D3+25(OH)D2 SERPL-MCNC: 24.6 NG/ML (ref 30–100)
MAGNESIUM SERPL-MCNC: 2 MG/DL (ref 1.6–2.3)
NT-PROBNP SERPL-MCNC: 177 PG/ML (ref 0–376)
TROPONIN I SERPL-MCNC: <0.01 NG/ML (ref 0–0.04)
URATE SERPL-MCNC: 5.1 MG/DL (ref 3.8–8.4)

## 2021-04-05 ENCOUNTER — TELEPHONE (OUTPATIENT)
Dept: CARDIOLOGY CLINIC | Age: 66
End: 2021-04-05

## 2021-04-05 DIAGNOSIS — E55.9 VITAMIN D DEFICIENCY: Primary | ICD-10-CM

## 2021-04-05 RX ORDER — THERA TABS 400 MCG
1 TAB ORAL DAILY
COMMUNITY
End: 2021-06-16 | Stop reason: ALTCHOICE

## 2021-04-05 RX ORDER — ERGOCALCIFEROL 1.25 MG/1
50000 CAPSULE ORAL
Qty: 12 CAP | Refills: 0 | Status: SHIPPED | OUTPATIENT
Start: 2021-04-05 | End: 2021-06-16 | Stop reason: ALTCHOICE

## 2021-04-05 NOTE — TELEPHONE ENCOUNTER
Per Chris Burciaga NP: Labs reviewed: vitamin d low at 24.6 start ergocalciferol [id number] weekly x 12 weeks. Called patient; verified ID with two identifiers. Reviewed labs and NP recommendation to start ergocalciferol. Patient verbalized understanding.  Corrina Barrett RN

## 2021-04-08 ENCOUNTER — OFFICE VISIT (OUTPATIENT)
Dept: CARDIOLOGY CLINIC | Age: 66
End: 2021-04-08
Payer: MEDICARE

## 2021-04-08 DIAGNOSIS — Z95.810 AUTOMATIC IMPLANTABLE CARDIAC DEFIBRILLATOR IN SITU: Primary | ICD-10-CM

## 2021-04-08 PROCEDURE — 93295 DEV INTERROG REMOTE 1/2/MLT: CPT | Performed by: INTERNAL MEDICINE

## 2021-04-08 PROCEDURE — 93296 REM INTERROG EVL PM/IDS: CPT | Performed by: INTERNAL MEDICINE

## 2021-04-08 NOTE — LETTER
4/8/2021 3:03 PM 
 
Mr. Angelic Humphries [address] [place] [address] [id number] Dear Patient, We have received your recent remote monitor check of your implanted device scheduled on  4/8/2021 . Your remaining estimated battery life is 12 years  and your device is working normally & appropriately. Your next remote monitor check is scheduled for   10/21/2021. Your next clinic/office check is scheduled for Wednesday, 7/14/2021 at 2:45pm.  Lisa Vuong will have your device checked then see the provider. Please bring a complete list of your medicaions with strengths and dosages to this appointment. Please be advised that due to [address] there is NO  PARKING. We kindly request that you keep this in mind when planning your arrival time. Thank you for helping us keep timely appointments for everyone. If you have any questions, please call the Pacemaker/ICD clinic at the [facility] location at [phone]. Sincerely, 
 
Renu STEENN, RN Cardiac Device Clinic Coordinator [facility] [place] [id number] [facility]. [address] [place], [address] [place] [address] 
[phone]

## 2021-04-12 ENCOUNTER — DOCUMENTATION ONLY (OUTPATIENT)
Dept: CARDIOLOGY CLINIC | Age: 66
End: 2021-04-12

## 2021-04-12 NOTE — PROGRESS NOTES
KACY contacted patient at request of [facility] NP to provide patient assistance for Jardiance. Patient stated he has Jardiance and it is affordable, he pays $9.00 per month for this medication, however the patient believes the health risks for taking Jardiance are greater than if he doesn't take this medication. KACY relayed this information to [facility] NP. SW available if any further assistance is required. Patient reached out to [facility] requesting further information regarding patient assistance, 8:45 am KACY called patient [phone] voice mailbox is full. KACY spoke with [facility] NP regarding this patient and Roby Krueger, she stated patient reported he wasn't taking Jardiance because he couldn't afford it, however when KACY spoke with patient yesterday (see above note) he stated he has this medication and it is affordable. NP requested SW encourage patient to have his ECHO scheduled for 4/19/21 as patient reported low BPs and Jardiance will be discussed at next visit after ECHO. SW will attempt to reach patient later today. KACY attempted to contact patient again this afternoon (see above note). SW was not able to speak with patient, voice mailbox full.

## 2021-05-18 ENCOUNTER — TELEPHONE (OUTPATIENT)
Dept: CARDIOLOGY CLINIC | Age: 66
End: 2021-05-18

## 2021-05-18 NOTE — TELEPHONE ENCOUNTER
I called patient to reschedule missed appt. Patient scheduled 6/16/21 at 2:30pm with Betty Brown. Patient would like a call back to discuss his medications. He can be reached at [phone].

## 2021-05-18 NOTE — TELEPHONE ENCOUNTER
I returned call to patient, he would like refill of Plavix. I gave him number to [facility]. He states understanding.

## 2021-06-16 ENCOUNTER — OFFICE VISIT (OUTPATIENT)
Dept: CARDIOLOGY CLINIC | Age: 66
End: 2021-06-16
Payer: MEDICARE

## 2021-06-16 VITALS
SYSTOLIC BLOOD PRESSURE: 108 MMHG | DIASTOLIC BLOOD PRESSURE: 62 MMHG | WEIGHT: 232.8 LBS | TEMPERATURE: 98.3 F | OXYGEN SATURATION: 97 % | HEIGHT: 70 IN | BODY MASS INDEX: 33.33 KG/M2 | HEART RATE: 76 BPM | RESPIRATION RATE: 20 BRPM

## 2021-06-16 DIAGNOSIS — I50.20 NYHA CLASS 3 HEART FAILURE WITH REDUCED EJECTION FRACTION (HCC): ICD-10-CM

## 2021-06-16 DIAGNOSIS — E11.59 CONTROLLED TYPE 2 DIABETES MELLITUS WITH OTHER CIRCULATORY COMPLICATION, WITHOUT LONG-TERM CURRENT USE OF INSULIN (HCC): ICD-10-CM

## 2021-06-16 DIAGNOSIS — E66.01 SEVERE OBESITY WITH BODY MASS INDEX (BMI) OF 35.0 TO 39.9 WITH SERIOUS COMORBIDITY (HCC): ICD-10-CM

## 2021-06-16 DIAGNOSIS — E55.9 VITAMIN D DEFICIENCY: Primary | ICD-10-CM

## 2021-06-16 DIAGNOSIS — I25.5 ISCHEMIC CARDIOMYOPATHY: ICD-10-CM

## 2021-06-16 PROCEDURE — 3017F COLORECTAL CA SCREEN DOC REV: CPT | Performed by: NURSE PRACTITIONER

## 2021-06-16 PROCEDURE — G8432 DEP SCR NOT DOC, RNG: HCPCS | Performed by: NURSE PRACTITIONER

## 2021-06-16 PROCEDURE — G8536 NO DOC ELDER MAL SCRN: HCPCS | Performed by: NURSE PRACTITIONER

## 2021-06-16 PROCEDURE — G8417 CALC BMI ABV UP PARAM F/U: HCPCS | Performed by: NURSE PRACTITIONER

## 2021-06-16 PROCEDURE — 99214 OFFICE O/P EST MOD 30 MIN: CPT | Performed by: NURSE PRACTITIONER

## 2021-06-16 PROCEDURE — 99000 SPECIMEN HANDLING OFFICE-LAB: CPT | Performed by: NURSE PRACTITIONER

## 2021-06-16 PROCEDURE — 2022F DILAT RTA XM EVC RTNOPTHY: CPT | Performed by: NURSE PRACTITIONER

## 2021-06-16 PROCEDURE — 3046F HEMOGLOBIN A1C LEVEL >9.0%: CPT | Performed by: NURSE PRACTITIONER

## 2021-06-16 PROCEDURE — 1101F PT FALLS ASSESS-DOCD LE1/YR: CPT | Performed by: NURSE PRACTITIONER

## 2021-06-16 PROCEDURE — G8427 DOCREV CUR MEDS BY ELIG CLIN: HCPCS | Performed by: NURSE PRACTITIONER

## 2021-06-16 RX ORDER — FLUTICASONE FUROATE, UMECLIDINIUM BROMIDE AND VILANTEROL TRIFENATATE 100; 62.5; 25 UG/1; UG/1; UG/1
1 POWDER RESPIRATORY (INHALATION) DAILY
COMMUNITY
End: 2021-08-02

## 2021-06-16 NOTE — PATIENT INSTRUCTIONS
Medication changes: 
 
Start Farxiga 5 mg once daily Please take this to your pharmacy to notify them of the change in medications. Testing Ordered: 
 
Lab work has been drawn today. You will be contacted with any abnormal results requiring changes to your current plan of care. Please call [phone] to schedule echocardiogram 
 
Other Recommendations: A referral to Pulmonary medicine has been placed. You will be contacted for scheduling. Ensure your drinking an adequate amount of water with a goal of 6-8 eight ounce glasses (1.5-2 liters) of fluid daily. Your urine should be clear and light yellow straw colored. If your blood pressure begins to consistently run below 90/60 and/or you begin to experience dizziness or lightheadedness, please contact the [facility] at [phone]. Follow up in 3 months with NP with [facility] Please monitor your weights daily upon waking and after using the bathroom. Keep a written records of your weights and bring to your next appointment. If you have a weight gain of 3 or more pounds overnight OR 5 or more pounds in one week please contact our office. Thank you for allowing us the privilege of being a part of your healthcare team! Please do not hesitate to contact our office at [phone] with any questions or concerns. Virtual Heart Failure Support Group [facility] invites you to learn more about heart failure and to share your questions, ideas, and experiences with others. Each month, the Heart Failure Support Group features a new educational topic and a guest speaker, followed by an interactive discussion. Our Heart Failure Nurse Navigator will moderate each session. You will be able to participate by phone, tablet or computer through [address] [place] [address].  This support group takes place on the 3rd Thursday of each month from 6:00-7:30PM. All individuals living with heart failure and their caregivers are welcome to join. If you are interested in participating, please contact us at [email or web address] and you will be sent the link to join the [employer].

## 2021-06-16 NOTE — PROGRESS NOTES
[facility] in [place], [address] [place] [address] Note    Patient name: Ortega Engle  Patient : 1955  Patient MRN: [id number]  Date of service: 21    Primary care Maday Zamudio MD  Primary cardiologist: Dr. Ronald Santos  Primary EP: Dr Ronald Santos:  Chief Complaint   Patient presents with    CHF        PLAN:  Continue current medical therapy for heart failure; limited by hypotension  Continue current dose of beta-blocker- toprol XL  12.5mg daily   Continue current dose of entresto 24/26mg twice daily  Cannot tolerate spironolactone due to breast tenderness   Does not require diuretics; weight today 232lb (Body mass index is 33.4 kg/m².   Continue ASA and statin   Start Farxiga 5mg- will send to pharmacy to check on copay first- if not will resume Jardiance  ICD interrogation every 3 months per routine, per Dr. Ramsey Simmons  Has not followed up with sleep medicine despite numerous conversations   Scheduled annual echocardiogram on (21) - has not scheduled yet  Labs today- HF labs including HgA1c  Reinforced low salt diet  Reinforced fluid restriction to 6 x 8oz glasses per day  Discussed tobacco cessation; continues to smoke, intolerant of wellbutrin or chantix,   Discussed abstinence from illicit drugs; continues to abstain from alcohol  Follow-up with primary cardiologist, Dr. Anmol Dunn; next visit; we will alternate  Follow-up with EP cardiologist  Referral for PCP;Flako Tejeda MD for depression and anxiety, Depression screening positive  Referral sent for pulmonary eval of COPD  Up-to-date with Flu vaccine   Return to AHF Clinic in 3 months with NP/MD     IMPRESSION:  Chronic systolic heart failure  Stage C, NYHA class IIIA symptoms  CAD s/p RCA stent + 100% m-LAD  Ischemic cardiomyopathy, LVEF 15-20%  TIA, possible   Morbid obesity  HL  HTN  SHERI on CPAP  Previous EtOH use, abstinent  COPD  Active tobacco abuse     CARDIAC IMAGING:  Echo (10/22/19) LVEF 21-25%, cannot rule out bicuspic valve, no other valvular dz, multiple WMA+  Echo (8/21/18) LVEF 15-20%, no valvular pathology  EKG (3/13/19) NSR, no ST-T changes  LHC (6/2/17), 90% RCA and 100% mLAD, s/p [facility] to RCA  ICD interrogation (5/1/19) no events, normal device function, good battery  Cardiac MRI (6/1/17)  . Mildly dilated left ventricle by 3-D volumetric assessment index to body surface area. Moderate to severe left ventricular systolic dysfunction. Severe hypokinesis of the mid anterior wall. Dyskinesis and thinning of the distal anterior wall, anteroapical wall, apex and apical septal wall. 3-D LVEF 32%. 2. Normal right ventricular size with low-normal right ventricular systolic function. RVEF 55%. 3. Trace to mild mitral regurgitation. 4. On LGE imaging, there is a dense infarct involving greater than 75% thickness  of the thinned out mid to distal anterior wall, anteroapical wall, apex, apical septal wall with with hardly any viable myocardium subtending this infarct. The entire lateral wall, inferolateral wall, inferior wall, inferoseptal wall does not demonstrate any infarct and are completely viable. Based on the viability information, the LAD territory is not a suitable candidate for revascularization and will not improve. The RCA and LCx territories are widely viable and should benefit from revascularization if needed. There is no areas of infiltrative sarcoidosis or amyloidosis. There is no areas of inflammatory myocarditis. 5. Normal pericardium without significant pericardial effusion. Small to  medium-sized bilateral pleural effusions. 6. Mildly dilated ascending aorta, aortic arch and descending thoracic aorta. No significant atherosclerotic disease. No aortic dissection.  7. Dilated main pulmonary artery suggests mild pulmonary hypertension.     HISTORY OF PRESENT ILLNESS:  I had the pleasure of seeing Marco Holder in [facility] at [address] [place] [address] 2 a 70 y. o. male with h/o CAD (90% RCA and 100 m-LAD) s/p PCI RCA with [facility] 6/17, T2D, COPD, ETOH abuse (quit months ago), tobacco abuse, Soniya Rein presents for further evaluation of his chronic systolic heart failure.      INTERVAL HISTORY:  Today, Mr. Nelson Subramanian presents for HF clinic visit. Overall he states he is at his baseline, he does states that he tires easily when doing activities especially with lifting equipment and walking. He did not have to stop walking into clinic today. He started a new inhaler from his PCP and this has improved his SOB. He does not see a pulmonologist, he does sometimes still use nocturnal O2. He is taking his medications and manages them on his own- he did take an extra toprol today so that his BP would be ok for his office visit. He has not followed up with sleep medicine or has his TTE that was ordered in April. He has lost about 80 lbs in the last year, he admits to dietary indiscretions because healthy food is expensive.          REVIEW OF SYSTEMS:  General: Denies fever, night sweats. +Increased stress due to finances and living situation  Ear, nose and throat: Denies difficulty hearing, sinus problems, runny nose, post-nasal drip, ringing in ears, mouth sores, loose teeth, ear pain, nosebleeds, sore throate, facial pain or numbess  Cardiovascular: see above in the interval history  Respiratory: +wheezing, denies cough, sputum production, hemoptysis.   Gastrointestinal: Denies heartburn, constipation, intolerance to certain foods, diarrhea, abdominal pain, nausea, vomiting, difficulty swallowing, blood in stool  Kidney and bladder: Denies painful urination, frequent urination, urgency, prostate problems and impotence  Musculoskeletal: Denies joint pain, muscle weakness  Skin and hair: Denies change in existing skin lesions, hair loss or increase, breast changes    PHYSICAL EXAM:  Visit Vitals  /62 (BP 1 Location: Right arm, BP Patient Position: Sitting, BP Cuff Size: Adult)   Pulse 76   Temp 98.3 °F (36.8 °C)   Resp 20   Ht 5' 10\" (1.778 m)   Wt 232 lb 12.8 oz (105.6 kg)   SpO2 97%   BMI 33.40 kg/m²     General: Patient is well developed, morbidly obese in no acute distress, very talkative, smells of strong cigarette smoke  HEENT: Normocephalic and atraumatic. No scleral icterus. Pupils are equal, round and reactive to light and accomodation. No conjunctival injection. Oropharynx is clear. Neck: Supple. No evidence of thyroid enlargements or lymphadenopathy. JVD: Cannot be appreciated   Lungs: Breath sounds are equal and clear anteriorly,  +scattered wheezes posterior, rhonchi, or rales. Heart: Regular rate and rhythm with normal S1 and S2. No murmurs, gallops or rubs. +ICD MANUEL chest  Abdomen: Soft, obese, no mass or tenderness. No organomegaly or hernia. Bowel sounds present. Genitourinary and rectal: deferred  Extremities: No cyanosis, clubbing, or edema. Neurologic: No focal sensory or motor deficits are noted. Grossly intact +anxious   Skin: Warm, dry and well perfused. No lesions, nodules or rashes are noted.  Dirty long fingernails    PAST MEDICAL HISTORY:  Past Medical History:   Diagnosis Date    Controlled type 2 diabetes mellitus with circulatory disorder ([address] 6/1/2017    COPD ([facility] disease) [facility]     Coronary artery disease involving native coronary artery without angina pectoris 06/01/2017    OOH MI, 2v disease s/p PCI RCA with [facility] June 2017    ETOH abuse 6/1/2017    ICD (implantable cardioverter-defibrillator) in place     Ischemic cardiomyopathy     Extensive LAD territory infarction    Nicotine dependence 6/1/2017    Pacemaker     [employer] ICD    SOB (shortness of breath) 34/4/9336    Systolic congestive heart failure with reduced left ventricular function, NYHA class 3 ([address] 10/25/2018       PAST SURGICAL HISTORY:  Past Surgical History:   Procedure Laterality Date    HX CORONARY STENT PLACEMENT      HX HEART CATHETERIZATION      HX PACEMAKER      [employer] ICD    HX TONSILLECTOMY      HX WISDOM TEETH EXTRACTION         FAMILY HISTORY:  Family History   Problem Relation Age of Onset    Stroke Father     Heart Disease Father     Hypertension Father        SOCIAL HISTORY:  Social History     Socioeconomic History    Marital status: SINGLE     Spouse name: Not on file    Number of children: Not on file    Years of education: Not on file    Highest education level: Not on file   Tobacco Use    Smoking status: Current Every Day Smoker     Packs/day: 1.00     Years: 30.00     Pack years: 30.00     Types: Cigarettes    Smokeless tobacco: Never Used   Substance and Sexual Activity    Alcohol use: No     Comment: former drinker-quit 3 months ago-drank a 6 pack per day    Drug use: No    Sexual activity: Yes     Partners: Female     Social Determinants of Health     Financial Resource Strain:     Difficulty of Paying Living Expenses:    Food Insecurity:     Worried About Running Out of Food in the Last Year:     Ran Out of Food in the Last Year:    Transportation Needs:     Lack of Transportation (Medical):  Lack of Transportation (Non-Medical):    Physical Activity:     Days of Exercise per Week:     Minutes of Exercise per Session:    Stress:     Feeling of Stress :    Social Connections:     Frequency of Communication with Friends and Family:     Frequency of Social Gatherings with Friends and Family:     Attends Buddhism Services:     Active Member of Clubs or Organizations:     Attends Club or Organization Meetings:     Marital Status:        LABORATORY RESULTS:     No flowsheet data found.   Lab Results   Component Value Date/Time    TSH 1.770 01/02/2020 10:08 AM    TSH 1.960 05/09/2019 02:40 PM    TSH 1.90 11/15/2018 11:30 AM    TSH 1.72 04/05/2018 09:10 AM    TSH 1.46 02/01/2018 09:45 AM TSH 1.68 07/27/2017 09:50 AM       ALLERGY:  Allergies   Allergen Reactions    Atorvastatin Diarrhea    Codeine Itching    Metformin Other (comments)     Abdominal pain     Wellbutrin [Bupropion] Other (comments)     Night mare  Aggression         CURRENT MEDICATIONS:    Current Outpatient Medications:     clopidogreL (PLAVIX) 75 mg tab, TAKE 1 TABLET BY MOUTH ONCE DAILY FOR BLOOD CLOT PREVENTION FOLLOWING PERCUTANEOUS CORONARY INTERVENTION, Disp: 90 Tab, Rfl: 0    ergocalciferol (ERGOCALCIFEROL) 1,250 mcg (50,000 unit) capsule, Take 1 Cap by mouth every seven (7) days. , Disp: 12 Cap, Rfl: 0    therapeutic multivitamin (THERAGRAN) tablet, Take 1 Tab by mouth daily. Centrum Silver for men, Disp: , Rfl:     Januvia 25 mg tablet, , Disp: , Rfl:     pyridoxine, vitamin B6, (Vitamin B-6) 100 mg tablet, Take 100 mg by mouth daily. Takes sometimes, Disp: , Rfl:     nitroglycerin (NITROSTAT) 0.3 mg SL tablet, 0.4 mg by SubLINGual route every five (5) minutes as needed for Chest Pain., Disp: , Rfl:     sacubitriL-valsartan (Entresto) 24-26 mg tablet, TAKE 1 TABLET BY MOUTH TWICE DAILY . , Disp: 60 Tab, Rfl: 2    melatonin 3 mg tablet, Take  by mouth., Disp: , Rfl:     nortriptyline (PAMELOR) 25 mg capsule, Take  by mouth nightly., Disp: , Rfl:     pseudoephed/acetaminophen/cpm (ALLERGY-SINUS PO), Take  by mouth., Disp: , Rfl:     folic acid/multivit-min/lutein (CENTRUM SILVER PO), Take  by mouth., Disp: , Rfl:     acetaminophen (TYLENOL) 500 mg tablet, Take  by mouth every six (6) hours as needed for Pain., Disp: , Rfl:     tetrahydrozoline/polyethyl gly (EYE DROPS OP), Apply  to eye., Disp: , Rfl:     metoprolol succinate (Toprol XL) 25 mg XL tablet, Take 0.5 Tabs by mouth nightly.  Hold morning dose if SBP less than 90, Disp: 60 Tab, Rfl: 3    oxymetazoline (AFRIN) 0.05 % nasal spray, 2 Sprays two (2) times a day., Disp: , Rfl:     BREO ELLIPTA 100-25 mcg/dose inhaler, INHALE 1 PUFF BY MOUTH ONCE DAILY, Disp: 1 Inhaler, Rfl: 0    albuterol (VENTOLIN HFA) 90 mcg/actuation inhaler, Take  by inhalation. , Disp: , Rfl:     levocetirizine (XYZAL) 5 mg tablet, Take 1 Tab by mouth daily. , Disp: 90 Tab, Rfl: 3    bumetanide (BUMEX) 0.5 mg tablet, Take 1 Tab by mouth as needed (weight gain or worsening HF sxs). , Disp: 30 Tab, Rfl: 3    aspirin 81 mg chewable tablet, Take 1 Tab by mouth daily. , Disp: , Rfl:     Thank you for your referral and allowing me to participate in this patient's care.     Papito Alicia NP  [address] [place] [address], [address]  Phone: [phone]

## 2021-06-17 LAB
25(OH)D3+25(OH)D2 SERPL-MCNC: 33.2 NG/ML (ref 30–100)
ALBUMIN SERPL-MCNC: 4.2 G/DL (ref 3.8–4.8)
ALBUMIN/GLOB SERPL: 1.4 {RATIO} (ref 1.2–2.2)
ALP SERPL-CCNC: 86 IU/L (ref 48–121)
ALT SERPL-CCNC: 21 IU/L (ref 0–44)
AST SERPL-CCNC: 20 IU/L (ref 0–40)
BILIRUB SERPL-MCNC: 0.3 MG/DL (ref 0–1.2)
BUN SERPL-MCNC: 13 MG/DL (ref 8–27)
BUN/CREAT SERPL: 14 (ref 10–24)
CALCIUM SERPL-MCNC: 9.4 MG/DL (ref 8.6–10.2)
CHLORIDE SERPL-SCNC: 105 MMOL/L (ref 96–106)
CO2 SERPL-SCNC: 19 MMOL/L (ref 20–29)
CREAT SERPL-MCNC: 0.94 MG/DL (ref 0.76–1.27)
EST. AVERAGE GLUCOSE BLD GHB EST-MCNC: 143 MG/DL
GLOBULIN SER CALC-MCNC: 3.1 G/DL (ref 1.5–4.5)
GLUCOSE SERPL-MCNC: 106 MG/DL (ref 65–99)
HBA1C MFR BLD: 6.6 % (ref 4.8–5.6)
MAGNESIUM SERPL-MCNC: 2 MG/DL (ref 1.6–2.3)
NT-PROBNP SERPL-MCNC: 264 PG/ML (ref 0–376)
POTASSIUM SERPL-SCNC: 4.2 MMOL/L (ref 3.5–5.2)
PROT SERPL-MCNC: 7.3 G/DL (ref 6–8.5)
SODIUM SERPL-SCNC: 139 MMOL/L (ref 134–144)
SPECIMEN STATUS REPORT, ROLRST: NORMAL

## 2021-06-21 ENCOUNTER — TELEPHONE (OUTPATIENT)
Dept: CARDIOLOGY CLINIC | Age: 66
End: 2021-06-21

## 2021-08-02 ENCOUNTER — OFFICE VISIT (OUTPATIENT)
Dept: CARDIOLOGY CLINIC | Age: 66
End: 2021-08-02

## 2021-08-02 ENCOUNTER — CLINICAL SUPPORT (OUTPATIENT)
Dept: CARDIOLOGY CLINIC | Age: 66
End: 2021-08-02
Payer: MEDICARE

## 2021-08-02 VITALS
BODY MASS INDEX: 33.36 KG/M2 | WEIGHT: 233 LBS | HEART RATE: 72 BPM | HEIGHT: 70 IN | DIASTOLIC BLOOD PRESSURE: 82 MMHG | RESPIRATION RATE: 18 BRPM | OXYGEN SATURATION: 98 % | SYSTOLIC BLOOD PRESSURE: 140 MMHG

## 2021-08-02 DIAGNOSIS — Z95.810 AUTOMATIC IMPLANTABLE CARDIAC DEFIBRILLATOR IN SITU: Primary | ICD-10-CM

## 2021-08-02 DIAGNOSIS — I25.5 ISCHEMIC CARDIOMYOPATHY: ICD-10-CM

## 2021-08-02 PROCEDURE — 99215 OFFICE O/P EST HI 40 MIN: CPT | Performed by: NURSE PRACTITIONER

## 2021-08-02 PROCEDURE — 3017F COLORECTAL CA SCREEN DOC REV: CPT | Performed by: NURSE PRACTITIONER

## 2021-08-02 PROCEDURE — G8427 DOCREV CUR MEDS BY ELIG CLIN: HCPCS | Performed by: NURSE PRACTITIONER

## 2021-08-02 PROCEDURE — 1101F PT FALLS ASSESS-DOCD LE1/YR: CPT | Performed by: NURSE PRACTITIONER

## 2021-08-02 PROCEDURE — G8417 CALC BMI ABV UP PARAM F/U: HCPCS | Performed by: NURSE PRACTITIONER

## 2021-08-02 PROCEDURE — G8432 DEP SCR NOT DOC, RNG: HCPCS | Performed by: NURSE PRACTITIONER

## 2021-08-02 PROCEDURE — G8536 NO DOC ELDER MAL SCRN: HCPCS | Performed by: NURSE PRACTITIONER

## 2021-08-02 PROCEDURE — 93282 PRGRMG EVAL IMPLANTABLE DFB: CPT | Performed by: NURSE PRACTITIONER

## 2021-08-02 NOTE — LETTER
8/2/2021    Patient: Sara Dow   YOB: 1955   Date of Visit: 8/2/2021     Ara San MD  [facility] [address]  Via Fax: [phone]    Dear Ara San MD,      Thank you for referring Mr. Angela Griffin to [address] for evaluation. My notes for this consultation are attached. If you have questions, please do not hesitate to call me. I look forward to following your patient along with you.       Sincerely,    Manoj Gibbs NP

## 2021-08-02 NOTE — PROGRESS NOTES
Room EP 2  Visit Vitals  BP (!) 140/82 (BP 1 Location: Left upper arm, BP Patient Position: Sitting)   Pulse 72   Resp 18   Ht 5' 10\" (1.778 m)   Wt 233 lb (105.7 kg)   SpO2 98%   BMI 33.43 kg/m²       Chest pain: no  Shortness of breath: yes  Edema: no  Palpitations, Skipped beats, Rapid heartbeat: no  Dizziness: no  Fatigue:yes    New diagnosis/Surgeries: no    ER/Hospitalizations: no    Refills:no

## 2021-08-02 NOTE — PROGRESS NOTES
HISTORY OF PRESENTING ILLNESS      Manda Quiroz is a 77 y.o. male with hx of CHF, NYHA Class IIIA symptoms, CAD s/p RCA stent, ICM with LVEF of 15-20% followed by AHF, possible TIA, obesity, HTN, SHERI with CPAP, COPD, previous [address] use, active tobacco abuse who is here for follow-up of his ICD. ICD interrogation was normal device functioning without arrhythmias. He denies cardiac complaints during today's visit.      ACTIVE PROBLEM LIST     Patient Active Problem List    Diagnosis Date Noted    ICD (implantable cardioverter-defibrillator) in place 12/04/2017    Vitamin D deficiency 10/02/2020    Shortness of breath 10/01/2020    Positive depression screening 10/01/2020    Tobacco user 10/01/2020    Anxiety 19/95/4053    Systolic congestive heart failure with reduced left ventricular function, NYHA class 3 ([address] 10/25/2018    Severe obesity with body mass index (BMI) of 35.0 to 39.9 with serious comorbidity ([address] 08/30/2018    Ischemic cardiomyopathy 09/18/2017    Coronary artery disease involving native coronary artery without angina pectoris 36/56/0319    Systolic heart failure, ACC/AHA stage C ([address] 06/01/2017    ETOH abuse 06/01/2017    Controlled type 2 diabetes mellitus with circulatory disorder ([address] 06/01/2017    Nicotine dependence 06/01/2017           PAST MEDICAL HISTORY     Past Medical History:   Diagnosis Date    Controlled type 2 diabetes mellitus with circulatory disorder ([address] 6/1/2017    COPD (chronic obstructive pulmonary disease) ([address]     Coronary artery disease involving native coronary artery without angina pectoris 06/01/2017    OOH MI, 2v disease s/p PCI RCA with [facility] June 2017    ETOH abuse 6/1/2017    ICD (implantable cardioverter-defibrillator) in place     Ischemic cardiomyopathy     Extensive LAD territory infarction    Nicotine dependence 6/1/2017    Pacemaker     [employer] ICD    SOB (shortness of breath) 36/4/5762    Systolic congestive heart failure with reduced left ventricular function, NYHA class 3 ([facility] 10/25/2018           PAST SURGICAL HISTORY     Past Surgical History:   Procedure Laterality Date    HX CORONARY STENT PLACEMENT      HX HEART CATHETERIZATION      HX PACEMAKER      [employer] ICD    HX TONSILLECTOMY      HX WISDOM TEETH EXTRACTION            ALLERGIES     Allergies   Allergen Reactions    Atorvastatin Diarrhea    Codeine Itching    Metformin Other (comments)     Abdominal pain     Wellbutrin [Bupropion] Other (comments)     Night mare  Aggression           FAMILY HISTORY     Family History   Problem Relation Age of Onset    Stroke Father     Heart Disease Father     Hypertension Father     negative for cardiac disease       SOCIAL HISTORY     Social History     Socioeconomic History    Marital status: SINGLE     Spouse name: Not on file    Number of children: Not on file    Years of education: Not on file    Highest education level: Not on file   Tobacco Use    Smoking status: Current Every Day Smoker     Packs/day: 1.00     Years: 30.00     Pack years: 30.00     Types: Cigarettes    Smokeless tobacco: Never Used   Substance and Sexual Activity    Alcohol use: No     Comment: former drinker-quit 3 months ago-drank a 6 pack per day    Drug use: No    Sexual activity: Yes     Partners: Female     Social Determinants of Health     Financial Resource Strain:     Difficulty of Paying Living Expenses:    Food Insecurity:     Worried About Running Out of Food in the Last Year:     Ran Out of Food in the Last Year:    Transportation Needs:     Lack of Transportation (Medical):      Lack of Transportation (Non-Medical):    Physical Activity:     Days of Exercise per Week:     Minutes of Exercise per Session:    Stress:     Feeling of Stress :    Social Connections:     Frequency of Communication with Friends and Family:     Frequency of Social Gatherings with Friends and Family:     Attends Sabianism Services:     Active Member of Clubs or Organizations:     Attends Club or Organization Meetings:     Marital Status:          MEDICATIONS     Current Outpatient Medications   Medication Sig    clopidogreL (PLAVIX) 75 mg tab TAKE 1 TABLET BY MOUTH ONCE DAILY FOR BLOOD CLOT PREVENTION FOLLOWING PERCUTANEOUS CORONARY INTERVENTION    nitroglycerin (NITROSTAT) 0.3 mg SL tablet 0.4 mg by SubLINGual route every five (5) minutes as needed for Chest Pain.  sacubitriL-valsartan (Entresto) 24-26 mg tablet TAKE 1 TABLET BY MOUTH TWICE DAILY .  folic acid/multivit-min/lutein (CENTRUM SILVER PO) Take  by mouth.  acetaminophen (TYLENOL) 500 mg tablet Take  by mouth every six (6) hours as needed for Pain.  tetrahydrozoline/polyethyl gly (EYE DROPS OP) Apply  to eye.  metoprolol succinate (Toprol XL) 25 mg XL tablet Take 0.5 Tabs by mouth nightly. Hold morning dose if SBP less than 90    oxymetazoline (AFRIN) 0.05 % nasal spray 2 Sprays two (2) times a day.  albuterol (VENTOLIN HFA) 90 mcg/actuation inhaler Take 2 Puffs by inhalation every four (4) hours as needed.  aspirin 81 mg chewable tablet Take 1 Tab by mouth daily. No current facility-administered medications for this visit. I have reviewed the nurses notes, vitals, problem list, allergy list, medical history, family, social history and medications. REVIEW OF SYMPTOMS      General: Pt denies excessive weight gain or loss. Pt is able to conduct ADL's  HEENT: Denies blurred vision, headaches, hearing loss, epistaxis and difficulty swallowing. Respiratory: Denies cough, congestion, shortness of breath, BERNABE, wheezing or stridor.   Cardiovascular: Denies precordial pain, palpitations, edema or PND  Gastrointestinal: Denies poor appetite, indigestion, abdominal pain or blood in stool  Genitourinary: Denies hematuria, dysuria, increased urinary frequency  Musculoskeletal: Denies joint pain or swelling from muscles or joints  Neurologic: Denies tremor, paresthesias, headache, or sensory motor disturbance  Psychiatric: Denies confusion, insomnia, depression  Integumentray: Denies rash, itching or ulcers. Hematologic: Denies easy bruising, bleeding       PHYSICAL EXAMINATION      Vitals:    08/02/21 1003   BP: (!) 140/82   Pulse: 72   Resp: 18   SpO2: 98%   Weight: 233 lb (105.7 kg)   Height: 5' 10\" (1.778 m)     General: Well developed, in no acute distress. HEENT: No jaundice, oral mucosa moist, no oral ulcers  Neck: Supple, no stiffness, no lymphadenopathy, supple  Heart:  Normal S1/S2 negative S3 or S4. Regular, no murmur, gallop or rub, no jugular venous distention  Respiratory: Clear bilaterally x 4, no wheezing or rales  Abdomen:   Soft, non-tender, bowel sounds are active.   Extremities:  No edema, normal cap refill, no cyanosis. Musculoskeletal: No clubbing, no deformities  Neuro: A&Ox3, speech clear, gait stable, cooperative, no focal neurologic deficits  Skin: Skin color is normal. No rashes or lesions. Non diaphoretic, moist.  Vascular: 2+ pulses symmetric in all extremities       DIAGNOSTIC DATA      EKG:        LABORATORY DATA      Lab Results   Component Value Date/Time    WBC 9.0 10/01/2020 12:00 AM    HGB 15.6 10/01/2020 12:00 AM    HCT 45.3 10/01/2020 12:00 AM    PLATELET 276 93/74/7186 12:00 AM    MCV 94 10/01/2020 12:00 AM      Lab Results   Component Value Date/Time    Sodium 139 06/16/2021 12:00 AM    Potassium 4.2 06/16/2021 12:00 AM    Chloride 105 06/16/2021 12:00 AM    CO2 19 (L) 06/16/2021 12:00 AM    Anion gap 9 11/15/2018 11:30 AM    Glucose 106 (H) 06/16/2021 12:00 AM    BUN 13 06/16/2021 12:00 AM    Creatinine 0.94 06/16/2021 12:00 AM    BUN/Creatinine ratio 14 06/16/2021 12:00 AM    GFR est AA 97 06/16/2021 12:00 AM    GFR est non-AA 84 06/16/2021 12:00 AM    Calcium 9.4 06/16/2021 12:00 AM    Bilirubin, total 0.3 06/16/2021 12:00 AM    Alk.  phosphatase 86 06/16/2021 12:00 AM    Protein, total 7.3 06/16/2021 12:00 AM    Albumin 4.2 06/16/2021 12:00 AM    Globulin 3.6 11/15/2018 11:30 AM    A-G Ratio 1.4 06/16/2021 12:00 AM    ALT (SGPT) 21 06/16/2021 12:00 AM           ASSESSMENT      1. Cardiomyopathy                        A. Ischemic                        B. NYHA class 2-3                        C. LVEF 15-20%  2. ICD. A. Single chamber   B. C/ [address]. Primary prevention  3. Diabetes mellitus  4. CAD, native   S/p RCA stent  5. Percutaneous transluminal angioplasty  6. Myocardial infarction, previous  7. COPD   8. SHERI with CPAP       PLAN     Continue follow-up in device clinic remotely and once yearly in office. Device interrogation was reviewed today and showed normal functioning. contnue care with AHF every 3 months and pulmonary for COPD. He has some noncompliance issues with his medications due to cost, will discuss at his AHF clinic follow up. Thank you, Bill Cadet MD for allowing me to participate in the care of this extraordinarily pleasant male. Please do not hesitate to contact me for further questions/concerns.        ALEX Ash [facility] [address]  [address] [facility], [place] [address]  [address]  [phone] / [phone] Fax   [phone] / [phone] Fax

## 2021-08-10 ENCOUNTER — TELEPHONE (OUTPATIENT)
Dept: CARDIOLOGY CLINIC | Age: 66
End: 2021-08-10

## 2021-08-10 NOTE — TELEPHONE ENCOUNTER
Fer Crisostomo from [facility] would like to know if patient is supposed to be on a statin.      Phone: [phone]

## 2021-08-11 ENCOUNTER — TELEPHONE (OUTPATIENT)
Dept: CARDIOLOGY CLINIC | Age: 66
End: 2021-08-11

## 2021-08-11 NOTE — TELEPHONE ENCOUNTER
Called family practice specialists and spoke with [facility]. Notified her per our med reconciliation patient is not on statin medications. Advised her that statin medications are usually prescribed by primary cardiologist. notified her that patients sees Dr. Charles Rodriguez and to follow up with him. She stated understanding and had no further questions JUSTIN Pierce RN

## 2021-08-11 NOTE — TELEPHONE ENCOUNTER
Returned call to Daris Rinne at [facility] [place], [place] verified using two patient identifiers. Arnaud Meth that Dr. Rancho Green does not prescribe a statin for Mr. Lynne Hill. Patient sees the [facility] at [facility] and Dr. Arnold Linder at [facility]. Daris Rinne states she will call them to inquire if patient is supposed to be on a statin.

## 2021-09-17 ENCOUNTER — TELEPHONE (OUTPATIENT)
Dept: CARDIOLOGY CLINIC | Age: 66
End: 2021-09-17

## 2021-09-17 NOTE — TELEPHONE ENCOUNTER
1336 - called pt using two pt identifiers. Asked pt if he had his echo completed, pt states he has not. Asked pt if he had scheduled his echo to have it done, pt states he has not scheduled it and says \"I dont know if I want to have another one done. \" pt also states he can hardly get out of bed and has to hold on to the wall when he is walking. Informed pt that getting an echo done would help us to see how his heart is doing and help us making decisions about his treatment, but ultimately the decision is his. Pt states, \"yeah, I dont think I will get it done. \"    Pt asked about getting a refill on his blood thinner since he runs out soon, informed pt we would talk with him about it at his appointment on Monday with the NP MARGE Orr. Pt wanted to verify date and time of appointment, informed pt his appointment with [facility] is scheduled for Monday 9/20/21 at 2:00 PM. Pt states understanding and has no further questions or concerns at this time.     Nay Arvizu RN

## 2021-09-20 ENCOUNTER — OFFICE VISIT (OUTPATIENT)
Dept: CARDIOLOGY CLINIC | Age: 66
End: 2021-09-20
Payer: MEDICARE

## 2021-09-20 VITALS
OXYGEN SATURATION: 97 % | WEIGHT: 229 LBS | HEART RATE: 76 BPM | TEMPERATURE: 97.5 F | BODY MASS INDEX: 32.78 KG/M2 | DIASTOLIC BLOOD PRESSURE: 66 MMHG | SYSTOLIC BLOOD PRESSURE: 116 MMHG | RESPIRATION RATE: 16 BRPM | HEIGHT: 70 IN

## 2021-09-20 DIAGNOSIS — I50.20 NYHA CLASS 3 HEART FAILURE WITH REDUCED EJECTION FRACTION (HCC): ICD-10-CM

## 2021-09-20 DIAGNOSIS — E55.9 VITAMIN D DEFICIENCY: ICD-10-CM

## 2021-09-20 DIAGNOSIS — R06.09 DOE (DYSPNEA ON EXERTION): ICD-10-CM

## 2021-09-20 DIAGNOSIS — D50.8 OTHER IRON DEFICIENCY ANEMIA: ICD-10-CM

## 2021-09-20 DIAGNOSIS — E03.9 ACQUIRED HYPOTHYROIDISM: ICD-10-CM

## 2021-09-20 DIAGNOSIS — I25.5 ISCHEMIC CARDIOMYOPATHY: Primary | ICD-10-CM

## 2021-09-20 PROCEDURE — 1101F PT FALLS ASSESS-DOCD LE1/YR: CPT | Performed by: NURSE PRACTITIONER

## 2021-09-20 PROCEDURE — G8417 CALC BMI ABV UP PARAM F/U: HCPCS | Performed by: NURSE PRACTITIONER

## 2021-09-20 PROCEDURE — G8432 DEP SCR NOT DOC, RNG: HCPCS | Performed by: NURSE PRACTITIONER

## 2021-09-20 PROCEDURE — 99215 OFFICE O/P EST HI 40 MIN: CPT | Performed by: NURSE PRACTITIONER

## 2021-09-20 PROCEDURE — G8427 DOCREV CUR MEDS BY ELIG CLIN: HCPCS | Performed by: NURSE PRACTITIONER

## 2021-09-20 PROCEDURE — G8536 NO DOC ELDER MAL SCRN: HCPCS | Performed by: NURSE PRACTITIONER

## 2021-09-20 PROCEDURE — 3017F COLORECTAL CA SCREEN DOC REV: CPT | Performed by: NURSE PRACTITIONER

## 2021-09-20 RX ORDER — CLOPIDOGREL BISULFATE 75 MG/1
TABLET ORAL
Qty: 90 TABLET | Refills: 0 | Status: SHIPPED | OUTPATIENT
Start: 2021-09-20

## 2021-09-20 NOTE — PATIENT INSTRUCTIONS
Medication changes:    No medication changes today. Testing Ordered:    Labs at [facility] in 2 weeks. An order for echocardiogram has been placed to be done. You will be receiving an automated call from Coordination of Care to schedule this test. If you are unavailable to receive the call or would like to contact coordination of care yourself you may contact [phone] to schedule. You will need to contact coordination of care yourself if you miss their calls as they will only make 3 attempts to reach you. Other Recommendations:      Ensure your drinking an adequate amount of water with a goal of 6-8 eight ounce glasses (1.5-2 liters) of fluid daily. Your urine should be clear and light yellow straw colored. If your blood pressure begins to consistently run below 90/60 and/or you begin to experience dizziness or lightheadedness, please contact the [facility] at [phone]. Follow up in 4 months with [facility]      Please monitor your weights daily upon waking and after using the bathroom. Keep a written records of your weights and bring to your next appointment. If you have a weight gain of 3 or more pounds overnight OR 5 or more pounds in one week please contact our office. Thank you for allowing us the privilege of being a part of your healthcare team! Please do not hesitate to contact our office at [phone] with any questions or concerns. Virtual Heart Failure [address] invites you to learn more about heart failure and to share your questions, ideas, and experiences with others. Each month, the Heart Failure Support Group features a new educational topic and a guest speaker, followed by an interactive discussion. Our Heart Failure Nurse Navigator will moderate each session. You will be able to participate by phone, tablet or computer through [address] [place] [address].  This support group takes place on the 3rd Thursday of each month from 6:00-7:30PM. All individuals living with heart failure and their caregivers are welcome to join. If you are interested in participating, please contact us at [email or web address] and you will be sent the link to join the [employer].

## 2021-09-20 NOTE — PROGRESS NOTES
[facility] in [place], [address] [place] [address] Note    Patient name: Smith Johnson  Patient : 1955  Patient MRN: [id number]  Date of service: 21    Primary care Preet Marrufo MD  Primary cardiologist: Dr. Carmen Rodriguez  Primary EP: Dr Carmen Rodriguez:  Chief Complaint   Patient presents with    CHF       PLAN:  Continue current medical therapy for heart failure; limited by hypotension  Continue current dose of beta blocker;  Toprol XL 12.5mg daily   Continue current dose of Entresto 24/26mg twice daily  Cannot tolerate spironolactone due to breast tenderness   Does not require diuretics; weight today 229 lb (Body mass index is 32.86 kg/m².)  Continue ASA, Plavix   Patient reports intolerance to statin   Intolerant to SGLT2i due to genital infections   Has not followed up with sleep medicine despite numerous conversations   Re-ordered annual echocardiogram - not done since 10/2019   HF labs at [facility] within next week; slips provided to patient   Reinforced low salt diet and fluid restriction 6 x 8oz glasses per day  Discussed tobacco cessation; continues to smoke, intolerant of wellbutrin or chantix  Discussed abstinence from illicit drugs; continues to abstain from alcohol  Follow-up with primary cardiologist, Dr. Percy Miranda with EP cardiologist  Follow up with PCP;Sylvie Tejeda MD   Return to [address] [place] [address] NP/MD     IMPRESSION:  Chronic systolic heart failure  Stage C, NYHA class IIIA symptoms  CAD s/p RCA stent + 100% m-LAD  Ischemic cardiomyopathy, LVEF 15-20%  TIA, possible   Morbid obesity  HL  HTN  SHERI on CPAP  Previous EtOH use, abstinent  COPD  Active tobacco abuse     CARDIAC IMAGING:  Echo (10/22/19) LVEF 21-25%, cannot rule out bicuspic valve, no other valvular dz, multiple WMA+  Echo (18) LVEF 15-20%, no valvular pathology  EKG (3/13/19) NSR, no ST-T changes  LHC (17), 90% RCA and 100% mLAD, s/p [facility] to RCA  ICD interrogation (5/1/19) no events, normal device function, good battery  Cardiac MRI (6/1/17)  . Mildly dilated left ventricle by 3-D volumetric assessment index to body surface area. Moderate to severe left ventricular systolic dysfunction. Severe hypokinesis of the mid anterior wall. Dyskinesis and thinning of the distal anterior wall, anteroapical wall, apex and apical septal wall. 3-D LVEF 32%. 2. Normal right ventricular size with low-normal right ventricular systolic function. RVEF 55%. 3. Trace to mild mitral regurgitation. 4. On LGE imaging, there is a dense infarct involving greater than 75% thickness  of the thinned out mid to distal anterior wall, anteroapical wall, apex, apical septal wall with with hardly any viable myocardium subtending this infarct. The entire lateral wall, inferolateral wall, inferior wall, inferoseptal wall does not demonstrate any infarct and are completely viable. Based on the viability information, the LAD territory is not a suitable candidate for revascularization and will not improve. The RCA and LCx territories are widely viable and should benefit from revascularization if needed. There is no areas of infiltrative sarcoidosis or amyloidosis. There is no areas of inflammatory myocarditis. 5. Normal pericardium without significant pericardial effusion. Small to  medium-sized bilateral pleural effusions. 6. Mildly dilated ascending aorta, aortic arch and descending thoracic aorta. No significant atherosclerotic disease. No aortic dissection. 7. Dilated main pulmonary artery suggests mild pulmonary hypertension.     HISTORY OF PRESENT ILLNESS:  I had the pleasure of [address] [facility] at [address] [place] [address] 2 a 70 y. o. male with h/o CAD (90% RCA and 100 m-LAD) s/p PCI RCA with [facility] 6/17, T2D, COPD, ETOH abuse (quit months ago), tobacco abuse, ICM who presents for further evaluation of his chronic systolic heart failure.      INTERVAL HISTORY:  Today, Mr. Ludmila Lowe presents for HF clinic visit. Overall he states he is at his baseline, he does states that he tires easily when doing activities especially with lifting equipment and walking. He did not have to stop walking into clinic today. He is taking his medications and manages them on his own. He has not followed up with sleep medicine or has his TTE that was ordered in April. REVIEW OF SYSTEMS:  General: Denies fever, night sweats. +Increased stress due to finances and living situation  Ear, nose and throat: Denies difficulty hearing, sinus problems, runny nose, post-nasal drip, ringing in ears, mouth sores, loose teeth, ear pain, nosebleeds, sore throate, facial pain or numbess  Cardiovascular: see above in the interval history  Respiratory: denies cough, sputum production, hemoptysis. Gastrointestinal: Denies heartburn, constipation, intolerance to certain foods, diarrhea, abdominal pain, nausea, vomiting, difficulty swallowing, blood in stool  Kidney and bladder: Denies painful urination, frequent urination, urgency, prostate problems and impotence  Musculoskeletal: Denies joint pain, muscle weakness  Skin and hair: Denies change in existing skin lesions, hair loss or increase, breast changes    PHYSICAL EXAM:  Visit Vitals  /66 (BP 1 Location: Left arm, BP Patient Position: Sitting, BP Cuff Size: Adult)   Pulse 76   Temp 97.5 °F (36.4 °C) (Oral)   Resp 16   Ht 5' 10\" (1.778 m)   Wt 229 lb (103.9 kg)   SpO2 97%   BMI 32.86 kg/m²     General: Patient is well developed, obese, in no acute distress, very talkative, smells of strong cigarette smoke  HEENT: Normocephalic and atraumatic. No scleral icterus. Pupils are equal, round and reactive to light and accomodation. No conjunctival injection. Oropharynx is clear. Neck: Supple. No evidence of thyroid enlargements or lymphadenopathy.   JVD: Cannot be appreciated   Lungs: Breath sounds are equal and clear anteriorly, rhonchi, or rales. Heart: Regular rate and rhythm with normal S1 and S2. No murmurs, gallops or rubs. +ICD MANUEL chest  Abdomen: Soft, obese, no mass or tenderness. No organomegaly or hernia. Bowel sounds present. Genitourinary and rectal: deferred  Extremities: No cyanosis, clubbing, or edema. Neurologic: No focal sensory or motor deficits are noted. Grossly intact +anxious   Skin: Warm, dry and well perfused. No lesions, nodules or rashes are noted.  Dirty long fingernails    PAST MEDICAL HISTORY:  Past Medical History:   Diagnosis Date    Controlled type 2 diabetes mellitus with circulatory disorder ([facility] 6/1/2017    COPD (chronic obstructive pulmonary disease) (HCC)     Coronary artery disease involving native coronary artery without angina pectoris 06/01/2017    OOH MI, 2v disease s/p PCI RCA with [facility] June 2017    ETOH abuse 6/1/2017    ICD (implantable cardioverter-defibrillator) in place     Ischemic cardiomyopathy     Extensive LAD territory infarction    Nicotine dependence 6/1/2017    Pacemaker     [employer] ICD    SOB (shortness of breath) 28/8/2311    Systolic congestive heart failure with reduced left ventricular function, NYHA class 3 ([facility] [address] [facility] 10/25/2018       PAST SURGICAL HISTORY:  Past Surgical History:   Procedure Laterality Date    HX CORONARY STENT PLACEMENT      HX HEART CATHETERIZATION      HX PACEMAKER      [employer] ICD    HX TONSILLECTOMY      HX WISDOM TEETH EXTRACTION         FAMILY HISTORY:  Family History   Problem Relation Age of Onset    Stroke Father     Heart Disease Father     Hypertension Father        SOCIAL HISTORY:  Social History     Socioeconomic History    Marital status: SINGLE     Spouse name: Not on file    Number of children: Not on file    Years of education: Not on file    Highest education level: Not on file   Tobacco Use    Smoking status: Current Every Day Smoker Packs/day: 1.00     Years: 30.00     Pack years: 30.00     Types: Cigarettes    Smokeless tobacco: Never Used   Substance and Sexual Activity    Alcohol use: No     Comment: former drinker-quit 3 months ago-drank a 6 pack per day    Drug use: No    Sexual activity: Yes     Partners: Female     Social Determinants of Health     Financial Resource Strain:     Difficulty of Paying Living Expenses:    Food Insecurity:     Worried About Running Out of Food in the Last Year:     [address] in the Last Year:    Transportation Needs:     Lack of Transportation (Medical):  Lack of Transportation (Non-Medical):    Physical Activity:     Days of Exercise per Week:     Minutes of Exercise per Session:    Stress:     Feeling of Stress :    Social Connections:     Frequency of Communication with Friends and Family:     Frequency of Social Gatherings with Friends and Family:     Attends Yazdanism Services:     Active Member of Clubs or Organizations:     Attends Club or Organization Meetings:     Marital Status:        LABORATORY RESULTS:     No flowsheet data found.   Lab Results   Component Value Date/Time    TSH 1.770 01/02/2020 10:08 AM    TSH 1.960 05/09/2019 02:40 PM    TSH 1.90 11/15/2018 11:30 AM    TSH 1.72 04/05/2018 09:10 AM    TSH 1.46 02/01/2018 09:45 AM    TSH 1.68 07/27/2017 09:50 AM       ALLERGY:  Allergies   Allergen Reactions    Atorvastatin Diarrhea    Codeine Itching    Metformin Other (comments)     Abdominal pain     Wellbutrin [Bupropion] Other (comments)     Night mare  Aggression         CURRENT MEDICATIONS:    Current Outpatient Medications:     clopidogreL (PLAVIX) 75 mg tab, TAKE 1 TABLET BY MOUTH ONCE DAILY FOR BLOOD CLOT PREVENTION FOLLOWING PERCUTANEOUS CORONARY INTERVENTION, Disp: 90 Tablet, Rfl: 0    nitroglycerin (NITROSTAT) 0.3 mg SL tablet, 0.4 mg by SubLINGual route every five (5) minutes as needed for Chest Pain., Disp: , Rfl:     sacubitriL-valsartan (Entresto) 24-26 mg tablet, TAKE 1 TABLET BY MOUTH TWICE DAILY . (Patient taking differently: TAKE 1 TABLET BY MOUTH TWICE DAILY . Pt needs refill), Disp: 60 Tab, Rfl: 2    acetaminophen (TYLENOL) 500 mg tablet, Take  by mouth every six (6) hours as needed for Pain., Disp: , Rfl:     tetrahydrozoline/polyethyl gly (EYE DROPS OP), Apply  to eye., Disp: , Rfl:     metoprolol succinate (Toprol XL) 25 mg XL tablet, Take 0.5 Tabs by mouth nightly. Hold morning dose if SBP less than 90, Disp: 60 Tab, Rfl: 3    oxymetazoline (AFRIN) 0.05 % nasal spray, 2 Sprays two (2) times a day., Disp: , Rfl:     aspirin 81 mg chewable tablet, Take 1 Tab by mouth daily. , Disp: , Rfl:     folic acid/multivit-min/lutein (CENTRUM SILVER PO), Take  by mouth., Disp: , Rfl:     albuterol (VENTOLIN HFA) 90 mcg/actuation inhaler, Take 2 Puffs by inhalation every four (4) hours as needed. (Patient not taking: Reported on 9/20/2021), Disp: , Rfl:     Thank you for your referral and allowing me to participate in this patient's care.     Madyson Chambers NP  [address] [place] [address], [address]  Phone: [phone]

## 2021-09-27 LAB
25(OH)D3+25(OH)D2 SERPL-MCNC: 32.8 NG/ML (ref 30–100)
ALBUMIN SERPL ELPH-MCNC: 3.9 G/DL (ref 2.9–4.4)
ALBUMIN SERPL-MCNC: 4.5 G/DL (ref 3.8–4.8)
ALBUMIN/GLOB SERPL: 1.4 {RATIO} (ref 0.7–1.7)
ALBUMIN/GLOB SERPL: 1.9 {RATIO} (ref 1.2–2.2)
ALP SERPL-CCNC: 94 IU/L (ref 44–121)
ALPHA1 GLOB SERPL ELPH-MCNC: 0.3 G/DL (ref 0–0.4)
ALPHA2 GLOB SERPL ELPH-MCNC: 0.7 G/DL (ref 0.4–1)
ALT SERPL-CCNC: 14 IU/L (ref 0–44)
AST SERPL-CCNC: 15 IU/L (ref 0–40)
B-GLOBULIN SERPL ELPH-MCNC: 1.1 G/DL (ref 0.7–1.3)
BILIRUB SERPL-MCNC: 0.3 MG/DL (ref 0–1.2)
BUN SERPL-MCNC: 10 MG/DL (ref 8–27)
BUN/CREAT SERPL: 13 (ref 10–24)
CALCIUM SERPL-MCNC: 9.5 MG/DL (ref 8.6–10.2)
CHLORIDE SERPL-SCNC: 100 MMOL/L (ref 96–106)
CO2 SERPL-SCNC: 24 MMOL/L (ref 20–29)
CREAT SERPL-MCNC: 0.78 MG/DL (ref 0.76–1.27)
ERYTHROCYTE [DISTWIDTH] IN BLOOD BY AUTOMATED COUNT: 12.7 % (ref 11.6–15.4)
FERRITIN SERPL-MCNC: 152 NG/ML (ref 30–400)
GAMMA GLOB SERPL ELPH-MCNC: 1 G/DL (ref 0.4–1.8)
GLOBULIN SER CALC-MCNC: 2.4 G/DL (ref 1.5–4.5)
GLOBULIN SER-MCNC: 3 G/DL (ref 2.2–3.9)
GLUCOSE SERPL-MCNC: 102 MG/DL (ref 65–99)
HCT VFR BLD AUTO: 46.3 % (ref 37.5–51)
HGB BLD-MCNC: 16.4 G/DL (ref 13–17.7)
IGA SERPL-MCNC: 288 MG/DL (ref 61–437)
IGG SERPL-MCNC: 936 MG/DL (ref 603–1613)
IGM SERPL-MCNC: 62 MG/DL (ref 20–172)
INTERPRETATION SERPL IEP-IMP: ABNORMAL
IRON SATN MFR SERPL: 25 % (ref 15–55)
IRON SERPL-MCNC: 79 UG/DL (ref 38–169)
KAPPA LC FREE SER-MCNC: 34.2 MG/L (ref 3.3–19.4)
KAPPA LC FREE/LAMBDA FREE SER: 1.41 {RATIO} (ref 0.26–1.65)
LAMBDA LC FREE SERPL-MCNC: 24.2 MG/L (ref 5.7–26.3)
M PROTEIN SERPL ELPH-MCNC: ABNORMAL G/DL
MAGNESIUM SERPL-MCNC: 2 MG/DL (ref 1.6–2.3)
MCH RBC QN AUTO: 33.1 PG (ref 26.6–33)
MCHC RBC AUTO-ENTMCNC: 35.4 G/DL (ref 31.5–35.7)
MCV RBC AUTO: 93 FL (ref 79–97)
NT-PROBNP SERPL-MCNC: 223 PG/ML (ref 0–376)
PLATELET # BLD AUTO: 214 X10E3/UL (ref 150–450)
PLEASE NOTE:, 149534: ABNORMAL
POTASSIUM SERPL-SCNC: 4 MMOL/L (ref 3.5–5.2)
PROT SERPL-MCNC: 6.9 G/DL (ref 6–8.5)
RBC # BLD AUTO: 4.96 X10E6/UL (ref 4.14–5.8)
SODIUM SERPL-SCNC: 137 MMOL/L (ref 134–144)
T4 FREE SERPL-MCNC: 1.3 NG/DL (ref 0.82–1.77)
TIBC SERPL-MCNC: 311 UG/DL (ref 250–450)
TSH SERPL DL<=0.005 MIU/L-ACNC: 1.66 UIU/ML (ref 0.45–4.5)
UIBC SERPL-MCNC: 232 UG/DL (ref 111–343)
URATE SERPL-MCNC: 3.7 MG/DL (ref 3.8–8.4)
WBC # BLD AUTO: 8.5 X10E3/UL (ref 3.4–10.8)

## 2021-10-21 ENCOUNTER — OFFICE VISIT (OUTPATIENT)
Dept: CARDIOLOGY CLINIC | Age: 66
End: 2021-10-21
Payer: MEDICARE

## 2021-10-21 DIAGNOSIS — Z95.810 AUTOMATIC IMPLANTABLE CARDIAC DEFIBRILLATOR IN SITU: Primary | ICD-10-CM

## 2021-10-21 PROCEDURE — 93296 REM INTERROG EVL PM/IDS: CPT | Performed by: NURSE PRACTITIONER

## 2021-10-21 PROCEDURE — 93295 DEV INTERROG REMOTE 1/2/MLT: CPT | Performed by: NURSE PRACTITIONER

## 2022-01-13 ENCOUNTER — VIRTUAL VISIT (OUTPATIENT)
Dept: CARDIOLOGY CLINIC | Age: 67
End: 2022-01-13
Payer: MEDICARE

## 2022-01-13 DIAGNOSIS — I50.20 NYHA CLASS 3 HEART FAILURE WITH REDUCED EJECTION FRACTION (HCC): ICD-10-CM

## 2022-01-13 DIAGNOSIS — I25.5 ISCHEMIC CARDIOMYOPATHY: Primary | ICD-10-CM

## 2022-01-13 DIAGNOSIS — E55.9 VITAMIN D DEFICIENCY: ICD-10-CM

## 2022-01-13 DIAGNOSIS — R06.02 SHORTNESS OF BREATH: ICD-10-CM

## 2022-01-13 DIAGNOSIS — R06.09 DOE (DYSPNEA ON EXERTION): ICD-10-CM

## 2022-01-13 PROCEDURE — 99443 PR PHYS/QHP TELEPHONE EVALUATION 21-30 MIN: CPT | Performed by: NURSE PRACTITIONER

## 2022-01-13 RX ORDER — FLUTICASONE FUROATE, UMECLIDINIUM BROMIDE AND VILANTEROL TRIFENATATE 200; 62.5; 25 UG/1; UG/1; UG/1
POWDER RESPIRATORY (INHALATION) AS NEEDED
COMMUNITY
Start: 2021-11-29

## 2022-01-13 RX ORDER — SITAGLIPTIN 50 MG/1
50 TABLET, FILM COATED ORAL DAILY
COMMUNITY
Start: 2021-11-18

## 2022-01-13 NOTE — PATIENT INSTRUCTIONS
Medication changes:    No medication changes today. We may decide to put you back on Entresto based upon your lab results. Testing Ordered:    Please have your labs done this week. Other Recommendations:      Ensure your drinking an adequate amount of water with a goal of 6-8 eight ounce glasses (1.5-2 liters) of fluid daily. Your urine should be clear and light yellow straw colored. If your blood pressure begins to consistently run below 90/60 and/or you begin to experience dizziness or lightheadedness, please contact the [facility] at [phone]. Follow up in 3 months with [facility]. Please monitor your weights daily upon waking and after using the bathroom. Keep a written records of your weights and bring to your next appointment. If you have a weight gain of 3 or more pounds overnight OR 5 or more pounds in one week please contact our office. Thank you for allowing us the privilege of being a part of your healthcare team! Please do not hesitate to contact our office at [phone] with any questions or concerns. Virtual Heart Failure [address] invites you to learn more about heart failure and to share your questions, ideas, and experiences with others. Each month, the Heart Failure Support Group features a new educational topic and a guest speaker, followed by an interactive discussion. Our Heart Failure Nurse Navigator will moderate each session. You will be able to participate by phone, tablet or computer through [address] [place] [address]. This support group takes place on the 3rd Thursday of each month from 6:00-7:30PM. All individuals living with heart failure and their caregivers are welcome to join. If you are interested in participating, please contact us at [email or web address] and you will be sent the link to join the [email or web address].

## 2022-01-13 NOTE — PROGRESS NOTES
[facility] in [place], [place]  Telephone Visit    Patient name: Bettie Urias  Patient : 1955  Patient MRN: [id number]  Date of service: 22    Primary care Lubertha Harada, MD  Primary cardiologist: Dr. Suzanne Alicia  Primary EP: Dr Suzanne Alicia:  Chief Complaint   Patient presents with    CHF       PLAN:  Continue current medical therapy for heart failure  Continue current dose of beta blocker;  Toprol XL 12.5mg daily   Patient is not taking Entresto due to \"kidney pain\"; repeat labs and resume if Cr stable - pt agreeable   Cannot tolerate spironolactone due to breast tenderness   Does not require diuretics  Continue ASA, Plavix   Patient reports intolerance to statin, check CK  Intolerant to SGLT2i due to genital infections   Has not followed up with sleep medicine despite numerous conversations   Still has not had echocardiogram completed despite numerous conversations and orders - not done since 10/2019   HF labs at [facility] within next week  Reinforced low salt diet and fluid restriction 6 x 8oz glasses per day  Discussed tobacco cessation; continues to smoke, intolerant of wellbutrin or chantix  Discussed abstinence from illicit drugs; continues to abstain from alcohol  Follow-up with primary cardiologist, Dr. Ilsa Palacios  Follow-up with EP cardiologist  Follow up with Abbe Hewitt MD   Patient with recent presumed COVID infection; instructed to contact PCP to discuss timing of vaccine, patient now agreeable   Return to [facility] Clinic in 3 months with NP/MD     IMPRESSION:  Chronic systolic heart failure  Stage C, NYHA class IIIA symptoms  CAD s/p RCA stent + 100% m-LAD  Ischemic cardiomyopathy, LVEF 15-20%  TIA, possible   Morbid obesity  HL  HTN  SHERI on CPAP  Previous EtOH use, abstinent  COPD  Active tobacco abuse     CARDIAC IMAGING:  Echo (10/22/19) LVEF 21-25%, cannot rule out bicuspic valve, no other valvular dz, multiple WMA+  Echo (8/21/18) LVEF 15-20%, no valvular pathology  EKG (3/13/19) NSR, no ST-T changes  LHC (6/2/17), 90% RCA and 100% mLAD, s/p EMORY to RCA  ICD interrogation (5/1/19) no events, normal device function, good battery  Cardiac MRI (6/1/17)  . Mildly dilated left ventricle by 3-D volumetric assessment index to body surface area. Moderate to severe left ventricular systolic dysfunction. Severe hypokinesis of the mid anterior wall. Dyskinesis and thinning of the distal anterior wall, anteroapical wall, apex and apical septal wall. 3-D LVEF 32%. 2. Normal right ventricular size with low-normal right ventricular systolic function. RVEF 55%. 3. Trace to mild mitral regurgitation. 4. On LGE imaging, there is a dense infarct involving greater than 75% thickness  of the thinned out mid to distal anterior wall, anteroapical wall, apex, apical septal wall with with hardly any viable myocardium subtending this infarct. The entire lateral wall, inferolateral wall, inferior wall, inferoseptal wall does not demonstrate any infarct and are completely viable. Based on the viability information, the LAD territory is not a suitable candidate for revascularization and will not improve. The RCA and LCx territories are widely viable and should benefit from revascularization if needed. There is no areas of infiltrative sarcoidosis or amyloidosis. There is no areas of inflammatory myocarditis. 5. Normal pericardium without significant pericardial effusion. Small to  medium-sized bilateral pleural effusions. 6. Mildly dilated ascending aorta, aortic arch and descending thoracic aorta. No significant atherosclerotic disease. No aortic dissection. 7. Dilated main pulmonary artery suggests mild pulmonary hypertension.     HISTORY OF PRESENT ILLNESS:  I had the pleasure of [address] [facility] at [facility] in [facility] 2 a 74 y. o. male with h/o CAD (90% RCA and 100 m-LAD) s/p PCI RCA with EMORY , T2D, COPD, ETOH abuse (quit 3 years ago), tobacco abuse, Ykara Conception presents for further evaluation of his chronic systolic heart failure.      INTERVAL HISTORY:  Today, Mr. Russ Mathews presents for telephone HF visit. His sister recently  from [facility], and he suspects he was also infected due to severity of symptoms although a rapid antigen test was negative. He has not regained his strength, but his dyspnea and cough have resolved. Overall he states he is at his baseline, he does states that he tires easily when doing activities especially with lifting equipment and walking. He is taking his medications and manages them on his own. He has not followed up with sleep medicine or has his TTE that was ordered in April. REVIEW OF SYSTEMS:  General: Denies fever, night sweats. +Increased stress due to sister's recent death   Ear, nose and throat: Denies difficulty hearing, sinus problems, runny nose, post-nasal drip, ringing in ears, mouth sores, loose teeth, ear pain, nosebleeds, sore throate, facial pain or numbess  Cardiovascular: see above in the interval history  Respiratory: denies cough, sputum production, hemoptysis.   Gastrointestinal: Denies heartburn, constipation, intolerance to certain foods, diarrhea, abdominal pain, nausea, vomiting, difficulty swallowing, blood in stool  Kidney and bladder: Denies painful urination, frequent urination, urgency, prostate problems and impotence  Musculoskeletal: Denies joint pain, + muscle weakness  Skin and hair: Denies change in existing skin lesions, hair loss or increase, breast changes    PHYSICAL EXAM:  Patient-Reported Vitals 2022   Patient-Reported Weight 235 lbs      PE not performed due to nature of exam.     PAST MEDICAL HISTORY:  Past Medical History:   Diagnosis Date    Controlled type 2 diabetes mellitus with circulatory disorder ([facility] [address] 2017    COPD (chronic obstructive pulmonary disease) [facility])     Coronary artery disease involving native coronary artery without angina pectoris 06/01/2017    OOH MI, 2v disease s/p PCI RCA with EMORY June 2017    ETOH abuse 6/1/2017    ICD (implantable cardioverter-defibrillator) in place     Ischemic cardiomyopathy     Extensive LAD territory infarction    Nicotine dependence 6/1/2017    Pacemaker     [employer] ICD    SOB (shortness of breath) 95/6/8952    Systolic congestive heart failure with reduced left ventricular function, NYHA class 3 ([address] 10/25/2018       PAST SURGICAL HISTORY:  Past Surgical History:   Procedure Laterality Date    HX CORONARY STENT PLACEMENT      HX HEART CATHETERIZATION      HX PACEMAKER      [employer] ICD    HX TONSILLECTOMY      HX WISDOM TEETH EXTRACTION         FAMILY HISTORY:  Family History   Problem Relation Age of Onset    Stroke Father     Heart Disease Father     Hypertension Father        SOCIAL HISTORY:  Social History     Socioeconomic History    Marital status: SINGLE   Tobacco Use    Smoking status: Current Every Day Smoker     Packs/day: 1.00     Years: 30.00     Pack years: 30.00     Types: Cigarettes    Smokeless tobacco: Never Used   Substance and Sexual Activity    Alcohol use: No     Comment: former drinker-quit 3 months ago-drank a 6 pack per day    Drug use: No    Sexual activity: Yes     Partners: Female       LABORATORY RESULTS:     No flowsheet data found.   Lab Results   Component Value Date/Time    TSH 1.660 09/23/2021 03:43 PM    TSH 1.770 01/02/2020 10:08 AM    TSH 1.960 05/09/2019 02:40 PM    TSH 1.90 11/15/2018 11:30 AM    TSH 1.72 04/05/2018 09:10 AM    TSH 1.46 02/01/2018 09:45 AM    TSH 1.68 07/27/2017 09:50 AM       ALLERGY:  Allergies   Allergen Reactions    Atorvastatin Diarrhea    Codeine Itching    Metformin Other (comments)     Abdominal pain     Wellbutrin [Bupropion] Other (comments)     Night mare  Aggression         CURRENT MEDICATIONS:    Current Outpatient Medications:     Trelegy Ellipta 200-62.5-25 mcg inhaler, as needed. , Disp: , Rfl:     Januvia 50 mg tablet, Take 50 mg by mouth daily. , Disp: , Rfl:     clopidogreL (PLAVIX) 75 mg tab, TAKE 1 TABLET BY MOUTH ONCE DAILY FOR BLOOD CLOT PREVENTION FOLLOWING PERCUTANEOUS CORONARY INTERVENTION, Disp: 90 Tablet, Rfl: 0    nitroglycerin (NITROSTAT) 0.3 mg SL tablet, 0.4 mg by SubLINGual route every five (5) minutes as needed for Chest Pain., Disp: , Rfl:     folic acid/multivit-min/lutein (CENTRUM SILVER PO), Take  by mouth., Disp: , Rfl:     acetaminophen (TYLENOL) 500 mg tablet, Take  by mouth every six (6) hours as needed for Pain., Disp: , Rfl:     tetrahydrozoline/polyethyl gly (EYE DROPS OP), Apply  to eye., Disp: , Rfl:     metoprolol succinate (Toprol XL) 25 mg XL tablet, Take 0.5 Tabs by mouth nightly. Hold morning dose if SBP less than 90, Disp: 60 Tab, Rfl: 3    oxymetazoline (AFRIN) 0.05 % nasal spray, 2 Sprays two (2) times daily as needed. , Disp: , Rfl:     albuterol (VENTOLIN HFA) 90 mcg/actuation inhaler, Take 2 Puffs by inhalation every four (4) hours as needed. , Disp: , Rfl:     aspirin 81 mg chewable tablet, Take 1 Tab by mouth daily. , Disp: , Rfl:     sacubitriL-valsartan (Entresto) 24-26 mg tablet, TAKE 1 TABLET BY MOUTH TWICE DAILY . (Patient not taking: Reported on 1/13/2022), Disp: 60 Tab, Rfl: 2    Thank you for your referral and allowing me to participate in this patient's care. Mary Ellen [place] NP  [address] [place] [address], [address]  Phone: [phone]    Marta Mayo, who was evaluated through a synchronous (real-time) audio only encounter, and/or his healthcare decision maker, is aware that it is a billable service, with coverage as determined by his insurance carrier. He provided verbal consent to proceed: Yes, and patient identification was verified.  This visit was conducted pursuant to the emergency declaration under the [facility], [address] [place] [address] waiver authority and the [employer] General Act. A caregiver was present when appropriate. Ability to conduct physical exam was limited. The patient was located in a state where the provider was credentialed to provide care.      Time: 1171-2846

## 2022-01-14 ENCOUNTER — TELEPHONE (OUTPATIENT)
Dept: CARDIOLOGY CLINIC | Age: 67
End: 2022-01-14

## 2022-01-27 ENCOUNTER — OFFICE VISIT (OUTPATIENT)
Dept: CARDIOLOGY CLINIC | Age: 67
End: 2022-01-27
Payer: MEDICARE

## 2022-01-27 DIAGNOSIS — Z95.810 AUTOMATIC IMPLANTABLE CARDIAC DEFIBRILLATOR IN SITU: Primary | ICD-10-CM

## 2022-01-27 PROCEDURE — 93295 DEV INTERROG REMOTE 1/2/MLT: CPT | Performed by: INTERNAL MEDICINE

## 2022-01-27 PROCEDURE — 93296 REM INTERROG EVL PM/IDS: CPT | Performed by: INTERNAL MEDICINE

## 2022-01-27 NOTE — LETTER
1/28/2022 1:40 PM    Mr. Michel French  [facility] [id number]    Dear Mr. Michel French,    We have received your recent remote monitor check of your implanted device on 1/27/2022. Your remaining estimated battery life is 12 years and your device is working normally & appropriately. You have had no fast heart rates recorded this session. Your next remote monitor check is scheduled for 5/3/2022. This is NOT an in-clinic appointment. This transmission is sent from your home monitor. Please make sure your home monitor is plugged into power and within 10 feet of where you sleep. If you have difficulty sending a transmission, please do NOT call our office. Instead, call tech support for your device as they are better able to assist.    [facility])  [phone]    Your next clinic/office check is scheduled for Monday, 8/8/2022 at 11:00 am.  You will have your device checked then see the provider. Please bring a complete list of your medications with strengths and dosages to this appointment. Also, be prepared to discuss any symptoms you may be having since your last visit. Please plan to arrive 10 minutes early to allow time for check-in. [employer] parking is CLOSED once again, due to [address]. The parking lot entrance that is next to [address] is also CLOSED. If you have difficulty walking from the parking lot, please arrange to have someone drop you off to allow time to check into the office. You are allowed to have one visitor accompany you to your appointment and no children under the age of 13 are allowed. Masks are mandatory while in the building. If you have any questions, please call the Pacemaker/ICD clinic at the [address] [place] [address] location at [phone]. We appreciate you staying remotely connected!     Sincerely,    Camelia TREJO, RN  Cardiac Device Clinic Coordinator  [facility] [address] [place] [address]. Padmini Gaona.  [address] [place] [address], [address] [facility]  [phone]

## 2022-03-18 PROBLEM — E66.01 SEVERE OBESITY WITH BODY MASS INDEX (BMI) OF 35.0 TO 39.9 WITH SERIOUS COMORBIDITY (HCC): Status: ACTIVE | Noted: 2018-08-30

## 2022-03-18 PROBLEM — I50.20 SYSTOLIC CONGESTIVE HEART FAILURE WITH REDUCED LEFT VENTRICULAR FUNCTION, NYHA CLASS 3 (HCC): Status: ACTIVE | Noted: 2018-10-25

## 2022-03-18 PROBLEM — F17.200 NICOTINE DEPENDENCE: Status: ACTIVE | Noted: 2017-06-01

## 2022-03-18 PROBLEM — I25.5 ISCHEMIC CARDIOMYOPATHY: Status: ACTIVE | Noted: 2017-09-18

## 2022-03-19 PROBLEM — E55.9 VITAMIN D DEFICIENCY: Status: ACTIVE | Noted: 2020-10-02

## 2022-03-19 PROBLEM — E11.59 CONTROLLED TYPE 2 DIABETES MELLITUS WITH CIRCULATORY DISORDER (HCC): Status: ACTIVE | Noted: 2017-06-01

## 2022-03-19 PROBLEM — R06.02 SHORTNESS OF BREATH: Status: ACTIVE | Noted: 2020-10-01

## 2022-03-19 PROBLEM — Z13.31 POSITIVE DEPRESSION SCREENING: Status: ACTIVE | Noted: 2020-10-01

## 2022-03-19 PROBLEM — I50.20 SYSTOLIC HEART FAILURE, ACC/AHA STAGE C (HCC): Status: ACTIVE | Noted: 2017-06-01

## 2022-03-19 PROBLEM — F10.10 ETOH ABUSE: Status: ACTIVE | Noted: 2017-06-01

## 2022-03-19 PROBLEM — I25.10 CORONARY ARTERY DISEASE INVOLVING NATIVE CORONARY ARTERY WITHOUT ANGINA PECTORIS: Status: ACTIVE | Noted: 2017-06-01

## 2022-03-19 PROBLEM — F41.9 ANXIETY: Status: ACTIVE | Noted: 2020-10-01

## 2022-03-19 PROBLEM — Z72.0 TOBACCO USER: Status: ACTIVE | Noted: 2020-10-01

## 2022-03-20 PROBLEM — Z95.810 ICD (IMPLANTABLE CARDIOVERTER-DEFIBRILLATOR) IN PLACE: Status: ACTIVE | Noted: 2017-12-04

## 2022-04-01 ENCOUNTER — TELEPHONE (OUTPATIENT)
Dept: CARDIOLOGY CLINIC | Age: 67
End: 2022-04-01

## 2022-04-01 NOTE — TELEPHONE ENCOUNTER
Left voicemail for patient advising that ALEX Kaur is not available in afternoon on 04/13 and will need to cancel appt and reschedule

## 2022-05-03 ENCOUNTER — OFFICE VISIT (OUTPATIENT)
Dept: CARDIOLOGY CLINIC | Age: 67
End: 2022-05-03
Payer: MEDICARE

## 2022-05-03 DIAGNOSIS — Z95.810 AUTOMATIC IMPLANTABLE CARDIAC DEFIBRILLATOR IN SITU: Primary | ICD-10-CM

## 2022-05-03 PROCEDURE — 93295 DEV INTERROG REMOTE 1/2/MLT: CPT | Performed by: INTERNAL MEDICINE

## 2022-05-03 NOTE — LETTER
5/6/2022 10:05 AM    Mr. Jojo Harden  [facility] [address] [phone]      Dear Mr. Jojo Harden,    We have received your recent remote monitor check of your implanted device on 5/3/22. Your remaining estimated battery life is 12 years and your device is working normally & appropriately. You've had no unusual heart rates recorded this session. Your next remote monitor check is scheduled for 11/15/2022. This is NOT an in-clinic appointment. This transmission is sent from your home monitor. Please make sure your home monitor is plugged into power and within 10 feet of where you sleep. If you are using the phone applications, please make sure it is open on your smart phone. If you have difficulty sending a transmission, please do NOT call our office. Instead, call tech support for your device as they are better able to assist.    [facility] [address]) [phone]    Your next clinic/office check is scheduled for Monday, 8/8/2022 at 11:00 am.  You will have your device checked then see the provider. Please bring a complete list of your medications with strengths and dosages to this appointment. If you have any questions, please call the Pacemaker/ICD clinic at the [facility] location at [phone]. We appreciate you staying remotely connected!     Sincerely,    Broderick Toro RN, BSN  Device Coordinator  [phone]

## 2022-05-06 NOTE — PROGRESS NOTES
chrgeable VVI ICD remote    Normal device function with no tachy detections or discharges. Overall slower heart rates - improvement from prior histogram.  No RV pacing. See scanned report in  for details.

## 2022-06-21 ENCOUNTER — TRANSCRIBE ORDER (OUTPATIENT)
Dept: SCHEDULING | Age: 67
End: 2022-06-21

## 2022-06-21 DIAGNOSIS — I50.22 CHRONIC SYSTOLIC HEART FAILURE (HCC): Primary | ICD-10-CM

## 2022-08-08 ENCOUNTER — OFFICE VISIT (OUTPATIENT)
Dept: CARDIOLOGY CLINIC | Age: 67
End: 2022-08-08
Payer: MEDICARE

## 2022-08-08 DIAGNOSIS — Z95.810 AUTOMATIC IMPLANTABLE CARDIAC DEFIBRILLATOR IN SITU: Primary | ICD-10-CM

## 2022-08-08 PROCEDURE — 93289 INTERROG DEVICE EVAL HEART: CPT | Performed by: INTERNAL MEDICINE

## 2022-11-15 ENCOUNTER — OFFICE VISIT (OUTPATIENT)
Dept: CARDIOLOGY CLINIC | Age: 67
End: 2022-11-15
Payer: MEDICARE

## 2022-11-15 DIAGNOSIS — Z95.810 AUTOMATIC IMPLANTABLE CARDIAC DEFIBRILLATOR IN SITU: Primary | ICD-10-CM

## 2023-02-14 ENCOUNTER — OFFICE VISIT (OUTPATIENT)
Dept: CARDIOLOGY CLINIC | Age: 68
End: 2023-02-14
Payer: MEDICARE

## 2023-02-14 DIAGNOSIS — Z95.810 AUTOMATIC IMPLANTABLE CARDIAC DEFIBRILLATOR IN SITU: Primary | ICD-10-CM

## 2023-02-21 ENCOUNTER — TELEPHONE (OUTPATIENT)
Dept: CARDIOLOGY CLINIC | Age: 68
End: 2023-02-21

## 2023-03-24 ENCOUNTER — TELEPHONE (OUTPATIENT)
Dept: CARDIOLOGY CLINIC | Age: 68
End: 2023-03-24

## 2023-03-24 NOTE — TELEPHONE ENCOUNTER
Returned patient's phone call in regards to his appointment time w/ Dr. Cresencio Burciaga on 3/30/23.

## 2023-03-30 ENCOUNTER — OFFICE VISIT (OUTPATIENT)
Dept: CARDIOLOGY CLINIC | Age: 68
End: 2023-03-30

## 2023-03-30 VITALS
OXYGEN SATURATION: 95 % | DIASTOLIC BLOOD PRESSURE: 84 MMHG | RESPIRATION RATE: 20 BRPM | HEIGHT: 70 IN | TEMPERATURE: 97.9 F | BODY MASS INDEX: 32.01 KG/M2 | WEIGHT: 223.6 LBS | SYSTOLIC BLOOD PRESSURE: 134 MMHG | HEART RATE: 61 BPM

## 2023-03-30 DIAGNOSIS — I50.20 NYHA CLASS 3 HEART FAILURE WITH REDUCED EJECTION FRACTION (HCC): Primary | ICD-10-CM

## 2023-03-30 DIAGNOSIS — Z79.899 ENCOUNTER FOR MONITORING DIURETIC THERAPY: ICD-10-CM

## 2023-03-30 DIAGNOSIS — R06.09 DOE (DYSPNEA ON EXERTION): ICD-10-CM

## 2023-03-30 DIAGNOSIS — Z51.81 ENCOUNTER FOR MONITORING DIURETIC THERAPY: ICD-10-CM

## 2023-03-30 RX ORDER — SITAGLIPTIN 100 MG/1
TABLET, FILM COATED ORAL
COMMUNITY
Start: 2023-03-29

## 2023-03-30 NOTE — PROGRESS NOTES
[facility], [facility] Note    Patient name: Krishna Mohan  Patient : 1955  Patient MRN: [id number]  Date of service: 23    Primary care physician: Fatimah Junior MD  Primary general cardiologist: Dr. Suzie Norris   Primary F cardiologist: Marta Burgess MD    CHIEF COMPLAINT:  Chronic systolic heart failure    PLAN OF CARE:  70 y/o male with ischemic cardiomyopathy, LVEF 15-20% (by echo ), stage C, NYHA class I/II on GDMT  Patient declined imaging including echo and EKGs; does not want to know his heart function    PLAN:  Continue current medical therapy for heart failure; limited by hypotension  Continue current dose of beta blocker;  Toprol XL 12.5mg daily   Resume previous dose of entresto 24/26mg twice daily, check BMP in 2 weeks  Cannot tolerate spironolactone due to breast tenderness   Does not require diuretics; weight today 223lbs, down 6 lbs since last year  Continue ASA, Plavix   Patient reports intolerance to statin   Intolerant to SGLT2i due to genital infections   ICD interrogation every 3 months per routine  Has not followed up with sleep medicine despite numerous conversations   Declined annual echocardiogram - not done since 10/2019   HF labs at [facility] within next week; slips provided to patient   Reinforced low salt diet and fluid restriction 6 x 8oz glasses per day  Discussed tobacco cessation; continues to smoke, intolerant of wellbutrin or chantix  Discussed abstinence from illicit drugs; continues to abstain from alcohol  Follow-up with primary cardiologist, Dr. Suzie Norris  Follow-up with EP cardiologist, Dr. Guillermo Lion  Follow up with PCP, Dr. Delonte Nice   Return to [facility] in 6 months with NP     IMPRESSION:  Chronic systolic heart failure  Stage C, NYHA class IIIA symptoms  CAD s/p RCA stent + 100% m-LAD  Ischemic cardiomyopathy, LVEF 15-20%  TIA, possible   S/p ICD (2017)  Cardiac risk factors: Morbid obesity  HL  HTN  SHERI on CPAP  Previous EtOH use, abstinent  COPD  Active tobacco abuse     CARDIAC IMAGING:  Echo (10/22/19) LVEF 21-25%, cannot rule out bicuspic valve, no other valvular dz, multiple WMA+  Echo (8/21/18) LVEF 15-20%, no valvular pathology  EKG (3/13/19) NSR, no ST-T changes  LHC (6/2/17), 90% RCA and 100% mLAD, s/p [facility] to RCA  ICD interrogation (5/1/19) no events, normal device function, good battery  Cardiac MRI (6/1/17) Mildly dilated left ventricle by 3-D volumetric assessment index to body surface area. Moderate to severe left ventricular systolic dysfunction. Severe hypokinesis of the mid anterior wall. Dyskinesis and thinning of the distal anterior wall, anteroapical wall, apex and apical septal wall. 3-D LVEF 32%. 2. Normal right ventricular size with low-normal right ventricular systolic function. RVEF 55%. 3. Trace to mild mitral regurgitation. 4. On LGE imaging, there is a dense infarct involving greater than 75% thickness of the thinned out mid to distal anterior wall, anteroapical wall, apex, apical septal wall with with hardly any viable myocardium subtending this infarct. The entire lateral wall, inferolateral wall, inferior wall, inferoseptal wall does not demonstrate any infarct and are completely viable. Based on the viability information, the LAD territory is not a suitable candidate for revascularization and will not improve. The RCA and LCx territories are widely viable and should benefit from revascularization if needed. There is no areas of infiltrative sarcoidosis or amyloidosis. There is no areas of inflammatory myocarditis. 5. Normal pericardium without significant pericardial effusion. Small to medium-sized bilateral pleural effusions. 6. Mildly dilated ascending aorta, aortic arch and descending thoracic aorta. No significant atherosclerotic disease. No aortic dissection. 7. Dilated main pulmonary artery suggests mild pulmonary hypertension.      HISTORY OF PRESENT ILLNESS:  I had the pleasure of seeing Krishna Mohan in [facility] at [facility] in [facility]. Briefly, Krishan Mohan is a 70 y.o. male with h/o CAD (90% RCA and 100 m-LAD) s/p PCI RCA with [facility] 6/17, T2D, COPD, ETOH abuse (quit months ago), tobacco abuse, ICM who presents for further evaluation of his chronic systolic heart failure. INTERVAL HISTORY:  Today, patient presents for routine clinic visit. Patient is doing very well. Patient walked to our clinic from parking garage without having to slow down or stop. Patient can walk more than one block without symptoms of fatigue or shortness of breath or chest pain. Patient can walk one flight of stairs without symptoms of fatigue or shortness of breath or chest pain. Patient can perform home activities without problem, such as cooking, making bed and showering. Patient routinely participates in daily walking for more than 15 minutes. Patient denies symptoms of volume overload or leg edema. Patient denies abdominal bloating or change of appetite. Patient's weight remained stable. Patient denies orthopnea, PND or nocturia. Patient denies irregular heart rate or palpitations. No presyncope or syncope. ICD has not fired. Patient denies other cardiac symptoms such as chest pain or leg pain with walking. Patient is compliant with fluid restriction and taking medications as prescribed. Patient manages his own medications. PHYSICAL EXAM:  Visit Vitals  /84 (BP 1 Location: Right upper arm, BP Patient Position: Sitting)   Pulse 61   Temp 97.9 °F (36.6 °C)   Resp 20   Ht 5' 10\" (1.778 m)   Wt 223 lb 9.6 oz (101.4 kg)   SpO2 95%   BMI 32.08 kg/m²     General: Patient is well developed, well-nourished in no acute distress  HEENT: Unremarkable   Neck: Supple. No evidence of thyroid enlargements or lymphadenopathy.   JVD: Cannot be appreciated   Lungs: Breath sounds are equal and clear bilaterally. No wheezes, rhonchi, or rales. Heart: Regular rate and rhythm with normal S1 and S2. No murmurs, gallops or rubs. Abdomen: Soft, no mass or tenderness. No organomegaly or hernia. Bowel sounds present. Genitourinary and rectal: deferred  Extremities: No cyanosis, clubbing, or edema. Neurologic: No focal sensory or motor deficits are noted. Grossly intact. Psychiatric: Awake, alert an doriented x 3. Appropriate mood and affect. Skin: Warm, dry and well perfused. REVIEW OF SYSTEMS:  General: Denies fever. Ear, nose and throat: Denies difficulty hearing, sinus problems, nosebleeds  Cardiovascular: see above in the interval history  Respiratory: Denies cough, wheezing, sputum production, hemoptysis.   Gastrointestinal: Denies heartburn, constipation, diarrhea, abdominal pain, nausea, blood in stool  Kidney and bladder: Denies painful urination, frequent urination  Musculoskeletal: Denies joint pain, muscle weakness  Skin and hair: Denies change in existing skin lesions    PAST MEDICAL HISTORY:  Past Medical History:   Diagnosis Date    Controlled type 2 diabetes mellitus with circulatory disorder ([facility] [address] 6/1/2017    COPD (chronic obstructive pulmonary disease) ([facility] [address] [facility]     Coronary artery disease involving native coronary artery without angina pectoris 06/01/2017    OOH MI, 2v disease s/p PCI RCA with [facility] June 2017    ETOH abuse 6/1/2017    ICD (implantable cardioverter-defibrillator) in place     Ischemic cardiomyopathy     Extensive LAD territory infarction    Nicotine dependence 6/1/2017    Pacemaker     [place] [employer] ICD    SOB (shortness of breath) 46/0/8722    Systolic congestive heart failure with reduced left ventricular function, NYHA class 3 ([facility] [address] 10/25/2018       PAST SURGICAL HISTORY:  Past Surgical History:   Procedure Laterality Date    HX CORONARY STENT PLACEMENT      HX HEART CATHETERIZATION      HX PACEMAKER      [place] [employer] ICD    HX TONSILLECTOMY      HX WISDOM TEETH EXTRACTION         FAMILY HISTORY:  Family History   Problem Relation Age of Onset    Stroke Father     Heart Disease Father     Hypertension Father        SOCIAL HISTORY:  Social History     Socioeconomic History    Marital status: SINGLE   Tobacco Use    Smoking status: Every Day     Packs/day: 1.00     Years: 30.00     Pack years: 30.00     Types: Cigarettes    Smokeless tobacco: Never   Substance and Sexual Activity    Alcohol use: No     Comment: former drinker-quit 3 months ago-drank a 6 pack per day    Drug use: No    Sexual activity: Yes     Partners: Female       LABORATORY RESULTS:  No flowsheet data found. ALLERGY:  Allergies   Allergen Reactions    Atorvastatin Diarrhea    Codeine Itching    Metformin Other (comments)     Abdominal pain     Wellbutrin [Bupropion] Other (comments)     Night mare  Aggression         CURRENT MEDICATIONS:    Current Outpatient Medications:     Januvia 100 mg tablet, , Disp: , Rfl:     sacubitril-valsartan (ENTRESTO) 24 mg/26 mg tablet, Take 1 Tablet by mouth two (2) times a day., Disp: 60 Tablet, Rfl: 3    Trelegy Ellipta 200-62.5-25 mcg inhaler, as needed. , Disp: , Rfl:     clopidogreL (PLAVIX) 75 mg tab, TAKE 1 TABLET BY MOUTH ONCE DAILY FOR BLOOD CLOT PREVENTION FOLLOWING PERCUTANEOUS CORONARY INTERVENTION, Disp: 90 Tablet, Rfl: 0    nitroglycerin (NITROSTAT) 0.3 mg SL tablet, 0.4 mg by SubLINGual route every five (5) minutes as needed for Chest Pain., Disp: , Rfl:     acetaminophen (TYLENOL) 500 mg tablet, Take  by mouth every six (6) hours as needed for Pain., Disp: , Rfl:     tetrahydrozoline/polyethyl gly (EYE DROPS OP), Apply  to eye., Disp: , Rfl:     metoprolol succinate (Toprol XL) 25 mg XL tablet, Take 0.5 Tabs by mouth nightly. Hold morning dose if SBP less than 90 (Patient taking differently: Take 25 mg by mouth two (2) times a day.  Hold morning dose if SBP less than 90), Disp: 60 Tab, Rfl: 3    oxymetazoline (AFRIN) 0.05 % nasal spray, 2 Sprays two (2) times daily as needed. , Disp: , Rfl:     albuterol (PROVENTIL HFA, VENTOLIN HFA, PROAIR HFA) 90 mcg/actuation inhaler, Take 2 Puffs by inhalation every four (4) hours as needed. , Disp: , Rfl:     aspirin 81 mg chewable tablet, Take 1 Tab by mouth daily. , Disp: , Rfl:     Thank you for your referral and allowing me to participate in this patient's care.     Marta Burgess MD PhD  [facility]  [address] [place] [address], [address]  Phone: [phone]  Fax: [phone]    PATIENT CARE TEAM:  Patient Care Team:  Fatimah Junior MD as PCP - General (Family Medicine)  Gomez Nicole MD as Consulting Provider (Cardiovascular Disease Physician)  ZACH Kapadia as Consulting Provider (Nurse Practitioner)  Haile Contreras NP as Consulting Provider (Nurse Practitioner)  Milton Motley MD (Cardiovascular Disease Physician)     Total visit time:40 minutes  (> 50% spent face-to-face counseling)

## 2023-03-30 NOTE — PATIENT INSTRUCTIONS
Medication changes:    Re-start entresto 24/26 twice daily      Please take this to your pharmacy to notify them of the change in medications. Testing Ordered:    Lab work has been ordered to be completed in 2 weeks. Please present to a [facility] location of your choice with the orders provided to have lab work done. Please wear a mask when you present to [facility] and practice diligent hand hygeine before and after your visit. Our office will notify you of any abnormal results requiring changes to your current plan of care. Other Recommendations:      Ensure your drinking an adequate amount of water with a goal of 6-8 eight ounce glasses (1.5-2 liters) of fluid daily. Your urine should be clear and light yellow straw colored. If your blood pressure begins to consistently run below 90/60 and/or you begin to experience dizziness or lightheadedness, please contact the [facility] at [phone]. Follow up in 6 months with NP with [facility]      Please monitor your weights daily upon waking and after using the bathroom. Keep a written records of your weights and bring to your next appointment. If you have a weight gain of 3 or more pounds overnight OR 5 or more pounds in one week please contact our office. Thank you for allowing us the privilege of being a part of your healthcare team! Please do not hesitate to contact our office at [phone] with any questions or concerns. Virtual Heart Failure [address] invites you to learn more about heart failure and to share your questions, ideas, and experiences with others. Each month, the Heart Failure Support Group features a new educational topic and a guest speaker, followed by an interactive discussion. Our Heart Failure Nurse Navigator will moderate each session. You will be able to participate by phone, tablet or computer through [address] [place] [address].  This support group takes place on the 3rd Thursday of each month from 6:00-7:30PM. All individuals living with heart failure and their caregivers are welcome to join. If you are interested in participating, please contact us at [email or web address] and you will be sent the link to join the [employer].

## 2023-04-23 DIAGNOSIS — Z79.899 ENCOUNTER FOR MONITORING DIURETIC THERAPY: ICD-10-CM

## 2023-04-23 DIAGNOSIS — I50.20 NYHA CLASS 3 HEART FAILURE WITH REDUCED EJECTION FRACTION (HCC): Primary | ICD-10-CM

## 2023-04-23 DIAGNOSIS — R06.09 DOE (DYSPNEA ON EXERTION): ICD-10-CM

## 2023-04-23 DIAGNOSIS — Z51.81 ENCOUNTER FOR MONITORING DIURETIC THERAPY: ICD-10-CM

## 2023-04-24 DIAGNOSIS — I50.20 NYHA CLASS 3 HEART FAILURE WITH REDUCED EJECTION FRACTION (HCC): Primary | ICD-10-CM

## 2023-04-24 DIAGNOSIS — Z51.81 ENCOUNTER FOR MONITORING DIURETIC THERAPY: ICD-10-CM

## 2023-04-24 DIAGNOSIS — R06.09 DOE (DYSPNEA ON EXERTION): ICD-10-CM

## 2023-04-24 DIAGNOSIS — Z79.899 ENCOUNTER FOR MONITORING DIURETIC THERAPY: ICD-10-CM

## 2023-05-16 ENCOUNTER — TELEPHONE (OUTPATIENT)
Age: 68
End: 2023-05-16

## 2023-05-18 ENCOUNTER — NURSE ONLY (OUTPATIENT)
Age: 68
End: 2023-05-18

## 2023-05-18 DIAGNOSIS — Z95.810 PRESENCE OF AUTOMATIC CARDIOVERTER/DEFIBRILLATOR (AICD): Primary | ICD-10-CM

## 2023-05-18 NOTE — TELEPHONE ENCOUNTER
Referral to Pulmonary Medicine and patient records were faxed to [facility] ([phone]). This will be reviewed and they will contact patient to schedule his appointment. Basaloid Carcinoma Histology Text: There were aggregates of basaloid cells.

## 2023-05-21 RX ORDER — METOPROLOL SUCCINATE 25 MG/1
12.5 TABLET, EXTENDED RELEASE ORAL
COMMUNITY
Start: 2020-07-06

## 2023-05-21 RX ORDER — OXYMETAZOLINE HYDROCHLORIDE 0.05 G/100ML
2 SPRAY NASAL 2 TIMES DAILY PRN
COMMUNITY

## 2023-05-21 RX ORDER — ALBUTEROL SULFATE 90 UG/1
2 AEROSOL, METERED RESPIRATORY (INHALATION) EVERY 4 HOURS PRN
COMMUNITY

## 2023-05-21 RX ORDER — ASPIRIN 81 MG/1
81 TABLET, CHEWABLE ORAL DAILY
COMMUNITY
Start: 2017-06-02

## 2023-05-21 RX ORDER — ACETAMINOPHEN 500 MG
TABLET ORAL EVERY 6 HOURS PRN
COMMUNITY

## 2023-05-21 RX ORDER — FLUTICASONE FUROATE, UMECLIDINIUM BROMIDE AND VILANTEROL TRIFENATATE 200; 62.5; 25 UG/1; UG/1; UG/1
POWDER RESPIRATORY (INHALATION) PRN
COMMUNITY
Start: 2021-11-29

## 2023-05-21 RX ORDER — NITROGLYCERIN 0.3 MG/1
0.4 TABLET SUBLINGUAL
COMMUNITY

## 2023-05-21 RX ORDER — CLOPIDOGREL BISULFATE 75 MG/1
TABLET ORAL
COMMUNITY
Start: 2021-09-20

## 2023-08-21 NOTE — PROGRESS NOTES
Cardiac Electrophysiology OFFICE Follow Up Note       Dr. Benita Beatty and Dr. Leonela Srivastava        Assessment/Plan:   1. ICD (implantable cardioverter-defibrillator) in place  2. Systolic congestive heart failure with reduced left ventricular function, NYHA class 3 ([address])  3. Coronary artery disease involving native coronary artery of native heart without angina pectoris  4. Ischemic cardiomyopathy    [place] [employer] single chamber ICD  12 years on battery  Less than 1% V-paced  No VT/VF or therapies  - Continue Q3 month remote device transmissions. Follow-up in the EP clinic in one year, or sooner for any concerns. Ischemic cardiomyopathy   Declines echo, last 2019  - Continue GDMT with Toprol and Entresto   - Intolerant to spironolactone    CAD  Prior MI and RCA stent   - Continue aspirin, Plavix, and rosuvastatin     DEVONTE  - compliant with CPAP    COPD  - active tobacco abuse          Subjective:         Guerline Ferreira is a 69 y.o. patient who is seen for follow up of his ICD. He notes longstanding dyspnea, likely due to cardiomyopathy, COPD, and ongoing tobacco abuse. He denies chest pain, dizziness or syncope.        Patient Active Problem List   Diagnosis    Severe obesity with body mass index (BMI) of 35.0 to 39.9 with serious comorbidity (HCC)    Systolic congestive heart failure with reduced left ventricular function, NYHA class 3 (HCC)    Ischemic cardiomyopathy    Nicotine dependence    Systolic heart failure, ACC/AHA stage C (HCC)    Shortness of breath    Tobacco user    Anxiety    Positive depression screening    Vitamin D deficiency    ETOH abuse    Coronary artery disease involving native coronary artery without angina pectoris    Controlled type 2 diabetes mellitus with circulatory disorder ([address])    ICD (implantable cardioverter-defibrillator) in place         Current Outpatient Medications   Medication Sig Dispense Refill    rosuvastatin (CRESTOR) 10 MG tablet Take 1 tablet by mouth daily

## 2023-08-22 ENCOUNTER — OFFICE VISIT (OUTPATIENT)
Age: 68
End: 2023-08-22
Payer: MEDICARE

## 2023-08-22 ENCOUNTER — PROCEDURE VISIT (OUTPATIENT)
Age: 68
End: 2023-08-22
Payer: MEDICARE

## 2023-08-22 VITALS
HEART RATE: 64 BPM | HEIGHT: 70 IN | RESPIRATION RATE: 16 BRPM | BODY MASS INDEX: 33.07 KG/M2 | DIASTOLIC BLOOD PRESSURE: 72 MMHG | SYSTOLIC BLOOD PRESSURE: 120 MMHG | OXYGEN SATURATION: 95 % | WEIGHT: 231 LBS

## 2023-08-22 DIAGNOSIS — I25.10 CORONARY ARTERY DISEASE INVOLVING NATIVE CORONARY ARTERY OF NATIVE HEART WITHOUT ANGINA PECTORIS: ICD-10-CM

## 2023-08-22 DIAGNOSIS — Z95.810 ICD (IMPLANTABLE CARDIOVERTER-DEFIBRILLATOR) IN PLACE: Primary | ICD-10-CM

## 2023-08-22 DIAGNOSIS — I25.5 ISCHEMIC CARDIOMYOPATHY: ICD-10-CM

## 2023-08-22 DIAGNOSIS — I50.20 SYSTOLIC CONGESTIVE HEART FAILURE WITH REDUCED LEFT VENTRICULAR FUNCTION, NYHA CLASS 3 (HCC): ICD-10-CM

## 2023-08-22 DIAGNOSIS — Z95.810 PRESENCE OF AUTOMATIC (IMPLANTABLE) CARDIAC DEFIBRILLATOR: Primary | ICD-10-CM

## 2023-08-22 PROCEDURE — 93282 PRGRMG EVAL IMPLANTABLE DFB: CPT | Performed by: INTERNAL MEDICINE

## 2023-08-22 PROCEDURE — 99214 OFFICE O/P EST MOD 30 MIN: CPT | Performed by: NURSE PRACTITIONER

## 2023-08-22 RX ORDER — ROSUVASTATIN CALCIUM 10 MG/1
10 TABLET, COATED ORAL DAILY
COMMUNITY

## 2023-08-30 ENCOUNTER — ANCILLARY PROCEDURE (OUTPATIENT)
Age: 68
End: 2023-08-30
Payer: MEDICARE

## 2023-08-30 VITALS
HEIGHT: 70 IN | WEIGHT: 223 LBS | SYSTOLIC BLOOD PRESSURE: 120 MMHG | DIASTOLIC BLOOD PRESSURE: 72 MMHG | BODY MASS INDEX: 31.92 KG/M2

## 2023-08-30 DIAGNOSIS — I25.5 ISCHEMIC CARDIOMYOPATHY: ICD-10-CM

## 2023-08-30 PROCEDURE — C8929 TTE W OR WO FOL WCON,DOPPLER: HCPCS

## 2023-08-30 RX ADMIN — PERFLUTREN 1.3 ML: 6.52 INJECTION, SUSPENSION INTRAVENOUS at 12:50

## 2023-09-03 LAB
ECHO AO ASC DIAM: 3.4 CM
ECHO AO ASCENDING AORTA INDEX: 1.55 CM/M2
ECHO AO ROOT DIAM: 4.2 CM
ECHO AO ROOT INDEX: 1.92 CM/M2
ECHO AV MEAN GRADIENT: 3 MMHG
ECHO AV MEAN VELOCITY: 0.9 M/S
ECHO AV PEAK GRADIENT: 5 MMHG
ECHO AV PEAK VELOCITY: 1.1 M/S
ECHO AV VELOCITY RATIO: 0.64
ECHO AV VTI: 24.2 CM
ECHO BSA: 2.23 M2
ECHO LA DIAMETER INDEX: 2.01 CM/M2
ECHO LA DIAMETER: 4.4 CM
ECHO LA TO AORTIC ROOT RATIO: 1.05
ECHO LA VOL 2C: 68 ML (ref 18–58)
ECHO LA VOL 4C: 69 ML (ref 18–58)
ECHO LA VOL BP: 64 ML (ref 18–58)
ECHO LA VOL/BSA BIPLANE: 29 ML/M2 (ref 16–34)
ECHO LA VOLUME AREA LENGTH: 69 ML
ECHO LA VOLUME INDEX A2C: 31 ML/M2 (ref 16–34)
ECHO LA VOLUME INDEX A4C: 32 ML/M2 (ref 16–34)
ECHO LA VOLUME INDEX AREA LENGTH: 32 ML/M2 (ref 16–34)
ECHO LV E' LATERAL VELOCITY: 8 CM/S
ECHO LV E' SEPTAL VELOCITY: 7 CM/S
ECHO LV EDV A2C: 157 ML
ECHO LV EDV A4C: 174 ML
ECHO LV EDV BP: 179 ML (ref 67–155)
ECHO LV EDV INDEX A4C: 79 ML/M2
ECHO LV EDV INDEX BP: 82 ML/M2
ECHO LV EDV NDEX A2C: 72 ML/M2
ECHO LV EJECTION FRACTION A2C: 21 %
ECHO LV EJECTION FRACTION A4C: 23 %
ECHO LV EJECTION FRACTION BIPLANE: 25 % (ref 55–100)
ECHO LV ESV A2C: 124 ML
ECHO LV ESV A4C: 133 ML
ECHO LV ESV BP: 129 ML (ref 22–58)
ECHO LV ESV INDEX A2C: 57 ML/M2
ECHO LV ESV INDEX A4C: 61 ML/M2
ECHO LV ESV INDEX BP: 59 ML/M2
ECHO LV FRACTIONAL SHORTENING: 11 % (ref 28–44)
ECHO LV INTERNAL DIMENSION DIASTOLE INDEX: 2.79 CM/M2
ECHO LV INTERNAL DIMENSION DIASTOLIC: 6.1 CM (ref 4.2–5.9)
ECHO LV INTERNAL DIMENSION SYSTOLIC INDEX: 2.47 CM/M2
ECHO LV INTERNAL DIMENSION SYSTOLIC: 5.4 CM
ECHO LV IVSD: 1.2 CM (ref 0.6–1)
ECHO LV MASS 2D: 270.5 G (ref 88–224)
ECHO LV MASS INDEX 2D: 123.5 G/M2 (ref 49–115)
ECHO LV POSTERIOR WALL DIASTOLIC: 0.9 CM (ref 0.6–1)
ECHO LV RELATIVE WALL THICKNESS RATIO: 0.3
ECHO LVOT AV VTI INDEX: 0.64
ECHO LVOT MEAN GRADIENT: 1 MMHG
ECHO LVOT PEAK GRADIENT: 2 MMHG
ECHO LVOT PEAK VELOCITY: 0.7 M/S
ECHO LVOT VTI: 15.5 CM
ECHO MV A VELOCITY: 0.99 M/S
ECHO MV AREA PHT: 4 CM2
ECHO MV E DECELERATION TIME (DT): 189.2 MS
ECHO MV E VELOCITY: 0.91 M/S
ECHO MV E/A RATIO: 0.92
ECHO MV E/E' LATERAL: 11.38
ECHO MV E/E' RATIO (AVERAGED): 12.19
ECHO MV E/E' SEPTAL: 13
ECHO MV PRESSURE HALF TIME (PHT): 54.9 MS
ECHO RV TAPSE: 1.7 CM (ref 1.7–?)

## 2023-09-06 ENCOUNTER — TELEPHONE (OUTPATIENT)
Age: 68
End: 2023-09-06

## 2023-09-06 NOTE — TELEPHONE ENCOUNTER
more than 4 lbs before next visit patient to call to the office   Patient to continue to keep bp and weight logs bring to next visit. Called patient using two patient identifiers. Advised patient on above information per Dr. Jocelyne Gilbert. Patient stated understanding of instructions and had no further questions at this time. Notified patient that sw will reach out sometime this week.

## 2023-09-08 ENCOUNTER — TELEPHONE (OUTPATIENT)
Age: 68
End: 2023-09-08

## 2023-09-08 NOTE — TELEPHONE ENCOUNTER
Called pt. To discuss stressors. Pt. Was not able to talk privately. Requested call back at 1:30pm. Pt. Also reports feeling the current situation is hopeless and there are no practical solutions. Pt. Reports he has no email or way to receive information other than mail. SW will speak to pt.  Today at 1:30 and mail a resource list.

## 2023-09-15 ENCOUNTER — TELEPHONE (OUTPATIENT)
Age: 68
End: 2023-09-15

## 2023-09-15 LAB
ALBUMIN SERPL-MCNC: 4.6 G/DL (ref 3.9–4.9)
ALBUMIN/GLOB SERPL: 1.8 {RATIO} (ref 1.2–2.2)
ALP SERPL-CCNC: 79 IU/L (ref 44–121)
ALT SERPL-CCNC: 23 IU/L (ref 0–44)
AST SERPL-CCNC: 26 IU/L (ref 0–40)
BILIRUB SERPL-MCNC: 0.5 MG/DL (ref 0–1.2)
BUN SERPL-MCNC: 13 MG/DL (ref 8–27)
BUN/CREAT SERPL: 13 (ref 10–24)
CALCIUM SERPL-MCNC: 9.6 MG/DL (ref 8.6–10.2)
CHLORIDE SERPL-SCNC: 103 MMOL/L (ref 96–106)
CO2 SERPL-SCNC: 19 MMOL/L (ref 20–29)
CREAT SERPL-MCNC: 0.98 MG/DL (ref 0.76–1.27)
EGFRCR SERPLBLD CKD-EPI 2021: 83 ML/MIN/1.73
GLOBULIN SER CALC-MCNC: 2.6 G/DL (ref 1.5–4.5)
GLUCOSE SERPL-MCNC: 139 MG/DL (ref 70–99)
MAGNESIUM SERPL-MCNC: 1.9 MG/DL (ref 1.6–2.3)
NT-PROBNP SERPL-MCNC: 235 PG/ML (ref 0–376)
POTASSIUM SERPL-SCNC: 4.4 MMOL/L (ref 3.5–5.2)
PROT SERPL-MCNC: 7.2 G/DL (ref 6–8.5)
SODIUM SERPL-SCNC: 137 MMOL/L (ref 134–144)

## 2023-09-18 ENCOUNTER — OFFICE VISIT (OUTPATIENT)
Age: 68
End: 2023-09-18

## 2023-09-18 ENCOUNTER — TELEPHONE (OUTPATIENT)
Age: 68
End: 2023-09-18

## 2023-09-18 VITALS
DIASTOLIC BLOOD PRESSURE: 84 MMHG | SYSTOLIC BLOOD PRESSURE: 122 MMHG | HEART RATE: 68 BPM | WEIGHT: 229 LBS | OXYGEN SATURATION: 94 % | HEIGHT: 70 IN | BODY MASS INDEX: 32.78 KG/M2 | TEMPERATURE: 99.2 F | RESPIRATION RATE: 18 BRPM

## 2023-09-18 DIAGNOSIS — I50.22 SYSTOLIC CHF, CHRONIC (HCC): Primary | ICD-10-CM

## 2023-09-18 ASSESSMENT — PATIENT HEALTH QUESTIONNAIRE - PHQ9
10. IF YOU CHECKED OFF ANY PROBLEMS, HOW DIFFICULT HAVE THESE PROBLEMS MADE IT FOR YOU TO DO YOUR WORK, TAKE CARE OF THINGS AT HOME, OR GET ALONG WITH OTHER PEOPLE: 3
8. MOVING OR SPEAKING SO SLOWLY THAT OTHER PEOPLE COULD HAVE NOTICED. OR THE OPPOSITE, BEING SO FIGETY OR RESTLESS THAT YOU HAVE BEEN MOVING AROUND A LOT MORE THAN USUAL: 0
SUM OF ALL RESPONSES TO PHQ QUESTIONS 1-9: 15
6. FEELING BAD ABOUT YOURSELF - OR THAT YOU ARE A FAILURE OR HAVE LET YOURSELF OR YOUR FAMILY DOWN: 3
4. FEELING TIRED OR HAVING LITTLE ENERGY: 3
SUM OF ALL RESPONSES TO PHQ QUESTIONS 1-9: 15
SUM OF ALL RESPONSES TO PHQ9 QUESTIONS 1 & 2: 5
5. POOR APPETITE OR OVEREATING: 2
7. TROUBLE CONCENTRATING ON THINGS, SUCH AS READING THE NEWSPAPER OR WATCHING TELEVISION: 0
SUM OF ALL RESPONSES TO PHQ QUESTIONS 1-9: 15
3. TROUBLE FALLING OR STAYING ASLEEP: 2
SUM OF ALL RESPONSES TO PHQ QUESTIONS 1-9: 15
1. LITTLE INTEREST OR PLEASURE IN DOING THINGS: 2
2. FEELING DOWN, DEPRESSED OR HOPELESS: 3

## 2023-09-18 NOTE — TELEPHONE ENCOUNTER
Pt called to say that he could not afford today's copay. I told him that we could bill him for it, so he will come to his appt at 2:00 p.m.

## 2023-09-18 NOTE — PROGRESS NOTES
Rfl:     Oxymetazoline HCl (NASAL SPRAY) 0.05 % SOLN, 2 sprays 2 times daily as needed, Disp: , Rfl:     SITagliptin (JANUVIA) 100 MG tablet, ceived the following from [facility]: Outside name: Januvia 100 mg tablet, Disp: , Rfl:     sacubitril-valsartan (ENTRESTO) 24-26 MG per tablet, Take 1 tablet by mouth 2 times daily (Patient not taking: Reported on 9/18/2023), Disp: , Rfl:     Thank you for your referral and allowing me to participate in this patient's care.     Tremayne Esquivel MD  [address] [place] [address] Drive  [address] [place] [address], [address]  Phone: [phone]      PATIENT CARE TEAM:  Patient Care Team:  Tiffanie Perez MD as PCP - Beverley De La O MD as Consulting Physician  Daniela Burciaga, JOANNEP as Consulting Physician  Rae Oh, APRN - NP as Consulting Physician     Total visit time: 25 minutes  (> 50% spent face-to-face counseling)

## 2023-09-18 NOTE — PATIENT INSTRUCTIONS
Medication changes:    No changes, please call next week with BP readings at [phone] option 2 as Dr Queenie Ganser, may want to make changes to medication    Please take this to your pharmacy to notify them of the change in medications. Testing Ordered:    none    Other Recommendations:      Ensure your drinking an adequate amount of water with a goal of 6-8 eight ounce glasses (1.5-2 liters) of fluid daily. Your urine should be clear and light yellow straw colored. If your blood pressure begins to consistently run below 90/60 and/or you begin to experience dizziness or lightheadedness, please contact the [address] [place] [address] at [phone]. Follow up in 3 months with [facility]      Please monitor your weights daily upon waking and after using the bathroom. Keep a written records of your weights and bring to your next appointment. If you have a weight gain of 3 or more pounds overnight OR 5 or more pounds in one week please contact our office. Thank you for allowing us the privilege of being a part of your healthcare team! Please do not hesitate to contact our office at [phone] with any questions or concerns.

## 2023-09-19 ENCOUNTER — SOCIAL WORK (OUTPATIENT)
Age: 68
End: 2023-09-19
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
## 2023-09-19 NOTE — PROGRESS NOTES
[facility]  [unfilled]. [facility]       Affect: WNR. Mood: Stable, Within Normal Range  Alert and Oriented: all spheres     Visit: Met with patient after provider. SW inquired about the visit and any concerns pt. May have. Asked directly about and discussed psychosocial needs: anxiety, depression, stress, insurance issues, financial assistance, basic resources, personal care, other healthcare needs (such as DME or OT/PT), and advanced directives. Pt. Had opportunity to ask questions. Patient Response: Pt. Had no questions and did not voice any concerns. Follow up:     provided contact information. Evonne Hoover  Advanced Heart Failure   Phone: [phone]  Fax: [phone]

## 2023-12-08 ENCOUNTER — TELEPHONE (OUTPATIENT)
Age: 68
End: 2023-12-08

## 2023-12-14 ENCOUNTER — OFFICE VISIT (OUTPATIENT)
Age: 68
End: 2023-12-14

## 2023-12-14 VITALS
HEIGHT: 70 IN | BODY MASS INDEX: 32.5 KG/M2 | TEMPERATURE: 98.1 F | RESPIRATION RATE: 18 BRPM | WEIGHT: 227 LBS | SYSTOLIC BLOOD PRESSURE: 128 MMHG | HEART RATE: 70 BPM | DIASTOLIC BLOOD PRESSURE: 78 MMHG | OXYGEN SATURATION: 97 %

## 2023-12-14 DIAGNOSIS — I50.22 SYSTOLIC CHF, CHRONIC (HCC): Primary | ICD-10-CM

## 2023-12-14 NOTE — PROGRESS NOTES
99 mg/dL  102  106  177  130  177    BUN,BUNPL 8 - 27 mg/dL 13  10  13  11  12  12    Creatinine 0.76 - 1.27 mg/dL 0.98  0.78  0.94  0.87  0.96  1.03    Sodium 134 - 144 mmol/L 137  137  139  139  136  136    Potassium 3.5 - 5.2 mmol/L 4.4  4.0  4.2  4.1  4.4  4.2    Chloride 96 - 106 mmol/L 103  100  105  103  97  98    CO2 20 - 29 mmol/L 19  24  19  20  22  23    CALCIUM, SERUM, 344812 8.6 - 10.2 mg/dL 9.6  9.5  9.4  9.2  9.4  9.4    Total Protein 6.0 - 8.5 g/dL 7.2  6.9  7.3  6.9   7.1    BILIRUBIN TOTAL 0.0 - 1.2 mg/dL 0.5  0.3  0.3  0.4   0.4    AST 0 - 40 IU/L 26  15  20  27   17    ALT 0 - 44 IU/L 23  14  21  28   23        ALLERGY:  Allergies   Allergen Reactions    Atorvastatin Diarrhea    Bupropion Other (See Comments)     Night mare  Aggression     Codeine Itching    Metformin Other (See Comments)     Abdominal pain         CURRENT MEDICATIONS:    Current Outpatient Medications:     rosuvastatin (CRESTOR) 10 MG tablet, Take 1 tablet by mouth every other day, Disp: , Rfl:     acetaminophen (TYLENOL) 500 MG tablet, Take by mouth every 6 hours as needed, Disp: , Rfl:     albuterol sulfate HFA (PROVENTIL;VENTOLIN;PROAIR) 108 (90 Base) MCG/ACT inhaler, Inhale 2 puffs into the lungs every 4 hours as needed, Disp: , Rfl:     aspirin 81 MG EC tablet, Take 1 tablet by mouth daily, Disp: , Rfl:     clopidogrel (PLAVIX) 75 MG tablet, TAKE 1 TABLET BY MOUTH ONCE DAILY FOR BLOOD CLOT PREVENTION FOLLOWING PERCUTANEOUS CORONARY INTERVENTION, Disp: , Rfl:     fluticasone-umeclidin-vilant (TRELEGY ELLIPTA) 200-62.5-25 MCG/ACT AEPB inhaler, as needed, Disp: , Rfl:     metoprolol succinate (TOPROL XL) 25 MG extended release tablet, Take 2 tablets by mouth, Disp: , Rfl:     nitroGLYCERIN (NITROSTAT) 0.3 MG SL tablet, Place 0.4 mg under the tongue, Disp: , Rfl:     Oxymetazoline HCl (NASAL SPRAY) 0.05 % SOLN, 2 sprays 2 times daily as needed, Disp: , Rfl:     sacubitril-valsartan (ENTRESTO) 24-26 MG per tablet, Take 1 tablet

## 2023-12-14 NOTE — PATIENT INSTRUCTIONS
Medication changes:    No medication changes     Please take this to your pharmacy to notify them of the change in medications. Testing Ordered:    Please call [phone] to schedule carolin scan stress test for soonest available appointment     Other Recommendations:      Please record blood pressure daily before medication and two hours after medication, please record weight daily upon waking/after using the bathroom. Keep a written records of your weights and blood pressure and bring to your next appointment. If you have a weight gain of 3 or more pounds overnight OR 5 or more pounds in one week, new/worsened shortness of breath or swelling, or if your blood pressure begins to consistently run below 90/60 and/or you begin to experience dizziness or lightheadedness please contact our office at [phone] option 2. Ensure your drinking an adequate amount of water with a goal of 6-8 eight ounce glasses (1.5-2 liters) of fluid daily. Your urine should be clear and light yellow straw colored. Follow up 2 months with NP with [facility] [place]    Thank you for allowing us the privilege of being a part of your healthcare team! Please do not hesitate to contact our office at [phone] option 2 with any questions or concerns.

## 2023-12-15 ENCOUNTER — TELEPHONE (OUTPATIENT)
Age: 68
End: 2023-12-15

## 2023-12-15 NOTE — TELEPHONE ENCOUNTER
Returned patient call, ID verified using two patient identifiers. Patient has a [place]. Patient states he experienced \"fluttering\" in his chest on 12/8 and 12/9, and he sent a manual transmission from his home monitor and wants to know if we received it. Advised patient that per [place] device clinic coordinator we received the transmissions and that no events or alerts were noted. He saw Dr. Laurel Rivera yesterday and is scheduled to have a stress test done. Advised patient that his home monitor is working. He is aware to call the office with any questions or concerns.

## 2024-02-06 ENCOUNTER — TELEPHONE (OUTPATIENT)
Age: 69
End: 2024-02-06

## 2024-02-06 NOTE — TELEPHONE ENCOUNTER
Telephone Call RE:  Appointment reminder     Outcome:     [x] Patient confirmed appointment   [] Patient rescheduled appointment for    [] Unable to reach  [] Left message              [] Other:       after much deliberation

## 2024-02-06 NOTE — TELEPHONE ENCOUNTER
Pt called back to say that he has not done his atomic stress test and so should push back his 2/9/24 appt.  When I asked when he would have it done, he said that he wasn't going to do it.  I have rescheduled him for the first available appt that he could do, Wed, 3/13/24 at 2:30 p.m. with Isa.

## 2024-03-08 ENCOUNTER — TELEPHONE (OUTPATIENT)
Age: 69
End: 2024-03-08

## 2024-03-12 ENCOUNTER — TELEPHONE (OUTPATIENT)
Age: 69
End: 2024-03-12

## 2024-03-12 NOTE — TELEPHONE ENCOUNTER
Pt had to cx 3/13/24 appt b/c his well broke, and he needs to be home for the repair person.  He's on the waitlist for a new appt.

## 2024-03-21 ENCOUNTER — TELEPHONE (OUTPATIENT)
Age: 69
End: 2024-03-21

## 2024-03-21 NOTE — TELEPHONE ENCOUNTER
Left voicemail asking pt to call me back to schedule follow-up next week.  We have some slots, and he's on the waitlist.

## 2024-03-25 ENCOUNTER — TELEPHONE (OUTPATIENT)
Age: 69
End: 2024-03-25

## 2024-05-24 ENCOUNTER — TELEPHONE (OUTPATIENT)
Age: 69
End: 2024-05-24

## 2024-09-04 ENCOUNTER — OFFICE VISIT (OUTPATIENT)
Age: 69
End: 2024-09-04
Payer: MEDICARE

## 2024-09-04 ENCOUNTER — PROCEDURE VISIT (OUTPATIENT)
Age: 69
End: 2024-09-04

## 2024-09-04 VITALS
OXYGEN SATURATION: 97 % | HEART RATE: 62 BPM | SYSTOLIC BLOOD PRESSURE: 112 MMHG | DIASTOLIC BLOOD PRESSURE: 70 MMHG | WEIGHT: 210.6 LBS | HEIGHT: 70 IN | BODY MASS INDEX: 30.15 KG/M2 | RESPIRATION RATE: 14 BRPM

## 2024-09-04 DIAGNOSIS — E11.59 CONTROLLED TYPE 2 DIABETES MELLITUS WITH OTHER CIRCULATORY COMPLICATION, WITHOUT LONG-TERM CURRENT USE OF INSULIN (HCC): ICD-10-CM

## 2024-09-04 DIAGNOSIS — I50.20 SYSTOLIC HEART FAILURE, ACC/AHA STAGE C (HCC): ICD-10-CM

## 2024-09-04 DIAGNOSIS — I50.20 SYSTOLIC CONGESTIVE HEART FAILURE WITH REDUCED LEFT VENTRICULAR FUNCTION, NYHA CLASS 3 (HCC): ICD-10-CM

## 2024-09-04 DIAGNOSIS — I25.5 ISCHEMIC CARDIOMYOPATHY: Primary | ICD-10-CM

## 2024-09-04 DIAGNOSIS — I25.10 CORONARY ARTERY DISEASE INVOLVING NATIVE CORONARY ARTERY OF NATIVE HEART WITHOUT ANGINA PECTORIS: ICD-10-CM

## 2024-09-04 DIAGNOSIS — Z95.810 ICD (IMPLANTABLE CARDIOVERTER-DEFIBRILLATOR) IN PLACE: Primary | ICD-10-CM

## 2024-09-04 DIAGNOSIS — Z95.810 ICD (IMPLANTABLE CARDIOVERTER-DEFIBRILLATOR) IN PLACE: ICD-10-CM

## 2024-09-04 PROCEDURE — 1123F ACP DISCUSS/DSCN MKR DOCD: CPT | Performed by: INTERNAL MEDICINE

## 2024-09-04 PROCEDURE — 93000 ELECTROCARDIOGRAM COMPLETE: CPT | Performed by: INTERNAL MEDICINE

## 2024-09-04 PROCEDURE — 99214 OFFICE O/P EST MOD 30 MIN: CPT | Performed by: INTERNAL MEDICINE

## 2024-09-04 RX ORDER — SIMVASTATIN 80 MG
80 TABLET ORAL DAILY
COMMUNITY

## 2024-09-04 NOTE — PROGRESS NOTES
Cardiac Electrophysiology Office Follow-up    NAME: Doroteo Paris   :  1955  MRM:  [id number]    Date:  2024         Assessment and Plan:     1. Ischemic cardiomyopathy  -     EKG 12 Lead  2. Controlled type 2 diabetes mellitus with other circulatory complication, without long-term current use of insulin ([facility])  3. Coronary artery disease involving native coronary artery of native heart without angina pectoris  4. ICD (implantable cardioverter-defibrillator) in place  5. Systolic congestive heart failure with reduced left ventricular function, NYHA class 3 ([facility])  6. Systolic heart failure, ACC/AHA stage C ([facility])         General Cardiologist: Advanced Heart Failure    Chronic Systolic CHF  - currently on Toprol XL, and Januvia  - had hypotension with small dose of entresto  - intolerant to spironolactone due to breast tenderness  - history of hypotension  - euvolemic on exam  - will establish with general cardiology here at [facility], used to see Dr. Jarvis and [facility]    [employer] single chamber ICD (2017):   Device check today shows proper lead & generator function. Generator longevity estimated 12 years. RV <1%. No sustained VT/VF.  Iterative programming was performed to test the leads sensing and threshold parameters without any permanent changes.  - Continue remote device transmissions every 3 months  - Follow-up in EP clinic in 1 year or sooner with any concerns     Ischemic cardiomyopathy   Echo 2023 LVEF 20-25%, mild MR & TR  - Continue GDMT with Toprol   - Intolerant to spironolactone     CAD  Prior MI and RCA stent   - Continue aspirin, Plavix, and rosuvastatin      DEVONTE  - compliant with CPAP     COPD  - active tobacco abuse                      ATTENTION:   This medical record was transcribed using an electronic medical records/speech recognition system.  Although proofread, it may and can contain electronic, spelling and other errors.  Corrections may be executed at a later

## 2024-09-04 NOTE — PROGRESS NOTES
Room #: EP 2    Chief Complaint   Patient presents with    Cardiomyopathy    Device Check     [employer] ICD     /70 (Site: Left Upper Arm, Position: Sitting, Cuff Size: Medium Adult)   Pulse 62   Resp 14   Ht 1.778 m (5' 10\")   Wt 95.5 kg (210 lb 9.6 oz)   SpO2 97%   BMI 30.22 kg/m²         Chest pain: yes, intermittent since 2017      1. Have you been to the ER, urgent care clinic outside of [facility] since your last visit?  Hospitalized since your last visit?  NO        Refills:  NO

## 2024-09-04 NOTE — PATIENT INSTRUCTIONS
Establish with general cardiology at [place]  Please make sure you're taking Metoprolol 25 mg daily  Remote transmission every 3 months

## 2024-09-27 PROCEDURE — 93295 DEV INTERROG REMOTE 1/2/MLT: CPT | Performed by: INTERNAL MEDICINE

## 2024-11-13 ENCOUNTER — OFFICE VISIT (OUTPATIENT)
Age: 69
End: 2024-11-13

## 2024-11-13 VITALS
BODY MASS INDEX: 30.21 KG/M2 | OXYGEN SATURATION: 96 % | SYSTOLIC BLOOD PRESSURE: 118 MMHG | HEART RATE: 75 BPM | HEIGHT: 70 IN | WEIGHT: 211 LBS | DIASTOLIC BLOOD PRESSURE: 62 MMHG

## 2024-11-13 DIAGNOSIS — Z95.810 ICD (IMPLANTABLE CARDIOVERTER-DEFIBRILLATOR) IN PLACE: ICD-10-CM

## 2024-11-13 DIAGNOSIS — E11.59 CONTROLLED TYPE 2 DIABETES MELLITUS WITH OTHER CIRCULATORY COMPLICATION, WITHOUT LONG-TERM CURRENT USE OF INSULIN (HCC): ICD-10-CM

## 2024-11-13 DIAGNOSIS — I50.20 SYSTOLIC CONGESTIVE HEART FAILURE WITH REDUCED LEFT VENTRICULAR FUNCTION, NYHA CLASS 3 (HCC): Primary | ICD-10-CM

## 2024-11-13 DIAGNOSIS — I25.10 CORONARY ARTERY DISEASE INVOLVING NATIVE CORONARY ARTERY OF NATIVE HEART WITHOUT ANGINA PECTORIS: ICD-10-CM

## 2024-11-13 DIAGNOSIS — R06.02 SHORTNESS OF BREATH: ICD-10-CM

## 2024-11-13 DIAGNOSIS — Z72.0 TOBACCO USER: ICD-10-CM

## 2024-11-13 DIAGNOSIS — I25.5 ISCHEMIC CARDIOMYOPATHY: ICD-10-CM

## 2024-11-13 RX ORDER — BUPROPION HYDROCHLORIDE 150 MG/1
150 TABLET, EXTENDED RELEASE ORAL EVERY MORNING
COMMUNITY
Start: 2024-09-25

## 2024-11-13 NOTE — PROGRESS NOTES
had concerns including New Patient and Congestive Heart Failure.    Vitals:    11/13/24 1034 11/13/24 1045   BP: 118/62    Site: Left Upper Arm    Position: Sitting    Pulse: 83 75   SpO2: (!) 87% 96%   Weight: 95.7 kg (211 lb)    Height: 1.778 m (5' 10\")         Chest pain No    Refills No  Patient wishes to discuss blood pressure medication      1. Have you been to the ER, urgent care clinic since your last visit? No       Hospitalized since your last visit? No       Where?        Date?

## 2024-11-13 NOTE — PROGRESS NOTES
Per Dr. Simpson-labs including BNP and 6 month echo/ fup.     Patient brought up financial constraints and was provided information on available patient resources from our resource guide in waiting room.

## 2024-11-13 NOTE — PROGRESS NOTES
Patient: Doroteo Paris  : 1955    Primary Cardiologist:Dr. TOÑO Simpson  EP Cardiologist:NONE   PCP: No primary care provider on file.    Today's Date: 2024      ASSESSMENT AND PLAN:     Assessment and Plan:  Chronic Systolic CHF  - currently on Toprol XL, and Januvia  - had hypotension with small dose of entresto  - intolerant to spironolactone due to breast tenderness  - history of hypotension  - euvolemic on exam  - EF 20-25% 2023  - intolerance to spironolactone breast tenderness, farxiga thought he had leprosy  -Suggested repeat echo + labs - not sure if he will do.       [employer] single chamber ICD (2017):   Recent device check shows proper lead & generator function. Generator longevity estimated 12 years. RV <1%. No sustained VT/VF.  Iterative programming was performed to test the leads sensing and threshold parameters without any permanent changes.  - Continue remote device transmissions every 3 months  - Follow-up in EP clinic in 1 year      Ischemic cardiomyopathy   Echo 2023 LVEF 20-25%, mild MR & TR  - Continue GDMT with Toprol   - Intolerant to spironolactone  -Echo, labs     CAD  Prior MI and RCA stent   - Continue aspirin, Plavix, and rosuvastatin      DEVONTE  - compliant with CPAP     COPD  - active tobacco abuse   -Counselled on cessation.                                                                                                                     Follow up with Dr. TOÑO Simpson in 6 months with echo prior.  Labs if willing.        ICD-10-CM    1. ICD (implantable cardioverter-defibrillator) in place  Z95.810       2. Systolic congestive heart failure with reduced left ventricular function, NYHA class 3 ([facility])  I50.20       3. Ischemic cardiomyopathy  I25.5       4. Tobacco user  Z72.0       5. Coronary artery disease involving native coronary artery without angina pectoris  I25.10       6. Controlled type 2 diabetes mellitus with circulatory disorder

## 2025-05-05 ENCOUNTER — CLINICAL DOCUMENTATION (OUTPATIENT)
Age: 70
End: 2025-05-05

## 2025-05-05 NOTE — PROGRESS NOTES
Medical records request from [facility].  Form sent to Miriam Cameron for processing.

## 2025-05-07 ENCOUNTER — TELEPHONE (OUTPATIENT)
Age: 70
End: 2025-05-07

## 2025-05-07 NOTE — TELEPHONE ENCOUNTER
Patient is calling because he canceled his Echocardiogram and he would like to know if the doctor would like to have him reschedule again since he is not having the Echocardiogram.    [phone]

## 2025-05-09 PROCEDURE — 93295 DEV INTERROG REMOTE 1/2/MLT: CPT | Performed by: INTERNAL MEDICINE
